# Patient Record
Sex: FEMALE | Race: WHITE | Employment: UNEMPLOYED | URBAN - METROPOLITAN AREA
[De-identification: names, ages, dates, MRNs, and addresses within clinical notes are randomized per-mention and may not be internally consistent; named-entity substitution may affect disease eponyms.]

---

## 2018-04-18 ENCOUNTER — HOSPITAL ENCOUNTER (EMERGENCY)
Age: 48
Discharge: HOME | End: 2018-04-18

## 2018-04-18 ENCOUNTER — APPOINTMENT (OUTPATIENT)
Dept: GENERAL RADIOLOGY | Age: 48
End: 2018-04-18

## 2018-04-18 VITALS
HEART RATE: 69 BPM | BODY MASS INDEX: 27.37 KG/M2 | SYSTOLIC BLOOD PRESSURE: 190 MMHG | TEMPERATURE: 98.6 F | OXYGEN SATURATION: 99 % | RESPIRATION RATE: 18 BRPM | WEIGHT: 180 LBS | DIASTOLIC BLOOD PRESSURE: 93 MMHG

## 2018-04-18 DIAGNOSIS — I10 HYPERTENSION, UNSPECIFIED TYPE: ICD-10-CM

## 2018-04-18 DIAGNOSIS — I10 ESSENTIAL HYPERTENSION: ICD-10-CM

## 2018-04-18 DIAGNOSIS — K59.00 CONSTIPATION, UNSPECIFIED CONSTIPATION TYPE: ICD-10-CM

## 2018-04-18 DIAGNOSIS — J40 BRONCHITIS: Primary | ICD-10-CM

## 2018-04-18 PROCEDURE — 6370000000 HC RX 637 (ALT 250 FOR IP): Performed by: NURSE PRACTITIONER

## 2018-04-18 PROCEDURE — 71046 X-RAY EXAM CHEST 2 VIEWS: CPT

## 2018-04-18 PROCEDURE — 99212 OFFICE O/P EST SF 10 MIN: CPT

## 2018-04-18 RX ORDER — LISINOPRIL AND HYDROCHLOROTHIAZIDE 12.5; 1 MG/1; MG/1
2 TABLET ORAL DAILY
Qty: 60 TABLET | Refills: 0 | Status: SHIPPED | OUTPATIENT
Start: 2018-04-18 | End: 2018-06-28

## 2018-04-18 RX ORDER — CLONIDINE HYDROCHLORIDE 0.1 MG/1
0.1 TABLET ORAL ONCE
Status: COMPLETED | OUTPATIENT
Start: 2018-04-18 | End: 2018-04-18

## 2018-04-18 RX ORDER — ALBUTEROL SULFATE 90 UG/1
2 AEROSOL, METERED RESPIRATORY (INHALATION) EVERY 6 HOURS PRN
Qty: 1 INHALER | Refills: 3 | Status: SHIPPED | OUTPATIENT
Start: 2018-04-18 | End: 2018-12-13

## 2018-04-18 RX ORDER — AZITHROMYCIN 250 MG/1
TABLET, FILM COATED ORAL
Qty: 6 TABLET | Refills: 0 | Status: SHIPPED | OUTPATIENT
Start: 2018-04-18 | End: 2018-04-28

## 2018-04-18 RX ADMIN — CLONIDINE HYDROCHLORIDE 0.1 MG: 0.1 TABLET ORAL at 19:18

## 2018-06-27 DIAGNOSIS — M79.671 RIGHT FOOT PAIN: Primary | ICD-10-CM

## 2018-06-28 ENCOUNTER — HOSPITAL ENCOUNTER (OUTPATIENT)
Age: 48
Discharge: HOME | End: 2018-06-30

## 2018-06-28 ENCOUNTER — OFFICE VISIT (OUTPATIENT)
Dept: ORTHOPEDIC SURGERY | Age: 48
End: 2018-06-28

## 2018-06-28 VITALS — WEIGHT: 180 LBS | HEIGHT: 68 IN | BODY MASS INDEX: 27.28 KG/M2

## 2018-06-28 DIAGNOSIS — E11.610 CHARCOT FOOT DUE TO DIABETES MELLITUS (HCC): ICD-10-CM

## 2018-06-28 DIAGNOSIS — L08.9 RIGHT FOOT INFECTION: Primary | ICD-10-CM

## 2018-06-28 DIAGNOSIS — S92.314A CLOSED NONDISPLACED FRACTURE OF FIRST METATARSAL BONE OF RIGHT FOOT, INITIAL ENCOUNTER: ICD-10-CM

## 2018-06-28 DIAGNOSIS — L08.9 RIGHT FOOT INFECTION: ICD-10-CM

## 2018-06-28 LAB
BASOPHILS ABSOLUTE: 0.13 E9/L (ref 0–0.2)
BASOPHILS RELATIVE PERCENT: 1.1 % (ref 0–2)
EOSINOPHILS ABSOLUTE: 0.62 E9/L (ref 0.05–0.5)
EOSINOPHILS RELATIVE PERCENT: 5 % (ref 0–6)
HCT VFR BLD CALC: 36.7 % (ref 34–48)
HEMOGLOBIN: 11.9 G/DL (ref 11.5–15.5)
IMMATURE GRANULOCYTES #: 0.04 E9/L
IMMATURE GRANULOCYTES %: 0.3 % (ref 0–5)
LYMPHOCYTES ABSOLUTE: 2.97 E9/L (ref 1.5–4)
LYMPHOCYTES RELATIVE PERCENT: 24.1 % (ref 20–42)
MCH RBC QN AUTO: 30.7 PG (ref 26–35)
MCHC RBC AUTO-ENTMCNC: 32.4 % (ref 32–34.5)
MCV RBC AUTO: 94.6 FL (ref 80–99.9)
MONOCYTES ABSOLUTE: 0.57 E9/L (ref 0.1–0.95)
MONOCYTES RELATIVE PERCENT: 4.6 % (ref 2–12)
NEUTROPHILS ABSOLUTE: 7.99 E9/L (ref 1.8–7.3)
NEUTROPHILS RELATIVE PERCENT: 64.9 % (ref 43–80)
PDW BLD-RTO: 14.7 FL (ref 11.5–15)
PLATELET # BLD: 364 E9/L (ref 130–450)
PMV BLD AUTO: 11.4 FL (ref 7–12)
RBC # BLD: 3.88 E12/L (ref 3.5–5.5)
WBC # BLD: 12.3 E9/L (ref 4.5–11.5)

## 2018-06-28 PROCEDURE — 85025 COMPLETE CBC W/AUTO DIFF WBC: CPT

## 2018-06-28 PROCEDURE — 85651 RBC SED RATE NONAUTOMATED: CPT

## 2018-06-28 PROCEDURE — 99204 OFFICE O/P NEW MOD 45 MIN: CPT | Performed by: ORTHOPAEDIC SURGERY

## 2018-06-28 PROCEDURE — 86140 C-REACTIVE PROTEIN: CPT

## 2018-06-28 RX ORDER — LISINOPRIL 20 MG/1
TABLET ORAL
COMMUNITY
Start: 2018-06-13 | End: 2018-12-13

## 2018-06-28 RX ORDER — LEVOTHYROXINE SODIUM 0.03 MG/1
TABLET ORAL
Status: ON HOLD | COMMUNITY
Start: 2018-05-24 | End: 2020-10-03 | Stop reason: HOSPADM

## 2018-06-28 RX ORDER — NAPROXEN 500 MG/1
500 TABLET ORAL 2 TIMES DAILY
Qty: 14 TABLET | Refills: 0 | Status: ON HOLD
Start: 2018-06-28 | End: 2020-09-15 | Stop reason: HOSPADM

## 2018-06-28 NOTE — PROGRESS NOTES
cavus claw deformity noted. ROM R: Limited Secondary to pain; Left ankle : DF20; PF 40;  INV 30, MANNIE 10. This exam was compared bilaterally. Right Ankle:   (-) Anterior Drawer ,  (-) Posterior Drawer ,  (-) Squeeze test,  (-) External Rotation, (-) Eversion test , (-) Jennings Test     Left ankle:   (-) Anterior Drawer ,(-)  Posterior Drawer ,(-) Squeeze test,(-) External Rotation (-) Eversion test, (-) Jennings Test.      Foot exam- visual inspection reveals warm, good capillary refill, there is pain to palpation over the metatarsals especially at the base of the 1st.   ROM inversion/eversion full range of motion, abduction/adduction full range of motion, ROM in MTP/PIP/DIP limited range of motion. Xrays:    Xr Foot Right (min 3 Views)    Result Date: 6/28/2018  Reading location: 200 INDICATION: Pain FINDINGS: 3 views right foot compared with 4/8/2017. Intact alignment. Images raise possibility of cortical resorption at the level the first tarsometatarsal joint which was not present previous. No fracture. Abnormal appearance the first tarsometatarsal joint raising possibility of underlying infection. Consider MRI or CT for further assessment. Radiographic findings reviewed with patient      Impression:  Zeyad Astudillo was seen today for foot pain. Diagnoses and all orders for this visit:    Right foot infection  -     CBC Auto Differential; Future  -     MRI FOOT RIGHT WO CONTRAST; Future  -     CT FOOT RIGHT WO CONTRAST; Future  -     C-Reactive Protein; Future  -     Sedimentation Rate; Future  -     naproxen (NAPROSYN) 500 MG tablet; Take 1 tablet by mouth 2 times daily for 7 days    Closed nondisplaced fracture of first metatarsal bone of right foot, initial encounter  -     MRI FOOT RIGHT WO CONTRAST; Future  -     CT FOOT RIGHT WO CONTRAST; Future  -     naproxen (NAPROSYN) 500 MG tablet;  Take 1 tablet by mouth 2 times daily for 7 days    Charcot foot due to diabetes mellitus ([facility] [address]  -

## 2018-06-29 ENCOUNTER — TELEPHONE (OUTPATIENT)
Dept: ORTHOPEDIC SURGERY | Age: 48
End: 2018-06-29

## 2018-06-29 LAB
C-REACTIVE PROTEIN: 1.5 MG/DL (ref 0–0.4)
SEDIMENTATION RATE, ERYTHROCYTE: 45 MM/HR (ref 0–20)

## 2018-06-29 NOTE — TELEPHONE ENCOUNTER
Patient was in office yesterday and states she was told to notify you when her CT & MRI were scheduled. She is coming to [facility] today at 4pm to complete both tests.

## 2018-07-05 ENCOUNTER — OFFICE VISIT (OUTPATIENT)
Dept: ORTHOPEDIC SURGERY | Age: 48
End: 2018-07-05

## 2018-07-05 VITALS — WEIGHT: 180 LBS | BODY MASS INDEX: 27.28 KG/M2 | HEIGHT: 68 IN

## 2018-07-05 DIAGNOSIS — M14.671 CHARCOT'S JOINT OF RIGHT FOOT: Primary | ICD-10-CM

## 2018-07-05 PROCEDURE — 99214 OFFICE O/P EST MOD 30 MIN: CPT | Performed by: ORTHOPAEDIC SURGERY

## 2018-07-05 NOTE — PROGRESS NOTES
Chief Complaint   Patient presents with    Foot Pain     Right foot CT and MRI follow up. Tejal Lambert is a 41 y.o. female who presents today with a Right foot initial injury was March 2017. Patient states she kicked a wall. No new injury. Patient works as a . States her foot swells up everyday. Patient has tried a bone stimulator in June 2017. She returns today for results of MRI and CT scan.      Past Medical History:   Diagnosis Date    Colitis     Constipation     Diabetes mellitus ([address]     Hyperlipidemia     Hypertension     Hypothyroidism     Thyroid disease      Past Surgical History:   Procedure Laterality Date    APPENDECTOMY      CHOLECYSTECTOMY      COLONOSCOPY      HERNIA REPAIR  12/1/15    laparoscopic incisional reapair with mesh    HYSTERECTOMY      KNEE SURGERY Right     VENTRAL HERNIA REPAIR         Current Outpatient Prescriptions:     levothyroxine (SYNTHROID) 25 MCG tablet, Take by mouth, Disp: , Rfl:     lisinopril (PRINIVIL;ZESTRIL) 20 MG tablet, Take by mouth, Disp: , Rfl:     naproxen (NAPROSYN) 500 MG tablet, Take 1 tablet by mouth 2 times daily for 7 days, Disp: 14 tablet, Rfl: 0    albuterol sulfate HFA (VENTOLIN HFA) 108 (90 Base) MCG/ACT inhaler, Inhale 2 puffs into the lungs every 6 hours as needed for Wheezing, Disp: 1 Inhaler, Rfl: 3    simvastatin (ZOCOR) 10 MG tablet, Take 10 mg by mouth nightly, Disp: , Rfl:     Insulin Pen Needle ([employer] PEN NEEDLES 31G) 31G X 8 MM MISC, 1 each by Does not apply route daily, Disp: 120 each, Rfl: 3    insulin glargine (LANTUS SOLOSTAR) 100 UNIT/ML injection pen, Inject 20 Units into the skin nightly, Disp: 5 Pen, Rfl: 3    glucose blood VI test strips (ASCENSIA AUTODISC VI;ONE TOUCH ULTRA TEST VI) strip, Inject 1 each into the skin 3 times daily, Disp: 100 strip, Rfl: 5    methadone (DOLOPHINE) 10 MG/ML solution, Take 30 mg by mouth daily, Disp: , Rfl:   No Known Allergies  Social History     Social History demonstrate normal morphology and CT attenuation. 1. Erosive changes at the 1st tarsometatarsal joint with a nondisplaced subchondral fracture within the distal aspect of the medial cuneiform. Findings related to remote trauma with subsequent degenerative changes, chronic changes of septic arthritis, or an inflammatory arthropathy. 2. Well-corticated fragment at the anterior process of the calcaneus with chronic appearing erosive or degenerative changes. 3. Diffuse dorsal soft tissue swelling. Mri Foot Right Wo Contrast    Result Date: 7/1/2018  Reading location:  200 Indication: Closed nondisplaced fracture 1st metatarsal, right foot infection. Comparison: Right foot radiographs from 6/28/2018; CT right foot from 6/29/2018. Technique: Multiplanar, multisequence MR imaging of the right midfoot was performed without administration of intravenous contrast. FINDINGS: Corresponding to erosive changes at the 1st tarsometatarsal joint on the concurrently performed CT, there are is mild patchy marrow edema with areas of hypointense T1 marrow signal. Redemonstrated is a nondisplaced subchondral fracture within the distal aspect of the medial cuneiform with associated hypointense T1 marrow signal. There is a small joint effusion. The anterior process of the calcaneus is not included within the field-of-view. The interosseous band of the Lisfranc ligament is grossly intact. The visualized tendons are grossly intact. The visualized muscles demonstrate normal morphology and signal. There is mild diffuse dorsal soft tissue swelling. 1. Erosive changes at the 1st tarsometatarsal joint with a nondisplaced subchondral fracture within the distal medial cuneiform with associated marrow edema. There are areas of confluent T1 marrow signal. Differential includes remote trauma with subsequent degenerative changes, chronic changes of septic arthritis, or inflammatory arthropathy.  2. Dorsal soft tissue swelling. Radiographic findings reviewed with patient      Impression:  Zeyad Astudillo was seen today for foot pain. Diagnoses and all orders for this visit:    Charcot's joint of right foot      Seen and examined. Xray and CT reviewed with patient. Plan:Natural history and expected course discussed. Questions answered. Rest, ice, compression, elevation (RICE) therapy. Educational materials distributed. OTC analgesics as needed. Referral to Dr. Aden Knott      25 minutes was spent with patient. 50% or greater was spent counseling the patient.

## 2018-12-13 ENCOUNTER — HOSPITAL ENCOUNTER (EMERGENCY)
Age: 48
Discharge: HOME | End: 2018-12-13
Attending: EMERGENCY MEDICINE

## 2018-12-13 VITALS
BODY MASS INDEX: 29.55 KG/M2 | HEIGHT: 68 IN | HEART RATE: 75 BPM | SYSTOLIC BLOOD PRESSURE: 159 MMHG | TEMPERATURE: 98.2 F | DIASTOLIC BLOOD PRESSURE: 84 MMHG | OXYGEN SATURATION: 100 % | WEIGHT: 195 LBS | RESPIRATION RATE: 16 BRPM

## 2018-12-13 DIAGNOSIS — B02.9 HERPES ZOSTER WITHOUT COMPLICATION: Primary | ICD-10-CM

## 2018-12-13 PROCEDURE — 6360000002 HC RX W HCPCS: Performed by: EMERGENCY MEDICINE

## 2018-12-13 PROCEDURE — 99283 EMERGENCY DEPT VISIT LOW MDM: CPT

## 2018-12-13 PROCEDURE — 96372 THER/PROPH/DIAG INJ SC/IM: CPT

## 2018-12-13 RX ORDER — INDOMETHACIN 50 MG/1
50 CAPSULE ORAL
Qty: 21 CAPSULE | Refills: 0 | Status: SHIPPED | OUTPATIENT
Start: 2018-12-13 | End: 2019-07-14 | Stop reason: ALTCHOICE

## 2018-12-13 RX ORDER — AMLODIPINE BESYLATE 10 MG/1
20 TABLET ORAL DAILY
Status: ON HOLD | COMMUNITY
End: 2020-10-03 | Stop reason: HOSPADM

## 2018-12-13 RX ORDER — DEXAMETHASONE SODIUM PHOSPHATE 10 MG/ML
10 INJECTION, SOLUTION INTRAMUSCULAR; INTRAVENOUS ONCE
Status: COMPLETED | OUTPATIENT
Start: 2018-12-13 | End: 2018-12-13

## 2018-12-13 RX ORDER — VALACYCLOVIR HYDROCHLORIDE 1 G/1
1000 TABLET, FILM COATED ORAL 3 TIMES DAILY
Qty: 21 TABLET | Refills: 0 | Status: SHIPPED | OUTPATIENT
Start: 2018-12-13 | End: 2018-12-20

## 2018-12-13 RX ORDER — KETOROLAC TROMETHAMINE 30 MG/ML
30 INJECTION, SOLUTION INTRAMUSCULAR; INTRAVENOUS ONCE
Status: COMPLETED | OUTPATIENT
Start: 2018-12-13 | End: 2018-12-13

## 2018-12-13 RX ORDER — LISINOPRIL AND HYDROCHLOROTHIAZIDE 12.5; 1 MG/1; MG/1
1 TABLET ORAL DAILY
Status: ON HOLD | COMMUNITY
End: 2020-10-03 | Stop reason: HOSPADM

## 2018-12-13 RX ADMIN — DEXAMETHASONE SODIUM PHOSPHATE 10 MG: 10 INJECTION, SOLUTION INTRAMUSCULAR; INTRAVENOUS at 16:33

## 2018-12-13 RX ADMIN — KETOROLAC TROMETHAMINE 30 MG: 30 INJECTION, SOLUTION INTRAMUSCULAR at 16:30

## 2018-12-13 ASSESSMENT — PAIN SCALES - GENERAL
PAINLEVEL_OUTOF10: 5
PAINLEVEL_OUTOF10: 6
PAINLEVEL_OUTOF10: 6

## 2018-12-13 ASSESSMENT — PAIN DESCRIPTION - PROGRESSION
CLINICAL_PROGRESSION: NOT CHANGED
CLINICAL_PROGRESSION: GRADUALLY IMPROVING

## 2018-12-13 ASSESSMENT — PAIN DESCRIPTION - PAIN TYPE: TYPE: ACUTE PAIN

## 2018-12-13 ASSESSMENT — PAIN DESCRIPTION - DESCRIPTORS: DESCRIPTORS: BURNING;TIGHTNESS

## 2018-12-13 ASSESSMENT — PAIN DESCRIPTION - LOCATION: LOCATION: NECK;SHOULDER

## 2018-12-13 ASSESSMENT — PAIN DESCRIPTION - ORIENTATION: ORIENTATION: RIGHT

## 2018-12-13 ASSESSMENT — PAIN DESCRIPTION - FREQUENCY: FREQUENCY: CONTINUOUS

## 2018-12-13 NOTE — ED PROVIDER NOTES
mouth 2 times daily for 7 days    SIMVASTATIN (ZOCOR) 10 MG TABLET    Take 10 mg by mouth nightly   Modified Medications    No medications on file   Discontinued Medications    ALBUTEROL SULFATE HFA (VENTOLIN HFA) 108 (90 BASE) MCG/ACT INHALER    Inhale 2 puffs into the lungs every 6 hours as needed for Wheezing    LISINOPRIL (PRINIVIL;ZESTRIL) 20 MG TABLET    Take by mouth    METHADONE (DOLOPHINE) 10 MG/ML SOLUTION    Take 30 mg by mouth daily         Counseling: The emergency provider has spoken with the patient and discussed todays results, in addition to providing specific details for the plan of care and counseling regarding the diagnosis and prognosis. Questions are answered at this time and they are agreeable with the plan.      --------------------------------- IMPRESSION AND DISPOSITION ---------------------------------    IMPRESSION  1.  Herpes zoster without complication        DISPOSITION  Disposition: Discharge to home  Patient condition is good                 Yajaira Louie MD  12/13/18 4372

## 2018-12-15 ENCOUNTER — HOSPITAL ENCOUNTER (EMERGENCY)
Age: 48
Discharge: HOME | End: 2018-12-15
Attending: EMERGENCY MEDICINE

## 2018-12-15 ENCOUNTER — APPOINTMENT (OUTPATIENT)
Dept: GENERAL RADIOLOGY | Age: 48
End: 2018-12-15

## 2018-12-15 VITALS
HEIGHT: 68 IN | HEART RATE: 81 BPM | OXYGEN SATURATION: 98 % | WEIGHT: 195 LBS | TEMPERATURE: 98.3 F | SYSTOLIC BLOOD PRESSURE: 143 MMHG | DIASTOLIC BLOOD PRESSURE: 86 MMHG | RESPIRATION RATE: 16 BRPM | BODY MASS INDEX: 29.55 KG/M2

## 2018-12-15 DIAGNOSIS — S43.401A SPRAIN OF RIGHT SHOULDER, UNSPECIFIED SHOULDER SPRAIN TYPE, INITIAL ENCOUNTER: Primary | ICD-10-CM

## 2018-12-15 PROCEDURE — 73030 X-RAY EXAM OF SHOULDER: CPT

## 2018-12-15 PROCEDURE — 99283 EMERGENCY DEPT VISIT LOW MDM: CPT

## 2018-12-15 RX ORDER — HYDROCODONE BITARTRATE AND ACETAMINOPHEN 5; 325 MG/1; MG/1
1 TABLET ORAL EVERY 8 HOURS PRN
Qty: 10 TABLET | Refills: 0 | Status: SHIPPED | OUTPATIENT
Start: 2018-12-15 | End: 2018-12-18

## 2018-12-15 RX ORDER — CYCLOBENZAPRINE HCL 10 MG
10 TABLET ORAL 3 TIMES DAILY PRN
Qty: 15 TABLET | Refills: 0 | Status: SHIPPED | OUTPATIENT
Start: 2018-12-15 | End: 2018-12-20

## 2018-12-15 ASSESSMENT — PAIN DESCRIPTION - DESCRIPTORS: DESCRIPTORS: ACHING;SHARP

## 2018-12-15 ASSESSMENT — PAIN DESCRIPTION - FREQUENCY: FREQUENCY: INTERMITTENT

## 2018-12-15 ASSESSMENT — PAIN SCALES - GENERAL: PAINLEVEL_OUTOF10: 5

## 2018-12-15 ASSESSMENT — PAIN DESCRIPTION - PAIN TYPE: TYPE: ACUTE PAIN

## 2018-12-15 ASSESSMENT — PAIN DESCRIPTION - ORIENTATION: ORIENTATION: RIGHT

## 2018-12-15 ASSESSMENT — PAIN DESCRIPTION - LOCATION: LOCATION: SHOULDER;NECK

## 2018-12-15 NOTE — ED PROVIDER NOTES
AUTODISC VI;ONE TOUCH ULTRA TEST VI) STRIP    Inject 1 each into the skin 3 times daily    INDOMETHACIN (INDOCIN) 50 MG CAPSULE    Take 1 capsule by mouth 3 times daily (with meals)    INSULIN GLARGINE (LANTUS SOLOSTAR) 100 UNIT/ML INJECTION PEN    Inject 20 Units into the skin nightly    INSULIN PEN NEEDLE ([employer] PEN NEEDLES 31G) 31G X 8 MM MISC    1 each by Does not apply route daily    LEVOTHYROXINE (SYNTHROID) 25 MCG TABLET    Take by mouth    LISINOPRIL-HYDROCHLOROTHIAZIDE (PRINZIDE;ZESTORETIC) 10-12.5 MG PER TABLET    Take 1 tablet by mouth daily    NAPROXEN (NAPROSYN) 500 MG TABLET    Take 1 tablet by mouth 2 times daily for 7 days    SIMVASTATIN (ZOCOR) 10 MG TABLET    Take 10 mg by mouth nightly    VALACYCLOVIR (VALTREX) 1 G TABLET    Take 1 tablet by mouth 3 times daily for 7 days   Modified Medications    No medications on file   Discontinued Medications    No medications on file         Counseling: The emergency provider has spoken with the patient and discussed todays results, in addition to providing specific details for the plan of care and counseling regarding the diagnosis and prognosis. Questions are answered at this time and they are agreeable with the plan.      --------------------------------- IMPRESSION AND DISPOSITION ---------------------------------    IMPRESSION  1.  Sprain of right shoulder, unspecified shoulder sprain type, initial encounter        DISPOSITION  Disposition: Discharge to home  Patient condition is good                  Mitch Grewal MD  12/15/18 2155

## 2018-12-19 ENCOUNTER — HOSPITAL ENCOUNTER (OUTPATIENT)
Dept: GENERAL RADIOLOGY | Age: 48
Discharge: HOME | End: 2018-12-21

## 2018-12-19 ENCOUNTER — HOSPITAL ENCOUNTER (OUTPATIENT)
Age: 48
Discharge: HOME | End: 2018-12-21

## 2018-12-19 DIAGNOSIS — M54.2 CERVICALGIA: ICD-10-CM

## 2018-12-19 PROCEDURE — 72040 X-RAY EXAM NECK SPINE 2-3 VW: CPT

## 2018-12-19 PROCEDURE — 70360 X-RAY EXAM OF NECK: CPT

## 2018-12-21 ENCOUNTER — HOSPITAL ENCOUNTER (OUTPATIENT)
Age: 48
Discharge: HOME | End: 2018-12-23

## 2018-12-21 ENCOUNTER — HOSPITAL ENCOUNTER (OUTPATIENT)
Dept: GENERAL RADIOLOGY | Age: 48
Discharge: HOME | End: 2018-12-23

## 2018-12-21 DIAGNOSIS — S43.101A SEPARATION OF RIGHT ACROMIOCLAVICULAR JOINT, INITIAL ENCOUNTER: ICD-10-CM

## 2018-12-21 PROCEDURE — 73050 X-RAY EXAM OF SHOULDERS: CPT

## 2018-12-22 ENCOUNTER — HOSPITAL ENCOUNTER (EMERGENCY)
Age: 48
Discharge: OTHER INSTITUTION | End: 2018-12-22

## 2018-12-22 VITALS
WEIGHT: 195 LBS | TEMPERATURE: 98.1 F | HEART RATE: 80 BPM | OXYGEN SATURATION: 97 % | SYSTOLIC BLOOD PRESSURE: 135 MMHG | DIASTOLIC BLOOD PRESSURE: 72 MMHG | RESPIRATION RATE: 16 BRPM | BODY MASS INDEX: 29.65 KG/M2

## 2018-12-22 DIAGNOSIS — M79.89 LEG SWELLING: Primary | ICD-10-CM

## 2018-12-22 PROCEDURE — 99212 OFFICE O/P EST SF 10 MIN: CPT

## 2019-01-04 ENCOUNTER — OFFICE VISIT (OUTPATIENT)
Dept: ORTHOPEDIC SURGERY | Age: 49
End: 2019-01-04

## 2019-01-04 VITALS — BODY MASS INDEX: 29.55 KG/M2 | HEIGHT: 68 IN | WEIGHT: 195 LBS

## 2019-01-04 DIAGNOSIS — F40.240 CLAUSTROPHOBIA: ICD-10-CM

## 2019-01-04 DIAGNOSIS — Z77.22 TOBACCO SMOKE EXPOSURE: ICD-10-CM

## 2019-01-04 DIAGNOSIS — M54.12 CERVICAL RADICULOPATHY: Primary | ICD-10-CM

## 2019-01-04 DIAGNOSIS — G56.01 CARPAL TUNNEL SYNDROME OF RIGHT WRIST: ICD-10-CM

## 2019-01-04 PROCEDURE — 99214 OFFICE O/P EST MOD 30 MIN: CPT | Performed by: ORTHOPAEDIC SURGERY

## 2019-01-04 PROCEDURE — 99406 BEHAV CHNG SMOKING 3-10 MIN: CPT | Performed by: ORTHOPAEDIC SURGERY

## 2019-01-04 PROCEDURE — 97760 ORTHOTIC MGMT&TRAING 1ST ENC: CPT | Performed by: ORTHOPAEDIC SURGERY

## 2019-01-04 RX ORDER — DIAZEPAM 5 MG/1
5 TABLET ORAL PRN
Qty: 1 TABLET | Refills: 0 | Status: SHIPPED | OUTPATIENT
Start: 2019-01-04 | End: 2019-01-11

## 2019-01-07 ENCOUNTER — HOSPITAL ENCOUNTER (OUTPATIENT)
Dept: MRI IMAGING | Age: 49
Discharge: HOME | End: 2019-01-09

## 2019-01-07 DIAGNOSIS — M54.12 RADICULOPATHY, CERVICAL: ICD-10-CM

## 2019-01-07 PROCEDURE — 72141 MRI NECK SPINE W/O DYE: CPT

## 2019-01-07 NOTE — ED PROVIDER NOTES
HPI:  1/6/19, Time: 7:07 PM         Raphael Longo is a 57 y.o. female presenting to the ED for swelling to her left leg. She said she woke up like that this morning. She said that there  was no injury. She's denying any pain in the leg. She's denying any numbness or tingling in the leg. States that the right leg is not bothering her. She denies any chest pain or shortness of breath. Does not of any fever. Review of Systems:   Pertinent positives and negatives are stated within HPI, all other systems reviewed and are negative.          --------------------------------------------- PAST HISTORY ---------------------------------------------  Past Medical History:  has a past medical history of Colitis; Constipation; Diabetes mellitus ([address] Hyperlipidemia; Hypertension; Hypothyroidism; and Thyroid disease. Past Surgical History:  has a past surgical history that includes Cholecystectomy; Appendectomy; knee surgery (Right); ventral hernia repair; Hysterectomy; hernia repair (12/1/15); and Colonoscopy. Social History:  reports that she has been smoking Cigarettes. She has a 37.50 pack-year smoking history. She has never used smokeless tobacco. She reports that she uses drugs. She reports that she does not drink alcohol. Family History: family history includes Mult Sclerosis in her maternal grandmother and mother. She was adopted. The patients home medications have been reviewed. Allergies: Patient has no known allergies. -------------------------------------------------- RESULTS -------------------------------------------------  All laboratory and radiology results have been personally reviewed by myself   LABS:  No results found for this visit on 12/22/18.     RADIOLOGY:  Interpreted by Radiologist.  US DUP LOWER EXTREMITY LEFT POP    (Results Pending)       ------------------------- NURSING NOTES AND VITALS REVIEWED ---------------------------   The nursing notes within the ED encounter and vital signs as below have been reviewed. /72   Pulse 80   Temp 98.1 °F (36.7 °C) (Oral)   Resp 16   Wt 195 lb (88.5 kg)   SpO2 97%   BMI 29.65 kg/m²   Oxygen Saturation Interpretation: Normal      ---------------------------------------------------PHYSICAL EXAM--------------------------------------      Constitutional/General: Alert and oriented x3, well appearing, non toxic in NAD  Head: Normocephalic and atraumatic  Eyes: clear  Mouth: Oropharynx clear, handling secretions, no trismus  Neck: Supple, full ROM,   Pulmonary: Lungs clear to auscultation bilaterally, no wheezes, rales, or rhonchi. Not in respiratory distress  Cardiovascular:  Regular rate and rhythm, no murmurs, gallops, or rubs. 2+ distal pulses  Abdomen: Soft, non tender, non distended,   Extremities: Moves all extremities x 4. Warm and well perfused, F leg just has trace edema around the ankle. She has a good strong pedal pulse normal color warmth and sensation present to her foot with good capillary refill. Skin: warm and dry without rash  Neurologic: GCS 15,  Psych: Normal Affect      ------------------------------ ED COURSE/MEDICAL DECISION MAKING----------------------  Medications - No data to display      ED COURSE:       Medical Decision Making:    Patient has swelling to her left lower extremity she is concerned that she could have a DVT. I didn't did send her for a venous duplex scan of the left lower extremity to rule out DVT. patient never went for her tests. --She eloped without completing treatment    Counseling: The emergency provider has spoken with the patient and discussed todays results, in addition to providing specific details for the plan of care and counseling regarding the diagnosis and prognosis. Questions are answered at this time and they are agreeable with the plan.      --------------------------------- IMPRESSION AND DISPOSITION ---------------------------------    IMPRESSION  1.  Leg swelling        DISPOSITION  Disposition: Other Disposition: Eloped      NOTE: This report was transcribed using voice recognition software.  Every effort was made to ensure accuracy; however, inadvertent computerized transcription errors may be present     WALLACE Mckeon CNP  01/06/19 1812

## 2019-01-22 ENCOUNTER — INITIAL CONSULT (OUTPATIENT)
Dept: NEUROSURGERY | Age: 49
End: 2019-01-22

## 2019-01-22 VITALS
SYSTOLIC BLOOD PRESSURE: 196 MMHG | HEIGHT: 68 IN | WEIGHT: 196 LBS | BODY MASS INDEX: 29.7 KG/M2 | DIASTOLIC BLOOD PRESSURE: 101 MMHG | HEART RATE: 89 BPM

## 2019-01-22 DIAGNOSIS — M50.20 CERVICAL DISC HERNIATION: ICD-10-CM

## 2019-01-22 PROCEDURE — 99203 OFFICE O/P NEW LOW 30 MIN: CPT | Performed by: PHYSICIAN ASSISTANT

## 2019-05-10 ENCOUNTER — HOSPITAL ENCOUNTER (EMERGENCY)
Age: 49
Discharge: HOME | End: 2019-05-10

## 2019-05-10 ENCOUNTER — APPOINTMENT (OUTPATIENT)
Dept: CT IMAGING | Age: 49
End: 2019-05-10

## 2019-05-10 VITALS
WEIGHT: 185 LBS | BODY MASS INDEX: 28.04 KG/M2 | TEMPERATURE: 98.5 F | DIASTOLIC BLOOD PRESSURE: 83 MMHG | SYSTOLIC BLOOD PRESSURE: 167 MMHG | HEIGHT: 68 IN | RESPIRATION RATE: 16 BRPM | HEART RATE: 75 BPM | OXYGEN SATURATION: 96 %

## 2019-05-10 DIAGNOSIS — K59.00 CONSTIPATION, UNSPECIFIED CONSTIPATION TYPE: Primary | ICD-10-CM

## 2019-05-10 DIAGNOSIS — B99.9 INFECTIOUS AND PARASITIC DISEASE: ICD-10-CM

## 2019-05-10 LAB
ALBUMIN SERPL-MCNC: 4.2 G/DL (ref 3.5–5.2)
ALP BLD-CCNC: 120 U/L (ref 35–104)
ALT SERPL-CCNC: 17 U/L (ref 0–32)
ANION GAP SERPL CALCULATED.3IONS-SCNC: 12 MMOL/L (ref 7–16)
AST SERPL-CCNC: 28 U/L (ref 0–31)
BASOPHILS ABSOLUTE: 0.12 E9/L (ref 0–0.2)
BASOPHILS RELATIVE PERCENT: 0.8 % (ref 0–2)
BILIRUB SERPL-MCNC: 0.2 MG/DL (ref 0–1.2)
BILIRUBIN URINE: NEGATIVE
BLOOD, URINE: NEGATIVE
BUN BLDV-MCNC: 7 MG/DL (ref 6–20)
CALCIUM SERPL-MCNC: 9.6 MG/DL (ref 8.6–10.2)
CHLORIDE BLD-SCNC: 95 MMOL/L (ref 98–107)
CLARITY: CLEAR
CO2: 27 MMOL/L (ref 22–29)
COLOR: YELLOW
CREAT SERPL-MCNC: 1.1 MG/DL (ref 0.5–1)
EOSINOPHILS ABSOLUTE: 0.66 E9/L (ref 0.05–0.5)
EOSINOPHILS RELATIVE PERCENT: 4.5 % (ref 0–6)
GFR AFRICAN AMERICAN: >60
GFR NON-AFRICAN AMERICAN: 53 ML/MIN/1.73
GLUCOSE BLD-MCNC: 152 MG/DL (ref 74–99)
GLUCOSE URINE: NEGATIVE MG/DL
HCT VFR BLD CALC: 38.2 % (ref 34–48)
HEMOGLOBIN: 12.5 G/DL (ref 11.5–15.5)
IMMATURE GRANULOCYTES #: 0.17 E9/L
IMMATURE GRANULOCYTES %: 1.2 % (ref 0–5)
KETONES, URINE: NEGATIVE MG/DL
LACTIC ACID: 1.6 MMOL/L (ref 0.5–2.2)
LEUKOCYTE ESTERASE, URINE: NEGATIVE
LIPASE: 17 U/L (ref 13–60)
LYMPHOCYTES ABSOLUTE: 2.7 E9/L (ref 1.5–4)
LYMPHOCYTES RELATIVE PERCENT: 18.6 % (ref 20–42)
MCH RBC QN AUTO: 29.4 PG (ref 26–35)
MCHC RBC AUTO-ENTMCNC: 32.7 % (ref 32–34.5)
MCV RBC AUTO: 89.9 FL (ref 80–99.9)
MONOCYTES ABSOLUTE: 0.73 E9/L (ref 0.1–0.95)
MONOCYTES RELATIVE PERCENT: 5 % (ref 2–12)
NEUTROPHILS ABSOLUTE: 10.17 E9/L (ref 1.8–7.3)
NEUTROPHILS RELATIVE PERCENT: 69.9 % (ref 43–80)
NITRITE, URINE: NEGATIVE
PDW BLD-RTO: 14.3 FL (ref 11.5–15)
PH UA: 6 (ref 5–9)
PLATELET # BLD: 439 E9/L (ref 130–450)
PMV BLD AUTO: 10.3 FL (ref 7–12)
POTASSIUM SERPL-SCNC: 3.5 MMOL/L (ref 3.5–5)
PROTEIN UA: NEGATIVE MG/DL
RBC # BLD: 4.25 E12/L (ref 3.5–5.5)
SODIUM BLD-SCNC: 134 MMOL/L (ref 132–146)
SPECIFIC GRAVITY UA: <=1.005 (ref 1–1.03)
TOTAL PROTEIN: 8.4 G/DL (ref 6.4–8.3)
UROBILINOGEN, URINE: 0.2 E.U./DL
WBC # BLD: 14.6 E9/L (ref 4.5–11.5)

## 2019-05-10 PROCEDURE — 81003 URINALYSIS AUTO W/O SCOPE: CPT

## 2019-05-10 PROCEDURE — 96374 THER/PROPH/DIAG INJ IV PUSH: CPT

## 2019-05-10 PROCEDURE — 96376 TX/PRO/DX INJ SAME DRUG ADON: CPT

## 2019-05-10 PROCEDURE — 96375 TX/PRO/DX INJ NEW DRUG ADDON: CPT

## 2019-05-10 PROCEDURE — 6360000002 HC RX W HCPCS

## 2019-05-10 PROCEDURE — 74177 CT ABD & PELVIS W/CONTRAST: CPT

## 2019-05-10 PROCEDURE — 80053 COMPREHEN METABOLIC PANEL: CPT

## 2019-05-10 PROCEDURE — 6360000002 HC RX W HCPCS: Performed by: PHYSICIAN ASSISTANT

## 2019-05-10 PROCEDURE — 99284 EMERGENCY DEPT VISIT MOD MDM: CPT

## 2019-05-10 PROCEDURE — 83605 ASSAY OF LACTIC ACID: CPT

## 2019-05-10 PROCEDURE — 85025 COMPLETE CBC W/AUTO DIFF WBC: CPT

## 2019-05-10 PROCEDURE — 36415 COLL VENOUS BLD VENIPUNCTURE: CPT

## 2019-05-10 PROCEDURE — 87329 GIARDIA AG IA: CPT

## 2019-05-10 PROCEDURE — 83690 ASSAY OF LIPASE: CPT

## 2019-05-10 PROCEDURE — 6360000004 HC RX CONTRAST MEDICATION: Performed by: RADIOLOGY

## 2019-05-10 RX ORDER — POLYETHYLENE GLYCOL 3350 17 G/17G
17 POWDER, FOR SOLUTION ORAL DAILY
Qty: 1530 G | Refills: 1 | Status: SHIPPED | OUTPATIENT
Start: 2019-05-10 | End: 2019-06-09

## 2019-05-10 RX ORDER — ONDANSETRON 2 MG/ML
4 INJECTION INTRAMUSCULAR; INTRAVENOUS ONCE
Status: COMPLETED | OUTPATIENT
Start: 2019-05-10 | End: 2019-05-10

## 2019-05-10 RX ORDER — HYDRALAZINE HYDROCHLORIDE 20 MG/ML
INJECTION INTRAMUSCULAR; INTRAVENOUS
Status: COMPLETED
Start: 2019-05-10 | End: 2019-05-10

## 2019-05-10 RX ORDER — HYDRALAZINE HYDROCHLORIDE 20 MG/ML
10 INJECTION INTRAMUSCULAR; INTRAVENOUS ONCE
Status: COMPLETED | OUTPATIENT
Start: 2019-05-10 | End: 2019-05-10

## 2019-05-10 RX ORDER — KETOROLAC TROMETHAMINE 30 MG/ML
30 INJECTION, SOLUTION INTRAMUSCULAR; INTRAVENOUS ONCE
Status: COMPLETED | OUTPATIENT
Start: 2019-05-10 | End: 2019-05-10

## 2019-05-10 RX ADMIN — HYDRALAZINE HYDROCHLORIDE 10 MG: 20 INJECTION INTRAMUSCULAR; INTRAVENOUS at 21:40

## 2019-05-10 RX ADMIN — ONDANSETRON 4 MG: 2 INJECTION INTRAMUSCULAR; INTRAVENOUS at 21:41

## 2019-05-10 RX ADMIN — ONDANSETRON 4 MG: 2 INJECTION INTRAMUSCULAR; INTRAVENOUS at 20:50

## 2019-05-10 RX ADMIN — HYDRALAZINE HYDROCHLORIDE 10 MG: 20 INJECTION, SOLUTION INTRAMUSCULAR; INTRAVENOUS at 21:40

## 2019-05-10 RX ADMIN — IOHEXOL 50 ML: 240 INJECTION, SOLUTION INTRATHECAL; INTRAVASCULAR; INTRAVENOUS; ORAL at 22:19

## 2019-05-10 RX ADMIN — KETOROLAC TROMETHAMINE 30 MG: 30 INJECTION, SOLUTION INTRAMUSCULAR at 20:50

## 2019-05-10 RX ADMIN — IOPAMIDOL 80 ML: 755 INJECTION, SOLUTION INTRAVENOUS at 22:16

## 2019-05-10 ASSESSMENT — PAIN DESCRIPTION - PAIN TYPE: TYPE: ACUTE PAIN

## 2019-05-10 ASSESSMENT — PAIN SCALES - GENERAL: PAINLEVEL_OUTOF10: 3

## 2019-05-10 ASSESSMENT — PAIN DESCRIPTION - DESCRIPTORS: DESCRIPTORS: ACHING

## 2019-05-10 ASSESSMENT — PAIN DESCRIPTION - LOCATION: LOCATION: ABDOMEN

## 2019-05-11 NOTE — ED PROVIDER NOTES
[facility]  HPI:  5/10/19, Time: 8:12 PM         Jaclyn Rubi is a 57 y.o. female presenting to the ED for  Abdominal pain , beginning 1 week  ago. The complaint has been persistent, moderate in severity, and worsened by nothing. Patient comes in with complaint of abdominal pain and distention. She states she has not had a bowel movement for one week. Today she took an enema and had a small bowel movement and felt that there was possible warm within the stool. She denies any fever or chills. She states she's had nausea but no vomiting denies any urinary frequency urgency or burning. She states she had a a hamburger approximately 3 weeks ago that was not fully cooked. Review of Systems:   Pertinent positives and negatives are stated within HPI, all other systems reviewed and are negative.          --------------------------------------------- PAST HISTORY ---------------------------------------------  Past Medical History:  has a past medical history of Colitis, Constipation, Diabetes mellitus ([address] Hyperlipidemia, Hypertension, Hypothyroidism, and Thyroid disease. Past Surgical History:  has a past surgical history that includes Cholecystectomy; Appendectomy; knee surgery (Right); ventral hernia repair; Hysterectomy; hernia repair (12/1/15); Colonoscopy; and  section. Social History:  reports that she has been smoking cigarettes. She has a 37.50 pack-year smoking history. She has never used smokeless tobacco. She reports that she has current or past drug history. She reports that she does not drink alcohol. Family History: family history includes Mult Sclerosis in her maternal grandmother and mother. She was adopted. The patients home medications have been reviewed. Allergies: Patient has no known allergies.     -------------------------------------------------- RESULTS -------------------------------------------------  All laboratory and radiology results have been personally reviewed by myself   LABS:  Results for orders placed or performed during the hospital encounter of 05/10/19   CBC Auto Differential   Result Value Ref Range    WBC 14.6 (H) 4.5 - 11.5 E9/L    RBC 4.25 3.50 - 5.50 E12/L    Hemoglobin 12.5 11.5 - 15.5 g/dL    Hematocrit 38.2 34.0 - 48.0 %    MCV 89.9 80.0 - 99.9 fL    MCH 29.4 26.0 - 35.0 pg    MCHC 32.7 32.0 - 34.5 %    RDW 14.3 11.5 - 15.0 fL    Platelets 077 742 - 469 E9/L    MPV 10.3 7.0 - 12.0 fL    Neutrophils % 69.9 43.0 - 80.0 %    Immature Granulocytes % 1.2 0.0 - 5.0 %    Lymphocytes % 18.6 (L) 20.0 - 42.0 %    Monocytes % 5.0 2.0 - 12.0 %    Eosinophils % 4.5 0.0 - 6.0 %    Basophils % 0.8 0.0 - 2.0 %    Neutrophils # 10.17 (H) 1.80 - 7.30 E9/L    Immature Granulocytes # 0.17 E9/L    Lymphocytes # 2.70 1.50 - 4.00 E9/L    Monocytes # 0.73 0.10 - 0.95 E9/L    Eosinophils # 0.66 (H) 0.05 - 0.50 E9/L    Basophils # 0.12 0.00 - 0.20 E9/L   Comprehensive Metabolic Panel   Result Value Ref Range    Sodium 134 132 - 146 mmol/L    Potassium 3.5 3.5 - 5.0 mmol/L    Chloride 95 (L) 98 - 107 mmol/L    CO2 27 22 - 29 mmol/L    Anion Gap 12 7 - 16 mmol/L    Glucose 152 (H) 74 - 99 mg/dL    BUN 7 6 - 20 mg/dL    CREATININE 1.1 (H) 0.5 - 1.0 mg/dL    GFR Non-African American 53 >=60 mL/min/1.73    GFR African American >60     Calcium 9.6 8.6 - 10.2 mg/dL    Total Protein 8.4 (H) 6.4 - 8.3 g/dL    Alb 4.2 3.5 - 5.2 g/dL    Total Bilirubin 0.2 0.0 - 1.2 mg/dL    Alkaline Phosphatase 120 (H) 35 - 104 U/L    ALT 17 0 - 32 U/L    AST 28 0 - 31 U/L   Lactic Acid, Plasma   Result Value Ref Range    Lactic Acid 1.6 0.5 - 2.2 mmol/L   Lipase   Result Value Ref Range    Lipase 17 13 - 60 U/L   Urinalysis   Result Value Ref Range    Color, UA Yellow Straw/Yellow    Clarity, UA Clear Clear    Glucose, Ur Negative Negative mg/dL    Bilirubin Urine Negative Negative    Ketones, Urine Negative Negative mg/dL    Specific Gravity, UA <=1.005 1.005 - 1.030    Blood, Urine Negative Negative    pH, UA 6.0 5.0 - 9.0    Protein, UA Negative Negative mg/dL    Urobilinogen, Urine 0.2 <2.0 E.U./dL    Nitrite, Urine Negative Negative    Leukocyte Esterase, Urine Negative Negative       RADIOLOGY:  Interpreted by Radiologist.  CT ABDOMEN PELVIS W IV CONTRAST Additional Contrast? Oral   Final Result   1. Negative CT scan of the abdomen and pelvis for etiology of   abdominal pain. 2. Left ventricular concentric hypertrophy. 3. Atherosclerosis, possible hemodynamically significant stenosis of   the common iliac arteries. ------------------------- NURSING NOTES AND VITALS REVIEWED ---------------------------   The nursing notes within the ED encounter and vital signs as below have been reviewed. BP (!) 167/83   Pulse 75   Temp 98.5 °F (36.9 °C) (Oral)   Resp 16   Ht 5' 8\" (1.727 m)   Wt 185 lb (83.9 kg)   SpO2 96%   BMI 28.13 kg/m²   Oxygen Saturation Interpretation: Normal      ---------------------------------------------------PHYSICAL EXAM--------------------------------------      Constitutional/General: Alert and oriented x3, well appearing, non toxic in NAD  Head: Normocephalic and atraumatic  Eyes: PERRL, EOMI  Mouth: Oropharynx clear, handling secretions, no trismus  Neck: Supple, full ROM,   Pulmonary: Lungs clear to auscultation bilaterally, no wheezes, rales, or rhonchi. Not in respiratory distress  Cardiovascular:  Regular rate and rhythm, no murmurs, gallops, or rubs. 2+ distal pulses  Abdomen: Soft, mild generalized tenderness of the abdomen with distention present there is no guarding or rebound. Extremities: Moves all extremities x 4.  Warm and well perfused  Skin: warm and dry without rash  Neurologic: GCS 15,  Psych: Normal Affect      ------------------------------ ED COURSE/MEDICAL DECISION MAKING----------------------  Medications   ondansetron (ZOFRAN) injection 4 mg (4 mg Intravenous Given 5/10/19 2050)   ketorolac (TORADOL) injection 30 mg (30 mg Intravenous Given 5/10/19 2050)   ondansetron (ZOFRAN) injection 4 mg (4 mg Intravenous Given 5/10/19 2141)   hydrALAZINE (APRESOLINE) injection 10 mg (10 mg Intravenous Given 5/10/19 2140)   iopamidol (ISOVUE-370) 76 % injection 80 mL (80 mLs Intravenous Given 5/10/19 2216)   iohexol (OMNIPAQUE 240) injection 50 mL (50 mLs Oral Given 5/10/19 2219)         ED COURSE:     2135 Nausea persist pain improved   2230 no complaints at this time  2310 awaiting CT results patient with no complaints at this time    Medical Decision Making:    Patient came in with complaint of no bowel movement for one week and states she took a enema at home with small bowel movement. She states she felt that there was a  worm within the stool. She did bring a stool in from home and sent to lab. She was treated prophylactically with an ax. CT abdomen showed no acute findings. She is placed on MiraLAX for constipation. Counseling: The emergency provider has spoken with the patient and discussed todays results, in addition to providing specific details for the plan of care and counseling regarding the diagnosis and prognosis. Questions are answered at this time and they are agreeable with the plan.      --------------------------------- IMPRESSION AND DISPOSITION ---------------------------------    IMPRESSION  1. Constipation, unspecified constipation type    2. Possible parasitic disease        DISPOSITION  Disposition: Discharge to home  Patient condition is good      NOTE: This report was transcribed using voice recognition software.  Every effort was made to ensure accuracy; however, inadvertent computerized transcription errors may be present     Ruiz Reedma  05/10/19 4399

## 2019-05-13 LAB — GIARDIA ANTIGEN STOOL: NORMAL

## 2019-07-14 ENCOUNTER — APPOINTMENT (OUTPATIENT)
Dept: CT IMAGING | Age: 49
End: 2019-07-14

## 2019-07-14 ENCOUNTER — HOSPITAL ENCOUNTER (EMERGENCY)
Age: 49
Discharge: HOME | End: 2019-07-14
Attending: FAMILY MEDICINE

## 2019-07-14 VITALS
HEART RATE: 75 BPM | SYSTOLIC BLOOD PRESSURE: 189 MMHG | DIASTOLIC BLOOD PRESSURE: 97 MMHG | OXYGEN SATURATION: 99 % | BODY MASS INDEX: 30.56 KG/M2 | TEMPERATURE: 98.8 F | RESPIRATION RATE: 20 BRPM | WEIGHT: 201 LBS

## 2019-07-14 DIAGNOSIS — H60.392 ACUTE INFECTIVE OTITIS EXTERNA OF LEFT EAR: ICD-10-CM

## 2019-07-14 DIAGNOSIS — H60.12 CELLULITIS OF LEFT EAR: Primary | ICD-10-CM

## 2019-07-14 LAB
CO2: 25 MMOL/L (ref 22–29)
GFR AFRICAN AMERICAN: >60
GFR NON-AFRICAN AMERICAN: >60 ML/MIN/1.73
GLUCOSE BLD-MCNC: 159 MG/DL (ref 74–99)
HCT VFR BLD CALC: 36.6 % (ref 34–48)
HEMOGLOBIN: 12.3 G/DL (ref 11.5–15.5)
MCH RBC QN AUTO: 30 PG (ref 26–35)
MCHC RBC AUTO-ENTMCNC: 33.6 % (ref 32–34.5)
MCV RBC AUTO: 89.3 FL (ref 80–99.9)
PDW BLD-RTO: 16.2 FL (ref 11.5–15)
PLATELET # BLD: 272 E9/L (ref 130–450)
PMV BLD AUTO: 11.1 FL (ref 7–12)
POC ANION GAP: 10 MMOL/L (ref 7–16)
POC BUN: 10 MG/DL (ref 8–23)
POC CHLORIDE: 103 MMOL/L (ref 100–108)
POC CREATININE: 0.8 MG/DL (ref 0.5–1)
POC POTASSIUM: 3.8 MMOL/L (ref 3.5–5)
POC SODIUM: 138 MMOL/L (ref 132–146)
RBC # BLD: 4.1 E12/L (ref 3.5–5.5)
WBC # BLD: 9.9 E9/L (ref 4.5–11.5)

## 2019-07-14 PROCEDURE — 82565 ASSAY OF CREATININE: CPT

## 2019-07-14 PROCEDURE — 36415 COLL VENOUS BLD VENIPUNCTURE: CPT

## 2019-07-14 PROCEDURE — 82947 ASSAY GLUCOSE BLOOD QUANT: CPT

## 2019-07-14 PROCEDURE — 84520 ASSAY OF UREA NITROGEN: CPT

## 2019-07-14 PROCEDURE — 6360000004 HC RX CONTRAST MEDICATION: Performed by: RADIOLOGY

## 2019-07-14 PROCEDURE — 96372 THER/PROPH/DIAG INJ SC/IM: CPT

## 2019-07-14 PROCEDURE — 99283 EMERGENCY DEPT VISIT LOW MDM: CPT

## 2019-07-14 PROCEDURE — 6360000002 HC RX W HCPCS: Performed by: FAMILY MEDICINE

## 2019-07-14 PROCEDURE — 85027 COMPLETE CBC AUTOMATED: CPT

## 2019-07-14 PROCEDURE — 80051 ELECTROLYTE PANEL: CPT

## 2019-07-14 PROCEDURE — 70487 CT MAXILLOFACIAL W/DYE: CPT

## 2019-07-14 RX ORDER — CEFTRIAXONE SODIUM 250 MG/1
250 INJECTION, POWDER, FOR SOLUTION INTRAMUSCULAR; INTRAVENOUS ONCE
Status: COMPLETED | OUTPATIENT
Start: 2019-07-14 | End: 2019-07-14

## 2019-07-14 RX ORDER — OFLOXACIN 3 MG/ML
10 SOLUTION AURICULAR (OTIC) DAILY
Qty: 1 BOTTLE | Refills: 0 | Status: SHIPPED | OUTPATIENT
Start: 2019-07-14 | End: 2019-07-14 | Stop reason: CLARIF

## 2019-07-14 RX ORDER — SULFAMETHOXAZOLE AND TRIMETHOPRIM 800; 160 MG/1; MG/1
1 TABLET ORAL 2 TIMES DAILY
Qty: 20 TABLET | Refills: 0 | Status: SHIPPED | OUTPATIENT
Start: 2019-07-14 | End: 2019-07-24

## 2019-07-14 RX ORDER — CEPHALEXIN 500 MG/1
500 CAPSULE ORAL 4 TIMES DAILY
Qty: 40 CAPSULE | Refills: 0 | Status: SHIPPED | OUTPATIENT
Start: 2019-07-14 | End: 2019-07-24

## 2019-07-14 RX ORDER — CIPROFLOXACIN 500 MG/1
750 TABLET, FILM COATED ORAL 2 TIMES DAILY
Qty: 30 TABLET | Refills: 0 | Status: SHIPPED | OUTPATIENT
Start: 2019-07-14 | End: 2019-07-14 | Stop reason: CLARIF

## 2019-07-14 RX ADMIN — IOPAMIDOL 80 ML: 755 INJECTION, SOLUTION INTRAVENOUS at 17:31

## 2019-07-14 RX ADMIN — CEFTRIAXONE SODIUM 250 MG: 250 INJECTION, POWDER, FOR SOLUTION INTRAMUSCULAR; INTRAVENOUS at 16:00

## 2019-07-14 NOTE — ED NOTES
Pt instructed to go directly to CT dept at [facility] for study and to await the result. Pt verbalized understanding.      Maddie Tapia RN  07/14/19 [id number]

## 2019-07-14 NOTE — ED NOTES
Dr speaking with ENT Dr Naomi Parr and also cat scan and pt  Concerning FU care     Lorrie Loo, RN  07/14/19 4251

## 2019-07-14 NOTE — ED PROVIDER NOTES
RESULTS -------------------------------------------------  All laboratory and radiology results have been personally reviewed by myself   LABS:  Results for orders placed or performed during the hospital encounter of 07/14/19   CBC   Result Value Ref Range    WBC 9.9 4.5 - 11.5 E9/L    RBC 4.10 3.50 - 5.50 E12/L    Hemoglobin 12.3 11.5 - 15.5 g/dL    Hematocrit 36.6 34.0 - 48.0 %    MCV 89.3 80.0 - 99.9 fL    MCH 30.0 26.0 - 35.0 pg    MCHC 33.6 32.0 - 34.5 %    RDW 16.2 (H) 11.5 - 15.0 fL    Platelets 509 961 - 279 E9/L    MPV 11.1 7.0 - 12.0 fL   POCT Venous   Result Value Ref Range    POC Sodium 138 132 - 146 mmol/L    POC Potassium 3.8 3.5 - 5.0 mmol/L    POC Chloride 103 100 - 108 mmol/L    CO2 25 22 - 29 mmol/L    POC Anion Gap 10 7 - 16 mmol/L    POC Glucose 159 (H) 74 - 99 mg/dl    POC BUN 10 8 - 23 mg/dL    POC Creatinine 0.8 0.5 - 1.0 mg/dL    GFR Non-African American >60 >=60 mL/min/1.73    GFR  >60        RADIOLOGY:  Interpreted by Radiologist.  CT MAXILLOFACIAL W CONTRAST   Final Result   Asymmetric mural thickening left external auditory canal   could be related to otitis externa. ------------------------- NURSING NOTES AND VITALS REVIEWED ---------------------------   The nursing notes within the ED encounter and vital signs as below have been reviewed. BP (!) 189/97   Pulse 75   Temp 98.8 °F (37.1 °C) (Oral)   Resp 20   Wt 201 lb (91.2 kg)   SpO2 99%   BMI 30.56 kg/m²   Oxygen Saturation Interpretation: Normal      ---------------------------------------------------PHYSICAL EXAM--------------------------------------    Constitutional/General: Alert and oriented x3, well appearing, non toxic in NAD  Head: Normocephalic and atraumatic  Eyes: PERRL, EOMI, conjunctiva normal, sclera non icteric  Ear: Tenderness, redness, and hot to touch over left auricle. tenderness, warmth, and redness to touch from helix down to lobe, and surrounding ear areas on the left side.  No hearing loss. No abnormal findings of the inner ear on the left. Right ear findings are normal.   Mouth: Oropharynx clear, handling secretions, no trismus, no asymmetry of the posterior oropharynx or uvular edema  Neck: Supple, full ROM, non tender to palpation in the midline, no stridor, no crepitus, no meningeal signs  Respiratory: Lungs clear to auscultation bilaterally, no wheezes, rales, or rhonchi. Not in respiratory distress  Cardiovascular:  Regular rate. Regular rhythm. No murmurs, gallops, or rubs. 2+ distal pulses  Chest: No chest wall tenderness  GI:  Abdomen Soft, Non tender, Non distended. +BS. No organomegaly, no palpable masses,  No rebound, guarding, or rigidity. Musculoskeletal: Moves all extremities x 4. Warm and well perfused, no clubbing, cyanosis, or edema. Capillary refill <3 seconds  Integument: skin warm and dry. No rashes. As above under ear. Lymphatic: no lymphadenopathy noted  Neurologic: GCS 15, no focal deficits, symmetric strength 5/5 in the upper and lower extremities bilaterally  Psychiatric: Normal Affect      ------------------------------ ED COURSE/MEDICAL DECISION MAKING----------------------  Medications   cefTRIAXone (ROCEPHIN) injection 250 mg (250 mg Intramuscular Given 7/14/19 1600)   iopamidol (ISOVUE-370) 76 % injection 80 mL (80 mLs Intravenous Given 7/14/19 1731)         Medical Decision Making:    Patient is stable. Informed the patient about the importance of bp control, she reports she understands. I had a concern for left sided malignant otitis media based on patient's sx. I sent the patient for a CT maxillofacial. I called Dr. Laurita Stoll who was on call for ENT in regards to the patient results. I informed him of the patient's results, and he reported this is most likely an outer ear cellulitis. He said very unlikely to be malignant otits media. He recommended following up with Dr. Sonya Hall after treatment is complete.  I informed the patient if any worsening sx

## 2019-07-29 ENCOUNTER — APPOINTMENT (OUTPATIENT)
Dept: CT IMAGING | Age: 49
End: 2019-07-29

## 2019-07-29 ENCOUNTER — HOSPITAL ENCOUNTER (EMERGENCY)
Age: 49
Discharge: HOME | End: 2019-07-30

## 2019-07-29 VITALS
BODY MASS INDEX: 30.31 KG/M2 | TEMPERATURE: 98.1 F | HEIGHT: 68 IN | OXYGEN SATURATION: 98 % | DIASTOLIC BLOOD PRESSURE: 94 MMHG | WEIGHT: 200 LBS | SYSTOLIC BLOOD PRESSURE: 179 MMHG | HEART RATE: 72 BPM | RESPIRATION RATE: 18 BRPM

## 2019-07-29 DIAGNOSIS — H60.502 ACUTE OTITIS EXTERNA OF LEFT EAR, UNSPECIFIED TYPE: Primary | ICD-10-CM

## 2019-07-29 DIAGNOSIS — R60.9 SWELLING: ICD-10-CM

## 2019-07-29 LAB
ALBUMIN SERPL-MCNC: 4.5 G/DL (ref 3.5–5.2)
ALP BLD-CCNC: 94 U/L (ref 35–104)
ALT SERPL-CCNC: 16 U/L (ref 0–32)
AMYLASE: 39 U/L (ref 20–100)
ANION GAP SERPL CALCULATED.3IONS-SCNC: 16 MMOL/L (ref 7–16)
AST SERPL-CCNC: 35 U/L (ref 0–31)
BASOPHILS ABSOLUTE: 0 E9/L (ref 0–0.2)
BASOPHILS RELATIVE PERCENT: 1.2 % (ref 0–2)
BILIRUB SERPL-MCNC: 0.3 MG/DL (ref 0–1.2)
BILIRUBIN URINE: NEGATIVE
BLOOD, URINE: NEGATIVE
BUN BLDV-MCNC: 12 MG/DL (ref 6–20)
BURR CELLS: ABNORMAL
CALCIUM SERPL-MCNC: 9.7 MG/DL (ref 8.6–10.2)
CHLORIDE BLD-SCNC: 97 MMOL/L (ref 98–107)
CLARITY: CLEAR
CO2: 22 MMOL/L (ref 22–29)
COLOR: YELLOW
CREAT SERPL-MCNC: 1.1 MG/DL (ref 0.5–1)
EOSINOPHILS ABSOLUTE: 0.36 E9/L (ref 0.05–0.5)
EOSINOPHILS RELATIVE PERCENT: 2.7 % (ref 0–6)
GFR AFRICAN AMERICAN: >60
GFR NON-AFRICAN AMERICAN: 53 ML/MIN/1.73
GLUCOSE BLD-MCNC: 154 MG/DL (ref 74–99)
GLUCOSE URINE: NEGATIVE MG/DL
HCT VFR BLD CALC: 39.2 % (ref 34–48)
HEMOGLOBIN: 12.7 G/DL (ref 11.5–15.5)
KETONES, URINE: NEGATIVE MG/DL
LACTIC ACID: 1.5 MMOL/L (ref 0.5–2.2)
LEUKOCYTE ESTERASE, URINE: NEGATIVE
LYMPHOCYTES ABSOLUTE: 3.22 E9/L (ref 1.5–4)
LYMPHOCYTES RELATIVE PERCENT: 24.1 % (ref 20–42)
MCH RBC QN AUTO: 30.2 PG (ref 26–35)
MCHC RBC AUTO-ENTMCNC: 32.4 % (ref 32–34.5)
MCV RBC AUTO: 93.3 FL (ref 80–99.9)
MONOCYTES ABSOLUTE: 0.67 E9/L (ref 0.1–0.95)
MONOCYTES RELATIVE PERCENT: 4.5 % (ref 2–12)
NEUTROPHILS ABSOLUTE: 9.25 E9/L (ref 1.8–7.3)
NEUTROPHILS RELATIVE PERCENT: 68.8 % (ref 43–80)
NITRITE, URINE: NEGATIVE
OVALOCYTES: ABNORMAL
PDW BLD-RTO: 15.9 FL (ref 11.5–15)
PH UA: 5.5 (ref 5–9)
PLATELET # BLD: 293 E9/L (ref 130–450)
PMV BLD AUTO: 11.1 FL (ref 7–12)
POIKILOCYTES: ABNORMAL
POLYCHROMASIA: ABNORMAL
POTASSIUM SERPL-SCNC: 3.9 MMOL/L (ref 3.5–5)
PROTEIN UA: NEGATIVE MG/DL
RBC # BLD: 4.2 E12/L (ref 3.5–5.5)
SODIUM BLD-SCNC: 135 MMOL/L (ref 132–146)
SPECIFIC GRAVITY UA: <=1.005 (ref 1–1.03)
TOTAL PROTEIN: 8.8 G/DL (ref 6.4–8.3)
UROBILINOGEN, URINE: 0.2 E.U./DL
WBC # BLD: 13.4 E9/L (ref 4.5–11.5)

## 2019-07-29 PROCEDURE — 6360000004 HC RX CONTRAST MEDICATION: Performed by: RADIOLOGY

## 2019-07-29 PROCEDURE — 87040 BLOOD CULTURE FOR BACTERIA: CPT

## 2019-07-29 PROCEDURE — 6370000000 HC RX 637 (ALT 250 FOR IP): Performed by: NURSE PRACTITIONER

## 2019-07-29 PROCEDURE — 85025 COMPLETE CBC W/AUTO DIFF WBC: CPT

## 2019-07-29 PROCEDURE — 99283 EMERGENCY DEPT VISIT LOW MDM: CPT

## 2019-07-29 PROCEDURE — 2580000003 HC RX 258: Performed by: NURSE PRACTITIONER

## 2019-07-29 PROCEDURE — 36415 COLL VENOUS BLD VENIPUNCTURE: CPT

## 2019-07-29 PROCEDURE — 82150 ASSAY OF AMYLASE: CPT

## 2019-07-29 PROCEDURE — 80053 COMPREHEN METABOLIC PANEL: CPT

## 2019-07-29 PROCEDURE — 70491 CT SOFT TISSUE NECK W/DYE: CPT

## 2019-07-29 PROCEDURE — 83605 ASSAY OF LACTIC ACID: CPT

## 2019-07-29 PROCEDURE — 81003 URINALYSIS AUTO W/O SCOPE: CPT

## 2019-07-29 PROCEDURE — 87088 URINE BACTERIA CULTURE: CPT

## 2019-07-29 RX ORDER — 0.9 % SODIUM CHLORIDE 0.9 %
1000 INTRAVENOUS SOLUTION INTRAVENOUS ONCE
Status: COMPLETED | OUTPATIENT
Start: 2019-07-29 | End: 2019-07-29

## 2019-07-29 RX ORDER — OFLOXACIN 3 MG/ML
5 SOLUTION AURICULAR (OTIC) 2 TIMES DAILY
Qty: 1 BOTTLE | Refills: 0 | Status: SHIPPED | OUTPATIENT
Start: 2019-07-29 | End: 2019-08-08

## 2019-07-29 RX ORDER — OFLOXACIN 3 MG/ML
5 SOLUTION AURICULAR (OTIC) ONCE
Status: COMPLETED | OUTPATIENT
Start: 2019-07-29 | End: 2019-07-29

## 2019-07-29 RX ADMIN — IOPAMIDOL 80 ML: 755 INJECTION, SOLUTION INTRAVENOUS at 22:39

## 2019-07-29 RX ADMIN — OFLOXACIN 5 DROP: 3 SOLUTION AURICULAR (OTIC) at 23:55

## 2019-07-29 RX ADMIN — SODIUM CHLORIDE 1000 ML: 9 INJECTION, SOLUTION INTRAVENOUS at 22:07

## 2019-07-29 ASSESSMENT — PAIN DESCRIPTION - DESCRIPTORS: DESCRIPTORS: ACHING

## 2019-07-29 ASSESSMENT — PAIN DESCRIPTION - FREQUENCY: FREQUENCY: CONTINUOUS

## 2019-07-29 ASSESSMENT — PAIN DESCRIPTION - PROGRESSION: CLINICAL_PROGRESSION: NOT CHANGED

## 2019-07-29 ASSESSMENT — PAIN SCALES - GENERAL: PAINLEVEL_OUTOF10: 3

## 2019-07-29 ASSESSMENT — PAIN DESCRIPTION - ORIENTATION: ORIENTATION: LEFT

## 2019-07-29 ASSESSMENT — PAIN DESCRIPTION - PAIN TYPE: TYPE: ACUTE PAIN

## 2019-07-29 ASSESSMENT — PAIN DESCRIPTION - LOCATION: LOCATION: EAR

## 2019-07-30 NOTE — ED PROVIDER NOTES
ED Triage Vitals [07/29/19 2059]   BP Temp Temp Source Pulse Resp SpO2 Height Weight   (!) 177/74 98.5 °F (36.9 °C) Oral 86 18 99 % 5' 8\" (1.727 m) 200 lb (90.7 kg)      Oxygen Saturation Interpretation: Normal.    Constitutional:  Alert, development consistent with age. Ears:  External Ears: Left edematous, mild tenderness, no erythema, no drainage, mild abrasion to the upper auricle without drainage. No mastoid tenderness. TM's & External Canals:  normal TM and external ear canal right ear and abnormal external canal left ear - Mild erythema of the canal and TM with mild bulging of the TM. No purulence visualized. .  Nose:   There is no abnormalities present  Throat:  Pharynx without injection, exudate, or tonsillar hypertrophy. Airway patient. Neck:  Normal ROM. Supple. Respiratory:  Clear to auscultation and breath sounds equal.    CV: Regular rate and rhythm, normal heart sounds, without pathological murmurs, ectopy, gallops, or rubs. Skin:  No rashes, erythema present, unless noted elsewhere. Mild left facial edema. Lymphatic: No lymphangitis or adenopathy noted. Neurological:  Oriented. Motor functions intact.     Lab / Imaging Results   (All laboratory and radiology results have been personally reviewed by myself)  Labs:  Results for orders placed or performed during the hospital encounter of 07/29/19   CBC Auto Differential   Result Value Ref Range    WBC 13.4 (H) 4.5 - 11.5 E9/L    RBC 4.20 3.50 - 5.50 E12/L    Hemoglobin 12.7 11.5 - 15.5 g/dL    Hematocrit 39.2 34.0 - 48.0 %    MCV 93.3 80.0 - 99.9 fL    MCH 30.2 26.0 - 35.0 pg    MCHC 32.4 32.0 - 34.5 %    RDW 15.9 (H) 11.5 - 15.0 fL    Platelets 028 469 - 187 E9/L    MPV 11.1 7.0 - 12.0 fL    Neutrophils % 68.8 43.0 - 80.0 %    Lymphocytes % 24.1 20.0 - 42.0 %    Monocytes % 4.5 2.0 - 12.0 %    Eosinophils % 2.7 0.0 - 6.0 %    Basophils % 1.2 0.0 - 2.0 %    Neutrophils # 9.25 (H) 1.80 - 7.30 E9/L    Lymphocytes # 3.22 Incidental note is made of high-grade stenosis of the left subclavian   artery which can predispose to the to subclavian steal syndrome   depending on patient's clinical presentation. May consider a follow-up   nonemergent carotid ultrasound examination              ED Course / Medical Decision Making     Medications   0.9 % sodium chloride bolus (0 mLs Intravenous Stopped 7/29/19 2330)   iopamidol (ISOVUE-370) 76 % injection 80 mL (80 mLs Intravenous Given 7/29/19 2239)   ofloxacin (FLOXIN) 0.3 % otic solution 5 drop (5 drops Left Ear Given 7/29/19 2355)        Re-examination:  7/29/19       Time: 2330   Resting in no distress. Consult(s):   None    Procedure(s):   none    MDM:   Patient presented with recurrence of left ear pain. Diagnostics were reviewed and discussed with her. Clinical exam is consistent with otitis externa of the left ear. Patient is deemed appropriate for discharge and outpatient follow-up. Patient appears well and nontoxic. She is initiated on ofloxacin drops in the emergency department and will be given a prescription for home. She is instructed to return to the emergency department immediately with any new or worsening symptoms. Counseling: The emergency provider has spoken with the patient and family and discussed todays results, in addition to providing specific details for the plan of care and counseling regarding the diagnosis and prognosis. Questions are answered at this time and they are agreeable with the plan. Assessment      1. Acute otitis externa of left ear, unspecified type    2. Swelling      Plan   Discharge to home  Patient condition is good    New Medications     New Prescriptions    OFLOXACIN (FLOXIN OTIC) 0.3 % OTIC SOLUTION    Place 5 drops into the left ear 2 times daily for 10 days TO AFFECTED EAR     Electronically signed by WALLACE Alvarez CNP   DD: 7/29/19  **This report was transcribed using voice recognition software.  Every effort was

## 2019-07-30 NOTE — ED NOTES
Blood Cultures obtained from the left forearm. 2nd set drawn and sent to lab.      Sylvia Galo RN  07/30/19 2692

## 2019-08-01 LAB — URINE CULTURE, ROUTINE: NORMAL

## 2019-08-04 LAB
BLOOD CULTURE, ROUTINE: NORMAL
CULTURE, BLOOD 2: NORMAL

## 2020-08-11 ENCOUNTER — HOSPITAL ENCOUNTER (EMERGENCY)
Age: 50
Discharge: HOME | End: 2020-08-11
Attending: EMERGENCY MEDICINE
Payer: OTHER GOVERNMENT

## 2020-08-11 VITALS
OXYGEN SATURATION: 96 % | WEIGHT: 197 LBS | RESPIRATION RATE: 20 BRPM | TEMPERATURE: 97.8 F | HEART RATE: 82 BPM | BODY MASS INDEX: 29.95 KG/M2 | DIASTOLIC BLOOD PRESSURE: 81 MMHG | SYSTOLIC BLOOD PRESSURE: 113 MMHG

## 2020-08-11 LAB — STREP GRP A PCR: NEGATIVE

## 2020-08-11 PROCEDURE — G0382 LEV 3 HOSP TYPE B ED VISIT: HCPCS

## 2020-08-11 PROCEDURE — 87880 STREP A ASSAY W/OPTIC: CPT

## 2020-08-11 PROCEDURE — U0003 INFECTIOUS AGENT DETECTION BY NUCLEIC ACID (DNA OR RNA); SEVERE ACUTE RESPIRATORY SYNDROME CORONAVIRUS 2 (SARS-COV-2) (CORONAVIRUS DISEASE [COVID-19]), AMPLIFIED PROBE TECHNIQUE, MAKING USE OF HIGH THROUGHPUT TECHNOLOGIES AS DESCRIBED BY CMS-2020-01-R: HCPCS

## 2020-08-11 RX ORDER — NITROGLYCERIN 0.4 MG/1
0.4 TABLET SUBLINGUAL EVERY 5 MIN PRN
Status: ON HOLD | COMMUNITY
End: 2020-10-03 | Stop reason: HOSPADM

## 2020-08-11 RX ORDER — ASPIRIN 81 MG/1
81 TABLET ORAL DAILY
COMMUNITY
End: 2021-01-12 | Stop reason: SDUPTHER

## 2020-08-11 RX ORDER — BROMPHENIRAMINE MALEATE, PSEUDOEPHEDRINE HYDROCHLORIDE, AND DEXTROMETHORPHAN HYDROBROMIDE 2; 30; 10 MG/5ML; MG/5ML; MG/5ML
5 SYRUP ORAL 4 TIMES DAILY PRN
Qty: 120 ML | Refills: 0 | Status: SHIPPED | OUTPATIENT
Start: 2020-08-11 | End: 2020-08-31

## 2020-08-11 ASSESSMENT — ENCOUNTER SYMPTOMS
EYE REDNESS: 0
DIARRHEA: 0
VOMITING: 0
BACK PAIN: 0
ABDOMINAL DISTENTION: 0
RHINORRHEA: 1
SORE THROAT: 1
SHORTNESS OF BREATH: 0
NAUSEA: 0
SINUS PRESSURE: 0
COUGH: 1
EYE PAIN: 0
WHEEZING: 0
EYE DISCHARGE: 0

## 2020-08-11 ASSESSMENT — PAIN DESCRIPTION - FREQUENCY: FREQUENCY: INTERMITTENT

## 2020-08-11 ASSESSMENT — PAIN DESCRIPTION - ORIENTATION: ORIENTATION: ANTERIOR

## 2020-08-11 ASSESSMENT — PAIN DESCRIPTION - LOCATION: LOCATION: FACE

## 2020-08-11 ASSESSMENT — PAIN DESCRIPTION - DESCRIPTORS: DESCRIPTORS: ACHING

## 2020-08-11 ASSESSMENT — PAIN DESCRIPTION - PROGRESSION
CLINICAL_PROGRESSION: GRADUALLY WORSENING
CLINICAL_PROGRESSION: NOT CHANGED

## 2020-08-11 ASSESSMENT — PAIN DESCRIPTION - ONSET: ONSET: GRADUAL

## 2020-08-11 ASSESSMENT — PAIN SCALES - GENERAL: PAINLEVEL_OUTOF10: 3

## 2020-08-11 ASSESSMENT — PAIN DESCRIPTION - PAIN TYPE: TYPE: ACUTE PAIN

## 2020-08-11 NOTE — LETTER
[address] [place] [address] Emergency Department  [facility] [address] [place] [address]  Phone: [phone]               August 11, 2020    Patient: Farheen Mane   YOB: 1970   Date of Visit: 8/11/2020       To Whom It May Concern:    Javier Crocker was seen and treated in our emergency department on 8/11/2020. She may not return to work until 8- or [facility].       Sincerely,       Lauri Sifuentes MD         Signature:__________________________________

## 2020-08-11 NOTE — ED PROVIDER NOTES
CONCERNED ABOUT POSSIBLE COVID EXPOSURE    The history is provided by the patient. URI   Presenting symptoms: congestion, cough, facial pain, fatigue, rhinorrhea and sore throat    Presenting symptoms: no ear pain and no fever    Severity:  Moderate  Onset quality:  Gradual  Duration:  5 days  Chronicity:  New  Associated symptoms: no arthralgias, no headaches and no wheezing         Review of Systems   Constitutional: Positive for fatigue. Negative for chills and fever. HENT: Positive for congestion, rhinorrhea and sore throat. Negative for ear pain and sinus pressure. Eyes: Negative for pain, discharge and redness. Respiratory: Positive for cough. Negative for shortness of breath and wheezing. Cardiovascular: Negative for chest pain. Gastrointestinal: Negative for abdominal distention, diarrhea, nausea and vomiting. Genitourinary: Negative for dysuria and frequency. Musculoskeletal: Negative for arthralgias and back pain. Skin: Negative for rash and wound. Neurological: Negative for weakness and headaches. Hematological: Negative for adenopathy. All other systems reviewed and are negative. Physical Exam  Vitals signs and nursing note reviewed. Constitutional:       Appearance: She is well-developed. HENT:      Head: Normocephalic and atraumatic. Right Ear: Hearing, tympanic membrane and external ear normal.      Left Ear: Hearing, tympanic membrane and external ear normal.      Nose: Mucosal edema and congestion present. Mouth/Throat:      Pharynx: Uvula midline. Posterior oropharyngeal erythema present. Eyes:      General: Lids are normal.      Conjunctiva/sclera: Conjunctivae normal.      Pupils: Pupils are equal, round, and reactive to light. Neck:      Musculoskeletal: Normal range of motion and neck supple. Cardiovascular:      Rate and Rhythm: Normal rate and regular rhythm. Heart sounds: Normal heart sounds. No murmur.    Pulmonary:      Effort: Pulmonary effort is normal. No respiratory distress. Breath sounds: Normal breath sounds. No wheezing or rales. Abdominal:      General: Bowel sounds are normal.      Palpations: Abdomen is soft. Abdomen is not rigid. Tenderness: There is no abdominal tenderness. There is no guarding or rebound. Skin:     General: Skin is warm and dry. Findings: No abrasion or rash. Neurological:      Mental Status: She is alert and oriented to person, place, and time. GCS: GCS eye subscore is 4. GCS verbal subscore is 5. GCS motor subscore is 6. Cranial Nerves: No cranial nerve deficit. Sensory: No sensory deficit. Coordination: Coordination normal.      Gait: Gait normal.          Procedures     MDM          --------------------------------------------- PAST HISTORY ---------------------------------------------  Past Medical History:  has a past medical history of Colitis, Constipation, Diabetes mellitus ([facility] [address] Heart problem, Hyperlipidemia, Hypertension, Hypothyroidism, and Thyroid disease. Past Surgical History:  has a past surgical history that includes Cholecystectomy; Appendectomy; knee surgery (Right); ventral hernia repair; Hysterectomy; hernia repair (12/1/15); Colonoscopy; and  section. Social History:  reports that she has been smoking cigarettes. She has a 37.50 pack-year smoking history. She has never used smokeless tobacco. She reports current drug use. She reports that she does not drink alcohol. Family History: family history includes Mult Sclerosis in her maternal grandmother and mother. She was adopted. The patients home medications have been reviewed. Allergies: Patient has no known allergies.     -------------------------------------------------- RESULTS -------------------------------------------------  Labs:  Results for orders placed or performed during the hospital encounter of 20   Strep Screen Group A Throat    Specimen: Throat   Result Value Ref Range    Strep Grp A PCR Negative Negative       Radiology:  No orders to display       ------------------------- NURSING NOTES AND VITALS REVIEWED ---------------------------  Date / Time Roomed:  8/11/2020 10:13 AM  ED Bed Assignment:  04/04    The nursing notes within the ED encounter and vital signs as below have been reviewed. /81   Pulse 82   Temp 97.8 °F (36.6 °C) (Oral)   Resp 20   Wt 197 lb (89.4 kg)   SpO2 96%   BMI 29.95 kg/m²   Oxygen Saturation Interpretation: Normal      ------------------------------------------ PROGRESS NOTES ------------------------------------------  I have spoken with the patient and discussed todays results, in addition to providing specific details for the plan of care and counseling regarding the diagnosis and prognosis. Their questions are answered at this time and they are agreeable with the plan. I discussed at length with them reasons for immediate return here for re evaluation. They will followup with primary care by calling their office tomorrow. COVID TEST DONE AS SENDOUT.      --------------------------------- ADDITIONAL PROVIDER NOTES ---------------------------------  At this time the patient is without objective evidence of an acute process requiring hospitalization or inpatient management. They have remained hemodynamically stable throughout their entire ED visit and are stable for discharge with outpatient follow-up. The plan has been discussed in detail and they are aware of the specific conditions for emergent return, as well as the importance of follow-up. New Prescriptions    BROMPHENIRAMINE-PSEUDOEPHEDRINE-DM 2-30-10 MG/5ML SYRUP    Take 5 mLs by mouth 4 times daily as needed for Congestion or Cough       Diagnosis:  1. Viral URI    2. Suspected COVID-19 virus infection        Disposition:  Patient's disposition: Discharge to home  Patient's condition is stable.                       Gm Connors MD  08/11/20 [address]

## 2020-08-12 ENCOUNTER — CARE COORDINATION (OUTPATIENT)
Dept: CASE MANAGEMENT | Age: 50
End: 2020-08-12

## 2020-08-12 LAB
SARS-COV-2: NOT DETECTED
SOURCE: NORMAL

## 2020-08-12 NOTE — CARE COORDINATION
Date/Time:  8/12/2020 1:04 PM  Attempted to reach patient by telephone. Left HIPPA compliant message requesting a return call. Will attempt to reach patient again.

## 2020-08-12 NOTE — CARE COORDINATION
Patient contacted regarding [employer] . Discussed COVID-19 related testing which was pending at this time. Test results were pending. Patient informed of results, if available? No    Care Transition Nurse/ Ambulatory Care Manager contacted the patient by telephone to perform post discharge assessment. Verified name and  with patient as identifiers. Provided introduction to self, and explanation of the CTN/ACM role, and reason for call due to risk factors for infection and/or exposure to COVID-19. Symptoms reviewed with patient who verbalized the following symptoms: fever, pain or aching joints, cough, chills or shaking, nausea, no new symptoms and no worsening symptoms. Due to no new or worsening symptoms encounter was not routed to provider for escalation. Discussed follow-up appointments. If no appointment was previously scheduled, appointment scheduling offered:   [facility] follow up appointment(s):   Future Appointments   Date Time Provider Derek Lewis   2020  8:20 AM MD Constantino Moody Count includes the [facility] follow up appointment(s):      Advance Care Planning:   Does patient have an Advance Directive:  none. Patient has following risk factors of: diabetes. CTN/ACM reviewed discharge instructions, medical action plan and red flags such as increased shortness of breath, increasing fever and signs of decompensation with patient who verbalized understanding. Discussed exposure protocols and quarantine with CDC Guidelines What to do if you are sick with coronavirus disease .  Patient was given an opportunity for questions and concerns. The patient agrees to contact the [facility] exposure line [phone], [address] of [facility]: ([phone]) and PCP office for questions related to their healthcare. CTN/ACM provided contact information for future needs.     Reviewed and educated patient on any new and changed medications related to discharge diagnosis     Patient/family/caregiver given information for GetWell Loop and agrees to enroll yes  Patient's preferred e-mail: [email or web address]   Patient's preferred phone number: [phone]    Based on Loop alert triggers, patient will be contacted by nurse care manager for worsening symptoms. Pt will be further monitored by COVID Loop Team based on severity of symptoms and risk factors.

## 2020-08-30 NOTE — PROGRESS NOTES
[facility] Cardiology consult  Dr. Mildred Nguyen      Reason for Consult: Uncontrolled HTN  Referring Physician: Atif Olivares MD     CHIEF COMPLAINT:   Chief Complaint   Patient presents with    New Patient     per [facility] [address] for hypertension and chest pain. Patient has been having jaw pain with sob on exertion. This has been going on for 2 months. Patient denies any recent cardiac testing done. HISTORY OF PRESENT ILLNESS:   54year old female with history of Diabetes Hyperlipidemia and Hypertension is here due to uncontrolled hypertension and chest pain. Patient has been having chest pain, retrosternal, radiation to the jaw and neck, last 20 to 30 minutes, mostly with exertion but it happens at rest, no associated symptoms, did not try any treatment for her symptoms, symptoms have been going on for the last couple of months, denies any palpitations, no pedal edema, no PND, no orthopnea, no syncope, no presyncopal episodes. Past Medical History:   Diagnosis Date    Claustrophobia     mild    Colitis     Constipation     Diabetes mellitus ([facility] [address]     Heart problem     Hyperlipidemia     Hypertension     Hypothyroidism     Sleep apnea     diagnosed 7-8 years ago does not use C-pap problems with cost    Thyroid disease          Past Surgical History:   Procedure Laterality Date    APPENDECTOMY       SECTION      x2    CHOLECYSTECTOMY      COLONOSCOPY      HERNIA REPAIR  12/1/15    laparoscopic incisional reapair with mesh    HYSTERECTOMY      KNEE SURGERY Right     VENTRAL HERNIA REPAIR           Current Outpatient Medications   Medication Sig Dispense Refill    aspirin 81 MG EC tablet Take 81 mg by mouth daily      nitroGLYCERIN (NITROSTAT) 0.4 MG SL tablet Place 0.4 mg under the tongue every 5 minutes as needed for Chest pain up to max of 3 total doses. If no relief after 1 dose, call 911.       insulin regular (HUMULIN R;NOVOLIN R) 100 UNIT/ML injection Inject into the skin See Admin Instructions      amLODIPine (NORVASC) 10 MG tablet Take 20 mg by mouth daily       lisinopril-hydrochlorothiazide (PRINZIDE;ZESTORETIC) 10-12.5 MG per tablet Take 1 tablet by mouth daily Taking 2 tablets once a day      levothyroxine (SYNTHROID) 25 MCG tablet Take by mouth      Insulin Pen Needle (Empathy CoOGER PEN NEEDLES 31G) 31G X 8 MM MISC 1 each by Does not apply route daily 120 each 3    insulin glargine (LANTUS SOLOSTAR) 100 UNIT/ML injection pen Inject 20 Units into the skin nightly 5 Pen 3    simvastatin (ZOCOR) 10 MG tablet Take 10 mg by mouth nightly Not been taking past 2 weeks bothering her stomach  PCP aware      METHADONE HCL PO Take by mouth Takes 37 mg daily ( taking for past 20 years) history of 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  Oxycodone      naproxen (NAPROSYN) 500 MG tablet Take 1 tablet by mouth 2 times daily for 7 days (Patient taking differently: Take 500 mg by mouth 2 times daily Not taking) 14 tablet 0     No current facility-administered medications for this visit.           Allergies as of 08/31/2020    (No Known Allergies)       Social History     Socioeconomic History    Marital status:      Spouse name: Not on file    Number of children: Not on file    Years of education: Not on file    Highest education level: Not on file   Occupational History    Occupation: [employer] Financial resource strain: Not on file    Food insecurity     Worry: Not on file     Inability: Not on file   [employer] needs     Medical: Not on file     Non-medical: Not on file   Tobacco Use    Smoking status: Current Every Day Smoker     Packs/day: 1.00     Years: 25.00     Pack years: 25.00     Types: Cigarettes    Smokeless tobacco: Never Used    Tobacco comment: trying to quit   Substance and Sexual Activity    Alcohol use: Not Currently     Comment: Pepsi daily     Drug use: Yes     Comment: methadone     Sexual activity: Yes     Partners: Male   Lifestyle  Physical activity     Days per week: Not on file     Minutes per session: Not on file    Stress: Not on file   Relationships    Social connections     Talks on phone: Not on file     Gets together: Not on file     Attends Hoahaoism service: Not on file     Active member of club or organization: Not on file     Attends meetings of clubs or organizations: Not on file     Relationship status: Not on file    Intimate partner violence     Fear of current or ex partner: Not on file     Emotionally abused: Not on file     Physically abused: Not on file     Forced sexual activity: Not on file   Other Topics Concern    Not on file   Social History Narrative    Not on file       Family History   Adopted: Yes   Problem Relation Age of Onset    Mult Sclerosis Mother     Mult Sclerosis Maternal Grandmother        REVIEW OF SYSTEMS:     CONSTITUTIONAL:  negative for  fevers, chills, sweats, + fatigue  EYES:  negative for  double vision, blurred vision and blind spots  HEENT:  negative for  tinnitus, earaches, nasal congestion and epistaxis  RESPIRATORY:  negative for  dry cough, cough with sputum, wheezing and hemoptysis  CARDIOVASCULAR: as per HPI  GASTROINTESTINAL:  negative for nausea, vomiting, diarrhea, constipation, pruritus and jaundice  GENITOURINARY:  negative for frequency, dysuria, nocturia, urinary incontinence and hesitancy  HEMATOLOGIC/LYMPHATIC:  negative for easy bruising, bleeding, lymphadenopathy and petechiae  ALLERGIC/IMMUNOLOGIC:  negative for urticaria, hay fever and angioedema  ENDOCRINE:  negative for heat intolerance, cold intolerance, tremor, hair loss and diabetic symptoms including neither polyuria nor polydipsia nor blurred vision  MUSCULOSKELETAL:  negative for  myalgias, arthralgias, joint swelling, stiff joints and decreased range of motion  NEUROLOGICAL:  negative for memory problems, speech problems, visual disturbance, dysphagia, weakness and numbness      PHYSICAL EXAM: CONSTITUTIONAL:  awake, alert, cooperative, no apparent distress, and appears stated age  EYES:  lids and lashes normal and pupils equal, round and reactive to light, anicteric sclerae  HEAD:  normocepalic, without obvious abnormality, atraumatic, pink, moist mucous membranes. NECK:  Supple, symmetrical, trachea midline, no adenopathy, thyroid symmetric, not enlarged and no tenderness, skin normal  HEMATOLOGIC/LYMPHATICS:  no cervical lymphadenopathy and no supraclavicular lymphadenopathy  LUNGS:  No increased work of breathing, good air exchange, clear to auscultation bilaterally, no crackles or wheezing  CARDIOVASCULAR:  Normal apical impulse, regular rate and rhythm, normal S1 and S2, no S3 or S4, 2/6 systolic murmur at the apex, no JVD, no carotid bruit, no pedal edema, good carotid upstroke bilaterally. ABDOMEN:  Soft, nontender, no masses, no hepatomegaly or splenomegaly, BS+  CHEST: nontender to palpation, expands symmetrically  MUSCULOSKELETAL:  No clubbing no cyanosis. there is no redness, warmth, or swelling of the joints  full range of motion noted  NEUROLOGIC:  Alert, awake,oriented x3  SKIN:  no bruising or bleeding, normal skin color, texture, turgor and no redness, warmth, or swelling    BP (!) 164/70 (Site: Right Upper Arm, Position: Sitting, Cuff Size: Large Adult)   Pulse 82   Ht 5' 8\" (1.727 m)   Wt 196 lb (88.9 kg)   BMI 29.80 kg/m²     DATA:   I personally reviewed the visit EKG with the following interpretation: Sinus rhythm with a nonspecific ST-T changes in the lateral leads  ECHO: 10/22/15     Summary   Left ventricular size is grossly normal.   Moderate-severe left ventricular concentric hypertrophy noted. Ejection fraction is measured at 51%. No evidence of left ventricular mass or thrombus noted. Mid-posterior wall appears akinetic. Mild mitral regurgitation is present. No evidence of mitral valve stenosis. Physiologic and/or trace tricuspid regurgitation.    RVSP is 18.59 mmHg. Regular rhythm. Cardiology Labs: BMP:    Lab Results   Component Value Date     09/11/2020    K 4.1 09/11/2020    CL 93 09/11/2020    CO2 26 09/11/2020    BUN 9 09/11/2020    CREATININE 0.7 09/11/2020     CMP:    Lab Results   Component Value Date     09/11/2020    K 4.1 09/11/2020    CL 93 09/11/2020    CO2 26 09/11/2020    BUN 9 09/11/2020    CREATININE 0.7 09/11/2020    PROT 8.8 07/29/2019     CBC:    Lab Results   Component Value Date    WBC 14.5 09/11/2020    RBC 4.22 09/11/2020    HGB 12.9 09/11/2020    HCT 38.9 09/11/2020    MCV 92.2 09/11/2020    RDW 13.4 09/11/2020     09/11/2020     PT/INR:  No results found for: PTINR  PT/INR Warfarin:  No components found for: PTPATWAR, PTINRWAR  PTT:  No results found for: APTT  PTT Heparin:  No components found for: APTTHEP  Magnesium:  No results found for: MG  TSH:  No results found for: TSH  TROPONIN:  No components found for: TROP  BNP:  No results found for: BNP  FASTING LIPID PANEL:    Lab Results   Component Value Date    CHOL 288 10/27/2015    HDL 27 10/27/2015    TRIG 253 10/27/2015     No orders to display     I have personally reviewed the laboratory, cardiac diagnostic and radiographic testing as outlined above:      IMPRESSION:  1. Chest pain: Somewhat typical, no previous functional ischemic evaluation, will schedule for Lexiscan stress test  2. Hypertension: We will adjust medications  3. Type 2 diabetes mellitus:  4. Tobacco abuse: Patient was counseled regarding smoking cessation    RECOMMENDATIONS:   1. Lexiscan stress test since she cannot walk on a treadmill due to back pain  2. Patient was strongly advised to call 911 if symptoms recur or get worse for any reason  3. Continue current treatment  4. Follow-up with the PCP as scheduled  5.   Follow-up with Dr. Dnog Ferguson after her test    I have reviewed my findings and recommendations with patient    Thank you for the consult    Electronically signed by Diana Quiroz Nusrat Blue MD on 9/13/2020 at 10:10 PM      NOTE: This report was transcribed using voice recognition software.  Every effort was made to ensure accuracy; however, inadvertent computerized transcription errors may be present

## 2020-08-31 ENCOUNTER — OFFICE VISIT (OUTPATIENT)
Dept: CARDIOLOGY CLINIC | Age: 50
End: 2020-08-31
Payer: MEDICAID

## 2020-08-31 VITALS
WEIGHT: 196 LBS | DIASTOLIC BLOOD PRESSURE: 70 MMHG | HEART RATE: 82 BPM | SYSTOLIC BLOOD PRESSURE: 164 MMHG | HEIGHT: 68 IN | BODY MASS INDEX: 29.7 KG/M2

## 2020-08-31 PROCEDURE — 99244 OFF/OP CNSLTJ NEW/EST MOD 40: CPT | Performed by: INTERNAL MEDICINE

## 2020-09-01 ENCOUNTER — HOSPITAL ENCOUNTER (OUTPATIENT)
Dept: CARDIOLOGY | Age: 50
Discharge: HOME | End: 2020-09-01

## 2020-09-01 VITALS — HEIGHT: 68 IN | TEMPERATURE: 96.9 F | HEART RATE: 59 BPM | WEIGHT: 196 LBS | BODY MASS INDEX: 29.7 KG/M2

## 2020-09-01 PROCEDURE — 2580000003 HC RX 258: Performed by: INTERNAL MEDICINE

## 2020-09-01 PROCEDURE — 3430000000 HC RX DIAGNOSTIC RADIOPHARMACEUTICAL: Performed by: INTERNAL MEDICINE

## 2020-09-01 PROCEDURE — 78452 HT MUSCLE IMAGE SPECT MULT: CPT

## 2020-09-01 PROCEDURE — 93017 CV STRESS TEST TRACING ONLY: CPT

## 2020-09-01 PROCEDURE — 6360000002 HC RX W HCPCS: Performed by: INTERNAL MEDICINE

## 2020-09-01 PROCEDURE — A9502 TC99M TETROFOSMIN: HCPCS | Performed by: INTERNAL MEDICINE

## 2020-09-01 RX ORDER — SIMVASTATIN 10 MG
10 TABLET ORAL NIGHTLY
Status: ON HOLD | COMMUNITY
End: 2020-09-15 | Stop reason: HOSPADM

## 2020-09-01 RX ORDER — SODIUM CHLORIDE 0.9 % (FLUSH) 0.9 %
10 SYRINGE (ML) INJECTION PRN
Status: DISCONTINUED | OUTPATIENT
Start: 2020-09-01 | End: 2020-09-02 | Stop reason: HOSPADM

## 2020-09-01 RX ADMIN — SODIUM CHLORIDE, PRESERVATIVE FREE 10 ML: 5 INJECTION INTRAVENOUS at 09:29

## 2020-09-01 RX ADMIN — TETROFOSMIN 8.1 MILLICURIE: 0.23 INJECTION, POWDER, LYOPHILIZED, FOR SOLUTION INTRAVENOUS at 09:29

## 2020-09-01 RX ADMIN — SODIUM CHLORIDE, PRESERVATIVE FREE 10 ML: 5 INJECTION INTRAVENOUS at 10:45

## 2020-09-01 RX ADMIN — TETROFOSMIN 25.8 MILLICURIE: 0.23 INJECTION, POWDER, LYOPHILIZED, FOR SOLUTION INTRAVENOUS at 10:44

## 2020-09-01 RX ADMIN — REGADENOSON 0.4 MG: 0.08 INJECTION, SOLUTION INTRAVENOUS at 10:44

## 2020-09-01 RX ADMIN — SODIUM CHLORIDE, PRESERVATIVE FREE 10 ML: 5 INJECTION INTRAVENOUS at 10:44

## 2020-09-01 NOTE — PROCEDURES
[id number] [facility] [address] and Vascular Lab - [address]. OMAY[place], [address] [place] [address]  [phone]               Pharmacologic Stress Nuclear Gated SPECT Study    Name: Nicci Rodriguez Account Number: [de-identified]    :  1970          Sex: female         Date of Study:  2020                    Ordering Provider: Sherri Browning MD          PCP: Susie Portillo MD      Cardiologist: Sherri Browning MD             Interpreting Physician: Kristen Guzman MD  _________________________________________________________________________________    Indication:   Detecting the presence and location of coronary artery disease    Clinical History:   Patient has no known history of coronary artery disease. Resting ECG:    Sinus rhythm    Procedure:   Pharmacologic stress testing was performed with regadenoson 0.4 mg for 15 seconds. The heart rate was 59 at baseline and liana to 101 beats during the infusion. The blood pressure at baseline was 154/84 and blood pressure at the end of infusion was 158/72. Blood pressure response was normal during the stress procedure. The patient experienced mild increased breathing felt dizzy and mild jaw pain post infuson. Symptoms lessened then resolved post sips of Reguar Pepsi. ECG during the infusion did not change. IMAGING: Myocardial perfusion imaging was performed at rest 30-35 minutes following the intravenous injection of 8.1 mCi of (Tc-tetrofosmin) followed by 10 ml of Normal Saline. As per infusion protocol, the patient was injected intravenously with 25.8 mCi of (Tc-tetrofosmin) followed by 10 ml of Normal Saline. Gated post-stress tomographic imaging was performed 45 minutes after stress. FINDINGS: The overall quality of the study was good. Left ventricular cavity size was noted to be normal.    Rotational analog analysis demonstrated no patient motion or abnormal extracardiac radioactivity.       A mild defect was present in the mid anterior wall(s) that was  small size on the post regadenoson images             The resting images are show no change. Gated SPECT left ventricular ejection fraction was calculated to be 45% with mild global hypokinesis. Impression:    1. Electrocardiographically normal regadenoson infusion with a clinicallynonischemic response. 2. Myocardial perfusion imaging showed small anterior wall fixed defect. 3. Overall left ventricular systolic function mildly reduced, EF 45% with mild global hypokinesis. 4. Low risk general pharmacologic stress test    No recent study to compare. Thank you for sending your patient to this [employer].      Electronically signed by Domenico Felder MD on 9/2/2020 at 8:14 AM

## 2020-09-02 ENCOUNTER — TELEPHONE (OUTPATIENT)
Dept: CARDIOLOGY | Age: 50
End: 2020-09-02

## 2020-09-02 ENCOUNTER — TELEPHONE (OUTPATIENT)
Dept: CARDIOLOGY CLINIC | Age: 50
End: 2020-09-02

## 2020-09-02 PROCEDURE — 93000 ELECTROCARDIOGRAM COMPLETE: CPT | Performed by: INTERNAL MEDICINE

## 2020-09-11 ENCOUNTER — HOSPITAL ENCOUNTER (OUTPATIENT)
Age: 50
Discharge: HOME | End: 2020-09-11

## 2020-09-11 LAB
ABO/RH: NORMAL
ANION GAP SERPL CALCULATED.3IONS-SCNC: 13 MMOL/L (ref 7–16)
ANTIBODY SCREEN: NORMAL
BILIRUBIN URINE: NEGATIVE
BLOOD, URINE: NEGATIVE
BUN BLDV-MCNC: 9 MG/DL (ref 6–20)
CALCIUM SERPL-MCNC: 9.5 MG/DL (ref 8.6–10.2)
CHLORIDE BLD-SCNC: 93 MMOL/L (ref 98–107)
CLARITY: CLEAR
CO2: 26 MMOL/L (ref 22–29)
COLOR: YELLOW
CREAT SERPL-MCNC: 0.7 MG/DL (ref 0.5–1)
GFR AFRICAN AMERICAN: >60
GFR NON-AFRICAN AMERICAN: >60 ML/MIN/1.73
GLUCOSE BLD-MCNC: 252 MG/DL (ref 74–99)
GLUCOSE URINE: NEGATIVE MG/DL
HCT VFR BLD CALC: 38.9 % (ref 34–48)
HEMOGLOBIN: 12.9 G/DL (ref 11.5–15.5)
KETONES, URINE: NEGATIVE MG/DL
LEUKOCYTE ESTERASE, URINE: NEGATIVE
MCH RBC QN AUTO: 30.6 PG (ref 26–35)
MCHC RBC AUTO-ENTMCNC: 33.2 % (ref 32–34.5)
MCV RBC AUTO: 92.2 FL (ref 80–99.9)
NITRITE, URINE: NEGATIVE
PDW BLD-RTO: 13.4 FL (ref 11.5–15)
PH UA: 6 (ref 5–9)
PLATELET # BLD: 356 E9/L (ref 130–450)
PMV BLD AUTO: 10.6 FL (ref 7–12)
POTASSIUM SERPL-SCNC: 4.1 MMOL/L (ref 3.5–5)
PROTEIN UA: NEGATIVE MG/DL
RBC # BLD: 4.22 E12/L (ref 3.5–5.5)
SODIUM BLD-SCNC: 132 MMOL/L (ref 132–146)
SPECIFIC GRAVITY UA: 1.01 (ref 1–1.03)
UROBILINOGEN, URINE: 0.2 E.U./DL
WBC # BLD: 14.5 E9/L (ref 4.5–11.5)

## 2020-09-11 PROCEDURE — 80048 BASIC METABOLIC PNL TOTAL CA: CPT

## 2020-09-11 PROCEDURE — 86901 BLOOD TYPING SEROLOGIC RH(D): CPT

## 2020-09-11 PROCEDURE — 85027 COMPLETE CBC AUTOMATED: CPT

## 2020-09-11 PROCEDURE — 81003 URINALYSIS AUTO W/O SCOPE: CPT

## 2020-09-11 PROCEDURE — 36415 COLL VENOUS BLD VENIPUNCTURE: CPT

## 2020-09-11 PROCEDURE — 86850 RBC ANTIBODY SCREEN: CPT

## 2020-09-11 PROCEDURE — 86900 BLOOD TYPING SEROLOGIC ABO: CPT

## 2020-09-14 ENCOUNTER — HOSPITAL ENCOUNTER (OUTPATIENT)
Dept: CARDIAC CATH/INVASIVE PROCEDURES | Age: 50
Setting detail: OBSERVATION
Discharge: HOME | End: 2020-09-15
Attending: INTERNAL MEDICINE | Admitting: INTERNAL MEDICINE
Payer: MEDICAID

## 2020-09-14 PROBLEM — I25.10 CAD IN NATIVE ARTERY: Status: ACTIVE | Noted: 2020-09-14

## 2020-09-14 LAB — METER GLUCOSE: 207 MG/DL (ref 74–99)

## 2020-09-14 PROCEDURE — 93458 L HRT ARTERY/VENTRICLE ANGIO: CPT | Performed by: INTERNAL MEDICINE

## 2020-09-14 PROCEDURE — 82962 GLUCOSE BLOOD TEST: CPT

## 2020-09-14 PROCEDURE — 75710 ARTERY X-RAYS ARM/LEG: CPT | Performed by: INTERNAL MEDICINE

## 2020-09-14 PROCEDURE — 2500000003 HC RX 250 WO HCPCS

## 2020-09-14 PROCEDURE — C1760 CLOSURE DEV, VASC: HCPCS

## 2020-09-14 PROCEDURE — C1725 CATH, TRANSLUMIN NON-LASER: HCPCS

## 2020-09-14 PROCEDURE — C1769 GUIDE WIRE: HCPCS

## 2020-09-14 PROCEDURE — 2709999900 HC NON-CHARGEABLE SUPPLY

## 2020-09-14 PROCEDURE — C1894 INTRO/SHEATH, NON-LASER: HCPCS

## 2020-09-14 PROCEDURE — 6370000000 HC RX 637 (ALT 250 FOR IP): Performed by: INTERNAL MEDICINE

## 2020-09-14 PROCEDURE — G0379 DIRECT REFER HOSPITAL OBSERV: HCPCS

## 2020-09-14 PROCEDURE — 2580000003 HC RX 258: Performed by: INTERNAL MEDICINE

## 2020-09-14 PROCEDURE — 6360000002 HC RX W HCPCS

## 2020-09-14 PROCEDURE — G0378 HOSPITAL OBSERVATION PER HR: HCPCS

## 2020-09-14 RX ORDER — SODIUM CHLORIDE 0.9 % (FLUSH) 0.9 %
10 SYRINGE (ML) INJECTION EVERY 12 HOURS SCHEDULED
Status: DISCONTINUED | OUTPATIENT
Start: 2020-09-14 | End: 2020-09-15 | Stop reason: HOSPADM

## 2020-09-14 RX ORDER — METHADONE HYDROCHLORIDE 10 MG/5ML
37 SOLUTION ORAL DAILY
Status: DISCONTINUED | OUTPATIENT
Start: 2020-09-14 | End: 2020-09-14 | Stop reason: SDUPTHER

## 2020-09-14 RX ORDER — ASPIRIN 81 MG/1
81 TABLET ORAL DAILY
Status: DISCONTINUED | OUTPATIENT
Start: 2020-09-14 | End: 2020-09-15 | Stop reason: HOSPADM

## 2020-09-14 RX ORDER — ASPIRIN 325 MG
325 TABLET ORAL ONCE
Status: COMPLETED | OUTPATIENT
Start: 2020-09-14 | End: 2020-09-14

## 2020-09-14 RX ORDER — LISINOPRIL AND HYDROCHLOROTHIAZIDE 12.5; 1 MG/1; MG/1
1 TABLET ORAL DAILY
Status: DISCONTINUED | OUTPATIENT
Start: 2020-09-14 | End: 2020-09-14 | Stop reason: SDUPTHER

## 2020-09-14 RX ORDER — LISINOPRIL 10 MG/1
10 TABLET ORAL DAILY
Status: DISCONTINUED | OUTPATIENT
Start: 2020-09-14 | End: 2020-09-15 | Stop reason: HOSPADM

## 2020-09-14 RX ORDER — NITROGLYCERIN 0.4 MG/1
0.4 TABLET SUBLINGUAL EVERY 5 MIN PRN
Status: DISCONTINUED | OUTPATIENT
Start: 2020-09-14 | End: 2020-09-15 | Stop reason: HOSPADM

## 2020-09-14 RX ORDER — ATORVASTATIN CALCIUM 40 MG/1
40 TABLET, FILM COATED ORAL NIGHTLY
Status: DISCONTINUED | OUTPATIENT
Start: 2020-09-14 | End: 2020-09-15 | Stop reason: HOSPADM

## 2020-09-14 RX ORDER — ACETAMINOPHEN 325 MG/1
650 TABLET ORAL EVERY 4 HOURS PRN
Status: DISCONTINUED | OUTPATIENT
Start: 2020-09-14 | End: 2020-09-15 | Stop reason: HOSPADM

## 2020-09-14 RX ORDER — DEXTROSE MONOHYDRATE 25 G/50ML
12.5 INJECTION, SOLUTION INTRAVENOUS PRN
Status: DISCONTINUED | OUTPATIENT
Start: 2020-09-14 | End: 2020-09-15 | Stop reason: HOSPADM

## 2020-09-14 RX ORDER — METHADONE HYDROCHLORIDE 10 MG/ML
37 CONCENTRATE ORAL DAILY
Status: DISCONTINUED | OUTPATIENT
Start: 2020-09-14 | End: 2020-09-15 | Stop reason: HOSPADM

## 2020-09-14 RX ORDER — SODIUM CHLORIDE 0.9 % (FLUSH) 0.9 %
10 SYRINGE (ML) INJECTION PRN
Status: DISCONTINUED | OUTPATIENT
Start: 2020-09-14 | End: 2020-09-15 | Stop reason: HOSPADM

## 2020-09-14 RX ORDER — AMLODIPINE BESYLATE 10 MG/1
20 TABLET ORAL DAILY
Status: DISCONTINUED | OUTPATIENT
Start: 2020-09-14 | End: 2020-09-15 | Stop reason: HOSPADM

## 2020-09-14 RX ORDER — NICOTINE POLACRILEX 4 MG
15 LOZENGE BUCCAL PRN
Status: DISCONTINUED | OUTPATIENT
Start: 2020-09-14 | End: 2020-09-15 | Stop reason: HOSPADM

## 2020-09-14 RX ORDER — INSULIN GLARGINE 100 [IU]/ML
20 INJECTION, SOLUTION SUBCUTANEOUS NIGHTLY
Status: DISCONTINUED | OUTPATIENT
Start: 2020-09-14 | End: 2020-09-15 | Stop reason: HOSPADM

## 2020-09-14 RX ORDER — ALPRAZOLAM 1 MG/1
1 TABLET ORAL 4 TIMES DAILY PRN
Status: DISCONTINUED | OUTPATIENT
Start: 2020-09-14 | End: 2020-09-15 | Stop reason: HOSPADM

## 2020-09-14 RX ORDER — HYDROCHLOROTHIAZIDE 12.5 MG/1
12.5 TABLET ORAL DAILY
Status: DISCONTINUED | OUTPATIENT
Start: 2020-09-14 | End: 2020-09-15 | Stop reason: HOSPADM

## 2020-09-14 RX ORDER — DEXTROSE MONOHYDRATE 50 MG/ML
100 INJECTION, SOLUTION INTRAVENOUS PRN
Status: DISCONTINUED | OUTPATIENT
Start: 2020-09-14 | End: 2020-09-15 | Stop reason: HOSPADM

## 2020-09-14 RX ADMIN — ASPIRIN 325 MG: 325 TABLET, FILM COATED ORAL at 09:09

## 2020-09-14 RX ADMIN — INSULIN GLARGINE 20 UNITS: 100 INJECTION, SOLUTION SUBCUTANEOUS at 20:42

## 2020-09-14 RX ADMIN — ATORVASTATIN CALCIUM 40 MG: 40 TABLET, FILM COATED ORAL at 20:38

## 2020-09-14 RX ADMIN — SODIUM CHLORIDE, PRESERVATIVE FREE 10 ML: 5 INJECTION INTRAVENOUS at 20:38

## 2020-09-14 RX ADMIN — METOPROLOL TARTRATE 25 MG: 25 TABLET, FILM COATED ORAL at 20:38

## 2020-09-14 ASSESSMENT — PAIN SCALES - GENERAL
PAINLEVEL_OUTOF10: 0
PAINLEVEL_OUTOF10: 0

## 2020-09-14 NOTE — H&P
Department of Medicine  Section of Cardiology  Attending Procedure History and Physical        Pre-Procedural Diagnosis: Chest pain, abnormal stress test    Indications: Chest pain, abnormal stress test    Procedure Planned:  Cardiac catheterization    History obtained from patient    History of Present Illness: The patient is a 54 y.o. female who presents for the above procedure. With hypertension, diabetes mellitus, smoking, has been having chest pain, was found to have abnormal stress test, patient is here for cardiac catheterization for further evaluation. Past Medical History:        Diagnosis Date    CAD in native artery 2020    Claustrophobia     mild    Colitis     Constipation     Diabetes mellitus ([address]     Heart problem     Hyperlipidemia     Hypertension     Hypothyroidism     Sleep apnea     diagnosed 7-8 years ago does not use C-pap problems with cost    Thyroid disease        Past Surgical History:        Procedure Laterality Date    APPENDECTOMY      CARDIAC CATHETERIZATION  2020    Dr. Oquendo Reading EF 55% cts consult ordered     SECTION      x2    CHOLECYSTECTOMY      COLONOSCOPY      HERNIA REPAIR  12/1/15    laparoscopic incisional reapair with mesh    HYSTERECTOMY      KNEE SURGERY Right     VENTRAL HERNIA REPAIR       Medications:         Allergies:  Oxycodone and Seasonal    History of allergic reaction to anesthesia:  no    Social History  TOBACCO: Current smoker    REVIEW OF SYSTEMS  CONSTITUTIONAL:  negative for  fevers, chills  HEENT:  negative for earaches, nasal congestion and epistaxis  RESPIRATORY:  negative for  dry cough, cough with sputum,wheezing and hemoptysis  GASTROINTESTINAL:  negative for nausea, vomiting  MUSCULOSKELETAL:  negative for  myalgias, arthralgias  NEUROLOGICAL:  negative for visual disturbance, dysphagia    PHYSICAL EXAM    BP (!) 180/80   Pulse 62   Temp 98.3 °F (36.8 °C) (Temporal)   Resp 16   Ht 5' 8\" (1.727

## 2020-09-15 ENCOUNTER — APPOINTMENT (OUTPATIENT)
Dept: CT IMAGING | Age: 50
End: 2020-09-15
Attending: INTERNAL MEDICINE

## 2020-09-15 ENCOUNTER — APPOINTMENT (OUTPATIENT)
Dept: INTERVENTIONAL RADIOLOGY/VASCULAR | Age: 50
End: 2020-09-15
Attending: INTERNAL MEDICINE

## 2020-09-15 ENCOUNTER — APPOINTMENT (OUTPATIENT)
Dept: ULTRASOUND IMAGING | Age: 50
End: 2020-09-15
Attending: INTERNAL MEDICINE

## 2020-09-15 VITALS
RESPIRATION RATE: 16 BRPM | SYSTOLIC BLOOD PRESSURE: 153 MMHG | DIASTOLIC BLOOD PRESSURE: 88 MMHG | BODY MASS INDEX: 28.79 KG/M2 | HEART RATE: 52 BPM | WEIGHT: 190 LBS | HEIGHT: 68 IN | TEMPERATURE: 97 F | OXYGEN SATURATION: 98 %

## 2020-09-15 LAB
ANION GAP SERPL CALCULATED.3IONS-SCNC: 12 MMOL/L (ref 7–16)
BUN BLDV-MCNC: 12 MG/DL (ref 6–20)
CALCIUM SERPL-MCNC: 9.3 MG/DL (ref 8.6–10.2)
CHLORIDE BLD-SCNC: 96 MMOL/L (ref 98–107)
CO2: 27 MMOL/L (ref 22–29)
CREAT SERPL-MCNC: 0.8 MG/DL (ref 0.5–1)
GFR AFRICAN AMERICAN: >60
GFR NON-AFRICAN AMERICAN: >60 ML/MIN/1.73
GLUCOSE BLD-MCNC: 172 MG/DL (ref 74–99)
HBA1C MFR BLD: 11.5 % (ref 4–5.6)
HCT VFR BLD CALC: 37.4 % (ref 34–48)
HEMOGLOBIN: 12.2 G/DL (ref 11.5–15.5)
LV EF: 65 %
LVEF MODALITY: NORMAL
MCH RBC QN AUTO: 30 PG (ref 26–35)
MCHC RBC AUTO-ENTMCNC: 32.6 % (ref 32–34.5)
MCV RBC AUTO: 91.9 FL (ref 80–99.9)
PDW BLD-RTO: 13.7 FL (ref 11.5–15)
PLATELET # BLD: 328 E9/L (ref 130–450)
PMV BLD AUTO: 10.4 FL (ref 7–12)
POTASSIUM SERPL-SCNC: 4 MMOL/L (ref 3.5–5)
RBC # BLD: 4.07 E12/L (ref 3.5–5.5)
SODIUM BLD-SCNC: 135 MMOL/L (ref 132–146)
WBC # BLD: 14.3 E9/L (ref 4.5–11.5)

## 2020-09-15 PROCEDURE — 80048 BASIC METABOLIC PNL TOTAL CA: CPT

## 2020-09-15 PROCEDURE — 85027 COMPLETE CBC AUTOMATED: CPT

## 2020-09-15 PROCEDURE — 93923 UPR/LXTR ART STDY 3+ LVLS: CPT

## 2020-09-15 PROCEDURE — 71250 CT THORAX DX C-: CPT

## 2020-09-15 PROCEDURE — 93922 UPR/L XTREMITY ART 2 LEVELS: CPT

## 2020-09-15 PROCEDURE — 2580000003 HC RX 258: Performed by: INTERNAL MEDICINE

## 2020-09-15 PROCEDURE — 83036 HEMOGLOBIN GLYCOSYLATED A1C: CPT

## 2020-09-15 PROCEDURE — 93306 TTE W/DOPPLER COMPLETE: CPT

## 2020-09-15 PROCEDURE — G0378 HOSPITAL OBSERVATION PER HR: HCPCS

## 2020-09-15 PROCEDURE — 36415 COLL VENOUS BLD VENIPUNCTURE: CPT

## 2020-09-15 PROCEDURE — 99244 OFF/OP CNSLTJ NEW/EST MOD 40: CPT | Performed by: THORACIC SURGERY (CARDIOTHORACIC VASCULAR SURGERY)

## 2020-09-15 PROCEDURE — 94729 DIFFUSING CAPACITY: CPT

## 2020-09-15 PROCEDURE — 93970 EXTREMITY STUDY: CPT

## 2020-09-15 PROCEDURE — 93880 EXTRACRANIAL BILAT STUDY: CPT

## 2020-09-15 PROCEDURE — 94375 RESPIRATORY FLOW VOLUME LOOP: CPT

## 2020-09-15 PROCEDURE — 99217 PR OBSERVATION CARE DISCHARGE MANAGEMENT: CPT | Performed by: INTERNAL MEDICINE

## 2020-09-15 PROCEDURE — 6370000000 HC RX 637 (ALT 250 FOR IP): Performed by: INTERNAL MEDICINE

## 2020-09-15 RX ORDER — ATORVASTATIN CALCIUM 40 MG/1
40 TABLET, FILM COATED ORAL DAILY
Qty: 30 TABLET | Refills: 3 | Status: SHIPPED | OUTPATIENT
Start: 2020-09-15 | End: 2021-01-12

## 2020-09-15 RX ADMIN — LISINOPRIL 10 MG: 10 TABLET ORAL at 08:53

## 2020-09-15 RX ADMIN — METHADONE HYDROCHLORIDE 37 MG: 10 CONCENTRATE ORAL at 07:28

## 2020-09-15 RX ADMIN — ACETAMINOPHEN 650 MG: 325 TABLET ORAL at 05:41

## 2020-09-15 RX ADMIN — ALPRAZOLAM 1 MG: 1 TABLET ORAL at 12:22

## 2020-09-15 RX ADMIN — SODIUM CHLORIDE, PRESERVATIVE FREE 10 ML: 5 INJECTION INTRAVENOUS at 08:54

## 2020-09-15 RX ADMIN — METOPROLOL TARTRATE 25 MG: 25 TABLET, FILM COATED ORAL at 08:57

## 2020-09-15 RX ADMIN — ASPIRIN 81 MG: 81 TABLET, COATED ORAL at 08:53

## 2020-09-15 RX ADMIN — HYDROCHLOROTHIAZIDE 12.5 MG: 12.5 TABLET ORAL at 08:53

## 2020-09-15 RX ADMIN — AMLODIPINE BESYLATE 20 MG: 10 TABLET ORAL at 08:53

## 2020-09-15 ASSESSMENT — PAIN SCALES - GENERAL
PAINLEVEL_OUTOF10: 3
PAINLEVEL_OUTOF10: 3
PAINLEVEL_OUTOF10: 0
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
## 2020-09-15 NOTE — CARE COORDINATION
9/15, SW went to meet with patient-not in room. Per Pt Access patient is a self pay and aware of financial aid program.  SW to follow for further discharge planning.     Camila Mckee, [facility]

## 2020-09-15 NOTE — DISCHARGE SUMMARY
Physician Discharge Summary     Patient ID:  Idalia Burkett  [id number]  89 y.o.  1970    Admit date: 9/14/2020    Discharge date and time: 9/15/2020    Admitting Physician: Tristin Sampson MD     Discharge Physician: Hudson Donato    Admission Diagnoses: Abnormal result of other cardiovascular function study [R94.39]  CAD in native artery [I25.10]    Discharge Diagnoses: multivessel CAD    Admission Condition: fair    Discharged Condition: good    Indication for Admission: CAD    Hospital Course: did well post cath,evaluated by CTS, will be brought back for CABG    Consults: CT surgery    Significant Diagnostic Studies: angiography: IMPRESSION:  1.  Multivessel disease involving ostial LAD, distal LAD, left  circumflex artery, OM branch, RCA and acute marginal branch. 2.  Moderate disease involving the ostium and the proximal segment of  the right common iliac artery. 3.  Preserved LV function with mild inferior wall hypokinesis. 4.  Successful deployment of 6-Slovak Angio-Seal in the right common  femoral artery and deployment of 6-Slovak Angio-Seal in the left common  femoral artery.     Outstanding Order Results     Date and Time Order Name Status Description    9/15/2020 1117 CT CHEST WO CONTRAST In process           Discharge Exam:  BP (!) 153/88   Pulse 52   Temp 97 °F (36.1 °C) (Temporal)   Resp 16   Ht 5' 8\" (1.727 m)   Wt 190 lb (86.2 kg)   SpO2 98%   BMI 28.89 kg/m²   CONSTITUTIONAL:  awake, alert, cooperative, no apparent distress, and appears stated age  HEAD:  normocepalic, without obvious abnormality, atraumatic  NECK:  Supple, symmetrical, trachea midline, no adenopathy, thyroid symmetric, not enlarged and no tenderness, skin normal  LUNGS:  No increased work of breathing, good air exchange, clear to auscultation bilaterally, no crackles or wheezing  CARDIOVASCULAR:  Normal apical impulse, regular rate and rhythm, normal S1 and S2, no S3 or S4, systolic murmur, no edema, no JVD, no carotid bruit. ABDOMEN:  Soft, nontender, no masses, no hepatomegaly, no splenomegaly, BS+  MUSCULOSKELETAL:  No clubbing no cyanosis. there is no redness, warmth, or swelling of the joints  full range of motion noted  NEUROLOGIC:  Alert, awake,oriented x3  SKIN:  no bruising or bleeding, normal skin color, texture, turgor and no redness, warmth, or swelling    Disposition: home    Patient Instructions:    Konstantin Aragon   Home Medication Instructions [facility]:[id number]    Printed on:10/04/20 1812   Medication Information                      aspirin 81 MG EC tablet  Take 81 mg by mouth daily             atorvastatin (LIPITOR) 40 MG tablet  Take 1 tablet by mouth daily             Insulin Pen Needle ([employer] PEN NEEDLES 31G) 31G X 8 MM MISC  1 each by Does not apply route daily             METHADONE HCL PO  Take by mouth Takes 37 mg daily ( taking for past 20 years) history of 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  Oxycodone                 Activity:avoid strenuous physical activities  Diet: cardiac diet  Wound Care: none needed    Follow-up with PCP in 1 to 2 weeks, Dr. Robert Martin in 1 to 2 weeks. Note that over 32 minutes was spent in preparing discharge papers, discussing discharge with patient and family, medication review.     Shyanne Knox  9/15/2020  11:52 AM

## 2020-09-15 NOTE — CARE COORDINATION
Transition of care: Pt having 2d echo done. Met with pt and pt's , Jose Madden, briefly in room. Pt lives with her  in a 2 story home. Her bedroom and bathroom are on the 1st floor. Independent with ADLs and drives. DME- glucometer. Pt stated no health insurance coverage. Public Benefits to speak with her. PCP is Dr. Melany Brown and pharmacy is [facility] on [address]  Sw/salvatore will follow

## 2020-09-16 ENCOUNTER — TELEPHONE (OUTPATIENT)
Dept: CARDIOTHORACIC SURGERY | Age: 50
End: 2020-09-16

## 2020-09-16 ENCOUNTER — PREP FOR PROCEDURE (OUTPATIENT)
Dept: CARDIOTHORACIC SURGERY | Age: 50
End: 2020-09-16

## 2020-09-16 DIAGNOSIS — I25.10 CAD IN NATIVE ARTERY: Primary | ICD-10-CM

## 2020-09-16 RX ORDER — SODIUM CHLORIDE 0.9 % (FLUSH) 0.9 %
10 SYRINGE (ML) INJECTION EVERY 12 HOURS SCHEDULED
Status: CANCELLED | OUTPATIENT
Start: 2020-09-16

## 2020-09-16 RX ORDER — CHLORHEXIDINE GLUCONATE ORAL RINSE 1.2 MG/ML
15 SOLUTION DENTAL ONCE
Status: CANCELLED | OUTPATIENT
Start: 2020-09-16 | End: 2020-09-16

## 2020-09-16 RX ORDER — SODIUM CHLORIDE 9 MG/ML
INJECTION, SOLUTION INTRAVENOUS CONTINUOUS
Status: CANCELLED | OUTPATIENT
Start: 2020-09-16

## 2020-09-16 RX ORDER — CHLORHEXIDINE GLUCONATE 4 G/100ML
SOLUTION TOPICAL ONCE
Status: CANCELLED | OUTPATIENT
Start: 2020-09-16 | End: 2020-09-16

## 2020-09-16 RX ORDER — SODIUM CHLORIDE 0.9 % (FLUSH) 0.9 %
10 SYRINGE (ML) INJECTION PRN
Status: CANCELLED | OUTPATIENT
Start: 2020-09-16

## 2020-09-16 NOTE — H&P
Chief Complaint:  Angina      HPI:  53 yo female smoker with hx of DM, HTN, HLD who underwent outpatient stress test which was suspicious for ischemia. She is now s/p elective LHC which revealed severe MVCAD. She complains of jaw pain with exertion. She denies chest or jaw pain at rest.       Allergies:          Allergies   Allergen Reactions    Oxycodone         Addiction happened 20 years ago on Methadone     Seasonal         Maple trees grass dust dust mites        Home medications:    Current Facility-Administered Medications             Current Facility-Administered Medications   Medication Dose Route Frequency Provider Last Rate Last Dose    sodium chloride flush 0.9 % injection 10 mL  10 mL Intravenous 2 times per day Zulema Foley MD   10 mL at 09/15/20 0854    sodium chloride flush 0.9 % injection 10 mL  10 mL Intravenous PRN Zulema Foley MD        acetaminophen (TYLENOL) tablet 650 mg  650 mg Oral Q4H PRN Zulema Foley MD   650 mg at 09/15/20 0541    amLODIPine (NORVASC) tablet 20 mg  20 mg Oral Daily Zulema Foley MD   20 mg at 09/15/20 0853    aspirin EC tablet 81 mg  81 mg Oral Daily Zulema Foley MD   81 mg at 09/15/20 0853    insulin glargine (LANTUS) injection vial 20 Units  20 Units Subcutaneous Nightly Zulema Foley MD   20 Units at 09/14/20 2042    ALPRAZolam (XANAX) tablet 1 mg  1 mg Oral 4x Daily PRN Zulema Foley MD        nitroGLYCERIN (NITROSTAT) SL tablet 0.4 mg  0.4 mg Sublingual Q5 Min PRN Zulema Foley MD        atorvastatin (LIPITOR) tablet 40 mg  40 mg Oral Nightly Zulema Foley MD   40 mg at 09/14/20 2038    metoprolol tartrate (LOPRESSOR) tablet 25 mg  25 mg Oral BID Zulema Foley MD   25 mg at 09/15/20 0857    glucose (GLUTOSE) 40 % oral gel 15 g  15 g Oral PRN Zulema Foley MD        dextrose 50 % IV solution  12.5 g Intravenous PRN Zulema Foley MD        glucagon (rDNA) injection 1 mg  1 mg Intramuscular PRN Zulema Foley MD        dextrose 5 % solution  100 mL/hr Intravenous PRN Magen Bolus, MD        perflutren lipid microspheres (DEFINITY) injection 1.65 mg  1.5 mL Intravenous ONCE PRN Magen Bolus, MD        lisinopril (PRINIVIL;ZESTRIL) tablet 10 mg  10 mg Oral Daily Magen Bolus, MD   10 mg at 09/15/20 0853     And    hydroCHLOROthiazide (HYDRODIURIL) tablet 12.5 mg  12.5 mg Oral Daily Magen Bolus, MD   12.5 mg at 09/15/20 0853    methadone (DOLOPHINE) 10 MG/ML solution 37 mg  37 mg Oral Daily Magen Bolus, MD   37 mg at 09/15/20 7048           Past Medical History:  Past Medical History        Past Medical History:   Diagnosis Date    CAD in native artery 2020    Claustrophobia       mild    Colitis      Constipation      Diabetes mellitus ([facility] [address]      Heart problem      Hyperlipidemia      Hypertension      Hypothyroidism      Sleep apnea       diagnosed 7-8 years ago does not use C-pap problems with cost    Thyroid disease             Past Surgical History:  Past Surgical History   Past Surgical History:   Procedure Laterality Date    APPENDECTOMY        CARDIAC CATHETERIZATION   2020     Dr. Lorna Alan EF 55% cts consult ordered     SECTION         x2    CHOLECYSTECTOMY        COLONOSCOPY        HERNIA REPAIR   12/1/15     laparoscopic incisional reapair with mesh    HYSTERECTOMY        KNEE SURGERY Right      VENTRAL HERNIA REPAIR               Social History:  Social History               Socioeconomic History    Marital status:        Spouse name: Not on file    Number of children: Not on file    Years of education: Not on file    Highest education level: Not on file   Occupational History    Occupation:  [employer] Financial resource strain: Not on file    Food insecurity       Worry: Not on file       Inability: Not on file    Transportation needs       Medical: Not on file       Non-medical: Not on file   Tobacco Use    Smoking status: Current Every Day Smoker       Packs/day: 1.00       Years: 25.00       Pack years: 25.00       Types: Cigarettes    Smokeless tobacco: Never Used    Tobacco comment: trying to quit   Substance and Sexual Activity    Alcohol use: Not Currently       Comment: Pepsi daily     Drug use: Yes       Comment: methadone     Sexual activity: Yes       Partners: Male   Lifestyle    Physical activity       Days per week: Not on file       Minutes per session: Not on file    Stress: Not on file   Relationships    Social connections       Talks on phone: Not on file       Gets together: Not on file       Attends Bahai service: Not on file       Active member of club or organization: Not on file       Attends meetings of clubs or organizations: Not on file       Relationship status: Not on file    Intimate partner violence       Fear of current or ex partner: Not on file       Emotionally abused: Not on file       Physically abused: Not on file       Forced sexual activity: Not on file   Other Topics Concern    Not on file   Social History Narrative    Not on file           Family History:  Family History         Family History   Adopted: Yes   Problem Relation Age of Onset    Mult Sclerosis Mother      Mult Sclerosis Maternal Grandmother             Review of Systems:  Constitutional: Denies fevers, chills, or weight loss. HEENT: Denies visual changes or hearing loss. Heart: As per HPI. Lungs: Denies shortness of breath, cough, or wheezing. Gastrointestinal: Denies nausea, vomiting, constipation, or diarrhea. Genitourinary: dysuria or hematuria. Psychiatric: Patient denies anxiety or depression. Neurologic: Patient denies weakness of the extremities, dizziness, or headaches.   All other ROS checked and found to be negative.     Labs:      Recent Labs     09/15/20  0317   WBC 14.3*   HGB 12.2   HCT 37.4             Recent Labs     09/15/20  0317   BUN 12   CREATININE 0.8        Objective:  Vitals BP (!) 155/79   Pulse 55   Temp 97.2 °F (36.2 °C)   Resp 16   Ht 5' 8\" (1.727 m)   Wt 190 lb (86.2 kg)   SpO2 98%   BMI 28.89 kg/m²   General Appearance: Pleasant 54y.o. year old female who appears stated age. Communicates well, no acute distress. HEENT: Head is normocephalic, atraumatic. EOMs intact, PERRL. Trachea midline. Lungs: Normal respiratory rate and normal effort. She is not in respiratory distress. Breath sounds clear to auscultation. No wheezes. Heart: Normal rate. Regular rhythm. S1 normal and S2 normal.   Chest: Symmetric chest wall expansion. Extremities: Normal range of motion. Neurological: Patient is alert and oriented to person, place and time. Patient has normal reflexes. Skin: Warm and dry. Abdomen: Abdomen is soft and non-distended. Bowel sounds are normal. There is no abdominal tenderness tenderness. There is no guarding. There is no mass. Pulses: Distal pulses are intact. Skin: Warm and dry without lesions.           LHC  IMPRESSION:  1.  Multivessel disease involving ostial LAD, distal LAD, left  circumflex artery, OM branch, RCA and acute marginal branch. 2.  Moderate disease involving the ostium and the proximal segment of  the right common iliac artery. 3.  Preserved LV function with mild inferior wall hypokinesis. 4.  Successful deployment of 6-Belarusian Angio-Seal in the right common  femoral artery and deployment of 6-[place]ian Angio-Seal in the left common  femoral artery.     RECOMMENDATIONS:  CT surgery evaluation for possible CABG.       Assessment/Plan:   55 yo female s/p elective LHC found to have severe MVCAD. Surgery was recommended.   All risks, benefits, alternatives and potential complications explained thoroughly including, but not limited to, bleeding, infection, lung injury, kidney injury, stroke, heart attack, prolonged ventilation, wound complication, need for re-operation, and death, as well as the importance of smoking cessation prior to and after surgery and the patient agrees to proceed. plan for elective CABG with possible radial harvest on 9/29/2020.

## 2020-09-16 NOTE — PROGRESS NOTES
Patient having covid 9/24/20 at [facility]. Patient asked to bring ID and self quarantine until day of procedure.

## 2020-09-16 NOTE — PROCEDURES
[address]                  [address] [facility],  [facility]                               PULMONARY FUNCTION    PATIENT NAME: Teddy Lord                   :        1970  MED REC NO:   [id number]                            ROOM:       6306  ACCOUNT NO:   [de-identified]                           ADMIT DATE: 2020  PROVIDER:     Mane Stringer DO    DATE OF PROCEDURE:  09/15/2020    INDICATIONS:  A 72-year-old female, 68 inches, 190 pounds; preoperative  assessment; a 30 pack-year smoker. FINDINGS:  Spirometry reveals forced vital capacity 2.36 liters, 58% of  predicted; an FEV1 of 1.98 liters, 61% of predicted; with an FEV1/FVC  ratio of 84%. Mid flow rates are reduced at 73% of predicted with a  maximum voluntary ventilation of 56 liters per minute, 52% of predicted. Static lung volumes with slow vital capacity 2.61 liters, 65% of  predicted. Inspiratory capacity 2.03 liters, 82% of predicted. Expiratory reserve volume 0.58 liters, 37% of predicted. The diffusing  capacity is reduced at 15.93 mL/min/ per mmHg. IMPRESSION:  Nonspecific ventilatory limitation with loss in gas  transfer. Clinical correlation needed.         Arnold Peralta DO    D: 09/15/2020 17:36:56       T: 09/15/2020 17:44:38     [email or web address]  Job#: [id number]     Doc#: [id number]
right sinus of  Valsalva. It is a mid-size vessel that has 80% proximal disease. Then,  it has 80% in the mid segment, that gives off a large acute marginal  branch that has 60% proximal segment disease. The left heart catheterization showed LVEDP of 10 and there was no  gradient across the aortic valve on pullback. The left ventriculography  showed estimated ejection fraction of 55% with mild distal and mid  inferior wall hypokinesis. There was no significant angiographic mitral  valve regurgitation noted. Limited right iliofemoral angiography confirmed the arteriotomies were  in the right and left common femoral arteries respectively, so a  6-Guamanian Angio-Seal was deployed successfully in the right and left  common femoral artery with effective hemostasis. COMPLICATIONS:  None. ESTIMATED TOTAL BLOOD LOSS:  30 to 40 mL. MEDICATIONS USED DURING THE PROCEDURE:  We used Versed and fentanyl for  conscious sedation. First dose was given at 13:02, procedure ended at  13:48. There were 46 minutes of direct face-to-face supervision during  conscious sedation administration. Total contrast used was 82 mL of Optiray. Total fluoroscopy time was 2.8 minutes. IMPRESSION:  1.  Multivessel disease involving ostial LAD, distal LAD, left  circumflex artery, OM branch, RCA and acute marginal branch. 2.  Moderate disease involving the ostium and the proximal segment of  the right common iliac artery. 3.  Preserved LV function with mild inferior wall hypokinesis. 4.  Successful deployment of 6-Guamanian Angio-Seal in the right common  femoral artery and deployment of 6-Guamanian Angio-Seal in the left common  femoral artery. RECOMMENDATIONS:  CT surgery evaluation for possible CABG.         Good Torres MD    D: 09/14/2020 14:13:14       T: 09/14/2020 15:10:29     [email or web address]  Job#: [id number]     Doc#: [id number]    CC:

## 2020-09-23 ENCOUNTER — TELEPHONE (OUTPATIENT)
Dept: CARDIOLOGY CLINIC | Age: 50
End: 2020-09-23

## 2020-09-23 NOTE — TELEPHONE ENCOUNTER
Pt complains the left groin where cath site(cath last week) is is still very sore and has since developed bruising and a lump. Pt stated it is very painful to walk. No puss or blood, not warm to touch, no fever. Please advise      Ultrasound scheduled for 9/24/20 2pm at [facility]. Pt notified.

## 2020-09-24 ENCOUNTER — HOSPITAL ENCOUNTER (OUTPATIENT)
Age: 50
Discharge: HOME | End: 2020-09-26

## 2020-09-24 ENCOUNTER — TELEPHONE (OUTPATIENT)
Dept: CARDIOLOGY CLINIC | Age: 50
End: 2020-09-24

## 2020-09-24 ENCOUNTER — HOSPITAL ENCOUNTER (OUTPATIENT)
Dept: INTERVENTIONAL RADIOLOGY/VASCULAR | Age: 50
Discharge: HOME | End: 2020-09-26

## 2020-09-24 PROCEDURE — U0003 INFECTIOUS AGENT DETECTION BY NUCLEIC ACID (DNA OR RNA); SEVERE ACUTE RESPIRATORY SYNDROME CORONAVIRUS 2 (SARS-COV-2) (CORONAVIRUS DISEASE [COVID-19]), AMPLIFIED PROBE TECHNIQUE, MAKING USE OF HIGH THROUGHPUT TECHNOLOGIES AS DESCRIBED BY CMS-2020-01-R: HCPCS

## 2020-09-24 PROCEDURE — 93926 LOWER EXTREMITY STUDY: CPT

## 2020-09-24 NOTE — PROGRESS NOTES
[facility] PRE-ADMISSION TESTING GENERAL INSTRUCTIONS- [facility]-phone number:[phone]    GENERAL INSTRUCTIONS    [x] Nothing by mouth after midnight, including gum, candy, mints, or water. [x] You may brush your teeth, gargle, but do NOT swallow water. [x]Hibiclens shower  the night before and the morning of surgery. Do not use             Hibiclens on your face or head. [x]No smoking, chewing tobacco, illegal drugs, or alcohol within 24 hours of your surgery. [x] Jewelry, valuables or body piercing's should not be brought to the hospital. All body and/or tongue piercing's must be removed prior to arriving to hospital.  ALL hair pins must be removed. [x] Do not wear makeup, lotions, powders, deodorant. Nail polish as directed by the nurse. [x] Arrange transportation with a responsible adult  to and from the hospital. If you do not have a responsible adult  to transport you, you will need to make arrangements with a medical transportation company (i.e. [employer] A Uber/taxi/bus is not appropriate unless you are accompanied by a responsible adult ). Arrange for someone to be with you for the remainder of the day and for 24 hours after your procedure due to having had anesthesia. Who will be your  for transportation?____husband, greta        [x] Bring insurance card and photo ID. [x] Transfusion Bracelet: Please bring with you to hospital, day of surgery         PARKING INSTRUCTIONS:   [x] Arrival Time:____PLEASE ARRIVE 9/29/2020 AT 0500 AM SURGERY WITH DR Hodgson 135. ARRIVE  [address] TO PRE OP._________            [x] To reach the [place] from [facility], upon entering the hospital, take elevator B to the 3rd floor. EDUCATION INSTRUCTIONS:      [] Knee or hip replacement booklet & exercise pamphlets given. [] Slim Cedillo placed in chart.    [x] Pre-admission Testing educational folder given  [x] Incentive Spirometry,coughing & deep breathing exercises reviewed. [x]Medication information sheet(s)   [x]Fluoroscopy-Xray used in surgery reviewed with patient. Educational pamphlet placed in chart. [x]Pain: Post-op pain is normal and to be expected. You will be asked to rate your pain from 0-10(a zero is not acceptable-education is needed). Your post-op pain goal is:  [x] Ask your nurse for your pain medication. MEDICATION INSTRUCTIONS:   [x]Bring a complete list of your medications, please write the last time you took the medicine, give this list to the nurse. [x] Take the following medications the morning of surgery with 1-2 ounces of water: SEE LIST  [x] Stop herbal supplements and vitamins 5 days before your surgery. [x] DO NOT take any diabetic medicine the morning of surgery. Follow instructions for insulin the day before surgery. [x] If you are diabetic and your blood sugar is low or you feel symptomatic, you may drink 1-2 ounces of apple juice or take a glucose tablet. The morning of your procedure, you may call the pre-op area if you have concerns about your blood sugar [phone]. [x] Use your inhalers the morning of surgery. Bring your emergency inhaler with you day of surgery. [x] Follow physician instructions regarding any blood thinners you may be taking. WHAT TO EXPECT:  [x] The day of surgery you will be greeted and checked in by the [employer].  In addition, you will be registered in the [place] by a Patient Access Representative. Please bring your photo ID and insurance card. A nurse will greet you in accordance to the time you are needed in the pre-op area to prepare you for surgery. Please do not be discouraged if you are not greeted in the order you arrive as there are many variables that are involved in patient preparation. Your patience is greatly appreciated as you wait for your nurse. Please bring in items such as: books, magazines, newspapers, electronics, or any other items  to occupy your time in the waiting area. [x]  Delays may occur with surgery and staff will make a sincere effort to keep you informed of delays. If any delays occur with your procedure, we apologize ahead of time for your inconvenience as we recognize the value of your time.

## 2020-09-24 NOTE — PROGRESS NOTES
PATIENT RECEIVES HER METHADONE FROM THE [facility] IN [place], [place]. PHONE NUMBER AS FOLLOWS [phone].

## 2020-09-24 NOTE — PROGRESS NOTES
PER DR MACARIO, PATIENT IS TO TAKE METHADONE THE MORNING OF SURGERY WITH A SIP OF WATER. PER RAMAN BAUTISTA FOR METHADONE. PATIENT TO TAKE 1/2 DOSE OF METOPROLOL THE MORNING OF SURGERY.

## 2020-09-25 ENCOUNTER — HOSPITAL ENCOUNTER (OUTPATIENT)
Dept: PREADMISSION TESTING | Age: 50
Discharge: HOME | End: 2020-09-25

## 2020-09-25 VITALS
TEMPERATURE: 98.3 F | BODY MASS INDEX: 29.55 KG/M2 | HEART RATE: 59 BPM | WEIGHT: 195 LBS | RESPIRATION RATE: 12 BRPM | SYSTOLIC BLOOD PRESSURE: 138 MMHG | DIASTOLIC BLOOD PRESSURE: 65 MMHG | HEIGHT: 68 IN | OXYGEN SATURATION: 97 %

## 2020-09-25 LAB
ABO/RH: NORMAL
ALBUMIN SERPL-MCNC: 4 G/DL (ref 3.5–5.2)
ALP BLD-CCNC: 107 U/L (ref 35–104)
ALT SERPL-CCNC: 18 U/L (ref 0–32)
ANION GAP SERPL CALCULATED.3IONS-SCNC: 11 MMOL/L (ref 7–16)
ANTIBODY SCREEN: NORMAL
APTT: 33.1 SEC (ref 24.5–35.1)
AST SERPL-CCNC: 19 U/L (ref 0–31)
BACTERIA: NORMAL /HPF
BILIRUB SERPL-MCNC: <0.2 MG/DL (ref 0–1.2)
BILIRUBIN URINE: NEGATIVE
BLOOD, URINE: NEGATIVE
BUN BLDV-MCNC: 6 MG/DL (ref 6–20)
CALCIUM SERPL-MCNC: 9.2 MG/DL (ref 8.6–10.2)
CHLORIDE BLD-SCNC: 100 MMOL/L (ref 98–107)
CLARITY: CLEAR
CO2: 25 MMOL/L (ref 22–29)
COLOR: YELLOW
CREAT SERPL-MCNC: 0.8 MG/DL (ref 0.5–1)
GFR AFRICAN AMERICAN: >60
GFR NON-AFRICAN AMERICAN: >60 ML/MIN/1.73
GLUCOSE BLD-MCNC: 238 MG/DL (ref 74–99)
GLUCOSE URINE: NEGATIVE MG/DL
HCT VFR BLD CALC: 35.8 % (ref 34–48)
HEMOGLOBIN: 11.9 G/DL (ref 11.5–15.5)
INR BLD: 1.1
KETONES, URINE: NEGATIVE MG/DL
LEUKOCYTE ESTERASE, URINE: ABNORMAL
MCH RBC QN AUTO: 30.7 PG (ref 26–35)
MCHC RBC AUTO-ENTMCNC: 33.2 % (ref 32–34.5)
MCV RBC AUTO: 92.3 FL (ref 80–99.9)
NITRITE, URINE: NEGATIVE
PDW BLD-RTO: 13.6 FL (ref 11.5–15)
PH UA: 6.5 (ref 5–9)
PLATELET # BLD: 359 E9/L (ref 130–450)
PMV BLD AUTO: 10.7 FL (ref 7–12)
POTASSIUM SERPL-SCNC: 3.6 MMOL/L (ref 3.5–5)
PROTEIN UA: NEGATIVE MG/DL
PROTHROMBIN TIME: 12.4 SEC (ref 9.3–12.4)
RBC # BLD: 3.88 E12/L (ref 3.5–5.5)
RBC UA: NORMAL /HPF (ref 0–2)
RENAL EPITHELIAL, UA: NORMAL /HPF
SODIUM BLD-SCNC: 136 MMOL/L (ref 132–146)
SPECIFIC GRAVITY UA: 1.01 (ref 1–1.03)
TOTAL PROTEIN: 8 G/DL (ref 6.4–8.3)
UROBILINOGEN, URINE: 0.2 E.U./DL
WBC # BLD: 12.7 E9/L (ref 4.5–11.5)
WBC UA: NORMAL /HPF (ref 0–5)

## 2020-09-25 PROCEDURE — 85027 COMPLETE CBC AUTOMATED: CPT

## 2020-09-25 PROCEDURE — 86920 COMPATIBILITY TEST SPIN: CPT

## 2020-09-25 PROCEDURE — 86901 BLOOD TYPING SEROLOGIC RH(D): CPT

## 2020-09-25 PROCEDURE — 36415 COLL VENOUS BLD VENIPUNCTURE: CPT

## 2020-09-25 PROCEDURE — 87088 URINE BACTERIA CULTURE: CPT

## 2020-09-25 PROCEDURE — 86850 RBC ANTIBODY SCREEN: CPT

## 2020-09-25 PROCEDURE — 86900 BLOOD TYPING SEROLOGIC ABO: CPT

## 2020-09-25 PROCEDURE — 85610 PROTHROMBIN TIME: CPT

## 2020-09-25 PROCEDURE — 80053 COMPREHEN METABOLIC PANEL: CPT

## 2020-09-25 PROCEDURE — 87081 CULTURE SCREEN ONLY: CPT

## 2020-09-25 PROCEDURE — 85730 THROMBOPLASTIN TIME PARTIAL: CPT

## 2020-09-25 PROCEDURE — 81001 URINALYSIS AUTO W/SCOPE: CPT

## 2020-09-26 LAB
MRSA CULTURE ONLY: NORMAL
SARS-COV-2: NOT DETECTED
SOURCE: NORMAL

## 2020-09-27 LAB — URINE CULTURE, ROUTINE: NORMAL

## 2020-09-28 ENCOUNTER — ANESTHESIA EVENT (OUTPATIENT)
Dept: OPERATING ROOM | Age: 50
DRG: 236 | End: 2020-09-28

## 2020-09-29 ENCOUNTER — ANESTHESIA (OUTPATIENT)
Dept: OPERATING ROOM | Age: 50
DRG: 236 | End: 2020-09-29

## 2020-09-29 ENCOUNTER — APPOINTMENT (OUTPATIENT)
Dept: GENERAL RADIOLOGY | Age: 50
DRG: 236 | End: 2020-09-29
Attending: THORACIC SURGERY (CARDIOTHORACIC VASCULAR SURGERY)

## 2020-09-29 ENCOUNTER — HOSPITAL ENCOUNTER (INPATIENT)
Age: 50
LOS: 4 days | Discharge: HOME | DRG: 236 | End: 2020-10-03
Attending: THORACIC SURGERY (CARDIOTHORACIC VASCULAR SURGERY) | Admitting: THORACIC SURGERY (CARDIOTHORACIC VASCULAR SURGERY)
Payer: MEDICAID

## 2020-09-29 VITALS — OXYGEN SATURATION: 98 %

## 2020-09-29 LAB
AADO2: 121.6 MMHG
AADO2: 179.2 MMHG
AADO2: 285.9 MMHG
ANION GAP SERPL CALCULATED.3IONS-SCNC: 11 MMOL/L (ref 7–16)
APTT: 30.2 SEC (ref 24.5–35.1)
B.E.: -1.3 MMOL/L (ref -3–0)
B.E.: -2.4 MMOL/L (ref -3–0)
B.E.: -3 MMOL/L (ref -3–3)
B.E.: -3.1 MMOL/L (ref -3–3)
B.E.: -3.7 MMOL/L (ref -3–3)
B.E.: -5.4 MMOL/L (ref -3–3)
B.E.: -5.9 MMOL/L (ref -3–3)
BUN BLDV-MCNC: 13 MG/DL (ref 6–20)
CALCIUM SERPL-MCNC: 7.9 MG/DL (ref 8.6–10.2)
CARDIOPULMONARY BYPASS: NO
CARDIOPULMONARY BYPASS: YES
CHLORIDE BLD-SCNC: 101 MMOL/L (ref 98–107)
CO2: 23 MMOL/L (ref 22–29)
COHB: 0.6 % (ref 0–1.5)
COHB: 0.8 % (ref 0–1.5)
COHB: 1.4 % (ref 0–1.5)
COHB: 1.5 % (ref 0–1.5)
COHB: 2.7 % (ref 0–1.5)
CREAT SERPL-MCNC: 0.9 MG/DL (ref 0.5–1)
CRITICAL: ABNORMAL
DATE ANALYZED: ABNORMAL
DATE OF COLLECTION: ABNORMAL
DEVICE: ABNORMAL
DEVICE: ABNORMAL
FIO2: 40 %
FIO2: 50 %
FIO2: 70 %
GFR AFRICAN AMERICAN: >60
GFR NON-AFRICAN AMERICAN: >60 ML/MIN/1.73
GLUCOSE BLD-MCNC: 237 MG/DL (ref 74–99)
HCO3 ARTERIAL: 23.6 MMOL/L (ref 22–26)
HCO3 ARTERIAL: 24.2 MMOL/L (ref 22–26)
HCO3: 19.1 MMOL/L (ref 22–26)
HCO3: 19.2 MMOL/L (ref 22–26)
HCO3: 21.3 MMOL/L (ref 22–26)
HCO3: 21.9 MMOL/L (ref 22–26)
HCO3: 22.5 MMOL/L (ref 22–26)
HCT (EST): 22 % (ref 34–48)
HCT (EST): 25 % (ref 34–48)
HCT VFR BLD CALC: 29.3 % (ref 34–48)
HEMOGLOBIN: 9.8 G/DL (ref 11.5–15.5)
HGB, (EST): 7.5 G/DL (ref 11.5–15.5)
HGB, (EST): 8.5 G/DL (ref 11.5–15.5)
HHB: 2 % (ref 0–5)
HHB: 2.8 % (ref 0–5)
HHB: 3.2 % (ref 0–5)
HHB: 5.3 % (ref 0–5)
HHB: 5.6 % (ref 0–5)
INR BLD: 1.4
LAB: ABNORMAL
MAGNESIUM: 2.4 MG/DL (ref 1.6–2.6)
MCH RBC QN AUTO: 30.3 PG (ref 26–35)
MCHC RBC AUTO-ENTMCNC: 33.4 % (ref 32–34.5)
MCV RBC AUTO: 90.7 FL (ref 80–99.9)
METER GLUCOSE: 118 MG/DL (ref 74–99)
METER GLUCOSE: 120 MG/DL (ref 74–99)
METER GLUCOSE: 147 MG/DL (ref 74–99)
METER GLUCOSE: 156 MG/DL (ref 74–99)
METER GLUCOSE: 170 MG/DL (ref 74–99)
METER GLUCOSE: 212 MG/DL (ref 74–99)
METER GLUCOSE: 214 MG/DL (ref 74–99)
METER GLUCOSE: 224 MG/DL (ref 74–99)
METER GLUCOSE: 253 MG/DL (ref 74–99)
METER GLUCOSE: 338 MG/DL (ref 74–99)
METER GLUCOSE: 359 MG/DL (ref 74–99)
METER GLUCOSE: 401 MG/DL (ref 74–99)
METHB: 0.1 % (ref 0–1.5)
METHB: 0.3 % (ref 0–1.5)
MODE: ABNORMAL
MODE: AC
MODE: AC
O2 SATURATION: 100 % (ref 92–98.5)
O2 SATURATION: 95.8 % (ref 92–98.5)
O2 SATURATION: 96.1 % (ref 92–98.5)
O2 SATURATION: 98 % (ref 92–98.5)
O2 SATURATION: 98 % (ref 92–98.5)
O2 SATURATION: 99 % (ref 92–98.5)
O2 SATURATION: 99.9 % (ref 92–98.5)
O2HB: 93.7 % (ref 94–97)
O2HB: 93.8 % (ref 94–97)
O2HB: 95.1 % (ref 94–97)
O2HB: 95.2 % (ref 94–97)
O2HB: 95.6 % (ref 94–97)
OPERATOR ID: 101
OPERATOR ID: ABNORMAL
OPERATOR ID: ABNORMAL
PATIENT TEMP: 37 C
PCO2 ARTERIAL: 38.9 MMHG (ref 35–45)
PCO2 ARTERIAL: 50.2 MMHG (ref 35–45)
PCO2: 33.9 MMHG (ref 35–45)
PCO2: 35.5 MMHG (ref 35–45)
PCO2: 35.6 MMHG (ref 35–45)
PCO2: 42.1 MMHG (ref 35–45)
PCO2: 42.9 MMHG (ref 35–45)
PDW BLD-RTO: 13.8 FL (ref 11.5–15)
PEEP/CPAP: 5 CMH2O
PEEP/CPAP: 5 CMH2O
PFO2: 2.28 MMHG/%
PFO2: 2.35 MMHG/%
PFO2: 2.82 MMHG/%
PH BLOOD GAS: 7.29 (ref 7.35–7.45)
PH BLOOD GAS: 7.33 (ref 7.35–7.45)
PH BLOOD GAS: 7.34 (ref 7.35–7.45)
PH BLOOD GAS: 7.35 (ref 7.35–7.45)
PH BLOOD GAS: 7.37 (ref 7.35–7.45)
PH BLOOD GAS: 7.39 (ref 7.35–7.45)
PH BLOOD GAS: 7.4 (ref 7.35–7.45)
PLATELET # BLD: 258 E9/L (ref 130–450)
PMV BLD AUTO: 10.3 FL (ref 7–12)
PO2 ARTERIAL: 365.2 MMHG (ref 80–100)
PO2 ARTERIAL: 417.2 MMHG (ref 80–100)
PO2: 112.7 MMHG (ref 75–100)
PO2: 117.4 MMHG (ref 75–100)
PO2: 159.3 MMHG (ref 75–100)
PO2: 81.5 MMHG (ref 75–100)
PO2: 84.5 MMHG (ref 75–100)
POTASSIUM SERPL-SCNC: 2.9 MMOL/L (ref 3.5–5.5)
POTASSIUM SERPL-SCNC: 3.3 MMOL/L (ref 3.5–5.5)
POTASSIUM SERPL-SCNC: 3.4 MMOL/L (ref 3.5–5)
POTASSIUM SERPL-SCNC: 3.7 MMOL/L (ref 3.5–5)
POTASSIUM SERPL-SCNC: 4.2 MMOL/L (ref 3.5–5)
POTASSIUM SERPL-SCNC: 4.31 MMOL/L (ref 3.5–5)
PROTHROMBIN TIME: 15.7 SEC (ref 9.3–12.4)
PS: 8 CMH20
RBC # BLD: 3.23 E12/L (ref 3.5–5.5)
RI(T): 1.08
RI(T): 1.53
RI(T): 1.79
RR MECHANICAL: 14 B/MIN
RR MECHANICAL: 16 B/MIN
SODIUM BLD-SCNC: 135 MMOL/L (ref 132–146)
SOURCE, BLOOD GAS: ABNORMAL
THB: 10.5 G/DL (ref 11.5–16.5)
THB: 10.8 G/DL (ref 11.5–16.5)
THB: 10.9 G/DL (ref 11.5–16.5)
THB: 9.7 G/DL (ref 11.5–16.5)
THB: 9.8 G/DL (ref 11.5–16.5)
TIME ANALYZED: 1219
TIME ANALYZED: 1328
TIME ANALYZED: 1530
TIME ANALYZED: 1722
TIME ANALYZED: 1858
VT MECHANICAL: 500 ML
VT MECHANICAL: 500 ML
WBC # BLD: 29.4 E9/L (ref 4.5–11.5)

## 2020-09-29 PROCEDURE — 94002 VENT MGMT INPAT INIT DAY: CPT

## 2020-09-29 PROCEDURE — 2000000000 HC ICU R&B

## 2020-09-29 PROCEDURE — C1713 ANCHOR/SCREW BN/BN,TIS/BN: HCPCS | Performed by: THORACIC SURGERY (CARDIOTHORACIC VASCULAR SURGERY)

## 2020-09-29 PROCEDURE — 3600000018 HC SURGERY OHS ADDTL 15MIN: Performed by: THORACIC SURGERY (CARDIOTHORACIC VASCULAR SURGERY)

## 2020-09-29 PROCEDURE — 99291 CRITICAL CARE FIRST HOUR: CPT | Performed by: NURSE PRACTITIONER

## 2020-09-29 PROCEDURE — P9045 ALBUMIN (HUMAN), 5%, 250 ML: HCPCS | Performed by: PHYSICIAN ASSISTANT

## 2020-09-29 PROCEDURE — 71045 X-RAY EXAM CHEST 1 VIEW: CPT

## 2020-09-29 PROCEDURE — 2700000000 HC OXYGEN THERAPY PER DAY

## 2020-09-29 PROCEDURE — 6360000002 HC RX W HCPCS: Performed by: PHYSICIAN ASSISTANT

## 2020-09-29 PROCEDURE — C9113 INJ PANTOPRAZOLE SODIUM, VIA: HCPCS | Performed by: PHYSICIAN ASSISTANT

## 2020-09-29 PROCEDURE — 6360000002 HC RX W HCPCS: Performed by: NURSE PRACTITIONER

## 2020-09-29 PROCEDURE — 06BQ4ZZ EXCISION OF LEFT SAPHENOUS VEIN, PERCUTANEOUS ENDOSCOPIC APPROACH: ICD-10-PCS | Performed by: THORACIC SURGERY (CARDIOTHORACIC VASCULAR SURGERY)

## 2020-09-29 PROCEDURE — 33534 CABG ARTERIAL TWO: CPT | Performed by: PHYSICIAN ASSISTANT

## 2020-09-29 PROCEDURE — 3700000000 HC ANESTHESIA ATTENDED CARE: Performed by: THORACIC SURGERY (CARDIOTHORACIC VASCULAR SURGERY)

## 2020-09-29 PROCEDURE — 6370000000 HC RX 637 (ALT 250 FOR IP): Performed by: PHYSICIAN ASSISTANT

## 2020-09-29 PROCEDURE — 36415 COLL VENOUS BLD VENIPUNCTURE: CPT

## 2020-09-29 PROCEDURE — 6360000002 HC RX W HCPCS: Performed by: ANESTHESIOLOGIST ASSISTANT

## 2020-09-29 PROCEDURE — 99253 IP/OBS CNSLTJ NEW/EST LOW 45: CPT | Performed by: INTERNAL MEDICINE

## 2020-09-29 PROCEDURE — 2580000003 HC RX 258: Performed by: THORACIC SURGERY (CARDIOTHORACIC VASCULAR SURGERY)

## 2020-09-29 PROCEDURE — 02100Z3 BYPASS CORONARY ARTERY, ONE ARTERY FROM CORONARY ARTERY, OPEN APPROACH: ICD-10-PCS | Performed by: THORACIC SURGERY (CARDIOTHORACIC VASCULAR SURGERY)

## 2020-09-29 PROCEDURE — 84132 ASSAY OF SERUM POTASSIUM: CPT

## 2020-09-29 PROCEDURE — 2580000003 HC RX 258: Performed by: ANESTHESIOLOGIST ASSISTANT

## 2020-09-29 PROCEDURE — 82803 BLOOD GASES ANY COMBINATION: CPT

## 2020-09-29 PROCEDURE — 02100Z9 BYPASS CORONARY ARTERY, ONE ARTERY FROM LEFT INTERNAL MAMMARY, OPEN APPROACH: ICD-10-PCS | Performed by: THORACIC SURGERY (CARDIOTHORACIC VASCULAR SURGERY)

## 2020-09-29 PROCEDURE — 82962 GLUCOSE BLOOD TEST: CPT

## 2020-09-29 PROCEDURE — 33508 ENDOSCOPIC VEIN HARVEST: CPT | Performed by: THORACIC SURGERY (CARDIOTHORACIC VASCULAR SURGERY)

## 2020-09-29 PROCEDURE — 37799 UNLISTED PX VASCULAR SURGERY: CPT

## 2020-09-29 PROCEDURE — 3600000008 HC SURGERY OHS BASE: Performed by: THORACIC SURGERY (CARDIOTHORACIC VASCULAR SURGERY)

## 2020-09-29 PROCEDURE — 85347 COAGULATION TIME ACTIVATED: CPT

## 2020-09-29 PROCEDURE — P9045 ALBUMIN (HUMAN), 5%, 250 ML: HCPCS | Performed by: NURSE PRACTITIONER

## 2020-09-29 PROCEDURE — 83735 ASSAY OF MAGNESIUM: CPT

## 2020-09-29 PROCEDURE — APPSS45 APP SPLIT SHARED TIME 31-45 MINUTES: Performed by: NURSE PRACTITIONER

## 2020-09-29 PROCEDURE — 2709999900 HC NON-CHARGEABLE SUPPLY: Performed by: THORACIC SURGERY (CARDIOTHORACIC VASCULAR SURGERY)

## 2020-09-29 PROCEDURE — 2500000003 HC RX 250 WO HCPCS: Performed by: ANESTHESIOLOGIST ASSISTANT

## 2020-09-29 PROCEDURE — 33517 CABG ARTERY-VEIN SINGLE: CPT | Performed by: PHYSICIAN ASSISTANT

## 2020-09-29 PROCEDURE — B24BZZ4 ULTRASONOGRAPHY OF HEART WITH AORTA, TRANSESOPHAGEAL: ICD-10-PCS | Performed by: THORACIC SURGERY (CARDIOTHORACIC VASCULAR SURGERY)

## 2020-09-29 PROCEDURE — 33534 CABG ARTERIAL TWO: CPT | Performed by: THORACIC SURGERY (CARDIOTHORACIC VASCULAR SURGERY)

## 2020-09-29 PROCEDURE — 021009W BYPASS CORONARY ARTERY, ONE ARTERY FROM AORTA WITH AUTOLOGOUS VENOUS TISSUE, OPEN APPROACH: ICD-10-PCS | Performed by: THORACIC SURGERY (CARDIOTHORACIC VASCULAR SURGERY)

## 2020-09-29 PROCEDURE — 33517 CABG ARTERY-VEIN SINGLE: CPT | Performed by: THORACIC SURGERY (CARDIOTHORACIC VASCULAR SURGERY)

## 2020-09-29 PROCEDURE — 94664 DEMO&/EVAL PT USE INHALER: CPT

## 2020-09-29 PROCEDURE — 33508 ENDOSCOPIC VEIN HARVEST: CPT | Performed by: PHYSICIAN ASSISTANT

## 2020-09-29 PROCEDURE — 85610 PROTHROMBIN TIME: CPT

## 2020-09-29 PROCEDURE — 80048 BASIC METABOLIC PNL TOTAL CA: CPT

## 2020-09-29 PROCEDURE — 2580000003 HC RX 258: Performed by: PHYSICIAN ASSISTANT

## 2020-09-29 PROCEDURE — 6360000002 HC RX W HCPCS: Performed by: THORACIC SURGERY (CARDIOTHORACIC VASCULAR SURGERY)

## 2020-09-29 PROCEDURE — 02HV33Z INSERTION OF INFUSION DEVICE INTO SUPERIOR VENA CAVA, PERCUTANEOUS APPROACH: ICD-10-PCS | Performed by: ANESTHESIOLOGY

## 2020-09-29 PROCEDURE — 85027 COMPLETE CBC AUTOMATED: CPT

## 2020-09-29 PROCEDURE — 85730 THROMBOPLASTIN TIME PARTIAL: CPT

## 2020-09-29 PROCEDURE — 5A1221Z PERFORMANCE OF CARDIAC OUTPUT, CONTINUOUS: ICD-10-PCS | Performed by: THORACIC SURGERY (CARDIOTHORACIC VASCULAR SURGERY)

## 2020-09-29 PROCEDURE — 2720000010 HC SURG SUPPLY STERILE: Performed by: THORACIC SURGERY (CARDIOTHORACIC VASCULAR SURGERY)

## 2020-09-29 PROCEDURE — 3700000001 HC ADD 15 MINUTES (ANESTHESIA): Performed by: THORACIC SURGERY (CARDIOTHORACIC VASCULAR SURGERY)

## 2020-09-29 PROCEDURE — 82805 BLOOD GASES W/O2 SATURATION: CPT

## 2020-09-29 PROCEDURE — 6370000000 HC RX 637 (ALT 250 FOR IP): Performed by: ANESTHESIOLOGIST ASSISTANT

## 2020-09-29 PROCEDURE — 94640 AIRWAY INHALATION TREATMENT: CPT

## 2020-09-29 DEVICE — PLATE LCK STRNL 8-H LAD T 1.8MM: Type: IMPLANTABLE DEVICE | Site: STERNUM | Status: FUNCTIONAL

## 2020-09-29 DEVICE — PLATE BONE 1.8MM THICKNESS STRNL LCK 6 H CP TI: Type: IMPLANTABLE DEVICE | Site: STERNUM | Status: FUNCTIONAL

## 2020-09-29 DEVICE — SCREW BNE L11MM DIA2.3MM THOR STRNL TI LOK DRL FREE LEV 1: Type: IMPLANTABLE DEVICE | Site: STERNUM | Status: FUNCTIONAL

## 2020-09-29 DEVICE — SCREW BNE L9MM DIA2.3MM THOR STRNL TI LOK DRL FREE LEV 1: Type: IMPLANTABLE DEVICE | Site: STERNUM | Status: FUNCTIONAL

## 2020-09-29 RX ORDER — OXYCODONE HYDROCHLORIDE 10 MG/1
10 TABLET ORAL EVERY 4 HOURS PRN
Status: DISCONTINUED | OUTPATIENT
Start: 2020-09-29 | End: 2020-10-03 | Stop reason: HOSPADM

## 2020-09-29 RX ORDER — POTASSIUM CHLORIDE 29.8 MG/ML
20 INJECTION INTRAVENOUS PRN
Status: DISCONTINUED | OUTPATIENT
Start: 2020-09-29 | End: 2020-09-30

## 2020-09-29 RX ORDER — DEXTROSE MONOHYDRATE 50 MG/ML
100 INJECTION, SOLUTION INTRAVENOUS PRN
Status: DISCONTINUED | OUTPATIENT
Start: 2020-09-29 | End: 2020-09-30

## 2020-09-29 RX ORDER — SODIUM CHLORIDE 0.9 % (FLUSH) 0.9 %
10 SYRINGE (ML) INJECTION EVERY 12 HOURS SCHEDULED
Status: DISCONTINUED | OUTPATIENT
Start: 2020-09-29 | End: 2020-09-29 | Stop reason: HOSPADM

## 2020-09-29 RX ORDER — PROPOFOL 10 MG/ML
10 INJECTION, EMULSION INTRAVENOUS CONTINUOUS PRN
Status: DISCONTINUED | OUTPATIENT
Start: 2020-09-29 | End: 2020-09-30

## 2020-09-29 RX ORDER — SODIUM CHLORIDE 0.9 % (FLUSH) 0.9 %
10 SYRINGE (ML) INJECTION EVERY 12 HOURS SCHEDULED
Status: DISCONTINUED | OUTPATIENT
Start: 2020-09-29 | End: 2020-10-03 | Stop reason: HOSPADM

## 2020-09-29 RX ORDER — EPHEDRINE SULFATE/0.9% NACL/PF 50 MG/5 ML
SYRINGE (ML) INTRAVENOUS PRN
Status: DISCONTINUED | OUTPATIENT
Start: 2020-09-29 | End: 2020-09-29 | Stop reason: SDUPTHER

## 2020-09-29 RX ORDER — CEFAZOLIN SODIUM 1 G/3ML
INJECTION, POWDER, FOR SOLUTION INTRAMUSCULAR; INTRAVENOUS PRN
Status: DISCONTINUED | OUTPATIENT
Start: 2020-09-29 | End: 2020-09-29 | Stop reason: SDUPTHER

## 2020-09-29 RX ORDER — PANTOPRAZOLE SODIUM 40 MG/1
40 TABLET, DELAYED RELEASE ORAL DAILY
Status: DISCONTINUED | OUTPATIENT
Start: 2020-09-30 | End: 2020-10-03 | Stop reason: HOSPADM

## 2020-09-29 RX ORDER — SODIUM CHLORIDE, SODIUM LACTATE, POTASSIUM CHLORIDE, CALCIUM CHLORIDE 600; 310; 30; 20 MG/100ML; MG/100ML; MG/100ML; MG/100ML
INJECTION, SOLUTION INTRAVENOUS CONTINUOUS PRN
Status: DISCONTINUED | OUTPATIENT
Start: 2020-09-29 | End: 2020-09-29 | Stop reason: SDUPTHER

## 2020-09-29 RX ORDER — POTASSIUM CHLORIDE 7.45 MG/ML
INJECTION INTRAVENOUS PRN
Status: DISCONTINUED | OUTPATIENT
Start: 2020-09-29 | End: 2020-09-29 | Stop reason: SDUPTHER

## 2020-09-29 RX ORDER — SODIUM CHLORIDE 9 MG/ML
INJECTION, SOLUTION INTRAVENOUS CONTINUOUS
Status: DISCONTINUED | OUTPATIENT
Start: 2020-09-29 | End: 2020-09-29

## 2020-09-29 RX ORDER — FENTANYL CITRATE 0.05 MG/ML
INJECTION, SOLUTION INTRAMUSCULAR; INTRAVENOUS PRN
Status: DISCONTINUED | OUTPATIENT
Start: 2020-09-29 | End: 2020-09-29 | Stop reason: SDUPTHER

## 2020-09-29 RX ORDER — CHLORHEXIDINE GLUCONATE 4 G/100ML
SOLUTION TOPICAL ONCE
Status: DISCONTINUED | OUTPATIENT
Start: 2020-09-29 | End: 2020-09-29 | Stop reason: HOSPADM

## 2020-09-29 RX ORDER — MAGNESIUM HYDROXIDE/ALUMINUM HYDROXICE/SIMETHICONE 120; 1200; 1200 MG/30ML; MG/30ML; MG/30ML
30 SUSPENSION ORAL EVERY 4 HOURS PRN
Status: DISCONTINUED | OUTPATIENT
Start: 2020-09-29 | End: 2020-09-30

## 2020-09-29 RX ORDER — MEPERIDINE HYDROCHLORIDE 25 MG/ML
25 INJECTION INTRAMUSCULAR; INTRAVENOUS; SUBCUTANEOUS
Status: ACTIVE | OUTPATIENT
Start: 2020-09-29 | End: 2020-09-29

## 2020-09-29 RX ORDER — VECURONIUM BROMIDE 1 MG/ML
INJECTION, POWDER, LYOPHILIZED, FOR SOLUTION INTRAVENOUS PRN
Status: DISCONTINUED | OUTPATIENT
Start: 2020-09-29 | End: 2020-09-29 | Stop reason: SDUPTHER

## 2020-09-29 RX ORDER — ACETAMINOPHEN 325 MG/1
650 TABLET ORAL EVERY 4 HOURS PRN
Status: DISCONTINUED | OUTPATIENT
Start: 2020-09-29 | End: 2020-09-30

## 2020-09-29 RX ORDER — SODIUM CHLORIDE 9 MG/ML
30 INJECTION, SOLUTION INTRAVENOUS CONTINUOUS
Status: DISCONTINUED | OUTPATIENT
Start: 2020-09-29 | End: 2020-09-30

## 2020-09-29 RX ORDER — SODIUM CHLORIDE 0.9 % (FLUSH) 0.9 %
10 SYRINGE (ML) INJECTION PRN
Status: DISCONTINUED | OUTPATIENT
Start: 2020-09-29 | End: 2020-09-29 | Stop reason: HOSPADM

## 2020-09-29 RX ORDER — ATORVASTATIN CALCIUM 40 MG/1
40 TABLET, FILM COATED ORAL DAILY
Status: DISCONTINUED | OUTPATIENT
Start: 2020-09-30 | End: 2020-10-03 | Stop reason: HOSPADM

## 2020-09-29 RX ORDER — PROPOFOL 10 MG/ML
INJECTION, EMULSION INTRAVENOUS PRN
Status: DISCONTINUED | OUTPATIENT
Start: 2020-09-29 | End: 2020-09-29 | Stop reason: SDUPTHER

## 2020-09-29 RX ORDER — SENNA PLUS 8.6 MG/1
1 TABLET ORAL NIGHTLY
Status: DISCONTINUED | OUTPATIENT
Start: 2020-09-30 | End: 2020-09-30

## 2020-09-29 RX ORDER — KETOROLAC TROMETHAMINE 30 MG/ML
30 INJECTION, SOLUTION INTRAMUSCULAR; INTRAVENOUS EVERY 6 HOURS PRN
Status: DISCONTINUED | OUTPATIENT
Start: 2020-09-29 | End: 2020-10-03 | Stop reason: HOSPADM

## 2020-09-29 RX ORDER — CHLORHEXIDINE GLUCONATE 0.12 MG/ML
15 RINSE ORAL 2 TIMES DAILY
Status: DISCONTINUED | OUTPATIENT
Start: 2020-09-29 | End: 2020-09-30

## 2020-09-29 RX ORDER — PANTOPRAZOLE SODIUM 40 MG/10ML
40 INJECTION, POWDER, LYOPHILIZED, FOR SOLUTION INTRAVENOUS DAILY
Status: COMPLETED | OUTPATIENT
Start: 2020-09-29 | End: 2020-09-29

## 2020-09-29 RX ORDER — DEXTROSE MONOHYDRATE 25 G/50ML
12.5 INJECTION, SOLUTION INTRAVENOUS PRN
Status: DISCONTINUED | OUTPATIENT
Start: 2020-09-29 | End: 2020-09-30

## 2020-09-29 RX ORDER — MAGNESIUM SULFATE IN WATER 40 MG/ML
2 INJECTION, SOLUTION INTRAVENOUS PRN
Status: DISCONTINUED | OUTPATIENT
Start: 2020-09-29 | End: 2020-09-30

## 2020-09-29 RX ORDER — IPRATROPIUM BROMIDE AND ALBUTEROL SULFATE 2.5; .5 MG/3ML; MG/3ML
1 SOLUTION RESPIRATORY (INHALATION)
Status: DISCONTINUED | OUTPATIENT
Start: 2020-09-29 | End: 2020-10-03 | Stop reason: HOSPADM

## 2020-09-29 RX ORDER — PROPOFOL 10 MG/ML
INJECTION, EMULSION INTRAVENOUS
Status: DISPENSED
Start: 2020-09-29 | End: 2020-09-29

## 2020-09-29 RX ORDER — SODIUM CHLORIDE 9 MG/ML
10 INJECTION INTRAVENOUS DAILY
Status: COMPLETED | OUTPATIENT
Start: 2020-09-29 | End: 2020-09-29

## 2020-09-29 RX ORDER — CHLORHEXIDINE GLUCONATE 0.12 MG/ML
15 RINSE ORAL ONCE
Status: COMPLETED | OUTPATIENT
Start: 2020-09-29 | End: 2020-09-29

## 2020-09-29 RX ORDER — AMINOCAPROIC ACID 250 MG/ML
INJECTION, SOLUTION INTRAVENOUS PRN
Status: DISCONTINUED | OUTPATIENT
Start: 2020-09-29 | End: 2020-09-29 | Stop reason: SDUPTHER

## 2020-09-29 RX ORDER — NICOTINE POLACRILEX 4 MG
15 LOZENGE BUCCAL PRN
Status: DISCONTINUED | OUTPATIENT
Start: 2020-09-29 | End: 2020-09-30

## 2020-09-29 RX ORDER — LEVOTHYROXINE SODIUM 0.03 MG/1
25 TABLET ORAL DAILY
Status: DISCONTINUED | OUTPATIENT
Start: 2020-09-29 | End: 2020-10-03

## 2020-09-29 RX ORDER — INSULIN GLARGINE 100 [IU]/ML
0.15 INJECTION, SOLUTION SUBCUTANEOUS NIGHTLY
Status: DISCONTINUED | OUTPATIENT
Start: 2020-09-30 | End: 2020-10-02

## 2020-09-29 RX ORDER — SENNA AND DOCUSATE SODIUM 50; 8.6 MG/1; MG/1
1 TABLET, FILM COATED ORAL 2 TIMES DAILY
Status: DISCONTINUED | OUTPATIENT
Start: 2020-09-29 | End: 2020-09-30

## 2020-09-29 RX ORDER — ACETAMINOPHEN 650 MG/1
650 SUPPOSITORY RECTAL EVERY 4 HOURS PRN
Status: DISCONTINUED | OUTPATIENT
Start: 2020-09-29 | End: 2020-09-30

## 2020-09-29 RX ORDER — ASPIRIN 300 MG/1
300 SUPPOSITORY RECTAL ONCE
Status: COMPLETED | OUTPATIENT
Start: 2020-09-29 | End: 2020-09-29

## 2020-09-29 RX ORDER — ASPIRIN 81 MG/1
81 TABLET ORAL DAILY
Status: DISCONTINUED | OUTPATIENT
Start: 2020-09-30 | End: 2020-09-30

## 2020-09-29 RX ORDER — PROTAMINE SULFATE 10 MG/ML
INJECTION, SOLUTION INTRAVENOUS PRN
Status: DISCONTINUED | OUTPATIENT
Start: 2020-09-29 | End: 2020-09-29 | Stop reason: SDUPTHER

## 2020-09-29 RX ORDER — PHENYLEPHRINE HYDROCHLORIDE 10 MG/ML
INJECTION INTRAVENOUS PRN
Status: DISCONTINUED | OUTPATIENT
Start: 2020-09-29 | End: 2020-09-29 | Stop reason: SDUPTHER

## 2020-09-29 RX ORDER — SODIUM CHLORIDE 0.9 % (FLUSH) 0.9 %
10 SYRINGE (ML) INJECTION PRN
Status: DISCONTINUED | OUTPATIENT
Start: 2020-09-29 | End: 2020-10-03 | Stop reason: HOSPADM

## 2020-09-29 RX ORDER — ONDANSETRON 2 MG/ML
4 INJECTION INTRAMUSCULAR; INTRAVENOUS EVERY 8 HOURS PRN
Status: DISCONTINUED | OUTPATIENT
Start: 2020-09-29 | End: 2020-10-03 | Stop reason: HOSPADM

## 2020-09-29 RX ORDER — HEPARIN SODIUM 10000 [USP'U]/ML
INJECTION, SOLUTION INTRAVENOUS; SUBCUTANEOUS PRN
Status: DISCONTINUED | OUTPATIENT
Start: 2020-09-29 | End: 2020-09-29 | Stop reason: SDUPTHER

## 2020-09-29 RX ORDER — ALBUMIN, HUMAN INJ 5% 5 %
25 SOLUTION INTRAVENOUS ONCE
Status: COMPLETED | OUTPATIENT
Start: 2020-09-29 | End: 2020-09-29

## 2020-09-29 RX ORDER — FENTANYL CITRATE 50 UG/ML
25 INJECTION, SOLUTION INTRAMUSCULAR; INTRAVENOUS
Status: DISCONTINUED | OUTPATIENT
Start: 2020-09-29 | End: 2020-09-30

## 2020-09-29 RX ORDER — OXYCODONE HYDROCHLORIDE 5 MG/1
5 TABLET ORAL EVERY 4 HOURS PRN
Status: DISCONTINUED | OUTPATIENT
Start: 2020-09-29 | End: 2020-10-03 | Stop reason: HOSPADM

## 2020-09-29 RX ORDER — 0.9 % SODIUM CHLORIDE 0.9 %
250 INTRAVENOUS SOLUTION INTRAVENOUS CONTINUOUS PRN
Status: DISCONTINUED | OUTPATIENT
Start: 2020-09-29 | End: 2020-09-30

## 2020-09-29 RX ORDER — BETHANECHOL CHLORIDE 25 MG/1
25 TABLET ORAL 3 TIMES DAILY
Status: DISCONTINUED | OUTPATIENT
Start: 2020-09-29 | End: 2020-10-03 | Stop reason: HOSPADM

## 2020-09-29 RX ORDER — ALBUMIN, HUMAN INJ 5% 5 %
SOLUTION INTRAVENOUS
Status: DISPENSED
Start: 2020-09-29 | End: 2020-09-29

## 2020-09-29 RX ORDER — MIDAZOLAM HYDROCHLORIDE 1 MG/ML
INJECTION INTRAMUSCULAR; INTRAVENOUS PRN
Status: DISCONTINUED | OUTPATIENT
Start: 2020-09-29 | End: 2020-09-29 | Stop reason: SDUPTHER

## 2020-09-29 RX ORDER — FENTANYL CITRATE 50 UG/ML
50 INJECTION, SOLUTION INTRAMUSCULAR; INTRAVENOUS
Status: DISCONTINUED | OUTPATIENT
Start: 2020-09-29 | End: 2020-09-30

## 2020-09-29 RX ORDER — CLOPIDOGREL BISULFATE 75 MG/1
75 TABLET ORAL DAILY
Status: DISCONTINUED | OUTPATIENT
Start: 2020-09-30 | End: 2020-10-03 | Stop reason: HOSPADM

## 2020-09-29 RX ORDER — ALBUMIN, HUMAN INJ 5% 5 %
25 SOLUTION INTRAVENOUS PRN
Status: DISCONTINUED | OUTPATIENT
Start: 2020-09-29 | End: 2020-09-30

## 2020-09-29 RX ADMIN — Medication 10 ML: at 21:07

## 2020-09-29 RX ADMIN — PHENYLEPHRINE HYDROCHLORIDE 100 MCG: 10 INJECTION INTRAVENOUS at 08:35

## 2020-09-29 RX ADMIN — POTASSIUM CHLORIDE 20 MEQ: 400 INJECTION, SOLUTION INTRAVENOUS at 17:00

## 2020-09-29 RX ADMIN — MIDAZOLAM 2 MG: 1 INJECTION INTRAMUSCULAR; INTRAVENOUS at 10:49

## 2020-09-29 RX ADMIN — IPRATROPIUM BROMIDE AND ALBUTEROL SULFATE 1 AMPULE: 2.5; .5 SOLUTION RESPIRATORY (INHALATION) at 17:13

## 2020-09-29 RX ADMIN — ALBUMIN (HUMAN) 25 G: 12.5 SOLUTION INTRAVENOUS at 16:04

## 2020-09-29 RX ADMIN — FENTANYL CITRATE 150 MCG: 50 INJECTION, SOLUTION INTRAMUSCULAR; INTRAVENOUS at 07:33

## 2020-09-29 RX ADMIN — ALBUMIN (HUMAN) 12.5 G: 12.5 INJECTION, SOLUTION INTRAVENOUS at 12:25

## 2020-09-29 RX ADMIN — SODIUM CHLORIDE, POTASSIUM CHLORIDE, SODIUM LACTATE AND CALCIUM CHLORIDE: 600; 310; 30; 20 INJECTION, SOLUTION INTRAVENOUS at 06:45

## 2020-09-29 RX ADMIN — PANTOPRAZOLE SODIUM 40 MG: 40 INJECTION, POWDER, FOR SOLUTION INTRAVENOUS at 12:30

## 2020-09-29 RX ADMIN — CHLORHEXIDINE GLUCONATE 0.12% ORAL RINSE 15 ML: 1.2 LIQUID ORAL at 21:07

## 2020-09-29 RX ADMIN — INSULIN HUMAN 10 UNITS: 100 INJECTION, SOLUTION PARENTERAL at 10:05

## 2020-09-29 RX ADMIN — PROPOFOL 15 MCG/KG/MIN: 10 INJECTION, EMULSION INTRAVENOUS at 11:53

## 2020-09-29 RX ADMIN — PHENYLEPHRINE HYDROCHLORIDE 100 MCG: 10 INJECTION INTRAVENOUS at 08:40

## 2020-09-29 RX ADMIN — PHENYLEPHRINE HYDROCHLORIDE 100 MCG: 10 INJECTION INTRAVENOUS at 11:08

## 2020-09-29 RX ADMIN — CEFAZOLIN 1000 MG: 1 INJECTION, POWDER, FOR SOLUTION INTRAMUSCULAR; INTRAVENOUS at 10:47

## 2020-09-29 RX ADMIN — IPRATROPIUM BROMIDE AND ALBUTEROL SULFATE 1 AMPULE: 2.5; .5 SOLUTION RESPIRATORY (INHALATION) at 12:25

## 2020-09-29 RX ADMIN — FENTANYL CITRATE 25 MCG: 50 INJECTION, SOLUTION INTRAMUSCULAR; INTRAVENOUS at 18:44

## 2020-09-29 RX ADMIN — Medication 5 MG: at 08:10

## 2020-09-29 RX ADMIN — ALBUMIN (HUMAN) 12.5 G: 12.5 INJECTION, SOLUTION INTRAVENOUS at 23:30

## 2020-09-29 RX ADMIN — SODIUM CHLORIDE 6 UNITS: 9 INJECTION, SOLUTION INTRAVENOUS at 09:17

## 2020-09-29 RX ADMIN — ASPIRIN 300 MG: 300 SUPPOSITORY RECTAL at 12:30

## 2020-09-29 RX ADMIN — INSULIN LISPRO 3 UNITS: 100 INJECTION, SOLUTION INTRAVENOUS; SUBCUTANEOUS at 18:55

## 2020-09-29 RX ADMIN — FENTANYL CITRATE 25 MCG: 50 INJECTION, SOLUTION INTRAMUSCULAR; INTRAVENOUS at 15:26

## 2020-09-29 RX ADMIN — INSULIN LISPRO 3 UNITS: 100 INJECTION, SOLUTION INTRAVENOUS; SUBCUTANEOUS at 23:52

## 2020-09-29 RX ADMIN — FENTANYL CITRATE 250 MCG: 50 INJECTION, SOLUTION INTRAMUSCULAR; INTRAVENOUS at 07:35

## 2020-09-29 RX ADMIN — VECURONIUM BROMIDE 10 MG: 10 INJECTION, POWDER, LYOPHILIZED, FOR SOLUTION INTRAVENOUS at 08:25

## 2020-09-29 RX ADMIN — PROTAMINE SULFATE 350 MG: 10 INJECTION, SOLUTION INTRAVENOUS at 10:30

## 2020-09-29 RX ADMIN — KETOROLAC TROMETHAMINE 30 MG: 30 INJECTION, SOLUTION INTRAMUSCULAR at 14:17

## 2020-09-29 RX ADMIN — MUPIROCIN: 20 OINTMENT TOPICAL at 21:06

## 2020-09-29 RX ADMIN — AMINOCAPROIC ACID 0.5 G/HR: 250 INJECTION, SOLUTION INTRAVENOUS at 07:15

## 2020-09-29 RX ADMIN — BETHANECHOL CHLORIDE 25 MG: 25 TABLET ORAL at 22:40

## 2020-09-29 RX ADMIN — PHENYLEPHRINE HYDROCHLORIDE 100 MCG: 10 INJECTION INTRAVENOUS at 11:47

## 2020-09-29 RX ADMIN — PHENYLEPHRINE HYDROCHLORIDE 100 MCG: 10 INJECTION INTRAVENOUS at 11:37

## 2020-09-29 RX ADMIN — INSULIN HUMAN 6 UNITS: 100 INJECTION, SOLUTION PARENTERAL at 09:15

## 2020-09-29 RX ADMIN — HEPARIN SODIUM 35000 UNITS: 10000 INJECTION, SOLUTION INTRAVENOUS; SUBCUTANEOUS at 08:12

## 2020-09-29 RX ADMIN — FENTANYL CITRATE 250 MCG: 50 INJECTION, SOLUTION INTRAMUSCULAR; INTRAVENOUS at 07:08

## 2020-09-29 RX ADMIN — PROPOFOL 100 MG: 10 INJECTION, EMULSION INTRAVENOUS at 06:58

## 2020-09-29 RX ADMIN — VECURONIUM BROMIDE 10 MG: 10 INJECTION, POWDER, LYOPHILIZED, FOR SOLUTION INTRAVENOUS at 06:59

## 2020-09-29 RX ADMIN — PROPOFOL 30 MG: 10 INJECTION, EMULSION INTRAVENOUS at 07:35

## 2020-09-29 RX ADMIN — KETOROLAC TROMETHAMINE 30 MG: 30 INJECTION, SOLUTION INTRAMUSCULAR at 21:04

## 2020-09-29 RX ADMIN — SODIUM CHLORIDE 5 UNITS/HR: 9 INJECTION, SOLUTION INTRAVENOUS at 15:28

## 2020-09-29 RX ADMIN — FENTANYL CITRATE 100 MCG: 50 INJECTION, SOLUTION INTRAMUSCULAR; INTRAVENOUS at 07:27

## 2020-09-29 RX ADMIN — CHLORHEXIDINE GLUCONATE 0.12% ORAL RINSE 15 ML: 1.2 LIQUID ORAL at 12:30

## 2020-09-29 RX ADMIN — FENTANYL CITRATE 500 MCG: 50 INJECTION, SOLUTION INTRAMUSCULAR; INTRAVENOUS at 07:37

## 2020-09-29 RX ADMIN — POTASSIUM CHLORIDE 10 MEQ: 10 INJECTION, SOLUTION INTRAVENOUS at 11:30

## 2020-09-29 RX ADMIN — IPRATROPIUM BROMIDE AND ALBUTEROL SULFATE 1 AMPULE: 2.5; .5 SOLUTION RESPIRATORY (INHALATION) at 20:32

## 2020-09-29 RX ADMIN — Medication 2 G: at 19:11

## 2020-09-29 RX ADMIN — DOCUSATE SODIUM 50 MG AND SENNOSIDES 8.6 MG 1 TABLET: 8.6; 5 TABLET, FILM COATED ORAL at 14:12

## 2020-09-29 RX ADMIN — PHENYLEPHRINE HYDROCHLORIDE 100 MCG: 10 INJECTION INTRAVENOUS at 11:18

## 2020-09-29 RX ADMIN — Medication 5 MG: at 08:43

## 2020-09-29 RX ADMIN — FENTANYL CITRATE 250 MCG: 50 INJECTION, SOLUTION INTRAMUSCULAR; INTRAVENOUS at 07:34

## 2020-09-29 RX ADMIN — ALBUMIN (HUMAN) 12.5 G: 12.5 INJECTION, SOLUTION INTRAVENOUS at 21:08

## 2020-09-29 RX ADMIN — Medication 2 G: at 07:07

## 2020-09-29 RX ADMIN — DOCUSATE SODIUM 50 MG AND SENNOSIDES 8.6 MG 1 TABLET: 8.6; 5 TABLET, FILM COATED ORAL at 21:06

## 2020-09-29 RX ADMIN — SODIUM CHLORIDE 30 ML/HR: 9 INJECTION, SOLUTION INTRAVENOUS at 12:28

## 2020-09-29 RX ADMIN — PHENYLEPHRINE HYDROCHLORIDE 100 MCG: 10 INJECTION INTRAVENOUS at 08:36

## 2020-09-29 RX ADMIN — FENTANYL CITRATE 250 MCG: 50 INJECTION, SOLUTION INTRAMUSCULAR; INTRAVENOUS at 07:36

## 2020-09-29 RX ADMIN — MIDAZOLAM 4 MG: 1 INJECTION INTRAMUSCULAR; INTRAVENOUS at 06:38

## 2020-09-29 RX ADMIN — Medication 10 ML: at 14:12

## 2020-09-29 RX ADMIN — Medication 5 MG: at 11:25

## 2020-09-29 RX ADMIN — SODIUM CHLORIDE: 9 INJECTION, SOLUTION INTRAVENOUS at 06:09

## 2020-09-29 RX ADMIN — POTASSIUM CHLORIDE 10 MEQ: 10 INJECTION, SOLUTION INTRAVENOUS at 11:10

## 2020-09-29 RX ADMIN — SODIUM CHLORIDE, PRESERVATIVE FREE 10 ML: 5 INJECTION INTRAVENOUS at 12:30

## 2020-09-29 RX ADMIN — FENTANYL CITRATE 100 MCG: 50 INJECTION, SOLUTION INTRAMUSCULAR; INTRAVENOUS at 10:49

## 2020-09-29 RX ADMIN — FENTANYL CITRATE 25 MCG: 50 INJECTION, SOLUTION INTRAMUSCULAR; INTRAVENOUS at 17:05

## 2020-09-29 RX ADMIN — MUPIROCIN: 20 OINTMENT TOPICAL at 14:11

## 2020-09-29 RX ADMIN — CALCIUM GLUCONATE 1 G: 98 INJECTION, SOLUTION INTRAVENOUS at 13:44

## 2020-09-29 RX ADMIN — CHLORHEXIDINE GLUCONATE 15 ML: 1.2 RINSE ORAL at 06:10

## 2020-09-29 RX ADMIN — FENTANYL CITRATE 25 MCG: 50 INJECTION, SOLUTION INTRAMUSCULAR; INTRAVENOUS at 21:03

## 2020-09-29 RX ADMIN — Medication 10 MG: at 10:45

## 2020-09-29 RX ADMIN — FENTANYL CITRATE 25 MCG: 50 INJECTION, SOLUTION INTRAMUSCULAR; INTRAVENOUS at 17:49

## 2020-09-29 RX ADMIN — SODIUM CHLORIDE, POTASSIUM CHLORIDE, SODIUM LACTATE AND CALCIUM CHLORIDE: 600; 310; 30; 20 INJECTION, SOLUTION INTRAVENOUS at 10:37

## 2020-09-29 RX ADMIN — POTASSIUM CHLORIDE 20 MEQ: 400 INJECTION, SOLUTION INTRAVENOUS at 15:56

## 2020-09-29 RX ADMIN — FENTANYL CITRATE 25 MCG: 50 INJECTION, SOLUTION INTRAMUSCULAR; INTRAVENOUS at 22:44

## 2020-09-29 RX ADMIN — AMINOCAPROIC ACID 5000 MG: 250 INJECTION, SOLUTION INTRAVENOUS at 07:07

## 2020-09-29 RX ADMIN — FENTANYL CITRATE 250 MCG: 50 INJECTION, SOLUTION INTRAMUSCULAR; INTRAVENOUS at 06:58

## 2020-09-29 ASSESSMENT — PULMONARY FUNCTION TESTS
PIF_VALUE: 32
PIF_VALUE: 28
PIF_VALUE: 1
PIF_VALUE: 28
PIF_VALUE: 30
PIF_VALUE: 24
PIF_VALUE: 8
PIF_VALUE: 1
PIF_VALUE: 0
PIF_VALUE: 5
PIF_VALUE: 1
PIF_VALUE: 27
PIF_VALUE: 0
PIF_VALUE: 31
PIF_VALUE: 32
PIF_VALUE: 34
PIF_VALUE: 1
PIF_VALUE: 1
PIF_VALUE: 30
PIF_VALUE: 28
PIF_VALUE: 32
PIF_VALUE: 23
PIF_VALUE: 1
PIF_VALUE: 30
PIF_VALUE: 27
PIF_VALUE: 29
PIF_VALUE: 27
PIF_VALUE: 0
PIF_VALUE: 29
PIF_VALUE: 0
PIF_VALUE: 26
PIF_VALUE: 31
PIF_VALUE: 1
PIF_VALUE: 27
PIF_VALUE: 23
PIF_VALUE: 0
PIF_VALUE: 29
PIF_VALUE: 26
PIF_VALUE: 21
PIF_VALUE: 28
PIF_VALUE: 28
PIF_VALUE: 6
PIF_VALUE: 27
PIF_VALUE: 28
PIF_VALUE: 29
PIF_VALUE: 1
PIF_VALUE: 1
PIF_VALUE: 27
PIF_VALUE: 31
PIF_VALUE: 28
PIF_VALUE: 1
PIF_VALUE: 26
PIF_VALUE: 1
PIF_VALUE: 0
PIF_VALUE: 27
PIF_VALUE: 30
PIF_VALUE: 1
PIF_VALUE: 1
PIF_VALUE: 32
PIF_VALUE: 27
PIF_VALUE: 6
PIF_VALUE: 27
PIF_VALUE: 32
PIF_VALUE: 29
PIF_VALUE: 24
PIF_VALUE: 1
PIF_VALUE: 27
PIF_VALUE: 30
PIF_VALUE: 32
PIF_VALUE: 28
PIF_VALUE: 27
PIF_VALUE: 1
PIF_VALUE: 31
PIF_VALUE: 1
PIF_VALUE: 1
PIF_VALUE: 27
PIF_VALUE: 1
PIF_VALUE: 24
PIF_VALUE: 28
PIF_VALUE: 30
PIF_VALUE: 1
PIF_VALUE: 1
PIF_VALUE: 27
PIF_VALUE: 32
PIF_VALUE: 27
PIF_VALUE: 26
PIF_VALUE: 29
PIF_VALUE: 29
PIF_VALUE: 1
PIF_VALUE: 29
PIF_VALUE: 29
PIF_VALUE: 33
PIF_VALUE: 1
PIF_VALUE: 32
PIF_VALUE: 32
PIF_VALUE: 29
PIF_VALUE: 32
PIF_VALUE: 30
PIF_VALUE: 34
PIF_VALUE: 30
PIF_VALUE: 32
PIF_VALUE: 1
PIF_VALUE: 1
PIF_VALUE: 23
PIF_VALUE: 0
PIF_VALUE: 1
PIF_VALUE: 1
PIF_VALUE: 25
PIF_VALUE: 27
PIF_VALUE: 32
PIF_VALUE: 25
PIF_VALUE: 13
PIF_VALUE: 21
PIF_VALUE: 32
PIF_VALUE: 29
PIF_VALUE: 1
PIF_VALUE: 32
PIF_VALUE: 1
PIF_VALUE: 1
PIF_VALUE: 30
PIF_VALUE: 22
PIF_VALUE: 31
PIF_VALUE: 1
PIF_VALUE: 14
PIF_VALUE: 1
PIF_VALUE: 30
PIF_VALUE: 27
PIF_VALUE: 2
PIF_VALUE: 26
PIF_VALUE: 30
PIF_VALUE: 30
PIF_VALUE: 27
PIF_VALUE: 1
PIF_VALUE: 27
PIF_VALUE: 28
PIF_VALUE: 27
PIF_VALUE: 32
PIF_VALUE: 27
PIF_VALUE: 29
PIF_VALUE: 24
PIF_VALUE: 0
PIF_VALUE: 1
PIF_VALUE: 28
PIF_VALUE: 27
PIF_VALUE: 1
PIF_VALUE: 1
PIF_VALUE: 22
PIF_VALUE: 22
PIF_VALUE: 1
PIF_VALUE: 30
PIF_VALUE: 30
PIF_VALUE: 1
PIF_VALUE: 1
PIF_VALUE: 27
PIF_VALUE: 0
PIF_VALUE: 25
PIF_VALUE: 29
PIF_VALUE: 9
PIF_VALUE: 13
PIF_VALUE: 1
PIF_VALUE: 27
PIF_VALUE: 29
PIF_VALUE: 1
PIF_VALUE: 33
PIF_VALUE: 26
PIF_VALUE: 31
PIF_VALUE: 32
PIF_VALUE: 1
PIF_VALUE: 22
PIF_VALUE: 33
PIF_VALUE: 5
PIF_VALUE: 0
PIF_VALUE: 28
PIF_VALUE: 29
PIF_VALUE: 1
PIF_VALUE: 1
PIF_VALUE: 25
PIF_VALUE: 1
PIF_VALUE: 24
PIF_VALUE: 1
PIF_VALUE: 22
PIF_VALUE: 0
PIF_VALUE: 1
PIF_VALUE: 1
PIF_VALUE: 33
PIF_VALUE: 29
PIF_VALUE: 35
PIF_VALUE: 25
PIF_VALUE: 28
PIF_VALUE: 27
PIF_VALUE: 1
PIF_VALUE: 1
PIF_VALUE: 28
PIF_VALUE: 1
PIF_VALUE: 7
PIF_VALUE: 27
PIF_VALUE: 1
PIF_VALUE: 30
PIF_VALUE: 0
PIF_VALUE: 28
PIF_VALUE: 5
PIF_VALUE: 1
PIF_VALUE: 29
PIF_VALUE: 1
PIF_VALUE: 32
PIF_VALUE: 1
PIF_VALUE: 29
PIF_VALUE: 1
PIF_VALUE: 28
PIF_VALUE: 27
PIF_VALUE: 0
PIF_VALUE: 26
PIF_VALUE: 26
PIF_VALUE: 1
PIF_VALUE: 9
PIF_VALUE: 26
PIF_VALUE: 32
PIF_VALUE: 28
PIF_VALUE: 24
PIF_VALUE: 4
PIF_VALUE: 32
PIF_VALUE: 24
PIF_VALUE: 1
PIF_VALUE: 0
PIF_VALUE: 1
PIF_VALUE: 1
PIF_VALUE: 27
PIF_VALUE: 1
PIF_VALUE: 26
PIF_VALUE: 1
PIF_VALUE: 0
PIF_VALUE: 1
PIF_VALUE: 1
PIF_VALUE: 31
PIF_VALUE: 31
PIF_VALUE: 29
PIF_VALUE: 27
PIF_VALUE: 27
PIF_VALUE: 1
PIF_VALUE: 1
PIF_VALUE: 6
PIF_VALUE: 1
PIF_VALUE: 29
PIF_VALUE: 27
PIF_VALUE: 7
PIF_VALUE: 29
PIF_VALUE: 7
PIF_VALUE: 30
PIF_VALUE: 1
PIF_VALUE: 1
PIF_VALUE: 32
PIF_VALUE: 30
PIF_VALUE: 26
PIF_VALUE: 27
PIF_VALUE: 1
PIF_VALUE: 26
PIF_VALUE: 30
PIF_VALUE: 27
PIF_VALUE: 1
PIF_VALUE: 28
PIF_VALUE: 27
PIF_VALUE: 1
PIF_VALUE: 27
PIF_VALUE: 32
PIF_VALUE: 28
PIF_VALUE: 27
PIF_VALUE: 31
PIF_VALUE: 13
PIF_VALUE: 33
PIF_VALUE: 0
PIF_VALUE: 23
PIF_VALUE: 1
PIF_VALUE: 29
PIF_VALUE: 1
PIF_VALUE: 26
PIF_VALUE: 30
PIF_VALUE: 29
PIF_VALUE: 0
PIF_VALUE: 25
PIF_VALUE: 1
PIF_VALUE: 27
PIF_VALUE: 23
PIF_VALUE: 29
PIF_VALUE: 27
PIF_VALUE: 13
PIF_VALUE: 27
PIF_VALUE: 23
PIF_VALUE: 32
PIF_VALUE: 27
PIF_VALUE: 27
PIF_VALUE: 23
PIF_VALUE: 1
PIF_VALUE: 28
PIF_VALUE: 0
PIF_VALUE: 7
PIF_VALUE: 12
PIF_VALUE: 29
PIF_VALUE: 26
PIF_VALUE: 29
PIF_VALUE: 1
PIF_VALUE: 28
PIF_VALUE: 29
PIF_VALUE: 27
PIF_VALUE: 30
PIF_VALUE: 31
PIF_VALUE: 29
PIF_VALUE: 28
PIF_VALUE: 22
PIF_VALUE: 31
PIF_VALUE: 1
PIF_VALUE: 1
PIF_VALUE: 26
PIF_VALUE: 27
PIF_VALUE: 0
PIF_VALUE: 1
PIF_VALUE: 1
PIF_VALUE: 35
PIF_VALUE: 7
PIF_VALUE: 0
PIF_VALUE: 28

## 2020-09-29 ASSESSMENT — PAIN DESCRIPTION - PROGRESSION
CLINICAL_PROGRESSION: GRADUALLY WORSENING
CLINICAL_PROGRESSION: NOT CHANGED
CLINICAL_PROGRESSION: NOT CHANGED
CLINICAL_PROGRESSION: GRADUALLY WORSENING
CLINICAL_PROGRESSION: GRADUALLY WORSENING

## 2020-09-29 ASSESSMENT — PAIN SCALES - GENERAL
PAINLEVEL_OUTOF10: 0
PAINLEVEL_OUTOF10: 7
PAINLEVEL_OUTOF10: 9
PAINLEVEL_OUTOF10: 1
PAINLEVEL_OUTOF10: 8
PAINLEVEL_OUTOF10: 0
PAINLEVEL_OUTOF10: 0
PAINLEVEL_OUTOF10: 7
PAINLEVEL_OUTOF10: 0
PAINLEVEL_OUTOF10: 1
PAINLEVEL_OUTOF10: 7
PAINLEVEL_OUTOF10: 0
PAINLEVEL_OUTOF10: 0
PAINLEVEL_OUTOF10: 6
PAINLEVEL_OUTOF10: 7
PAINLEVEL_OUTOF10: 1
PAINLEVEL_OUTOF10: 7
PAINLEVEL_OUTOF10: 2
PAINLEVEL_OUTOF10: 0
PAINLEVEL_OUTOF10: 0
PAINLEVEL_OUTOF10: 8

## 2020-09-29 ASSESSMENT — PAIN DESCRIPTION - PAIN TYPE
TYPE: ACUTE PAIN
TYPE: SURGICAL PAIN
TYPE: ACUTE PAIN
TYPE: SURGICAL PAIN
TYPE: ACUTE PAIN

## 2020-09-29 ASSESSMENT — PAIN DESCRIPTION - DESCRIPTORS
DESCRIPTORS: PENETRATING;SHARP
DESCRIPTORS: ACHING
DESCRIPTORS: ACHING;PENETRATING;PRESSURE
DESCRIPTORS: ACHING;PENETRATING;PRESSURE
DESCRIPTORS: ACHING

## 2020-09-29 ASSESSMENT — PAIN DESCRIPTION - LOCATION
LOCATION: CHEST

## 2020-09-29 ASSESSMENT — PAIN DESCRIPTION - ORIENTATION
ORIENTATION: ANTERIOR

## 2020-09-29 ASSESSMENT — PAIN DESCRIPTION - ONSET
ONSET: ON-GOING

## 2020-09-29 ASSESSMENT — PAIN - FUNCTIONAL ASSESSMENT: PAIN_FUNCTIONAL_ASSESSMENT: 0-10

## 2020-09-29 NOTE — ANESTHESIA PROCEDURE NOTES
Central Venous Line:    A central venous line was placed using ultrasound guidance and surface landmarks, in the pre-op for the following indication(s): central venous access and CVP monitoring. Sterility preparation included the following: hand hygiene performed prior to procedure, maximum sterile barriers used and sterile technique used to drape from head to toe. The patient was placed in Trendelenburg position. The right internal jugular vein was prepped. The site was prepped with Chloraprep. A 8.5 Fr (size), 10 (length), introducer slick was placed. During the procedure, the following specific steps were taken: target vein identified, needle advanced into vein and blood aspirated and guidewire advanced into vein. Intravenous verification was obtained by ultrasound and venous blood return. Post insertion care included: all ports aspirated, all ports flushed easily, guidewire removed intact, Biopatch applied, line sutured in place and dressing applied. During the procedure the patient experienced: patient tolerated procedure well with no complications.       Anesthesia type: local..No  Staffing  Anesthesiologist: Steve Alvarado DO  Performed: Anesthesiologist   Preanesthetic Checklist  Completed: patient identified, site marked, surgical consent, pre-op evaluation, timeout performed, IV checked, risks and benefits discussed, monitors and equipment checked, anesthesia consent given, oxygen available and patient being monitored

## 2020-09-29 NOTE — FLOWSHEET NOTE
PT arrived to the unit and admitted to [facility] post ACB x3, all hemodynamic lines leveled and calibrated, pt on vent, report given by anesthesia

## 2020-09-29 NOTE — ANESTHESIA PROCEDURE NOTES
Arterial Line:    An arterial line was placed using surface landmarks, in the OR for the following indication(s): continuous blood pressure monitoring and blood sampling needed. A 20 gauge (size), 5 cm (length), Arrow (type) catheter was placed, Seldinger technique not used, into thebrachial artery, secured by tape. Anesthesia type: Local    Events:  patient tolerated procedure well with no complications.   Anesthesiologist: Yaneth Jin MD  Performed: Resident/CRNA   Preanesthetic Checklist  Completed: patient identified, site marked, surgical consent, pre-op evaluation, timeout performed, IV checked, risks and benefits discussed, monitors and equipment checked, anesthesia consent given, oxygen available and patient being monitored

## 2020-09-29 NOTE — PROGRESS NOTES
Wean parameter done    VT= 612 mls  F= 22 B/M  V= 10.2 l/m  NIF=- 50 cmH2O  VC= 1057 mL    Pt awake and following commands.  Patient has a positive cuff leak

## 2020-09-29 NOTE — PLAN OF CARE
Problem: Cardiac Output - Decreased:  Goal: Cardiac output within specified parameters  Description: Cardiac output within specified parameters  Outcome: Met This Shift  Note: Monitor vital signs adm medications as ordered monitor fluid balance replace electrolytes as ordered     Problem: Gas Exchange - Impaired:  Goal: Levels of oxygenation will improve  Description: Levels of oxygenation will improve  Outcome: Met This Shift  Note: Wean and extubate pt as josefina encourage pt to cough and deep breathe instruct pt in the use of smi encourage pt to cough and deep breath

## 2020-09-29 NOTE — CONSULTS
Inpatient Cardiology Consultation      Reason for Consult: Post CABG     Consulting Physician: Dr. Luda Mendoza    Requesting Physician: Dr. Ulysees Kussmaul    Date of Consultation: 9/29/2020    HISTORY OF PRESENT ILLNESS:       This 81-year-old female is known to [address] [place] [address] and is followed by Dr. Luda Mendoza. She underwent cardiac catheterization on September 14 following an or abnormal stress test in the setting of chest pain. She underwent a CABG today and cardiology was consulted. Following information is taken from a review of electronic medical records this location is immediately postop. Past medical and surgical history  1.  overweight. 2. Tobacco abuse  3. Hypertension  4. Hyperlipidemia  5. Diabetes, type II ,insulin requiring  6. Obstructive sleep apnea  7. Chronic back pain  8. ?History of drug abuse and on methadone at home  9. Lexiscan stress test September 1, 2020  Good electrocardiographically normal regadenoson infusion with a   clinicallynonischemic response. 2. Myocardial perfusion imaging showed small anterior wall fixed   defect. 3. Overall left ventricular systolic function mildly reduced, EF   45% with mild global hypokinesis. 4.   Low risk general pharmacologic stress test    10. Coronary artery disease with a cardiac catheterization September 14, 2020   ANGIOGRAPHIC FINDINGS:  1.  Moderate disease involving the ostium and the proximal segment of  the right common iliac artery. 2.  The left main coronary artery is a mid size vessel without any  significant disease. It bifurcates into left anterior descending artery  and left circumflex artery. 3.  The left anterior descending artery is a long vessel, extends down  to the apex. It has 80% ostial stenosis. The rest of the vessel looks  okay. The distal segment of the vessel, there is MONY-1 to MONY-2 flow. The large diagonal branch has mild disease.   4.  The left circumflex artery is a large vessel that gives off a high  mid-sized OM branch that looks significantly diseased distally, the  proximal segment of the circumflex artery is moderately diseased. The  distal segment is significantly diseased with estimated stenosis at 80%  in between the take off of the fourth and fifth OM branches. Again, the  second OM branch is a small to mid size that has mild disease in the mid  segment. The fourth one is small, the fifth one has 80% disease in the  mid segment. The OM sixth one branch has no significant disease. 5.  The right coronary artery arises normally from the right sinus of  Valsalva. It is a mid-size vessel that has 80% proximal disease. Then,  it has 80% in the mid segment, that gives off a large acute marginal  branch that has 60% proximal segment disease.   The left heart catheterization showed LVEDP of 10 and there was no  gradient across the aortic valve on pullback. The left ventriculography  showed estimated ejection fraction of 55% with mild distal and mid  inferior wall hypokinesis. There was no significant angiographic mitral  valve regurgitation noted.     11. 2D echo 2020  EF 65%  Left ventricular internal dimensions were normal in diastole and systole. Severe left ventricular concentric hypertrophy noted. No regional wall motion abnormalities seen. Normal left ventricular ejection fraction. There is doppler evidence of stage II diastolic dysfunction. The left atrium is borderline dilated. Physiologic and/or trace mitral regurgitation is present. There is a trivial circumferential pericardial effusion noted    12. CABG 2020 LIMA to the LAD radial to the obtuse marginal saphenous vein graft to the d RCA  13. Hypothyroidism  14. History of colitis  15. Claustrophobia  16. Ventral hernia repair  16. Knee surgery on the right  18. Hysterectomy and appendectomy  19. Cholecystectomy  20.        Medications Prior to admit:  Prior to Admission medications Medication Sig Start Date End Date Taking? Authorizing Provider   atorvastatin (LIPITOR) 40 MG tablet Take 1 tablet by mouth daily 9/15/20  Yes Mirna Case MD   metoprolol tartrate (LOPRESSOR) 25 MG tablet Take 1 tablet by mouth 2 times daily 9/15/20  Yes Mirna Case MD   METHADONE HCL PO Take by mouth Takes 37 mg daily ( taking for past 20 years) history of 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  Oxycodone   Yes Historical Provider, MD   amLODIPine (NORVASC) 10 MG tablet Take 20 mg by mouth daily    Yes Historical Provider, MD   lisinopril-hydrochlorothiazide (PRINZIDE;ZESTORETIC) 10-12.5 MG per tablet Take 1 tablet by mouth daily Taking 2 tablets once a day   Yes Historical Provider, MD   levothyroxine (SYNTHROID) 25 MCG tablet Take by mouth 5/24/18  Yes Historical Provider, MD   insulin glargine (LANTUS SOLOSTAR) 100 UNIT/ML injection pen Inject 20 Units into the skin nightly 3/30/16  Yes Robinson Nobles, DO   aspirin 81 MG EC tablet Take 81 mg by mouth daily    Historical Provider, MD   nitroGLYCERIN (NITROSTAT) 0.4 MG SL tablet Place 0.4 mg under the tongue every 5 minutes as needed for Chest pain up to max of 3 total doses. If no relief after 1 dose, call 911.     Historical Provider, MD   insulin regular (HUMULIN R;NOVOLIN R) 100 UNIT/ML injection Inject into the skin See Admin Instructions    Historical Provider, MD   Insulin Pen Needle ([employer] PEN NEEDLES 31G) 31G X 8 MM MISC 1 each by Does not apply route daily 4/2/16   Robinson Nobles, DO       Current Medications:    Current Facility-Administered Medications: [START ON 9/30/2020] atorvastatin (LIPITOR) tablet 40 mg, 40 mg, Oral, Daily  levothyroxine (SYNTHROID) tablet 25 mcg, 25 mcg, Oral, Daily  0.9 % sodium chloride infusion, 30 mL/hr, Intravenous, Continuous  sodium chloride flush 0.9 % injection 10 mL, 10 mL, Intravenous, 2 times per day  sodium chloride flush 0.9 % injection 10 mL, 10 mL, Intravenous, PRN  potassium chloride 20 mEq/50 mL IVPB (Central Line), 20 mEq, Intravenous, PRN  magnesium sulfate 2 g in 50 mL IVPB premix, 2 g, Intravenous, PRN  ceFAZolin (ANCEF) 2 g in sterile water 20 mL IV syringe, 2 g, Intravenous, Q8H  acetaminophen (TYLENOL) tablet 650 mg, 650 mg, Oral, Q4H PRN  acetaminophen (TYLENOL) suppository 650 mg, 650 mg, Rectal, Q4H PRN  oxyCODONE (ROXICODONE) immediate release tablet 5 mg, 5 mg, Oral, Q4H PRN **OR** oxyCODONE (ROXICODONE) immediate release tablet 10 mg, 10 mg, Oral, Q4H PRN  fentaNYL (SUBLIMAZE) injection 25 mcg, 25 mcg, Intravenous, Q1H PRN **OR** fentaNYL (SUBLIMAZE) injection 50 mcg, 50 mcg, Intravenous, Q1H PRN  sennosides-docusate sodium (SENOKOT-S) 8.6-50 MG tablet 1 tablet, 1 tablet, Oral, BID  magnesium hydroxide (MILK OF MAGNESIA) 400 MG/5ML suspension 30 mL, 30 mL, Oral, Daily PRN  ondansetron (ZOFRAN) injection 4 mg, 4 mg, Intravenous, Q8H PRN  [START ON 9/30/2020] pantoprazole (PROTONIX) tablet 40 mg, 40 mg, Oral, Daily  pantoprazole (PROTONIX) injection 40 mg, 40 mg, Intravenous, Daily **AND** sodium chloride (PF) 0.9 % injection 10 mL, 10 mL, Intravenous, Daily  chlorhexidine (PERIDEX) 0.12 % solution 15 mL, 15 mL, Mouth/Throat, BID  [START ON 9/30/2020] magnesium oxide (MAG-OX) tablet 400 mg, 400 mg, Oral, Daily  mupirocin (BACTROBAN) 2 % ointment, , Nasal, BID  propofol injection, 10 mcg/kg/min, Intravenous, Continuous PRN  [START ON 9/30/2020] aspirin EC tablet 81 mg, 81 mg, Oral, Daily  aspirin suppository 300 mg, 300 mg, Rectal, Once  meperidine (DEMEROL) injection 25 mg, 25 mg, Intravenous, Once PRN  ipratropium-albuterol (DUONEB) nebulizer solution 1 ampule, 1 ampule, Inhalation, Q4H WA  albumin human 5 % IV solution 25 g, 25 g, Intravenous, PRN  0.9 % sodium chloride bolus, 250 mL, Intravenous, Continuous PRN  norepinephrine (LEVOPHED) 16 mg in dextrose 5% 250 mL infusion, 2 mcg/min, Intravenous, Continuous PRN  clevidipine (CLEVIPREX) infusion, 2 mg/hr, Intravenous, Continuous PRN  insulin regular (HUMULIN R;NOVOLIN R) 100 Units in sodium chloride 0.9 % 100 mL infusion, 1 Units/hr, Intravenous, Continuous PRN  [START ON 9/30/2020] insulin glargine (LANTUS) injection vial 13 Units, 0.15 Units/kg, Subcutaneous, Nightly  glucose (GLUTOSE) 40 % oral gel 15 g, 15 g, Oral, PRN  dextrose 50 % IV solution, 12.5 g, Intravenous, PRN  glucagon (rDNA) injection 1 mg, 1 mg, Intramuscular, PRN  dextrose 5 % solution, 100 mL/hr, Intravenous, PRN  aluminum & magnesium hydroxide-simethicone (MAALOX) 200-200-20 MG/5ML suspension 30 mL, 30 mL, Per NG tube, Q4H PRN  calcium gluconate 1 g in dextrose 5 % 100 mL IVPB, 1 g, Intravenous, PRN  [START ON 9/30/2020] clopidogrel (PLAVIX) tablet 75 mg, 75 mg, Oral, Daily  insulin lispro (HUMALOG) injection vial 0-18 Units, 0-18 Units, Subcutaneous, Q4H  [START ON 9/30/2020] insulin lispro (HUMALOG) injection vial 0-18 Units, 0-18 Units, Subcutaneous, Q4H  [START ON 9/30/2020] senna (SENOKOT) tablet 8.6 mg, 1 tablet, Oral, Nightly  bethanechol (URECHOLINE) tablet 25 mg, 25 mg, Oral, TID  propofol 1000 MG/100ML injection, , ,   albumin human 5 % IV solution, , ,   Facility-Administered Medications Ordered in Other Encounters: midazolam (VERSED) injection, , , PRN  fentaNYL (SUBLIMAZE) injection, , , PRN  propofol injection, , , PRN  vecuronium (NORCURON) injection, , , PRN  aminocaproic acid (AMICAR) injection, , , PRN  lactated ringers infusion, , , Continuous PRN  aminocaproic acid (AMICAR) 10 g in sodium chloride 0.9 % 500 mL infusion, , , Continuous PRN  heparin (porcine) injection, , , PRN  ePHEDrine injection, , , PRN  phenylephrine (VAZCULEP) injection, , , PRN  insulin regular (HUMULIN R;NOVOLIN R) injection, , , PRN  insulin regular (HUMULIN R;NOVOLIN R) 100 Units in sodium chloride 0.9 % 100 mL infusion, , , Continuous PRN  protamine injection, , , PRN  ceFAZolin (ANCEF) injection, , , PRN  potassium chloride 10 mEq/100 mL IVPB (Peripheral Line), , , PRN    Allergies:  Seasonal    Social History: Tobacco abuse, no alcohol hx drug abuse      Family History:   Family History   Adopted: Yes   Problem Relation Age of Onset    Mult Sclerosis Mother     Mult Sclerosis Maternal Grandmother        REVIEW OF SYSTEMS:   unable    PHYSICAL EXAM:   BP (!) 168/83   Pulse 69   Temp 97.4 °F (36.3 °C) (Temporal)   Resp 18   Ht 5' 8\" (1.727 m)   Wt 195 lb (88.5 kg)   SpO2 97%   BMI 29.65 kg/m²   CONST:  Well developed, obese who appears of stated age. Awake, alert and cooperative. Post anesthesia   HEENT:   Head- Normocephalic, atraumatic   Eyes- Conjunctivae pink, anicteric  Throat- Oral mucosa pink and moist. Oral ETT and NG tube  Neck-  No stridor, trachea midline, no jugular venous distention. No carotid bruit. Right neck IVC line  CHEST: Chest symmetrical and non-tender to palpation. No accessory muscle use or intercostal retractions, sternal dressing dry, chest tubes draining sanguinous  RESPIRATORY: Lung sounds - clear throughout fields   CARDIOVASCULAR:     Heart Inspection- shows no noted pulsations  Heart Palpation- no heaves or thrills; PMI is non-displaced   Heart Ausculation- Regular rate and rhythm, no murmur. No s3, s4 or rub   PV: No lower extremity edema on left. No varicosities. Pedal pulses palpable, , no clubbing or cyanosis ,right leg ACE  ABDOMEN: Soft, non-tender to light palpation. Bowel sounds present. No palpable masses no organomegaly; no abdominal bruit  MS: Good muscle strength and tone. No atrophy or abnormal movements. :  Camilo catheter draining clear rupa  SKIN: Warm and dry no statis dermatitis or ulcers   NEURO / PSYCH: Postanesthesia  DATA:    ECG / Tele strips: SR  Diagnostic:      Intake/Output Summary (Last 24 hours) at 9/29/2020 1146  Last data filed at 9/29/2020 1107  Gross per 24 hour   Intake 1475 ml   Output 475 ml   Net 1000 ml       Labs:   CBC: No results for input(s): WBC, HGB, HCT, PLT in the last 72 hours.   BMP:   Recent Labs     09/29/20  0906 09/29/20  1055 during my history, exam, and decision making. Please see my additional contributions to the history, physical exam, assessment, and recommendations below. HISTORY OF PRESENT ILLNESS:     Is intubated postop    Past medical history:  Reviewed, as above. Past surgical history:  Reviewed, as above. Current medications:  Reviewed, as above    Allergies:  Reviewed, as above    Social history:  Reviewed, as above    Family medical history:  Reviewed, as above. REVIEW OF SYSTEMS:   Reviewed, as above. PHYSICAL EXAM:   CONSTITUTIONAL: Intubated, no apparent distress, and appears stated age intubated,  EYES:  lids and lashes normal and pupils equal, round and reactive to light, anicteric sclerae  HEAD:  normocepalic, without obvious abnormality, atraumatic, pink, moist mucous membranes. NECK:  Supple, symmetrical, trachea midline, no adenopathy, thyroid symmetric, not enlarged and no tenderness, skin normal  HEMATOLOGIC/LYMPHATICS:  no cervical lymphadenopathy and no supraclavicular lymphadenopathy  LUNGS:  No increased work of breathing, good air exchange, scattered rhonchi CARDIOVASCULAR:  Normal apical impulse, regular rate and rhythm, normal S1 and S2, no S3 or S4, and no murmur noted and no JVD, no carotid bruit, no pedal edema, good carotid upstroke bilaterally. ABDOMEN:  Soft, nontender, no masses, no hepatomegaly or splenomegaly, BS+  CHEST: nontender to palpation, expands symmetrically  MUSCULOSKELETAL:  No clubbing no cyanosis. there is no redness, warmth, or swelling of the joints  NEUROLOGIC: Intubated, sedated  SKIN:  no bruising or bleeding, normal skin color, texture, turgor and no redness, warmth, or swelling    /60   Pulse 81   Temp 99.1 °F (37.3 °C) (Bladder)   Resp 17   Ht 5' 8\" (1.727 m)   Wt 195 lb (88.5 kg)   SpO2 96%   BMI 29.65 kg/m²     DATA:   I personally reviewed the admission EKG with the following interpretation: Sinus rhythm  ECHO:   Stress Test:   Angiography: Cardiology Labs:   BMP:    Lab Results   Component Value Date     09/29/2020    K 4.2 09/29/2020     09/29/2020    CO2 23 09/29/2020    BUN 13 09/29/2020     CMP:    Lab Results   Component Value Date     09/29/2020    K 4.2 09/29/2020     09/29/2020    CO2 23 09/29/2020    BUN 13 09/29/2020    PROT 8.0 09/25/2020     CBC:    Lab Results   Component Value Date    WBC 29.4 09/29/2020    RBC 3.23 09/29/2020    HGB 9.8 09/29/2020    HCT 29.3 09/29/2020    MCV 90.7 09/29/2020    RDW 13.8 09/29/2020     09/29/2020     PT/INR:  No results found for: PTINR  PT/INR Warfarin:  No components found for: PTPATWAR, PTINRWAR  PTT:    Lab Results   Component Value Date    APTT 30.2 09/29/2020     PTT Heparin:  No components found for: APTTHEP  Magnesium:    Lab Results   Component Value Date    MG 2.4 09/29/2020     TSH:  No results found for: TSH  TROPONIN:  No components found for: TROP  BNP:  No results found for: BNP  FASTING LIPID PANEL:    Lab Results   Component Value Date    CHOL 288 10/27/2015    HDL 27 10/27/2015    TRIG 253 10/27/2015     XR CHEST PORTABLE   Final Result   Moderate cardiomegaly with postoperative changes               XR CHEST PORTABLE    (Results Pending)     I have personally reviewed the laboratory, cardiac diagnostic and radiographic testing as outlined above:    IMPRESSION:  1.  CAD: Status post CABG with a LIMA to LAD, radial graft to OM, SVG to distal RCA,Still intubated  2. Hypertension: Controlled  3. Hyperlipidemia: On statin  4. Type 2 diabetes mellitus  5. Tobacco abuse    RECOMMENDATIONS:   1. Continue current treatment as per CT surgery    I have reviewed my findings and recommendations with patient    Thank you for the consult  Electronically signed by Zion Quijano MD on 9/29/2020 at 10:30 PM  NOTE: This report was transcribed using voice recognition software.  Every effort was made to ensure accuracy; however, inadvertent computerized transcription errors

## 2020-09-29 NOTE — PROGRESS NOTES
CVICU Admission Note    Name: Rupert Beach  MRN: [id number]    CC: Postoperative Critical Care Management     Indication for Surgery/Procedure: CAD  LVEF:  Normal    RVF:  Normal    Important/Relevant PMH/PSH: HTN, HPL, BURTON (not on CPAP), Hypothyroidism, DM, Colitis, current smoker, h/o drug abuse on methadone     Procedure/Surgeries: 9/29/2020 CABGx3 (LIMA-LAD, Radial-OM, SVG-dRCA), EVH, ERH, KLS plating     Pacing wires: Ventricular       Physical Exam:    /66   Pulse 71   Temp 97.4 °F (36.3 °C) (Temporal)   Resp 17   Ht 5' 8\" (1.727 m)   Wt 195 lb (88.5 kg)   SpO2 97%   BMI 29.65 kg/m²       Post operative CXR:  Atelectasis, No pneumothorax noted, Mildly increased vascular markings bilateral, No significant pleural effusion. ETT/lines/drains appear to be in proper position. Final Radiology report pending. General Appearance: Arrived to ICU intubated, sedated. Hemodynamically stable. On RHI gtt. Eyes: PERRL  Pulmonary: Diminished bibasilar. No wheezes, no accessory muscle use noted   Ventilator: Mode: AC/VC, 70% FiO2, 5 PEEP, 500 Vt   Cardiovascular: RRR, no heaves or thrills palpated   Telemetry: SR 70s  Abdomen: Soft, OG to LIWS  Extremities: s/p LUE radial harvest; LLE palpable DP/PT, RLE +PT signal, no edema   Neurologic/Psych: sedation  Skin: Warm and dry    Incision:MSI PEPE intact, LUE radial site with ace wrap- soft to palpation,  RLE SVG sites with ace wrap     Assessment/Plan: Day of Surgery     1. CAD S/p CABGx3 (LIMA-LAD, Radial-OM, SVG-dRCA)  - DAPT, statin  - Start Nitrate tomorrow for radial graft protection as BP allows   - Ancef   - Monitor chest tube output and hemodynamics closely    2. Acute Postoperative Respiratory Insufficiency  - 2/2 surgery  - Intubated on ventilator   - Wean vent settings and extubate patient once awake, following commands, HOOK, and no signs of bleeding  - ABGs per protocol and PRN     3.  Acute Post Operative Pain   - h/o drug abuse, on methadone at home   - PRN fentanyl for pain management until able to take PO     4. DM Type2   - HgbA1C 11.5%, Home Rx: lantus 20units nightly, SQ Humulin  - On RHI infusion for glycemic control per protocol    5. Postoperative Hypotension  - Target MAP >65  - IVF resuscitation PRN, start low dose levophed gtt if needed while sedated, wean off as able       This patient has a high probability of sudden deterioration, which requires preparedness to urgently intervene. I participated in the decision making process and managed the direct care of the patient that required frequent assessment and treatment. I personally spent 33 minutes of critical care time treating the patient.          Electronically signed by WALLACE Robertson CNP on 9/29/2020 at 11:56 AM

## 2020-09-29 NOTE — ANESTHESIA PRE PROCEDURE
Department of Anesthesiology  Preprocedure Note       Name:  Jaocb Em   Age:  54 y.o.  :  1970                                          MRN:  [id number]         Date:  2020      Surgeon: Mirtha Santos):  Kandis Peterson DO    Procedure: Procedure(s):  CORONARY ARTERY BYPASS, POSSIBLE LEFT RADIAL ARTERY HARVEST, MINDA    Medications prior to admission:   Prior to Admission medications    Medication Sig Start Date End Date Taking? Authorizing Provider   metoprolol tartrate (LOPRESSOR) 25 MG tablet Take 1 tablet by mouth 2 times daily 9/15/20  Yes Wendy Cabral MD   METHADONE HCL PO Take by mouth Takes 37 mg daily ( taking for past 20 years) history of 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  Oxycodone   Yes Historical Provider, MD   amLODIPine (NORVASC) 10 MG tablet Take 20 mg by mouth daily    Yes Historical Provider, MD   atorvastatin (LIPITOR) 40 MG tablet Take 1 tablet by mouth daily 9/15/20   Wendy Cabral MD   aspirin 81 MG EC tablet Take 81 mg by mouth daily    Historical Provider, MD   nitroGLYCERIN (NITROSTAT) 0.4 MG SL tablet Place 0.4 mg under the tongue every 5 minutes as needed for Chest pain up to max of 3 total doses. If no relief after 1 dose, call 911.     Historical Provider, MD   insulin regular (HUMULIN R;NOVOLIN R) 100 UNIT/ML injection Inject into the skin See Admin Instructions    Historical Provider, MD   lisinopril-hydrochlorothiazide (PRINZIDE;ZESTORETIC) 10-12.5 MG per tablet Take 1 tablet by mouth daily Taking 2 tablets once a day    Historical Provider, MD   levothyroxine (SYNTHROID) 25 MCG tablet Take by mouth 18   Historical Provider, MD   Insulin Pen Needle ([employer] PEN NEEDLES 31G) 31G X 8 MM MISC 1 each by Does not apply route daily 16   Robinson Nobles DO   insulin glargine (LANTUS SOLOSTAR) 100 UNIT/ML injection pen Inject 20 Units into the skin nightly 3/30/16   Robinson Nobles DO       Current medications:    Current Facility-Administered Medications   Medication Dose Route Progress Notes by Jen Bright RN at 05/14/18 10:52 AM     Author:  Jen Bright RN Service:  (none) Author Type:  Registered Nurse     Filed:  05/14/18 10:54 AM Encounter Date:  5/14/2018 Status:  Signed     :  Jen Bright RN (Registered Nurse)            Please see result note(s) from 5/9.  Patient notified.  She noted that she has not been compliant with taking the Mevacor.  Compliance stressed.  Refills sent to pharmacy.  Labs ordered.[CH1.1M]  Dora verbalized understanding of information given.[CH1.1T]        Revision History        User Key Date/Time User Provider Type Action    > CH1.1 05/14/18 10:54 AM Jen Bright RN Registered Nurse Sign    M - Manual, T - Template             Frequency Provider Last Rate Last Dose    0.9 % sodium chloride infusion   Intravenous Continuous KATY Brown        ceFAZolin (ANCEF) 2 g in sterile water 20 mL IV syringe  2 g Intravenous On Call to [address] KATY Bolivar        chlorhexidine (HIBICLENS) 4 % liquid   Topical Once Solange Brown        chlorhexidine (PERIDEX) 0.12 % solution 15 mL  15 mL Mouth/Throat Once YesyimaSolange Glover        sodium chloride flush 0.9 % injection 10 mL  10 mL Intravenous 2 times per day Yesyimahattie Gonsalves [place]        sodium chloride flush 0.9 % injection 10 mL  10 mL Intravenous PRN KATY Brown           Allergies:     Allergies   Allergen Reactions    Oxycodone      Addiction happened 20 years ago on Methadone     Seasonal      Maple trees grass dust dust mites       Problem List:    Patient Active Problem List   Diagnosis Code    Leukocytosis D72.829    Essential hypertension I10    History of drug abuse ([facility] [address] F19.11    Chronic nonintractable headache R51    Cervical disc herniation M50.20    CAD in native artery I25.10       Past Medical History:        Diagnosis Date    CAD in native artery 2020    Claustrophobia     mild    Colitis     Constipation     Diabetes mellitus ([facility] [address]     Heart problem     Hyperlipidemia     Hypertension     Hypothyroidism     Sleep apnea     diagnosed 7-8 years ago does not use C-pap problems with cost    Thyroid disease     Wears partial dentures     upper       Past Surgical History:        Procedure Laterality Date    APPENDECTOMY      CARDIAC CATHETERIZATION  2020    Dr. Jesus Castillo EF 55% cts consult ordered     SECTION      x2    CHOLECYSTECTOMY      COLONOSCOPY      HERNIA REPAIR  12/1/15    laparoscopic incisional reapair with mesh    HYSTERECTOMY      KNEE SURGERY Right     VENTRAL HERNIA REPAIR         Social History:    Social History     Tobacco Use    Smoking status: Current Every Day Smoker     Packs/day: 1.00     Years: 25.00     Pack years: 25.00     Types: Cigarettes    Smokeless tobacco: Never Used    Tobacco comment: trying to quit   Substance Use Topics    Alcohol use: Not Currently     Comment: [employer] daily                                 Ready to quit: Not Answered  Counseling given: Not Answered  Comment: trying to quit      Vital Signs (Current):   Vitals:    09/29/20 0543   BP: (!) 168/83   Pulse: 69   Resp: 18   Temp: 97.4 °F (36.3 °C)   TempSrc: Temporal   SpO2: 97%   Weight: 195 lb (88.5 kg)   Height: 5' 8\" (1.727 m)                                              BP Readings from Last 3 Encounters:   09/29/20 (!) 168/83   09/25/20 138/65   09/15/20 (!) 153/88       NPO Status:  > 8hrs                                                                               BMI:   Wt Readings from Last 3 Encounters:   09/29/20 195 lb (88.5 kg)   09/25/20 195 lb (88.5 kg)   09/14/20 190 lb (86.2 kg)     Body mass index is 29.65 kg/m². CBC:   Lab Results   Component Value Date    WBC 12.7 09/25/2020    RBC 3.88 09/25/2020    HGB 11.9 09/25/2020    HCT 35.8 09/25/2020    MCV 92.3 09/25/2020    RDW 13.6 09/25/2020     09/25/2020       CMP:   Lab Results   Component Value Date     09/25/2020    K 3.6 09/25/2020     09/25/2020    CO2 25 09/25/2020    BUN 6 09/25/2020    CREATININE 0.8 09/25/2020    GFRAA >60 09/25/2020    LABGLOM >60 09/25/2020    GLUCOSE 238 09/25/2020    PROT 8.0 09/25/2020    CALCIUM 9.2 09/25/2020    BILITOT <0.2 09/25/2020    ALKPHOS 107 09/25/2020    AST 19 09/25/2020    ALT 18 09/25/2020       POC Tests: No results for input(s): POCGLU, POCNA, POCK, POCCL, POCBUN, POCHEMO, POCHCT in the last 72 hours.     Coags:   Lab Results   Component Value Date    PROTIME 12.4 09/25/2020    INR 1.1 09/25/2020    APTT 33.1 09/25/2020       HCG (If Applicable): No results found for: PREGTESTUR, PREGSERUM, HCG, HCGQUANT     ABGs: No results found for: PHART, PO2ART, SIN0AGW, DAK1BKL, BEART, X9JARGZL     Type & Screen (If Applicable):  No results found for: LABABO, LABRH    Drug/Infectious Status (If Applicable):  No results found for: HIV, HEPCAB    COVID-19 Screening (If Applicable):   Lab Results   Component Value Date    COVID19 Not Detected 09/24/2020         Anesthesia Evaluation  Patient summary reviewed and Nursing notes reviewed no history of anesthetic complications:   Airway: Mallampati: III  TM distance: >3 FB   Neck ROM: full  Mouth opening: > = 3 FB Dental:      Comment: None loose    Pulmonary:   (+) sleep apnea: on noncompliant,  decreased breath sounds,                             Cardiovascular:  Exercise tolerance: poor (<4 METS),   (+) hypertension:, CAD:,         Rhythm: regular  Rate: normal           Beta Blocker:  Dose within 24 Hrs      ROS comment: Left ventricular internal dimensions were normal in diastole and systole. Severe left ventricular concentric hypertrophy noted. No regional wall motion abnormalities seen. Normal left ventricular ejection fraction. There is doppler evidence of stage II diastolic dysfunction. The left atrium is borderline dilated. Physiologic and/or trace mitral regurgitation is present. There is a trivial circumferential pericardial effusion noted. IMPRESSION:  1.  Multivessel disease involving ostial LAD, distal LAD, left  circumflex artery, OM branch, RCA and acute marginal branch. 2.  Moderate disease involving the ostium and the proximal segment of  the right common iliac artery. 3.  Preserved LV function with mild inferior wall hypokinesis. 4.  Successful deployment of 6-[place] Angio-Seal in the right common  femoral artery and deployment of 6-[place] Angio-Seal in the left common  femoral artery.      Neuro/Psych:   (+) headaches:,             GI/Hepatic/Renal: Neg GI/Hepatic/Renal ROS            Endo/Other:    (+) DiabetesType II DM, , hypothyroidism::., .                 Abdominal:           Vascular:           ROS comment: Atherosclerotic disease. No hemodynamically significant stenosis is   identified   Estimated stenosis by NASCET criteria in the proximal right carotid   artery is between 0% and 49%. Estimated stenosis by NASCET criteria in the proximal left carotid   artery is between 0% and 49%. .                               Anesthesia Plan      general     ASA 4       Induction: intravenous. arterial line, BIS, central line, MINDA and CVP  MIPS: Postoperative opioids intended, Prophylactic antiemetics administered and Postoperative ventilation. Anesthetic plan and risks discussed with patient. Plan discussed with CRNA.                   Kalen Diaz DO   9/29/2020

## 2020-09-29 NOTE — BRIEF OP NOTE
Brief Postoperative Note      Patient: Maria E Pritchett  YOB: 1970  MRN: [id number]    Date of Procedure: 9/29/2020    Pre-Op Diagnosis: CORONARY ARTERY DISEASE    Post-Op Diagnosis: Same       Procedure(s):  CORONARY ARTERY BYPASS x3 (LIMA-LAD, Radial-OM, SVG-dRCA)  EVH  ERH  KLS Plating     Surgeon(s):  Seamus Gallegos DO    Assistant:  First Assistant: Shantell Lopez  Physician Assistant: Linden Wilcox PA-C; KATY Rojas    Anesthesia: General    Estimated Blood Loss (mL): less than 50     Complications: None    Specimens:   * No specimens in log *    Implants:  Implant Name Type Inv. Item Serial No.  Lot No. LRB No. Used Action   PLATE STERNAL TI 6 HL 1.8MM Screw/Plate/Nail/Sai PLATE STERNAL TI 6 HL 1.8MM  [employer]  N/A 1 Implanted   PLATE STERNL LK 8 HL LADDER 1.8MM Screw/Plate/Nail/Sai PLATE STERNL LK 8 HL LADDER 1.8MM  [employer]  N/A 1 Implanted   SCREW STERNAL LOCK 2. [id number] Screw/Plate/Nail/Sai SCREW STERNAL LOCK 2. [id number]  [employer]  N/A 8 Implanted   SCREW LK STNAL 2.3X11MM Screw/Plate/Nail/Sai SCREW LK STNAL 2.3X11MM  [employer]  N/A 6 Implanted         Drains:   Chest Tube 1 Posterior Mediastinal 19 Malaysian (Active)   Dressing Status Clean;Dry; Intact 09/29/20 0917   Dressing Type Dry dressing 09/29/20 0917   Site Assessment Clean;Dry; Intact 09/29/20 0917       Chest Tube 2 Anterior Mediastinal 19 Malaysian (Active)   Dressing Status Clean;Dry; Intact 09/29/20 0917   Dressing Type Dry dressing 09/29/20 0917   Site Assessment Clean;Dry; Intact 09/29/20 0917       Chest Tube 3 Left Pleural 19 Malaysian (Active)   Dressing Status Clean;Dry; Intact 09/29/20 0917   Dressing Type Dry dressing 09/29/20 0917   Site Assessment Clean;Dry; Intact 09/29/20 0917       NG/OG/NJ/NE Tube Center mouth (Active)       Urethral Catheter Temperature probe;Non-latex 16 fr (Active)         Electronically signed by Seamus Gallegos DO on 9/29/2020 at 11:31 AM

## 2020-09-29 NOTE — PROGRESS NOTES
Admitted to Same Day Surgery Unit. Preop instructions given to patient & family. Covid Test done: Yes, 9/24/20    Results: NEGATIVE    Self-quarantine guidelines followed since tested? Yes    Any unusual S/S or concerns expressed or observed?  No

## 2020-09-30 ENCOUNTER — APPOINTMENT (OUTPATIENT)
Dept: GENERAL RADIOLOGY | Age: 50
DRG: 236 | End: 2020-09-30
Attending: THORACIC SURGERY (CARDIOTHORACIC VASCULAR SURGERY)

## 2020-09-30 LAB
ACTIVATED CLOTTING TIME: 107 SECONDS (ref 99–130)
ACTIVATED CLOTTING TIME: 109 SECONDS (ref 99–130)
ACTIVATED CLOTTING TIME: 311 SECONDS (ref 99–130)
ACTIVATED CLOTTING TIME: 423 SECONDS (ref 99–130)
ACTIVATED CLOTTING TIME: 443 SECONDS (ref 99–130)
ACTIVATED CLOTTING TIME: 503 SECONDS (ref 99–130)
ACTIVATED CLOTTING TIME: >1005 SECONDS (ref 99–130)
ANION GAP SERPL CALCULATED.3IONS-SCNC: 11 MMOL/L (ref 7–16)
BUN BLDV-MCNC: 12 MG/DL (ref 6–20)
CALCIUM SERPL-MCNC: 7.7 MG/DL (ref 8.6–10.2)
CHLORIDE BLD-SCNC: 103 MMOL/L (ref 98–107)
CO2: 22 MMOL/L (ref 22–29)
CREAT SERPL-MCNC: 0.7 MG/DL (ref 0.5–1)
GFR AFRICAN AMERICAN: >60
GFR NON-AFRICAN AMERICAN: >60 ML/MIN/1.73
GLUCOSE BLD-MCNC: 167 MG/DL (ref 74–99)
HCT VFR BLD CALC: 24.4 % (ref 34–48)
HEMOGLOBIN: 8 G/DL (ref 11.5–15.5)
MAGNESIUM: 2.1 MG/DL (ref 1.6–2.6)
MCH RBC QN AUTO: 30.5 PG (ref 26–35)
MCHC RBC AUTO-ENTMCNC: 32.8 % (ref 32–34.5)
MCV RBC AUTO: 93.1 FL (ref 80–99.9)
METER GLUCOSE: 164 MG/DL (ref 74–99)
METER GLUCOSE: 181 MG/DL (ref 74–99)
METER GLUCOSE: 188 MG/DL (ref 74–99)
METER GLUCOSE: 194 MG/DL (ref 74–99)
METER GLUCOSE: 221 MG/DL (ref 74–99)
PDW BLD-RTO: 14.2 FL (ref 11.5–15)
PLATELET # BLD: 206 E9/L (ref 130–450)
PMV BLD AUTO: 11.1 FL (ref 7–12)
POTASSIUM SERPL-SCNC: 4 MMOL/L (ref 3.5–5)
POTASSIUM SERPL-SCNC: 4.2 MMOL/L (ref 3.5–5)
RBC # BLD: 2.62 E12/L (ref 3.5–5.5)
SODIUM BLD-SCNC: 136 MMOL/L (ref 132–146)
WBC # BLD: 14.8 E9/L (ref 4.5–11.5)

## 2020-09-30 PROCEDURE — 6360000002 HC RX W HCPCS: Performed by: PHYSICIAN ASSISTANT

## 2020-09-30 PROCEDURE — 97530 THERAPEUTIC ACTIVITIES: CPT

## 2020-09-30 PROCEDURE — 2140000000 HC CCU INTERMEDIATE R&B

## 2020-09-30 PROCEDURE — 2700000000 HC OXYGEN THERAPY PER DAY

## 2020-09-30 PROCEDURE — 6370000000 HC RX 637 (ALT 250 FOR IP): Performed by: PHYSICIAN ASSISTANT

## 2020-09-30 PROCEDURE — 83735 ASSAY OF MAGNESIUM: CPT

## 2020-09-30 PROCEDURE — 97535 SELF CARE MNGMENT TRAINING: CPT

## 2020-09-30 PROCEDURE — 6360000002 HC RX W HCPCS: Performed by: NURSE PRACTITIONER

## 2020-09-30 PROCEDURE — 94640 AIRWAY INHALATION TREATMENT: CPT

## 2020-09-30 PROCEDURE — 85027 COMPLETE CBC AUTOMATED: CPT

## 2020-09-30 PROCEDURE — 2580000003 HC RX 258: Performed by: NURSE PRACTITIONER

## 2020-09-30 PROCEDURE — 99233 SBSQ HOSP IP/OBS HIGH 50: CPT | Performed by: NURSE PRACTITIONER

## 2020-09-30 PROCEDURE — 6370000000 HC RX 637 (ALT 250 FOR IP): Performed by: NURSE PRACTITIONER

## 2020-09-30 PROCEDURE — 37799 UNLISTED PX VASCULAR SURGERY: CPT

## 2020-09-30 PROCEDURE — 80048 BASIC METABOLIC PNL TOTAL CA: CPT

## 2020-09-30 PROCEDURE — P9045 ALBUMIN (HUMAN), 5%, 250 ML: HCPCS | Performed by: PHYSICIAN ASSISTANT

## 2020-09-30 PROCEDURE — 36415 COLL VENOUS BLD VENIPUNCTURE: CPT

## 2020-09-30 PROCEDURE — 71045 X-RAY EXAM CHEST 1 VIEW: CPT

## 2020-09-30 PROCEDURE — 82962 GLUCOSE BLOOD TEST: CPT

## 2020-09-30 PROCEDURE — 2580000003 HC RX 258: Performed by: PHYSICIAN ASSISTANT

## 2020-09-30 PROCEDURE — 97162 PT EVAL MOD COMPLEX 30 MIN: CPT

## 2020-09-30 PROCEDURE — 97166 OT EVAL MOD COMPLEX 45 MIN: CPT

## 2020-09-30 PROCEDURE — 84132 ASSAY OF SERUM POTASSIUM: CPT

## 2020-09-30 RX ORDER — DIPHENHYDRAMINE HCL 25 MG
25 TABLET ORAL EVERY 8 HOURS PRN
Status: DISCONTINUED | OUTPATIENT
Start: 2020-09-30 | End: 2020-10-03 | Stop reason: HOSPADM

## 2020-09-30 RX ORDER — DEXTROSE MONOHYDRATE 25 G/50ML
12.5 INJECTION, SOLUTION INTRAVENOUS PRN
Status: DISCONTINUED | OUTPATIENT
Start: 2020-09-30 | End: 2020-10-03 | Stop reason: HOSPADM

## 2020-09-30 RX ORDER — ACETAMINOPHEN 500 MG
1000 TABLET ORAL EVERY 8 HOURS SCHEDULED
Status: DISPENSED | OUTPATIENT
Start: 2020-09-30 | End: 2020-10-01

## 2020-09-30 RX ORDER — ASCORBIC ACID 500 MG
500 TABLET ORAL 2 TIMES DAILY
Status: DISCONTINUED | OUTPATIENT
Start: 2020-09-30 | End: 2020-10-03 | Stop reason: HOSPADM

## 2020-09-30 RX ORDER — ISOSORBIDE MONONITRATE 30 MG/1
30 TABLET, EXTENDED RELEASE ORAL DAILY
Status: DISCONTINUED | OUTPATIENT
Start: 2020-09-30 | End: 2020-10-03 | Stop reason: HOSPADM

## 2020-09-30 RX ORDER — POTASSIUM CHLORIDE 20 MEQ/1
20 TABLET, EXTENDED RELEASE ORAL PRN
Status: DISCONTINUED | OUTPATIENT
Start: 2020-09-30 | End: 2020-10-03 | Stop reason: HOSPADM

## 2020-09-30 RX ORDER — DEXTROSE MONOHYDRATE 50 MG/ML
100 INJECTION, SOLUTION INTRAVENOUS PRN
Status: DISCONTINUED | OUTPATIENT
Start: 2020-09-30 | End: 2020-10-03 | Stop reason: HOSPADM

## 2020-09-30 RX ORDER — BISACODYL 10 MG
10 SUPPOSITORY, RECTAL RECTAL DAILY PRN
Status: DISCONTINUED | OUTPATIENT
Start: 2020-09-30 | End: 2020-10-03 | Stop reason: HOSPADM

## 2020-09-30 RX ORDER — ACETAMINOPHEN 325 MG/1
650 TABLET ORAL EVERY 4 HOURS PRN
Status: DISCONTINUED | OUTPATIENT
Start: 2020-09-30 | End: 2020-10-03 | Stop reason: HOSPADM

## 2020-09-30 RX ORDER — SENNA PLUS 8.6 MG/1
1 TABLET ORAL 2 TIMES DAILY
Status: DISCONTINUED | OUTPATIENT
Start: 2020-09-30 | End: 2020-10-03 | Stop reason: HOSPADM

## 2020-09-30 RX ORDER — NICOTINE POLACRILEX 4 MG
15 LOZENGE BUCCAL PRN
Status: DISCONTINUED | OUTPATIENT
Start: 2020-09-30 | End: 2020-10-03 | Stop reason: HOSPADM

## 2020-09-30 RX ORDER — POLYETHYLENE GLYCOL 3350 17 G/17G
17 POWDER, FOR SOLUTION ORAL DAILY
Status: DISCONTINUED | OUTPATIENT
Start: 2020-09-30 | End: 2020-10-03 | Stop reason: HOSPADM

## 2020-09-30 RX ORDER — METHADONE HYDROCHLORIDE 10 MG/ML
37 CONCENTRATE ORAL DAILY
Status: DISCONTINUED | OUTPATIENT
Start: 2020-10-01 | End: 2020-10-03 | Stop reason: HOSPADM

## 2020-09-30 RX ORDER — ASPIRIN 81 MG/1
81 TABLET ORAL DAILY
Status: DISCONTINUED | OUTPATIENT
Start: 2020-10-01 | End: 2020-10-03 | Stop reason: HOSPADM

## 2020-09-30 RX ORDER — DOCUSATE SODIUM 100 MG/1
100 CAPSULE, LIQUID FILLED ORAL 2 TIMES DAILY
Status: DISCONTINUED | OUTPATIENT
Start: 2020-09-30 | End: 2020-10-03 | Stop reason: HOSPADM

## 2020-09-30 RX ORDER — FOLIC ACID 1 MG/1
1 TABLET ORAL DAILY
Status: DISCONTINUED | OUTPATIENT
Start: 2020-09-30 | End: 2020-10-03 | Stop reason: HOSPADM

## 2020-09-30 RX ORDER — FERROUS SULFATE 325(65) MG
325 TABLET ORAL 2 TIMES DAILY WITH MEALS
Status: DISCONTINUED | OUTPATIENT
Start: 2020-09-30 | End: 2020-10-03 | Stop reason: HOSPADM

## 2020-09-30 RX ADMIN — IPRATROPIUM BROMIDE AND ALBUTEROL SULFATE 1 AMPULE: 2.5; .5 SOLUTION RESPIRATORY (INHALATION) at 09:59

## 2020-09-30 RX ADMIN — ATORVASTATIN CALCIUM 40 MG: 40 TABLET, FILM COATED ORAL at 10:33

## 2020-09-30 RX ADMIN — DOCUSATE SODIUM 50 MG AND SENNOSIDES 8.6 MG 1 TABLET: 8.6; 5 TABLET, FILM COATED ORAL at 08:16

## 2020-09-30 RX ADMIN — Medication 500 MG: at 20:17

## 2020-09-30 RX ADMIN — POLYETHYLENE GLYCOL 3350 17 G: 17 POWDER, FOR SOLUTION ORAL at 14:14

## 2020-09-30 RX ADMIN — OXYCODONE HYDROCHLORIDE 10 MG: 10 TABLET ORAL at 20:17

## 2020-09-30 RX ADMIN — CLOPIDOGREL 75 MG: 75 TABLET, FILM COATED ORAL at 08:16

## 2020-09-30 RX ADMIN — ACETAMINOPHEN 1000 MG: 500 TABLET ORAL at 10:32

## 2020-09-30 RX ADMIN — OXYCODONE HYDROCHLORIDE 10 MG: 10 TABLET ORAL at 16:08

## 2020-09-30 RX ADMIN — Medication 10 ML: at 19:43

## 2020-09-30 RX ADMIN — PANTOPRAZOLE SODIUM 40 MG: 40 TABLET, DELAYED RELEASE ORAL at 08:16

## 2020-09-30 RX ADMIN — DOCUSATE SODIUM 100 MG: 100 CAPSULE ORAL at 10:32

## 2020-09-30 RX ADMIN — ASPIRIN 81 MG: 81 TABLET, COATED ORAL at 10:33

## 2020-09-30 RX ADMIN — ISOSORBIDE MONONITRATE 30 MG: 30 TABLET ORAL at 10:33

## 2020-09-30 RX ADMIN — BETHANECHOL CHLORIDE 25 MG: 25 TABLET ORAL at 16:09

## 2020-09-30 RX ADMIN — FENTANYL CITRATE 25 MCG: 50 INJECTION, SOLUTION INTRAMUSCULAR; INTRAVENOUS at 10:33

## 2020-09-30 RX ADMIN — OXYCODONE 5 MG: 5 TABLET ORAL at 08:16

## 2020-09-30 RX ADMIN — STANDARDIZED SENNA CONCENTRATE 8.6 MG: 8.6 TABLET ORAL at 20:17

## 2020-09-30 RX ADMIN — FENTANYL CITRATE 25 MCG: 50 INJECTION, SOLUTION INTRAMUSCULAR; INTRAVENOUS at 02:25

## 2020-09-30 RX ADMIN — Medication 2 G: at 19:43

## 2020-09-30 RX ADMIN — INSULIN LISPRO 3 UNITS: 100 INJECTION, SOLUTION INTRAVENOUS; SUBCUTANEOUS at 04:46

## 2020-09-30 RX ADMIN — IPRATROPIUM BROMIDE AND ALBUTEROL SULFATE 1 AMPULE: 2.5; .5 SOLUTION RESPIRATORY (INHALATION) at 19:51

## 2020-09-30 RX ADMIN — INSULIN LISPRO 3 UNITS: 100 INJECTION, SOLUTION INTRAVENOUS; SUBCUTANEOUS at 20:43

## 2020-09-30 RX ADMIN — Medication 2 G: at 12:36

## 2020-09-30 RX ADMIN — Medication 10 ML: at 10:34

## 2020-09-30 RX ADMIN — INSULIN GLARGINE 13 UNITS: 100 INJECTION, SOLUTION SUBCUTANEOUS at 20:43

## 2020-09-30 RX ADMIN — INSULIN LISPRO 6 UNITS: 100 INJECTION, SOLUTION INTRAVENOUS; SUBCUTANEOUS at 12:30

## 2020-09-30 RX ADMIN — LEVOTHYROXINE SODIUM 25 MCG: 0.03 TABLET ORAL at 06:26

## 2020-09-30 RX ADMIN — STANDARDIZED SENNA CONCENTRATE 8.6 MG: 8.6 TABLET ORAL at 10:32

## 2020-09-30 RX ADMIN — Medication 500 MG: at 15:22

## 2020-09-30 RX ADMIN — ALBUMIN (HUMAN) 12.5 G: 12.5 INJECTION, SOLUTION INTRAVENOUS at 02:37

## 2020-09-30 RX ADMIN — OXYCODONE HYDROCHLORIDE 10 MG: 10 TABLET ORAL at 11:48

## 2020-09-30 RX ADMIN — CALCIUM GLUCONATE 1 G: 98 INJECTION, SOLUTION INTRAVENOUS at 08:17

## 2020-09-30 RX ADMIN — FOLIC ACID 1 MG: 1 TABLET ORAL at 14:15

## 2020-09-30 RX ADMIN — SODIUM CHLORIDE 30 ML/HR: 9 INJECTION, SOLUTION INTRAVENOUS at 07:06

## 2020-09-30 RX ADMIN — KETOROLAC TROMETHAMINE 30 MG: 30 INJECTION, SOLUTION INTRAMUSCULAR at 18:56

## 2020-09-30 RX ADMIN — MUPIROCIN: 20 OINTMENT TOPICAL at 08:16

## 2020-09-30 RX ADMIN — Medication 2 G: at 03:08

## 2020-09-30 RX ADMIN — KETOROLAC TROMETHAMINE 30 MG: 30 INJECTION, SOLUTION INTRAMUSCULAR at 10:33

## 2020-09-30 RX ADMIN — MUPIROCIN: 20 OINTMENT TOPICAL at 20:44

## 2020-09-30 RX ADMIN — INSULIN LISPRO 3 UNITS: 100 INJECTION, SOLUTION INTRAVENOUS; SUBCUTANEOUS at 17:34

## 2020-09-30 RX ADMIN — DOCUSATE SODIUM 100 MG: 100 CAPSULE ORAL at 20:17

## 2020-09-30 RX ADMIN — SODIUM CHLORIDE 250 ML: 9 INJECTION, SOLUTION INTRAVENOUS at 05:11

## 2020-09-30 RX ADMIN — MAGNESIUM GLUCONATE 500 MG ORAL TABLET 400 MG: 500 TABLET ORAL at 10:33

## 2020-09-30 RX ADMIN — KETOROLAC TROMETHAMINE 30 MG: 30 INJECTION, SOLUTION INTRAMUSCULAR at 04:33

## 2020-09-30 RX ADMIN — FERROUS SULFATE TAB 325 MG (65 MG ELEMENTAL FE) 325 MG: 325 (65 FE) TAB at 17:35

## 2020-09-30 RX ADMIN — FENTANYL CITRATE 25 MCG: 50 INJECTION, SOLUTION INTRAMUSCULAR; INTRAVENOUS at 06:28

## 2020-09-30 RX ADMIN — INSULIN LISPRO 3 UNITS: 100 INJECTION, SOLUTION INTRAVENOUS; SUBCUTANEOUS at 08:43

## 2020-09-30 RX ADMIN — BETHANECHOL CHLORIDE 25 MG: 25 TABLET ORAL at 08:17

## 2020-09-30 RX ADMIN — Medication 10 ML: at 08:18

## 2020-09-30 ASSESSMENT — PAIN DESCRIPTION - PAIN TYPE
TYPE: SURGICAL PAIN

## 2020-09-30 ASSESSMENT — PAIN DESCRIPTION - FREQUENCY
FREQUENCY: CONTINUOUS

## 2020-09-30 ASSESSMENT — PAIN DESCRIPTION - ONSET
ONSET: ON-GOING

## 2020-09-30 ASSESSMENT — PAIN DESCRIPTION - DESCRIPTORS
DESCRIPTORS: ACHING
DESCRIPTORS: ACHING;DISCOMFORT;SORE
DESCRIPTORS: ACHING
DESCRIPTORS: ACHING;DISCOMFORT;SORE
DESCRIPTORS: ACHING;DISCOMFORT
DESCRIPTORS: SHARP
DESCRIPTORS: SHARP

## 2020-09-30 ASSESSMENT — PAIN DESCRIPTION - ORIENTATION
ORIENTATION: ANTERIOR
ORIENTATION: ANTERIOR

## 2020-09-30 ASSESSMENT — PAIN DESCRIPTION - LOCATION
LOCATION: CHEST
LOCATION: CHEST
LOCATION: STERNUM
LOCATION: CHEST
LOCATION: STERNUM;CHEST
LOCATION: CHEST
LOCATION: CHEST
LOCATION: STERNUM

## 2020-09-30 ASSESSMENT — PAIN SCALES - GENERAL
PAINLEVEL_OUTOF10: 8
PAINLEVEL_OUTOF10: 0
PAINLEVEL_OUTOF10: 0
PAINLEVEL_OUTOF10: 8
PAINLEVEL_OUTOF10: 6
PAINLEVEL_OUTOF10: 7
PAINLEVEL_OUTOF10: 5
PAINLEVEL_OUTOF10: 6
PAINLEVEL_OUTOF10: 6
PAINLEVEL_OUTOF10: 7
PAINLEVEL_OUTOF10: 5
PAINLEVEL_OUTOF10: 8
PAINLEVEL_OUTOF10: 5
PAINLEVEL_OUTOF10: 0
PAINLEVEL_OUTOF10: 6
PAINLEVEL_OUTOF10: 5
PAINLEVEL_OUTOF10: 7

## 2020-09-30 ASSESSMENT — PAIN DESCRIPTION - PROGRESSION
CLINICAL_PROGRESSION: GRADUALLY WORSENING

## 2020-09-30 ASSESSMENT — PAIN - FUNCTIONAL ASSESSMENT
PAIN_FUNCTIONAL_ASSESSMENT: PREVENTS OR INTERFERES SOME ACTIVE ACTIVITIES AND ADLS
PAIN_FUNCTIONAL_ASSESSMENT: PREVENTS OR INTERFERES SOME ACTIVE ACTIVITIES AND ADLS

## 2020-09-30 NOTE — OP NOTE
[address]                  [address] [facility]  [facility]                                OPERATIVE REPORT    PATIENT NAME: Elvina Gaucher                     :        1970  MED REC NO:   [id number]                            ROOM:       381  ACCOUNT NO:   [de-identified]                           ADMIT DATE: 2020  PROVIDER:     Yoselin Payton DO    DATE OF PROCEDURE:  2020    PREOPERATIVE DIAGNOSIS:  Severe multivessel coronary artery disease. POSTOPERATIVE DIAGNOSIS:  Severe multivessel coronary artery disease. PROCEDURES PERFORMED:  1. Coronary artery bypass grafting x3 using the left internal mammary  artery to the left anterior descending artery, radial artery to the  dominant obtuse marginal branch and circumflex and reverse saphenous  vein graft to the distal right coronary artery. 2.  Lower extremity endoscopic vein harvest.  3.  Left upper extremity endoscopic radial artery harvest.  4.  Rigid external fixation with KLS plating system. SURGEON:  Yoselin Payton DO    ASSISTANTS:  Kiarra Sow and [facility]. ANESTHESIA:  General.    ESTIMATED BLOOD LOSS:  Less than 50. COMPLICATIONS:  None. SPECIMENS:  None. DESCRIPTION OF THE PROCEDURE:  After informed and written consent had  been obtained, the patient was brought to the operating room, placed in  supine position on the operating table. Monitoring lines as well as  Camilo catheter and transesophageal echo probe were inserted after  induction of anesthesia. A preoperative echo demonstrated preserved  biventricular function. No valvular abnormalities. A surgical time-out  was held and the preoperative antibiotics were administered. A standard  midline sternotomy incision was then carried out. This subcutaneous  tissue was dissected and the sternum was divided with the sternal saw.    Simultaneously, the left upper extremity underwent endoscopic radial  harvest and the lower extremity underwent endoscopic saphenous vein  harvested as well. The left internal mammary artery was dissected free  from the undersurface of the left chest wall with the pedicle technique. All branches were clipped and divided. The patient was administered  systemic heparin for an adequate ACT for bypass and distal portion of  the left internal mammary artery was clipped and divided. Next, the  mammary retractor was removed. A standard sternal retractor was placed  and this pericardium was opened. The ascending aorta was palpated and  will be free of atherosclerotic disease, suitable areas were cannulated  with cross-clamping and proximal anastomoses were apparent. Next,  central cannulation was commenced in the usual fashion with the  ascending aorta and right atrial cannulas, this was followed by  insertion of antegrade and retrograde cardioplegia lines. Next, the ACT  was verified and the patient was placed in the cardiopulmonary bypass. The distal targets were inspected in order to be adequate. There was  one dominant obtuse marginal branch in the circumflex that was much  larger than the others. This was chosen for the lateral wall graft and  the posterior descending artery and PL branch of the right coronary  artery were much too small for bypass grafting, so a suitable portion of  the distal right coronary artery was chosen for bypass grafting through  the right coronary artery system. Next, cross-clamp was applied. The  heart was arrested with combination of antegrade and retrograde  cardioplegia. We were able to achieve an excellent arrest and  administered retrograde doses of cardioplegia every 15 minutes. First  our attention was turned to the obtuse marginal branch of the circumflex  after arteriotomy. End-to-side radial to OM anastomosis was created  here with running 7-0 Prolene suture.   This was tested and had excellent  flow and also was hemostatic. Next, our attention was turned to the  distal right coronary artery after arteriotomy. End-to-side vein graft  anastomosis was created here with a running 7-0 Prolene suture. This  was tested and also had excellent flow and that was also hemostatic. Next, our attention was turned to the midportion of the LAD. After  arteriotomy, an end-to-side LIMA to LAD anastomosis was created here  with running 7-0 Prolene suture. This was tested by removing the  bulldog clamp and noted excellent runoff to the distal vessel and also  it was noted to be hemostatic at that time. Next, our attention was  turned to the proximal anastomoses. A 4.8-mm punch was used to fashion  one aortotomy in the ascending aorta and the vein graft was then  measured to length. This proximal anastomoses was created with running  6-0 Prolene suture. Next, an additional aortotomy was fashioned in the  ascending aorta with a 4.0-mm punch and the radial artery was measured  to length and this proximal anastomoses was created with a running 6-0  Prolene suture. Once this was completed, a warm dose of blood was  administered via the retrograde cardioplegia cannula as a \"hot shot. \"   Once this was completed, the bulldog clamp was removed from the left  internal mammary artery. Cross-clamp was removed from the aorta and the  heart began to reanimate immediately. A temporary ventricular pacing  wire was placed; however, not used secondary to the patient's being in a  normal sinus rhythm. Once all evidence of intracardiac air was gone,  the aortic root vent was removed as well as the retrograde cardioplegia  line. The patient was then weaned from the cardiopulmonary bypass  without difficulty. The venous cannula was removed. Protamine was then  administered to normalize the ACT and after the volume from the bypass  circuit was returned to the patient, the aortic cannula was then  removed.   ACT was then verified and will be back down to its baseline  and inspection for hemostasis was carried out. Three chest tubes were  inserted, one in the left pleural space, two in the mediastinal space. The sponge and needle counts were noted to be correct and the sternum  was approximated with stainless steel wires. In addition, the sternum  underwent rigid fixation with the KLS plating system with one plate in  the manubrium, one in the body of the sternum. Next, the wound was  copiously irrigated and closed in multiple layers. The patient  tolerated the procedure well and was transferred to the intensive care  unit. The cardiopulmonary bypass time was 101 minutes. The cross-clamp  time was 80 minutes.         Brandon Brennan DO    D: 09/29/2020 15:56:47       T: 09/29/2020 17:00:31     [email or web address]  Job#: [id number]     Doc#: [id number]    CC:

## 2020-09-30 NOTE — FLOWSHEET NOTE
[facility]  Right jugular introducer dc'd site held pressure dressing applied right brachial art line dc'd pressure dressing applied chavez dc'd dtv 7-9pm 9/30 drained clear yellow urine transport ordered

## 2020-09-30 NOTE — PROGRESS NOTES
CVICU Progress Note    Name: Krishna Marcos  MRN: [id number]    CC: Postoperative Critical Care Management      Indication for Surgery/Procedure: CAD  LVEF:  Normal    RVF:  Normal     Important/Relevant PMH/PSH: HTN, HPL, BURTON (not on CPAP), Hypothyroidism, DM, Colitis, current smoker, h/o drug abuse on methadone      Procedure/Surgeries: 9/29/2020 CABGx3 (LIMA-LAD, Radial-OM, SVG-dRCA), EVH, ERH, KLS plating      Pacing wires: Ventricular       Intake/Output Summary (Last 24 hours) at 9/30/2020 0812  Last data filed at 9/30/2020 0700  Gross per 24 hour   Intake 4968.49 ml   Output 3885 ml   Net 1083.49 ml       Recent Labs     09/29/20  1207 09/30/20  0445   WBC 29.4* 14.8*   HGB 9.8* 8.0*   HCT 29.3* 24.4*    206      Lab Results   Component Value Date     09/30/2020    K 4.2 09/30/2020     09/30/2020    CO2 22 09/30/2020    BUN 12 09/30/2020    CREATININE 0.7 09/30/2020    GLUCOSE 167 09/30/2020    CALCIUM 7.7 09/30/2020      Recent Labs     09/29/20  1207 09/30/20  0445   BUN 13 12   CREATININE 0.9 0.7       Physical Exam:    /60   Pulse 72   Temp 99.7 °F (37.6 °C) (Bladder)   Resp 14   Ht 5' 8\" (1.727 m)   Wt 204 lb 6.4 oz (92.7 kg)   SpO2 94%   BMI 31.08 kg/m²       CXR Findings:     FINDINGS:    Stable postoperative changes. Stable multiple chest tubes and central    venous catheter. Endotracheal and nasogastric tubes have been removed. There is no visible pneumothorax. The cardiomediastinal silhouette is    stable. There is opacity at the left lower lobe with partial obscuration of    left my diaphragm which is likely related to atelectasis. This is    relatively stable. - CXR personally viewed and interpreted by ICU Nurse Practitioner, agree with above findings     General: Awake, alert. C/o incisional pain, tingling in left hand. Denies SOB, palpitations   Eyes: PERRL, anicteric   Pulmonary: Diminished bibasilar.  No wheezes, no accessory muscle use noted Cardiovascular:  RRR, no heaves or thrills on palpation  Tele: SR   Abdomen: Soft, nontender, hypoactive BS   Extremities: Palpable pulses all extremities, trace LE edema   Neurologic/Psych: A&Ox3, HOOK to command   Skin: Warm and dry  Incisions: MSI with PEPE intact, LUE radial harvest site well approximated- soft but tender to palpation; RLE SVG sites well approximated      Assessment/Plan: POD #1    1. CAD S/p CABGx3 (LIMA-LAD, Radial-OM, SVG-dRCA)  - DAPT, statin  - Imdur started for radial harvest protection   - Ancef   - Monitor chest tube output and hemodynamics closely     2. Acute Postoperative Respiratory Insufficiency  - 2/2 surgery, extubated DOS without difficulty  - On 3 L O2 NC, sats 95%  - Continue EZPAP q 4 hours, encourage IS, instructed to C&DB, OOBTC with PT/OT, increase activity as tolerated, wean O2 as able for sats >92%     3. Acute Post Operative Pain   - h/o drug abuse, on methadone at home   - Pain control suboptimal, PRN toradol, tylenol ATC, PRN PO narcotics, resume home methadone on transfer once off IV narcotics      4. DM Type2   - HgbA1C 11.5%, Home Rx: lantus 20units nightly, SQ Humulin  - SSI, nightly lantus for glycemic control      5. Postoperative Hypotension  - resolved    6. HTN  - Imdur started for radial graft protection, monitor BP and start BB when able     7.  Acute blood loss anemia  - expected blood loss 2/2 surgery  - H/H 8/24.4, continue to monitor       VTE Prophylaxis: Pharmacologic/Mechanical:  Yes, SCDs   Line infection prevention: Can CVC or arterial line be removed: yes, remove prior to tranfer  Continued need for urinary catheter: no, remove prior to transfer     Dispo: [facility]     Electronically signed by WALLACE Gold - CNP on 9/30/2020 at 7:56 AM

## 2020-09-30 NOTE — ANESTHESIA POSTPROCEDURE EVALUATION
Department of Anesthesiology  Postprocedure Note    Patient: Rae Faith  MRN: [id number]  YOB: 1970  Date of evaluation: 9/30/2020  Time:  11:21 AM     Procedure Summary     Date:  09/29/20 Room / Location:  [facility] OR 01 / [facility]    Anesthesia Start:  8393 Anesthesia Stop:  1209    Procedure:  CORONARY ARTERY BYPASS, POSSIBLE LEFT RADIAL ARTERY HARVEST, MINDA (N/A Chest) Diagnosis:  (CORONARY ARTERY DISEASE)    Surgeon:  Kasandra Stephens DO Responsible Provider:  Trina Nation MD    Anesthesia Type:  general ASA Status:  4          Anesthesia Type: general    Jose Phase I: Jose Score: 10    Jose Phase II:      Last vitals: Reviewed and per EMR flowsheets.        Anesthesia Post Evaluation    Patient location during evaluation: ICU  Patient participation: complete - patient participated  Level of consciousness: awake and alert  Airway patency: patent  Nausea & Vomiting: no nausea and no vomiting  Complications: no  Cardiovascular status: blood pressure returned to baseline and hemodynamically stable  Respiratory status: acceptable  Hydration status: euvolemic

## 2020-09-30 NOTE — PROGRESS NOTES
Physical Therapy  Physical Therapy Initial Assessment     Name: Romulo Howe  : 1970  MRN: [id number]    Referring Provider:  KATY Carson    Date of Service: 2020    Evaluating PT:  Kane Tineo PT, DPT [id number]    Room #:  2408/9804-W  Diagnosis:  CAD  Precautions: Falls, Sternal, Chest tube x 2, O2  Procedure/Surgery:   CABG x 3  PMHx/PSHx:  DM, HLD, HTN  Equipment Needs:  TBD    SUBJECTIVE:    Pt lives with , 2 children in a 2 story home, 1st floor setup with 3 stairs to enter and 1 rail. Pt ambulated with no device and was independent PTA. OBJECTIVE:   Initial Evaluation  Date: 20 Treatment Short Term/ Long Term   Goals   AM-PAC 6 Clicks      Was pt agreeable to Eval/treatment? Yes     Does pt have pain?  6/10 surgical site     Bed Mobility  Rolling: NT  Supine to sit: ModA x 2 with HOB elevated  Sit to supine: NT  Scooting: ModA  Anant   Transfers Sit to stand: Anant   Stand to sit: ModA  Stand pivot: Anant no device  Independent   Ambulation   20 feet with Anant no device  >400 feet Independently   Stair negotiation: ascended and descended NT  >4 steps with 1 rail Mod Independent   ROM BUE:  Defer to OT note  BLE:  WNL     Strength BUE:  Defer to OT note  BLE:  4/5  Increase by 1/3 MMT grade   Balance Sitting EOB:  Anant dynamic  Dynamic Standing:  Anant no device  Sitting EOB:  Independent  Dynamic Standing:  Independent     Pt is A & O x 4  CAM-ICU: NT  RASS: 0  Sensation:  WNL  Edema:  None    Vitals:  Heart Rate at rest 78 bpm Heart Rate post session 81 bpm   SpO2 at rest 95% SpO2 post session 95%   Blood Pressure at rest 120/55 mmHg Blood Pressure post session 139/67 mmHg     Functional Status Score-Intensive Care Unit (FSS-ICU)   Rolling -/   Supine to sit transfer 2   Unsupported sitting     Sit to stand transfers    Ambulation    Total         Therapeutic Exercises:  NA    Patient education  Pt educated on safety    Patient response to education:   Pt verbalized understanding Pt demonstrated skill Pt requires further education in this area   x x x     ASSESSMENT:    Comments:  RN reported pt was stable for session. Pt was in bed upon arrival, agreeable to initial evaluation. Pt was educated on sternal precautions and PLB prior to activity. Increased assistance required for all mobility. SOB noted with all activity and pt required frequent cues for PLB. Pt completed transfer to chair initially. Instructed pt on transfer technique from lower surface height. Pt ambulated with decreased gait speed and shuffled steps. Occasional brief standing rest breaks due to SOB. Pt was left in chair with all needs met and call light in reach. All lines remained intact. Pt's  present for session. Assistance of two required due to acuity of medical condition, complex medical lines management as well as overall deconditioning of patient. Treatment:  Patient practiced and was instructed in the following treatment:     Bed mobility training - pt given verbal and tactile cues to facilitate proper sequencing and safety during supine>sit as well as provided with physical assistance to complete task    Sitting EOB for >8 minutes for upright tolerance, postural awareness and BLE ROM   Transfer training - pt was given verbal and tactile cues to facilitate proper hand placement, technique and safety during sit to stand and stand to sit as well as provided with physical assistance to complete task.  Gait training- pt was given verbal and tactile cues to facilitate safety and balance during ambulation as well as provided with physical assistance to complete task. Pt's/ family goals   1. Return home    Patient and or family understand(s) diagnosis, prognosis, and plan of care.   Yes    PLAN OF CARE:    Current Treatment Recommendations     [x] Strengthening     [] ROM   [x] Balance Training   [x] Endurance Training   [x] Transfer Training   [x] Gait Training   [x] Stair Training   [] Positioning   [x] Safety and Education Training   [x] Patient/Caregiver Education   [] HEP  [] Other     Frequency of treatments: 2-5x/week x 1-2 weeks. Time in  0900  Time out  0930    Total Treatment Time  25 minutes     Evaluation Time includes thorough review of current medical information, gathering information on past medical history/social history and prior level of function, completion of standardized testing/informal observation of tasks, assessment of data and education on plan of care and goals.     CPT codes:  [] Low Complexity PT evaluation 25205  [x] Moderate Complexity PT evaluation 35705  [] High Complexity PT evaluation 49447  [] PT Re-evaluation 66353  [] Gait training 98937 - minutes  [] Manual therapy 85108 - minutes  [x] Therapeutic activities 41794 25 minutes  [] Therapeutic exercises 39344 - minutes  [] Neuromuscular reeducation 65899 - minutes     Serena Barbosa, PT, DPT  [id number]

## 2020-09-30 NOTE — CARE COORDINATION
Pod #1 s/p cabg x3. Met with pt and  in room. They live with their 2 children in a 2 story home with bed and bath on 1st floor, 3 steps to enter. Pt is independent in adl's. Explained need for 24/7 care for first 2 wks at home.  plans to take off work for 1 week and when he returns to work, son will be with her. She does not have health ins. and is aware that c will be self pay. Referral made to Dw[facility] at [facility] to follow.  will transport pt home at discharge.

## 2020-09-30 NOTE — PLAN OF CARE
Problem: Fluid Volume - Imbalance:  Goal: Chest tube drainage is within specified parameters  Description: Chest tube drainage is within specified parameters  Outcome: Met This Shift

## 2020-09-30 NOTE — PROGRESS NOTES
[address] [place] [address] ([phone])  for verification of Methadone HCL dose, pt takes 37mg liquid daily as reported.

## 2020-09-30 NOTE — PROGRESS NOTES
OCCUPATIONAL THERAPY INITIAL EVALUATION      Date:2020  Patient Name: Pipe Chapman  MRN: [id number]  : 1970  Room: [address] [place], [place] [address]    Referring Provider:   KATY Emery      Evaluating OT: Collette Squires, OTR/L [id number]    AM-PAC Daily Activity Raw Score:     Recommended Adaptive Equipment:  LB dressing AE, shower seat as needed for energy conservation      Diagnosis:  CAD  Surgery/Procedures:   CABG x 3     Pertinent Medical History:   DM, HLD, HTN    Precautions:  Falls, Sternal, Chest tube x 2, O2     Home Living: Pt lives with  and children in a 2 story with 3 step(s) to enter and 1 rail(s); bed/bath on first floor  Bathroom setup: tub/shower; lower commode seat  Equipment owned: no DME    Prior Level of Function: IND with ADLs;  IND with IADLs. No device for ambulation. Driving: yes  Occupation: no    Pain Level: pt c/o 6/10 chest pain  this session      Cognition: A&O: 4/4    Follows 1-2 step commands appropriately.    Memory: G   Comprehension G   Problem solving: G   Judgement/safety: G               Communication skills: WFL           Vision: WFL               Glasses:yes                                                   Hearing: WFL     RASS: 0  CAM-ICU: (NT) Delirium    UE Assessment:  Hand Dominance: Right []  Left [x]     ROM Strength STM goal: PRN   RUE  Grossly WFL within precautions Not formally tested; grossly WFL              WNL for ADLS     LUE Grossly WFL within precautions Not formally tested; grossly WFL              WNL for ADLS       Sensation: c/o numbness tingling L hand  Tone: WNL   Edema:WFL     Functional Assessment   Initial Eval Status  Date:  Treatment Status  Date: STG=LTG  5-7days   Feeding S; set up                       IND  while seated up in chair to increase activity tolerance        Grooming Min A                       Mod I   while standing sink level requiring no deivce for balance and demonstrating G tolerance      UB dressing/bathing [x]Functional transfer/mobility training for fall prevention & DME recommendations         [x]Patient/family education to increase safety and functional independence   [x]Environmental modifications for safe mobility and completion of ADLs                   []Cognitive retraining ex's to improve problem solving skills & safe participation in ADLs/IADLs     []Sensory re-education techniques to improve extremity awareness, maintain skin integrity and improve hand function                     []Visual/Perceptual retraining ex's to improve body awareness and safety during transfers and ADLs  []Splinting/postioning needs to maintain joint/skin integrity and prevent contractures  [x]Therapeutic activity to improve functional skills                                  [x]Therapeutic exercise to improve functional tolerance for ADLs  [x]Balance retraining ex's for postural control with dynamic challenges  []Neuromuscular re-education exercises                    []Delirium prevention/treatment    [x]Other:     Treatment: OT intervention provided to achieve goals:   Functional mobility: Instruction on sternal precautions to facilitate safe bed mobility & functional transfers. Pt required 2 person assist for safe mobility due to complexity of medical condition, medical lines and deconditioning; HOB elevated to assist. Pt required min cues to maintain sternal precautions. ADL retraining: Instruction on adapted dressing techniques/equipment to maintain sternal precautions during ADLs. Pt educated on use of reacher, sock aid to increase independence. Pt demo fair understanding; limited by SOB. Energy Conservation training: Education on postural awareness/positioning and breathing techniques to improve overall tolerance and participation in self care routine. Pt demonstrated fair- tolerance. Review of recommended DME for fall prevention, bathroom safety & energy conservation.     Pt/Family Education: Provided with handouts on energy conservation and sternal precautions during functional activities for safe return home. Pt/family demonstrates G understanding. Line management and environmental modifications made prior to and end of session to ensure patient safety and to increase efficiency of session. Skilled monitoring of HR, O2 saturation, blood pressure and patient's response to activity performed throughout session. Comments: OK from RN to see patient. Upon arrival, patient supine in bed; agreeable to session. At end of session, patient left seated in chair.  present. Call light within reach, all lines and tubes intact. Pt instructed on use of call light for assistance and fall prevention. Patient presents with decreased activity tolerance, dynamic balance, functional mobility limiting completion of ADLs and safety. Pt can benefit from continued skilled OT to increase safety and functional independence. Evaluation Complexity: Moderate    · History: Expanded chart review of consults, imaging, and psychosocial history related to current functional performance. · Exam: 5+ performance deficits identified limiting functional independence and safe return home   · Assistance/Modification: Min/mod assistance or modifications required to perform tasks. May have comorbidities that affect occupational performance. Rehab Potential: Good for established goals    Patient / Family Goal: to return to PLOF    Patient and/or family were instructed/educated on diagnosis, prognosis/goals and plan of care. Pt demonstrated G understanding. [] Malnutrition indicators have been identified and nursing has been notified to ensure a dietitian consult is ordered.       Time In:0910              Time Out: 0940       Total Treatment Time: 23          Min Units   OT Eval Low 21075     OT Eval Medium 01099 X    OT Eval High 12379     OT Re-Eval [id number]     Therapeutic Ex [id number]     Therapeutic Activities 09081 8 1   ADL/Self [facility] 29356 15 1   Orthotic Management 59616     Neuro Re-Ed 77244     Non-Billable Time     TOTAL TIMED TREATMENT 23 2     Evaluation time includes thorough review of current medical information, gathering information on past medical history/social history and prior level of function, completion of standardized testing/informal observation of tasks, assessment of data and development of POC/Goals.      Gabi Slater, OTR/L [id number]

## 2020-09-30 NOTE — PLAN OF CARE
Problem: Cardiac Output - Decreased:  Goal: Cardiac output within specified parameters  Description: Cardiac output within specified parameters  Outcome: Met This Shift  Note: Monitor vital signs adm medication as ordered monitor fluid balance replace electrolytes as ordered     Problem: Gas Exchange - Impaired:  Goal: Levels of oxygenation will improve  Description: Levels of oxygenation will improve  Outcome: Met This Shift  Note: Monitor sao2 abgs and cxr monitor breathe sounds encourage pt to cough and deep breathe instruct pt on smi

## 2020-10-01 ENCOUNTER — APPOINTMENT (OUTPATIENT)
Dept: GENERAL RADIOLOGY | Age: 50
DRG: 236 | End: 2020-10-01
Attending: THORACIC SURGERY (CARDIOTHORACIC VASCULAR SURGERY)

## 2020-10-01 LAB
ANION GAP SERPL CALCULATED.3IONS-SCNC: 12 MMOL/L (ref 7–16)
BUN BLDV-MCNC: 14 MG/DL (ref 6–20)
CALCIUM SERPL-MCNC: 8.5 MG/DL (ref 8.6–10.2)
CHLORIDE BLD-SCNC: 98 MMOL/L (ref 98–107)
CO2: 24 MMOL/L (ref 22–29)
CREAT SERPL-MCNC: 0.8 MG/DL (ref 0.5–1)
GFR AFRICAN AMERICAN: >60
GFR NON-AFRICAN AMERICAN: >60 ML/MIN/1.73
GLUCOSE BLD-MCNC: 181 MG/DL (ref 74–99)
HCT VFR BLD CALC: 27.2 % (ref 34–48)
HEMOGLOBIN: 8.8 G/DL (ref 11.5–15.5)
MCH RBC QN AUTO: 30.9 PG (ref 26–35)
MCHC RBC AUTO-ENTMCNC: 32.4 % (ref 32–34.5)
MCV RBC AUTO: 95.4 FL (ref 80–99.9)
METER GLUCOSE: 165 MG/DL (ref 74–99)
METER GLUCOSE: 172 MG/DL (ref 74–99)
METER GLUCOSE: 188 MG/DL (ref 74–99)
METER GLUCOSE: 200 MG/DL (ref 74–99)
PDW BLD-RTO: 14.3 FL (ref 11.5–15)
PLATELET # BLD: 214 E9/L (ref 130–450)
PMV BLD AUTO: 11.5 FL (ref 7–12)
POTASSIUM SERPL-SCNC: 4.3 MMOL/L (ref 3.5–5)
RBC # BLD: 2.85 E12/L (ref 3.5–5.5)
SODIUM BLD-SCNC: 134 MMOL/L (ref 132–146)
WBC # BLD: 15.9 E9/L (ref 4.5–11.5)

## 2020-10-01 PROCEDURE — 93798 PHYS/QHP OP CAR RHAB W/ECG: CPT

## 2020-10-01 PROCEDURE — 6370000000 HC RX 637 (ALT 250 FOR IP): Performed by: NURSE PRACTITIONER

## 2020-10-01 PROCEDURE — 71045 X-RAY EXAM CHEST 1 VIEW: CPT

## 2020-10-01 PROCEDURE — 6360000002 HC RX W HCPCS: Performed by: NURSE PRACTITIONER

## 2020-10-01 PROCEDURE — 6370000000 HC RX 637 (ALT 250 FOR IP): Performed by: PHYSICIAN ASSISTANT

## 2020-10-01 PROCEDURE — 2580000003 HC RX 258: Performed by: NURSE PRACTITIONER

## 2020-10-01 PROCEDURE — 80048 BASIC METABOLIC PNL TOTAL CA: CPT

## 2020-10-01 PROCEDURE — 82962 GLUCOSE BLOOD TEST: CPT

## 2020-10-01 PROCEDURE — 2140000000 HC CCU INTERMEDIATE R&B

## 2020-10-01 PROCEDURE — 99231 SBSQ HOSP IP/OBS SF/LOW 25: CPT | Performed by: INTERNAL MEDICINE

## 2020-10-01 PROCEDURE — 36415 COLL VENOUS BLD VENIPUNCTURE: CPT

## 2020-10-01 PROCEDURE — 85027 COMPLETE CBC AUTOMATED: CPT

## 2020-10-01 PROCEDURE — 2700000000 HC OXYGEN THERAPY PER DAY

## 2020-10-01 PROCEDURE — 94640 AIRWAY INHALATION TREATMENT: CPT

## 2020-10-01 RX ADMIN — Medication 500 MG: at 08:47

## 2020-10-01 RX ADMIN — Medication 10 ML: at 04:41

## 2020-10-01 RX ADMIN — ISOSORBIDE MONONITRATE 30 MG: 30 TABLET ORAL at 08:47

## 2020-10-01 RX ADMIN — MUPIROCIN: 20 OINTMENT TOPICAL at 20:22

## 2020-10-01 RX ADMIN — CLOPIDOGREL 75 MG: 75 TABLET, FILM COATED ORAL at 08:47

## 2020-10-01 RX ADMIN — STANDARDIZED SENNA CONCENTRATE 8.6 MG: 8.6 TABLET ORAL at 20:22

## 2020-10-01 RX ADMIN — Medication 10 ML: at 20:22

## 2020-10-01 RX ADMIN — METHADONE HYDROCHLORIDE 37 MG: 10 CONCENTRATE ORAL at 08:56

## 2020-10-01 RX ADMIN — OXYCODONE HYDROCHLORIDE 10 MG: 10 TABLET ORAL at 04:42

## 2020-10-01 RX ADMIN — ACETAMINOPHEN 1000 MG: 500 TABLET ORAL at 00:23

## 2020-10-01 RX ADMIN — IPRATROPIUM BROMIDE AND ALBUTEROL SULFATE 1 AMPULE: 2.5; .5 SOLUTION RESPIRATORY (INHALATION) at 07:39

## 2020-10-01 RX ADMIN — ASPIRIN 81 MG: 81 TABLET, COATED ORAL at 08:47

## 2020-10-01 RX ADMIN — OXYCODONE HYDROCHLORIDE 10 MG: 10 TABLET ORAL at 12:48

## 2020-10-01 RX ADMIN — METOPROLOL TARTRATE 12.5 MG: 25 TABLET, FILM COATED ORAL at 12:01

## 2020-10-01 RX ADMIN — MUPIROCIN: 20 OINTMENT TOPICAL at 08:51

## 2020-10-01 RX ADMIN — BETHANECHOL CHLORIDE 25 MG: 25 TABLET ORAL at 08:47

## 2020-10-01 RX ADMIN — INSULIN GLARGINE 13 UNITS: 100 INJECTION, SOLUTION SUBCUTANEOUS at 20:23

## 2020-10-01 RX ADMIN — FOLIC ACID 1 MG: 1 TABLET ORAL at 08:47

## 2020-10-01 RX ADMIN — BETHANECHOL CHLORIDE 25 MG: 25 TABLET ORAL at 14:27

## 2020-10-01 RX ADMIN — STANDARDIZED SENNA CONCENTRATE 8.6 MG: 8.6 TABLET ORAL at 08:47

## 2020-10-01 RX ADMIN — IPRATROPIUM BROMIDE AND ALBUTEROL SULFATE 1 AMPULE: 2.5; .5 SOLUTION RESPIRATORY (INHALATION) at 12:59

## 2020-10-01 RX ADMIN — ATORVASTATIN CALCIUM 40 MG: 40 TABLET, FILM COATED ORAL at 08:47

## 2020-10-01 RX ADMIN — POLYETHYLENE GLYCOL 3350 17 G: 17 POWDER, FOR SOLUTION ORAL at 08:47

## 2020-10-01 RX ADMIN — INSULIN LISPRO 3 UNITS: 100 INJECTION, SOLUTION INTRAVENOUS; SUBCUTANEOUS at 11:58

## 2020-10-01 RX ADMIN — INSULIN LISPRO 6 UNITS: 100 INJECTION, SOLUTION INTRAVENOUS; SUBCUTANEOUS at 06:59

## 2020-10-01 RX ADMIN — ACETAMINOPHEN 1000 MG: 500 TABLET ORAL at 06:59

## 2020-10-01 RX ADMIN — INSULIN LISPRO 3 UNITS: 100 INJECTION, SOLUTION INTRAVENOUS; SUBCUTANEOUS at 16:05

## 2020-10-01 RX ADMIN — PANTOPRAZOLE SODIUM 40 MG: 40 TABLET, DELAYED RELEASE ORAL at 08:46

## 2020-10-01 RX ADMIN — OXYCODONE HYDROCHLORIDE 10 MG: 10 TABLET ORAL at 00:27

## 2020-10-01 RX ADMIN — Medication 10 ML: at 04:52

## 2020-10-01 RX ADMIN — LEVOTHYROXINE SODIUM 25 MCG: 0.03 TABLET ORAL at 04:42

## 2020-10-01 RX ADMIN — FERROUS SULFATE TAB 325 MG (65 MG ELEMENTAL FE) 325 MG: 325 (65 FE) TAB at 08:47

## 2020-10-01 RX ADMIN — BETHANECHOL CHLORIDE 25 MG: 25 TABLET ORAL at 20:22

## 2020-10-01 RX ADMIN — OXYCODONE HYDROCHLORIDE 10 MG: 10 TABLET ORAL at 16:49

## 2020-10-01 RX ADMIN — DOCUSATE SODIUM 100 MG: 100 CAPSULE ORAL at 08:47

## 2020-10-01 RX ADMIN — Medication 500 MG: at 20:22

## 2020-10-01 RX ADMIN — OXYCODONE HYDROCHLORIDE 10 MG: 10 TABLET ORAL at 08:47

## 2020-10-01 RX ADMIN — IPRATROPIUM BROMIDE AND ALBUTEROL SULFATE 1 AMPULE: 2.5; .5 SOLUTION RESPIRATORY (INHALATION) at 18:24

## 2020-10-01 RX ADMIN — DOCUSATE SODIUM 100 MG: 100 CAPSULE ORAL at 20:22

## 2020-10-01 RX ADMIN — MAGNESIUM GLUCONATE 500 MG ORAL TABLET 400 MG: 500 TABLET ORAL at 08:47

## 2020-10-01 RX ADMIN — Medication 2 G: at 04:40

## 2020-10-01 RX ADMIN — Medication 10 ML: at 08:48

## 2020-10-01 RX ADMIN — FERROUS SULFATE TAB 325 MG (65 MG ELEMENTAL FE) 325 MG: 325 (65 FE) TAB at 16:05

## 2020-10-01 RX ADMIN — OXYCODONE HYDROCHLORIDE 10 MG: 10 TABLET ORAL at 21:20

## 2020-10-01 RX ADMIN — INSULIN LISPRO 3 UNITS: 100 INJECTION, SOLUTION INTRAVENOUS; SUBCUTANEOUS at 20:23

## 2020-10-01 RX ADMIN — BETHANECHOL CHLORIDE 25 MG: 25 TABLET ORAL at 00:24

## 2020-10-01 RX ADMIN — METOPROLOL TARTRATE 12.5 MG: 25 TABLET, FILM COATED ORAL at 20:21

## 2020-10-01 ASSESSMENT — PAIN SCALES - GENERAL
PAINLEVEL_OUTOF10: 0
PAINLEVEL_OUTOF10: 7
PAINLEVEL_OUTOF10: 7
PAINLEVEL_OUTOF10: 6
PAINLEVEL_OUTOF10: 7

## 2020-10-01 ASSESSMENT — PAIN DESCRIPTION - LOCATION
LOCATION: STERNUM
LOCATION: STERNUM
LOCATION: GENERALIZED
LOCATION: STERNUM

## 2020-10-01 ASSESSMENT — PAIN DESCRIPTION - FREQUENCY
FREQUENCY: CONTINUOUS

## 2020-10-01 ASSESSMENT — PAIN - FUNCTIONAL ASSESSMENT
PAIN_FUNCTIONAL_ASSESSMENT: PREVENTS OR INTERFERES WITH MANY ACTIVE NOT PASSIVE ACTIVITIES

## 2020-10-01 ASSESSMENT — PAIN DESCRIPTION - DESCRIPTORS
DESCRIPTORS: ACHING;CONSTANT;DISCOMFORT
DESCRIPTORS: ACHING;DISCOMFORT;CONSTANT
DESCRIPTORS: ACHING;CONSTANT;DISCOMFORT
DESCRIPTORS: ACHING;DISCOMFORT;PRESSURE

## 2020-10-01 ASSESSMENT — PAIN DESCRIPTION - ONSET
ONSET: GRADUAL
ONSET: GRADUAL
ONSET: ON-GOING
ONSET: GRADUAL

## 2020-10-01 ASSESSMENT — PAIN DESCRIPTION - ORIENTATION
ORIENTATION: INNER;LOWER;MID
ORIENTATION: MID;INNER
ORIENTATION: ANTERIOR

## 2020-10-01 ASSESSMENT — PAIN DESCRIPTION - PROGRESSION
CLINICAL_PROGRESSION: NOT CHANGED
CLINICAL_PROGRESSION: GRADUALLY WORSENING
CLINICAL_PROGRESSION: GRADUALLY IMPROVING
CLINICAL_PROGRESSION: NOT CHANGED

## 2020-10-01 ASSESSMENT — PAIN DESCRIPTION - PAIN TYPE
TYPE: SURGICAL PAIN
TYPE: ACUTE PAIN;SURGICAL PAIN
TYPE: SURGICAL PAIN

## 2020-10-01 NOTE — DISCHARGE INSTR - DIET
 Good nutrition is important when healing from an illness, injury, or surgery. Follow any nutrition recommendations given to you during your hospital stay.  If you were given an oral nutrition supplement while in the hospital, continue to take this supplement at home. You can take it with meals, in-between meals, and/or before bedtime. These supplements can be purchased at most local grocery stores, pharmacies, and chain super-stores.  If you have any questions about your diet or nutrition, call the hospital and ask for the dietitian. A heart-healthy and consistent carbohydrate diet is recommended to manage heart disease and diabetes. To follow a heart-healthy and consistent carbohydrate diet,  Eat a balanced diet with whole grains, fruits and vegetables, and lean protein sources. Choose heart-healthy unsaturated fats. Limit saturated fats, trans fats, and cholesterol intake. Eat more plant-based or vegetarian meals using beans and soy foods for protein. Eat whole, unprocessed foods to limit the amount of sodium (salt) you eat. Choose a consistent amount of carbohydrate at each meal and snack. Limit refined carbohydrates especially sugar, sweets and sugar-sweetened beverages. If you drink alcohol, do so in moderation: one serving per day (women) and two servings per day (men). o One serving is equivalent to 12 ounces beer, 5 ounces wine, or 1.5 ounces distilled spirits      Tips    Tips for Choosing Heart-Healthy Fats    Choose lean protein and low-fat dairy foods to reduce saturated fat intake. Saturated fat is usually found in animal-based protein and is associated with certain health risks. Saturated fat is the biggest contributor to raise low-density lipoprotein (LDL) cholesterol levels. Research shows that limiting saturated fat lowers unhealthy cholesterol levels. Eat no more than 7% of your total calories each day from saturated fat.  Ask your RDN to help you determine how much saturated fat is right for you. Evette Kelley are many foods that do not contain large amounts of saturated fats. Swapping these foods to replace foods high in saturated fats will help you limit the saturated fat you eat and improve your cholesterol levels. You can also try eating more plant-based or vegetarian meals. Instead of     Try:       Whole milk, cheese, yogurt, and ice cream     1% or skim milk, low-fat cheese, non-fat yogurt, and low-fat ice cream       Fatty, marbled beef and pork     Lean beef, pork, or venison       Poultry with skin     Poultry without skin       Butter, stick margarine     Reduced-fat, whipped, or liquid spreads       Coconut oil, palm oil     Liquid vegetable oils: corn, canola, olive, soybean and safflower oils         Avoid foods that contain trans fats. Trans fats increase levels of LDL-cholesterol. Hydrogenated fat in processed foods is the main source of trans fats in foods. Trans fats can be found in stick margarine, shortening, processed sweets, baked goods, some fried foods, and packaged foods made with hydrogenated oils. Avoid foods with partially hydrogenated oil on the ingredient list such as: cookies, pastries, baked goods, biscuits, crackers, microwave popcorn, and frozen dinners. Choose foods with heart healthy fats. Polyunsaturated and monounsaturated fat are unsaturated fats that may help lower your blood cholesterol level when used in place of saturated fat in your diet. Ask your RDN about taking a dietary supplement with plant sterols and stanols to help lower your cholesterol level. Yamel Mandujano shows that substituting saturated fats with unsaturated fats is beneficial to cholesterol levels. Try these easy swaps:              Instead of     Try:       Butter, stick margarine, or solid shortening     Reduced-fat, whipped, or liquid spreads       Beef, pork, or poultry with skin          Fish and seafood       Chips, crackers, snack foods     Raw Check your blood glucose level regularly. It can tell you if you need to adjust when you eat carbohydrates. Choose foods rich in viscous (soluble) fiber  Viscous, or soluble, is found in the walls of plant cells. Viscous fiber is found only in plant-based foods. Eating foods with fiber helps to lower your unhealthy cholesterol and keep your blood glucose in range  ? Rich sources of viscous fiber include vegetables (asparagus, [place] sprouts, sweet potatoes, turnips) fruit (apricots, mangoes, oranges), legumes, and whole grains (barley, oats, and oat bran). As you increase your fiber intake gradually, also increase the amount of water you drink. This will help prevent constipation. If you have difficulty achieving this goal, ask your RDN about fiber laxatives. Choose fiber supplements made with viscous fibers such as psyllium seed husks or methylcellulose to help lower unhealthy cholesterol. Limit refined carbohydrates   There are three types of carbohydrates: starches, sugar, and fiber. Some carbohydrates occur naturally in food, like the starches in rice or corn or the sugars in fruits and milk. Refined carbohydrates--foods with high amounts of simple sugars--can raise triglyceride levels. High triglyceride levels are associated with coronary heart disease. Some examples of refined carbohydrate foods are table sugar, sweets, and beverages sweetened with added sugar. Tips for Reducing Sodium (Salt)    Although sodium is important for your body to function, too much sodium can be harmful for people with high blood pressure. As sodium and fluid buildup in your tissues and bloodstream, your blood pressure increases. High blood pressure may cause damage to other organs and increase your risk for a stroke. Even if you take a pill for blood pressure or a water pill (diuretic) to remove fluid, it is still important to have less salt in your diet.  Ask your doctor and RDN what amount of sodium is right for you. Avoid processed foods. Eat more fresh foods. ?Fresh fruits and vegetables are naturally low in sodium, as well as frozen vegetables and fruits that have no added juices or sauces. ?Fresh meats are lower in sodium than processed meats, such as lim, sausage, and hotdogs. Read the nutrition label or ask your  to help you find a fresh meat that is low in sodium. Eat less salt--at the table and when cooking. ? A single teaspoon of table salt has 2,300 mg of sodium. ? Leave the salt out of recipes for pasta, casseroles, and soups. ? Ask your RDN how to cook your favorite recipes without sodium    Be a smart . ? Look for food packages that say salt-free or sodium-free.  These items contain less than 5 milligrams of sodium per serving. ?Very low-sodium products contain less than 35 milligrams of sodium per serving. ?Low-sodium products contain less than 140 milligrams of sodium per serving. ?Beware for Unsalted or No Added Salt products. These items may still be high in sodium. Check the nutrition label. Add flavors to your food without adding sodium. ?Try lemon juice, lime juice, fruit juice or vinegar. ?Dry or fresh herbs add flavor. Try basil, bay leaf, dill, rosemary, parsley, olive, dry mustard, nutmeg, thyme, and paprika. ? Pepper, red pepper flakes, and cayenne pepper can add spice t your meals without adding sodium. Hot sauce contains sodium, but if you use just a drop or two, it will not add up to much. ?Buy a sodium-free seasoning blend or make your own at home. Additional Lifestyle Tips    Achieve and maintain a healthy weight. Talk with your RDN or your doctor about what is a healthy weight for you. Set goals to reach and maintain that weight. To lose weight, reduce your calorie intake along with increasing your physical activity.  A weight loss of 10 to 15 pounds could reduce LDL-cholesterol by 5 milligrams per deciliter. Participate in physical activity. Talk with your health care team to find out what types of physical activity are best for you. Set a plan to get about 30 minutes of exercise on most days.     Foods Recommended      Food Group     Foods Recommended       Grains     Whole grain breads and cereals, including whole wheat, barley, rye, buckwheat, corn, teff, quinoa, millet, amaranth, brown or wild rice, sorghum, and oats  Pasta, especially whole wheat or other whole grain types   [place] [employer] [place], quinoa or wild rice  Whole grain crackers, bread, rolls, pitas   Home-made bread with reduced-sodium baking soda       Protein Foods     Lean cuts of beef and pork (loin, leg, round, extra lean hamburger)   Skinless poultry  Fish  Venison and other wild game  Dried beans and peas  Nuts and nut butters  Meat alternatives made with soy or textured vegetable protein   Egg whites or egg substitute  Cold cuts made with lean meat or soy protein       Dairy     Nonfat (skim), low-fat, or 1%-fat milk   Nonfat or low-fat yogurt or cottage cheese  Fat-free and low-fat cheese       Vegetables     Fresh, frozen, or canned vegetables without added fat or salt        Fruits     Fresh, frozen, canned, or dried fruit        Oils     Unsaturated oils (corn, olive, peanut, soy, sunflower, canola)   Soft or liquid margarines and vegetable oil spreads   Salad dressings  Seeds and nuts   Avocado       Foods Not Recommended      Food Group     Foods Not Recommended       Grains     Breads or crackers topped with salt   Cereals (hot or cold) with more than 300 mg sodium per serving   Biscuits, cornbread, and other quick breads prepared with baking soda   Bread crumbs or stuffing mix from a store   High-fat bakery products, such as doughnuts, biscuits, croissants, [place] pastries, pies, cookies   Instant cooking foods to which you add hot water and stir--potatoes, noodles, rice, etc.   Packaged starchy foods--seasoned noodle or rice dishes, stuffing mix, macaroni and cheese dinner   Snacks made with partially hydrogenated oils, including chips, cheese puffs, snack mixes, regular crackers, butter-flavored popcorn       Protein Foods     Higher-fat cuts of meats (ribs, t-bone steak, regular hamburger)   Ward, sausage, or hot dogs   Cold cuts, such as salami or bologna, deli meats, cured meats, corned beef   Organ meats (liver, brains, gizzards, sweetbreads)   Poultry with skin   Fried or smoked meat, poultry, and fish   Whole eggs and egg yolks (more than 2-4 per week)   Salted legumes, nuts, seeds, or nut/seed butters   Meat alternatives with high levels of sodium (>300 mg per serving) or saturated fat (>5 g per serving)       Dairy     Whole milk,?2% fat milk, buttermilk   Whole milk yogurt or ice cream   Cream   Half-&-half   Cream cheese   Sour cream   Cheese       Vegetables     Canned or frozen vegetables with salt, fresh vegetables prepared with salt, butter, cheese, or cream sauce  Fried vegetables  Pickled vegetables such as olives, pickles, or sauerkraut       Fruits     Fried fruits   Fruits served with butter or cream       Oils     Butter, stick margarine, shortening   Partially hydrogenated oils or trans fats   Tropical oils (coconut, palm, palm kernel oils)       Other     Candy, sugar sweetened soft drinks and desserts   Salt, sea salt, garlic salt, and seasoning mixes containing salt   Bouillon cubes   Ketchup, barbecue sauce, Worcestershire sauce, soy sauce, teriyaki sauce   Miso   Salsa   Pickles, olives, relish        Heart Healthy Consistent Carbohydrate Sample 1-Day Menu View Nutrient Info             Breakfast     1 cup cooked oatmeal (2 carbohydrate servings)    3/4 cup blueberries (1 carbohydrate serving)    1 ounce almonds    1 cup skim milk (1 carbohydrate serving)    1 cup coffee       Morning Snack     1 cup sugar-free nonfat yogurt (1 carbohydrate serving)       Lunch     2 slices whole-wheat bread (2 carbohydrate servings)    2 ounces lean turkey breast    1 ounce low-fat Swiss cheese    1 teaspoon mustard    1 slice tomato    1 lettuce leaf    1 small pear (1 carbohydrate serving)    1 cup skim milk (1 carbohydrate serving)       Afternoon Snack     1 ounce trail mix with unsalted nuts, seeds, and raisins (1 carbohydrate serving)       Evening Meal     3 ounces salmon    2/3 cup cooked brown rice (2 carbohydrate servings)    1 teaspoon soft margarine    1 cup cooked broccoli with 1/2 cup cooked carrots (1 carbohydrate serving    Carrots, cooked, boiled, drained, without salt    1 cup lettuce    1 teaspoon olive oil with vinegar for dressing    1 small whole grain roll (1 carbohydrate serving)    1 teaspoon soft margarine    1 cup unsweetened tea       Evening Snack     1 extra-small banana (1 carbohydrate serving)       Daily Sum            Nutrient Unit Value     Macronutrients   Energy kcal 1938   Energy kJ 8105   Protein g 110   Total lipid (fat) g 70   Carbohydrate, by difference g 232   Fiber, total dietary g 34   Sugars, total g 84   Minerals   Calcium, Ca mg 1359   Iron, Fe mg 12   Sodium, Na mg 1905   Vitamins   Vitamin C, total ascorbic acid mg 123   Vitamin A, IU IU 89028   Vitamin D    Lipids   Fatty acids, total saturated g 13   Fatty acids, total monounsaturated g 30   Fatty acids, total polyunsaturated g 21   Cholesterol mg 113         Heart Healthy Consistent Carbohydrate Vegetarian (Lacto-Ovo) Sample 1-Day Menu View Nutrient Info             Breakfast     1 cup oatmeal, cooked (2 carbohydrate servings)     ¾ cup blueberries (1 carbohydrate serving)     11 almonds, without salt     1 cup 1% milk (1 carbohydrate serving)    1 cup coffee       Morning Snack     1 cup fat-free plain yogurt (1 carbohydrate serving)       Lunch     1 whole wheat bun (1½ carbohydrate servings)    1 black bean burger (1 carbohydrate servings)    1 slice cheddar cheese, low sodium     2 slices tomatoes    2 leaves lettuce     1 teaspoon mustard    1 small pear (1½ carbohydrate servings)     1 cup green tea, unsweetened       Afternoon Snack     1/3 cup trail mix with nuts, seeds, and raisins, without salt (1½ carbohydrate servinga)       Evening Meal     ½ cup meatless chicken     2/3 cup brown rice, cooked (2 carbohydrate servings)    1 cup broccoli, cooked (2/3 carbohydrate serving)    ½ cup carrots, cooked (1/3 carbohydrate serving)    2 teaspoons olive oil    1 teaspoon balsamic vinegar    1 whole wheat dinner roll (1 carbohydrate serving)     1 teaspoon margarine, soft, tub    1 cup 1% milk (1 carbohydrate serving)       Evening Snack     1 extra small banana (1 carbohydrate serving)    1 tablespoon peanut butter       Daily Sum            Nutrient Unit Value     Macronutrients   Energy kcal 2093   Energy kJ 8755   Protein g 97   Total lipid (fat) g 81   Carbohydrate, by difference g 269   Fiber, total dietary g 39   Sugars, total g 96   Minerals   Calcium, Ca mg 1571   Iron, Fe mg 15   Sodium, Na mg 1950   Vitamins   Vitamin C, total ascorbic acid mg 139   Vitamin A, IU IU 01775   Vitamin D    Lipids   Fatty acids, total saturated g 20   Fatty acids, total monounsaturated g 33   Fatty acids, total polyunsaturated g 20   Cholesterol mg 57         Heart Healthy Consistent Carbohydrate Vegan Sample 1-Day Menu View Nutrient Info             Breakfast     1 cup oatmeal, cooked (2 carbohydrate servings)     ¾ cup blueberries (1 carbohydrate serving)     11 almonds, without salt     1 cup soymilk fortified with calcium, vitamin B12, and vitamin D    1 cup coffee       Morning Snack     6 ounces soy yogurt (1½ carbohydrate servings)       Lunch     1 whole wheat bun(1½ carbohydrate servings)    1 black bean burger (1 carbohydrate serving)    2 slices tomatoes    2 leaves lettuce     1 teaspoon mustard    1 small pear (1½ carbohydrate servings)     1 cup green tea, unsweetened       Afternoon Snack     1/3 cup trail mix with nuts, seeds, and raisins, without salt (1½ carbohydrate servinga)       Evening Meal     ½ cup meatless chicken     2/3 cup brown rice, cooked (2 carbohydrate servings)    1 cup broccoli, cooked (2/3 carbohydrate serving)    ½ cup carrots, cooked (1/3 carbohydrate serving)    2 teaspoons olive oil    1 teaspoon balsamic vinegar    1 whole wheat dinner roll (1 carbohydrate serving)     1 teaspoon margarine, soft, tub    1 cup soymilk fortified with calcium, vitamin B12, and vitamin D       Evening Snack     1 extra small banana (1 carbohydrate serving)    1 tablespoon peanut butter        Daily Sum            Nutrient Unit Value     Macronutrients   Energy kcal 1989   Energy kJ 8315   Protein g 85   Total lipid (fat) g 78   Carbohydrate, by difference g 258   Fiber, total dietary g 42   Sugars, total g 68   Minerals   Calcium, Ca mg 1343   Iron, Fe mg 16   Sodium, Na mg 1805   Vitamins   Vitamin C, total ascorbic acid mg 167   Vitamin A, IU IU 74467   Vitamin D    Lipids   Fatty acids, total saturated g 12   Fatty acids, total monounsaturated g 31   Fatty acids, total polyunsaturated g 25   Cholesterol mg 0

## 2020-10-01 NOTE — PROGRESS NOTES
Nutrition Education    Educational handouts and materials in discharge instructions. · Educated on heart healthy/cardiac diet  · Written educational materials provided. · Contact name and number provided. · Refer to Patient Education activity for more details.     Electronically signed by Corrine Gonzalez RD, LD on 10/1/20 at 10:33 AM EDT    Contact: [phone]

## 2020-10-01 NOTE — PROGRESS NOTES
POD# 2  Awake, alert. No complaints. Denies CP, palpitations, SOB at rest, dizziness/lightheadedness. Vitals:    10/01/20 0741 10/01/20 0814 10/01/20 1012 10/01/20 1026   BP:  124/68     Pulse:  80     Resp:  18     Temp:  98.5 °F (36.9 °C)     TempSrc:  Oral     SpO2: 93% 95% 94%    Weight:       Height:    5' 8\" (1.727 m)     O2: 3 L/NC      Intake/Output Summary (Last 24 hours) at 10/1/2020 1139  Last data filed at 10/1/2020 0540  Gross per 24 hour   Intake 400 ml   Output 1280 ml   Net -880 ml           Recent Labs     09/29/20  1207 09/30/20  0445 10/01/20  0719   WBC 29.4* 14.8* 15.9*   HGB 9.8* 8.0* 8.8*   HCT 29.3* 24.4* 27.2*    206 214      Recent Labs     09/29/20  1207 09/30/20  0445 10/01/20  0719   BUN 13 12 14   CREATININE 0.9 0.7 0.8         Telemetry: NSR      PE  Cardiac: RRR  Lungs: decreased bases  Chest incision with intact PEPE DSD. [employer] Bound Sternum stable. Chest tubes x 3 and Epicardial pacing wires present and secure. Abd: Soft, nontender, +BS  Ext: Incisions C/D/I, approximated, no erythema, + edema           A/P: POD# 2      Assessment/Plan: POD #1     1. CAD S/p CABGx3 (LIMA-LAD, Radial-OM, SVG-dRCA)  - DAPT, statin, will add low dose BB today   - Imdur started for radial harvest protection -WILL NEED TO REMAIN ON X 6 MONTHS   - will remove CTs today Chest tube(s) removed without difficulty. Patient tolerated well       2. Acute Postoperative Respiratory Insufficiency  - 2/2 surgery, extubated DOS without difficulty  - On 3 L O2 NC, sats 95%  - Continue EZPAP q 4 hours, encourage IS, instructed to C&DB, OOBTC with PT/OT, increase activity as tolerated, wean O2 as able for sats >92%     3. Acute Post Operative Pain   - h/o drug abuse, on methadone at home   - Pain control suboptimal, PRN toradol, tylenol ATC, PRN PO narcotics, resume home methadone      4. DM Type2   - HgbA1C 11.5%, Home Rx: lantus 20units nightly, SQ Humulin  - SSI, nightly lantus for glycemic control      5.

## 2020-10-01 NOTE — CARE COORDINATION
SOCIAL WORK/CASEMANAGEMENT TRANSITION OF CARE [id number] [facility], [address], [place], [phone]): I met with pt in the room this a.m. [facility] was referred by the cm in [facility]. Pt is aware she has to ambulate 400' , have 24/7 care from family and [facility]. Pt signed medicaid paperwork with gino this a.m. and I faxed them to public benefits. Pt is [facility] and they are going to file for medicaid. Provided pt with the pt assistance medication pamphlet. Gino/salvatore to follow.  Angela Reach  10/1/2020

## 2020-10-02 LAB
ANION GAP SERPL CALCULATED.3IONS-SCNC: 10 MMOL/L (ref 7–16)
BUN BLDV-MCNC: 15 MG/DL (ref 6–20)
CALCIUM SERPL-MCNC: 8.6 MG/DL (ref 8.6–10.2)
CHLORIDE BLD-SCNC: 102 MMOL/L (ref 98–107)
CO2: 24 MMOL/L (ref 22–29)
CREAT SERPL-MCNC: 0.7 MG/DL (ref 0.5–1)
GFR AFRICAN AMERICAN: >60
GFR NON-AFRICAN AMERICAN: >60 ML/MIN/1.73
GLUCOSE BLD-MCNC: 130 MG/DL (ref 74–99)
HCT VFR BLD CALC: 25.3 % (ref 34–48)
HEMOGLOBIN: 8.1 G/DL (ref 11.5–15.5)
MCH RBC QN AUTO: 30.2 PG (ref 26–35)
MCHC RBC AUTO-ENTMCNC: 32 % (ref 32–34.5)
MCV RBC AUTO: 94.4 FL (ref 80–99.9)
METER GLUCOSE: 128 MG/DL (ref 74–99)
METER GLUCOSE: 132 MG/DL (ref 74–99)
METER GLUCOSE: 134 MG/DL (ref 74–99)
METER GLUCOSE: 182 MG/DL (ref 74–99)
PDW BLD-RTO: 14.6 FL (ref 11.5–15)
PLATELET # BLD: 223 E9/L (ref 130–450)
PMV BLD AUTO: 11.6 FL (ref 7–12)
POTASSIUM SERPL-SCNC: 3.9 MMOL/L (ref 3.5–5)
RBC # BLD: 2.68 E12/L (ref 3.5–5.5)
SODIUM BLD-SCNC: 136 MMOL/L (ref 132–146)
WBC # BLD: 15.3 E9/L (ref 4.5–11.5)

## 2020-10-02 PROCEDURE — 82044 UR ALBUMIN SEMIQUANTITATIVE: CPT

## 2020-10-02 PROCEDURE — 6370000000 HC RX 637 (ALT 250 FOR IP): Performed by: INTERNAL MEDICINE

## 2020-10-02 PROCEDURE — 6360000002 HC RX W HCPCS: Performed by: NURSE PRACTITIONER

## 2020-10-02 PROCEDURE — 99254 IP/OBS CNSLTJ NEW/EST MOD 60: CPT | Performed by: INTERNAL MEDICINE

## 2020-10-02 PROCEDURE — 80048 BASIC METABOLIC PNL TOTAL CA: CPT

## 2020-10-02 PROCEDURE — 93798 PHYS/QHP OP CAR RHAB W/ECG: CPT

## 2020-10-02 PROCEDURE — 94640 AIRWAY INHALATION TREATMENT: CPT

## 2020-10-02 PROCEDURE — 85027 COMPLETE CBC AUTOMATED: CPT

## 2020-10-02 PROCEDURE — 6370000000 HC RX 637 (ALT 250 FOR IP): Performed by: NURSE PRACTITIONER

## 2020-10-02 PROCEDURE — 2580000003 HC RX 258: Performed by: NURSE PRACTITIONER

## 2020-10-02 PROCEDURE — 36415 COLL VENOUS BLD VENIPUNCTURE: CPT

## 2020-10-02 PROCEDURE — 2140000000 HC CCU INTERMEDIATE R&B

## 2020-10-02 PROCEDURE — 82962 GLUCOSE BLOOD TEST: CPT

## 2020-10-02 PROCEDURE — 6370000000 HC RX 637 (ALT 250 FOR IP): Performed by: PHYSICIAN ASSISTANT

## 2020-10-02 PROCEDURE — 97530 THERAPEUTIC ACTIVITIES: CPT

## 2020-10-02 PROCEDURE — 82570 ASSAY OF URINE CREATININE: CPT

## 2020-10-02 RX ORDER — INSULIN GLARGINE 100 [IU]/ML
14 INJECTION, SOLUTION SUBCUTANEOUS NIGHTLY
Status: DISCONTINUED | OUTPATIENT
Start: 2020-10-02 | End: 2020-10-03 | Stop reason: HOSPADM

## 2020-10-02 RX ORDER — DILTIAZEM HYDROCHLORIDE 120 MG/1
120 CAPSULE, COATED, EXTENDED RELEASE ORAL DAILY
Status: DISCONTINUED | OUTPATIENT
Start: 2020-10-02 | End: 2020-10-02

## 2020-10-02 RX ADMIN — FOLIC ACID 1 MG: 1 TABLET ORAL at 08:08

## 2020-10-02 RX ADMIN — Medication 10 ML: at 20:46

## 2020-10-02 RX ADMIN — METHADONE HYDROCHLORIDE 37 MG: 10 CONCENTRATE ORAL at 08:15

## 2020-10-02 RX ADMIN — OXYCODONE HYDROCHLORIDE 10 MG: 10 TABLET ORAL at 10:46

## 2020-10-02 RX ADMIN — DOCUSATE SODIUM 100 MG: 100 CAPSULE ORAL at 08:07

## 2020-10-02 RX ADMIN — MUPIROCIN: 20 OINTMENT TOPICAL at 20:41

## 2020-10-02 RX ADMIN — CLOPIDOGREL 75 MG: 75 TABLET, FILM COATED ORAL at 08:07

## 2020-10-02 RX ADMIN — POTASSIUM CHLORIDE 20 MEQ: 1500 TABLET, EXTENDED RELEASE ORAL at 10:41

## 2020-10-02 RX ADMIN — Medication 500 MG: at 08:07

## 2020-10-02 RX ADMIN — MUPIROCIN: 20 OINTMENT TOPICAL at 08:20

## 2020-10-02 RX ADMIN — BETHANECHOL CHLORIDE 25 MG: 25 TABLET ORAL at 20:41

## 2020-10-02 RX ADMIN — INSULIN LISPRO 3 UNITS: 100 INJECTION, SOLUTION INTRAVENOUS; SUBCUTANEOUS at 13:45

## 2020-10-02 RX ADMIN — PANTOPRAZOLE SODIUM 40 MG: 40 TABLET, DELAYED RELEASE ORAL at 08:07

## 2020-10-02 RX ADMIN — LEVOTHYROXINE SODIUM 25 MCG: 0.03 TABLET ORAL at 06:46

## 2020-10-02 RX ADMIN — ONDANSETRON 4 MG: 2 INJECTION INTRAMUSCULAR; INTRAVENOUS at 17:14

## 2020-10-02 RX ADMIN — ATORVASTATIN CALCIUM 40 MG: 40 TABLET, FILM COATED ORAL at 08:07

## 2020-10-02 RX ADMIN — Medication 10 ML: at 06:53

## 2020-10-02 RX ADMIN — OXYCODONE HYDROCHLORIDE 10 MG: 10 TABLET ORAL at 06:45

## 2020-10-02 RX ADMIN — BETHANECHOL CHLORIDE 25 MG: 25 TABLET ORAL at 15:16

## 2020-10-02 RX ADMIN — INSULIN GLARGINE 14 UNITS: 100 INJECTION, SOLUTION SUBCUTANEOUS at 20:43

## 2020-10-02 RX ADMIN — STANDARDIZED SENNA CONCENTRATE 8.6 MG: 8.6 TABLET ORAL at 20:41

## 2020-10-02 RX ADMIN — INSULIN LISPRO 1 UNITS: 100 INJECTION, SOLUTION INTRAVENOUS; SUBCUTANEOUS at 11:54

## 2020-10-02 RX ADMIN — ISOSORBIDE MONONITRATE 30 MG: 30 TABLET ORAL at 08:08

## 2020-10-02 RX ADMIN — FERROUS SULFATE TAB 325 MG (65 MG ELEMENTAL FE) 325 MG: 325 (65 FE) TAB at 08:07

## 2020-10-02 RX ADMIN — STANDARDIZED SENNA CONCENTRATE 8.6 MG: 8.6 TABLET ORAL at 08:07

## 2020-10-02 RX ADMIN — OXYCODONE HYDROCHLORIDE 10 MG: 10 TABLET ORAL at 15:16

## 2020-10-02 RX ADMIN — ONDANSETRON 4 MG: 2 INJECTION INTRAMUSCULAR; INTRAVENOUS at 06:53

## 2020-10-02 RX ADMIN — IPRATROPIUM BROMIDE AND ALBUTEROL SULFATE 1 AMPULE: 2.5; .5 SOLUTION RESPIRATORY (INHALATION) at 19:45

## 2020-10-02 RX ADMIN — POLYETHYLENE GLYCOL 3350 17 G: 17 POWDER, FOR SOLUTION ORAL at 08:08

## 2020-10-02 RX ADMIN — IPRATROPIUM BROMIDE AND ALBUTEROL SULFATE 1 AMPULE: 2.5; .5 SOLUTION RESPIRATORY (INHALATION) at 11:00

## 2020-10-02 RX ADMIN — Medication 10 ML: at 06:56

## 2020-10-02 RX ADMIN — Medication 10 ML: at 08:16

## 2020-10-02 RX ADMIN — INSULIN LISPRO 3 UNITS: 100 INJECTION, SOLUTION INTRAVENOUS; SUBCUTANEOUS at 17:56

## 2020-10-02 RX ADMIN — Medication 500 MG: at 20:41

## 2020-10-02 RX ADMIN — BETHANECHOL CHLORIDE 25 MG: 25 TABLET ORAL at 08:07

## 2020-10-02 RX ADMIN — METOPROLOL TARTRATE 12.5 MG: 25 TABLET, FILM COATED ORAL at 20:42

## 2020-10-02 RX ADMIN — OXYCODONE HYDROCHLORIDE 10 MG: 10 TABLET ORAL at 02:46

## 2020-10-02 RX ADMIN — DOCUSATE SODIUM 100 MG: 100 CAPSULE ORAL at 20:41

## 2020-10-02 RX ADMIN — ASPIRIN 81 MG: 81 TABLET, COATED ORAL at 08:07

## 2020-10-02 RX ADMIN — OXYCODONE HYDROCHLORIDE 10 MG: 10 TABLET ORAL at 19:35

## 2020-10-02 RX ADMIN — METOPROLOL TARTRATE 12.5 MG: 25 TABLET, FILM COATED ORAL at 08:08

## 2020-10-02 RX ADMIN — MAGNESIUM GLUCONATE 500 MG ORAL TABLET 400 MG: 500 TABLET ORAL at 08:11

## 2020-10-02 RX ADMIN — IPRATROPIUM BROMIDE AND ALBUTEROL SULFATE 1 AMPULE: 2.5; .5 SOLUTION RESPIRATORY (INHALATION) at 14:23

## 2020-10-02 ASSESSMENT — PAIN SCALES - GENERAL
PAINLEVEL_OUTOF10: 7
PAINLEVEL_OUTOF10: 0
PAINLEVEL_OUTOF10: 7
PAINLEVEL_OUTOF10: 7
PAINLEVEL_OUTOF10: 5

## 2020-10-02 ASSESSMENT — PAIN DESCRIPTION - LOCATION
LOCATION: STERNUM;NECK
LOCATION: STERNUM;NECK
LOCATION: SHOULDER;NECK

## 2020-10-02 ASSESSMENT — PAIN DESCRIPTION - FREQUENCY
FREQUENCY: INTERMITTENT
FREQUENCY: CONTINUOUS
FREQUENCY: INTERMITTENT

## 2020-10-02 ASSESSMENT — PAIN DESCRIPTION - ONSET
ONSET: GRADUAL

## 2020-10-02 ASSESSMENT — PAIN DESCRIPTION - PAIN TYPE
TYPE: ACUTE PAIN;SURGICAL PAIN
TYPE: SURGICAL PAIN
TYPE: ACUTE PAIN;SURGICAL PAIN

## 2020-10-02 ASSESSMENT — PAIN DESCRIPTION - ORIENTATION
ORIENTATION: LOWER;MID;INNER
ORIENTATION: LOWER;INNER;MID

## 2020-10-02 ASSESSMENT — PAIN DESCRIPTION - DESCRIPTORS
DESCRIPTORS: DISCOMFORT
DESCRIPTORS: DISCOMFORT
DESCRIPTORS: DISCOMFORT;CONSTANT

## 2020-10-02 ASSESSMENT — PAIN DESCRIPTION - PROGRESSION
CLINICAL_PROGRESSION: NOT CHANGED
CLINICAL_PROGRESSION: GRADUALLY IMPROVING
CLINICAL_PROGRESSION: NOT CHANGED

## 2020-10-02 NOTE — PROGRESS NOTES
POD#3  Awake, alert. No complaints other than some neck soreness    Vitals:    10/02/20 0036 10/02/20 0323 10/02/20 0656 10/02/20 0805   BP: 131/63 105/60  116/63   Pulse: 68 67  70   Resp: 18 18  18   Temp: 97.4 °F (36.3 °C) 96.8 °F (36 °C)  97.3 °F (36.3 °C)   TempSrc: Temporal Temporal     SpO2: 94% 93%  95%   Weight:   199 lb 12.8 oz (90.6 kg)    Height:         RA      Intake/Output Summary (Last 24 hours) at 10/2/2020 0858  Last data filed at 10/2/2020 0324  Gross per 24 hour   Intake 500 ml   Output 950 ml   Net -450 ml         Recent Labs     09/30/20  0445 10/01/20  0719 10/02/20  0504   WBC 14.8* 15.9* 15.3*   HGB 8.0* 8.8* 8.1*   HCT 24.4* 27.2* 25.3*    214 223      Recent Labs     09/30/20  0445 10/01/20  0719 10/02/20  0504   BUN 12 14 15   CREATININE 0.7 0.8 0.7         Telemetry: NSR       PE  Cardiac: RRR  Lungs: decreased bases  Chest incision with intact PEPE DSD. Sternum stable. Prior chest tube site incisions C/D/I, no erythema with intact sutures Epicardial pacing wires present and secure. Abd: Soft, nontender, +BS  Ext: Incisions C/D/I, approximated, no erythema, + edema           A/P: POD# 3    1. CAD S/p CABGx3 (LIMA-LAD, Radial-OM, SVG-dRCA)  - DAPT, statin, low dose BB added yesterday at 12.5- cont today  - Imdur started for radial harvest protection -WILL NEED TO REMAIN ON X 6 MONTHS , will add cardizem if pressure allows   - remove wires today. Epicardial pacing wires cut without difficulty. Patient tolerated well  - PT/OT         2. Acute blood loss anemia secondary to open heart surgery  --hgb 8.1 today will monitor closely     3.  Acute Postoperative Respiratory Insufficiency  - 2/2 surgery, extubated DOS without difficulty  - On RA  - Continue EZPAP q 4 hours, encourage IS, instructed to C&DB, OOBTC with PT/OT, increase activity as tolerated,      4.  DM Type2   - HgbA1C 11.5%, Home Rx: lantus 20units nightly, SQ Humulin  - SSI, nightly lantus for glycemic control   -

## 2020-10-02 NOTE — CONSULTS
ENDOCRINOLOGY INITIAL CONSULTATION NOTE      Date of admission: 9/29/2020  Date of service: 10/2/2020  Admitting physician: Davie Carreno DO   Primary Care Physician: Jacquelin Conteh MD  Consultant physician: Melina Hodgkins MD       Reason for the consultation:  Uncontrolled IDDM2    History of Present Illness: The history is provided by the patient. Accuracy of the patient data is excellent    Eitan Hollis is a very pleasant 54 y.o. old female with PMH listed below admitted to [facility] on 9/29/2020 because of CABG, endocrine team was consulted for diabetes management. Prior to admission  The patient was diagnosed with type 2 DM for many years. Prior to admission patient was on Lantus 20 Units nightly and medium dose sliding scale insulin with meals. Patient has had no hypoglycemic episodes. Patient has not been eating consistent carbohydrate meals. She does not know how to carb count. The patient has only been monitoring her blood glucose 2 times/day at the most (in the morning and prior to bed). Patient originally stated that she had a prior A1c of 6.5 but was surprised when she was told her A1c was 11.5 in September. She states that her blood glucoses can sometimes be in the 400-500s one week and 100s another week. She denies any changes in diet or lifestyle during the fluctuating blood glucose changes. She confirms that she normally gets her Lantus and meal time insulin from her PCP and normally gets approximately 5 pens per month. She states that she does not have insurance but has been taking her insulin. We discussed endocrinology followup with the patient and she is excited to have an office visit. She confirms peripheral neuropathy in her BL feet but denies other macrovascular or microvascular complications.  Has been up to date with yearly diabetic eye exam.   Lab Results   Component Value Date    LABA1C 11.5 09/15/2020       After admission   While hospitalized, anti-diabetic regiment includes Lantus 13 U nightly and low dose sliding scale insulin. Point of care glucose monitoring was reviewed and has been above goal. Appetite is good. Inpatient diet:   Carb Restricted diet     Point of care glucose monitoring (Independently reviewed)   Recent Labs     20  2042 10/01/20  0654 10/01/20  1149 10/01/20  1604 10/01/20  2020 10/02/20  0637 10/02/20  1033 10/02/20  1713   GLUMET 194* 200* 188* 165* 172* 134* 182* 128*       Past medical history:   Past Medical History:   Diagnosis Date    CAD in native artery 2020    Claustrophobia     mild    Colitis     Constipation     Diabetes mellitus ([facility] [address]     Heart problem     Hyperlipidemia     Hypertension     Hypothyroidism     Sleep apnea     diagnosed 7-8 years ago does not use C-pap problems with cost    Thyroid disease     Wears partial dentures     upper       Past surgical history:  Past Surgical History:   Procedure Laterality Date    APPENDECTOMY      CARDIAC CATHETERIZATION  2020    Dr. Christensen Files EF 55% cts consult ordered     SECTION      x2    CHOLECYSTECTOMY      COLONOSCOPY      CORONARY ARTERY BYPASS GRAFT N/A 2020    CORONARY ARTERY BYPASS, POSSIBLE LEFT RADIAL ARTERY HARVEST, MINDA performed by Jose Jauregui DO at [facility]  12/1/15    laparoscopic incisional reapair with mesh    HYSTERECTOMY      KNEE SURGERY Right     VENTRAL HERNIA REPAIR         Social history:   Tobacco:   reports that she has been smoking cigarettes. She has a 25.00 pack-year smoking history. She has never used smokeless tobacco.  Alcohol:   reports previous alcohol use. Drugs:   reports current drug use. Family history:    Family History   Adopted: Yes   Problem Relation Age of Onset    Mult Sclerosis Mother     Mult Sclerosis Maternal Grandmother        Allergy and drug reactions:    Allergies   Allergen Reactions    Seasonal      Maple trees grass dust dust mites       Scheduled Meds:   insulin lispro  0-6 Units Subcutaneous TID     insulin lispro  0-3 Units Subcutaneous Nightly    insulin lispro  3 Units Subcutaneous TID     insulin glargine  14 Units Subcutaneous Nightly    metoprolol tartrate  12.5 mg Oral BID    isosorbide mononitrate  30 mg Oral Daily    senna  1 tablet Oral BID    docusate sodium  100 mg Oral BID    methadone  37 mg Oral Daily    aspirin  81 mg Oral Daily    polyethylene glycol  17 g Oral Daily    vitamin C  500 mg Oral BID    ferrous sulfate  325 mg Oral BID WC    folic acid  1 mg Oral Daily    atorvastatin  40 mg Oral Daily    levothyroxine  25 mcg Oral Daily    sodium chloride flush  10 mL Intravenous 2 times per day    pantoprazole  40 mg Oral Daily    magnesium oxide  400 mg Oral Daily    mupirocin   Nasal BID    ipratropium-albuterol  1 ampule Inhalation Q4H WA    clopidogrel  75 mg Oral Daily    bethanechol  25 mg Oral TID     PRN Meds:   acetaminophen, 650 mg, Q4H PRN  potassium chloride, 20 mEq, PRN  bisacodyl, 10 mg, Daily PRN  diphenhydrAMINE, 25 mg, Q8H PRN  glucose, 15 g, PRN  dextrose, 12.5 g, PRN  glucagon (rDNA), 1 mg, PRN  dextrose, 100 mL/hr, PRN  sodium chloride flush, 10 mL, PRN  oxyCODONE, 5 mg, Q4H PRN    Or  oxyCODONE HCl, 10 mg, Q4H PRN  ondansetron, 4 mg, Q8H PRN  ketorolac, 30 mg, Q6H PRN      Continuous Infusions:   dextrose         Review of Systems  All systems reviewed. All negative except for symptoms mentioned in HPI     OBJECTIVE    /60   Pulse 67   Temp 98.5 °F (36.9 °C) (Oral)   Resp 20   Ht 5' 8\" (1.727 m)   Wt 199 lb 12.8 oz (90.6 kg)   SpO2 90%   BMI 30.38 kg/m²     Intake/Output Summary (Last 24 hours) at 10/2/2020 1721  Last data filed at 10/2/2020 1412  Gross per 24 hour   Intake 680 ml   Output 1250 ml   Net -570 ml       Physical examination:  General: awake alert, oriented x3, no abnormal position or movements.   HEENT: normocephalic non-traumatic, no exophthalmos   Neck: supple, no LN enlargement, no thyromegaly, no thyroid tenderness, no JVD. Pulm: Clear equal air entry no added sounds, no wheezing or rhonchi    CVS: S1 + S2, no murmur, no heave; surgical dressing and incision noted on patients chest   Abd: soft lax, no tenderness, no organomegaly, audible bowel sounds. Skin: warm, no lesions, no rash. Feet: sensory exam of the feet is present, No ulcers or open wounds. Decreased dorsalis pedis pulses BL but 2/4 posterior tibialis pulses BL. Compression stockings applied to BL LE;   Neuro: CN intact, muscle power normal  Psych: normal mood, and affect    Review of Laboratory Data:  I personally reviewed the following labs:   Recent Labs     09/30/20  0445 10/01/20  0719 10/02/20  0504   WBC 14.8* 15.9* 15.3*   RBC 2.62* 2.85* 2.68*   HGB 8.0* 8.8* 8.1*   HCT 24.4* 27.2* 25.3*   MCV 93.1 95.4 94.4   MCH 30.5 30.9 30.2   MCHC 32.8 32.4 32.0   RDW 14.2 14.3 14.6    214 223   MPV 11.1 11.5 11.6     Recent Labs     09/30/20  0445 09/30/20  1935 10/01/20  0719 10/02/20  0504     --  134 136   K 4.2 4.0 4.3 3.9     --  98 102   CO2 22  --  24 24   BUN 12  --  14 15   CREATININE 0.7  --  0.8 0.7   GLUCOSE 167*  --  181* 130*   CALCIUM 7.7*  --  8.5* 8.6     No results found for: BHYDRXBUT  Lab Results   Component Value Date    LABA1C 11.5 09/15/2020    LABA1C 9.5 10/27/2015    LABA1C 5.4 10/12/2015     No results found for: TSH, T4FREE, J8NCIDD, FT3, W6YDPJD, TSI, TPOABS, THGAB  Lab Results   Component Value Date    LABA1C 11.5 09/15/2020    GLUCOSE 130 10/02/2020    MALBCR - 10/27/2015    LABMICR <12.0 10/27/2015    LABCREA 108 10/27/2015     Lab Results   Component Value Date    TRIG 253 10/27/2015    HDL 27 10/27/2015    LDLCALC 210 10/27/2015    CHOL 288 10/27/2015       Blood culture   Lab Results   Component Value Date    BC 5 Days- no growth 07/29/2019       Radiology:  XR CHEST PORTABLE   Final Result   1. Interval removal of a right internal jugular central venous catheter.    2. Persistent bibasilar atelectasis, left greater than right. XR CHEST PORTABLE   Final Result   Postoperative changes with likely left lower lobe atelectasis which   appears stable. XR CHEST PORTABLE   Final Result   Moderate cardiomegaly with postoperative changes                   Medical Records/Labs/Images review:   I personally reviewed and summarized previous records   All labs and imaging were reviewed independently     [address] TIFFANIE Vanegas, a 54 y.o.-old female seen today for inpatient diabetes management     Diabetes Mellitus type 2  · Patient's diabetes is uncontrolled, A1c 11.5%   · For now, will change insulin regiment to:   · Lantus 14 units Nightly  · Humalog 3 units with meals   · Low dose sliding scale   · Continue glucose check with meals and at bedtime   · Will titrate insulin dose based on the blood glucose trend & insulin requirement  · We will arrange for the patient to be seen by our inpatient diabetes education team for further teaching on consistent Carb diet   · Pt will follow with us after discharge. Endocrine follow up visit, Thursday 10/15 at 10:00AM      Primary Hypothyroidism  · currently on synthroid 25 mcg daily  · will check Thyroid function panel while hospitalized to assess  · patient denies any intolerance to heat or cold, changes in nails, changes in hair, changes in weight, or changes in heart rate/syncopal episodes    CAD and LV Hypertrophy  · cardiology and CTS consulted for care s/p CABG with left internal mammary artery, radial artery, and saphenous vein grafting  · Care per their reccomendations; input appreciated    Hx of methadone use  · Care per primary     The above issues were reviewed with the patient who understood and agreed with the plan. Thank you for allowing us to participate in the care of this patient. Please do not hesitate to contact us with any additional questions.      Kristi Collado MD  Endocrinologist, [facility]   [address] [facility] [address]   Phone: [phone]  Fax: [phone]  --------------------------------------------  An electronic signature was used to authenticate this note.  Magda Curtis MD on 10/2/2020 at 5:21 PM

## 2020-10-02 NOTE — PLAN OF CARE
Problem:  Activity Intolerance:  Goal: Able to perform prescribed physical activity  Description: Able to perform prescribed physical activity  10/2/2020 0337 by Ozzy Watt RN  Outcome: Ongoing    Problem: Gas Exchange - Impaired:  Goal: Levels of oxygenation will improve  Description: Levels of oxygenation will improve  Outcome: Ongoing     Problem: Pain:  Goal: Control of acute pain  Description: Control of acute pain  Outcome: Met This Shift

## 2020-10-02 NOTE — PLAN OF CARE
Problem: Falls - Risk of:  Goal: Will remain free from falls  Description: Will remain free from falls  Outcome: Met This Shift     Problem: Discharge Planning:  Goal: Discharged to appropriate level of care  Description: Discharged to appropriate level of care  Outcome: Met This Shift     Problem:  Activity Intolerance:  Goal: Able to perform prescribed physical activity  Description: Able to perform prescribed physical activity  Outcome: Met This Shift

## 2020-10-02 NOTE — PROGRESS NOTES
Reason for consult: Uncontrolled type 2 DM, s/p CABG     A1C: 11.5%     [] Not available                 Patient states the following concerns/barriers to diabetes self-management:       [] None       [] Medication cost   [] Food cost/availability          [] Reading  [] Hearing   [] Vision                  [] Work    [] Transportation  [x] No insurance    [] Physical limitations    [] Other:              Diabetes survival packet provided to:   [x] Patient     [x] Other: daughter present and supportive     Information reviewed:   Definition of diabetes   Target glucose ranges/A1C   Self-monitoring of blood glucose   Prevention/symptoms/treatment of hypo-/hyperglycemia   Medication adherence   The plate method/meal planning guidelines   The benefits of exercise and recommendations   Reducing the risk of chronic complications         Comment: Patient pleasant and receptive to education. She states she has not been taking care of her diabetes as well as she should be. She skips her Lantus at bedtime sometimes because she forgets and does not count carbohydrates at meals or eat three meals per day. She normally only monitors her glucose levels twice daily, but states her glucose levels run in the low to mid 100's. She was very surprised her A1C was so high, and questioned if her glucose meter was working properly. It is only 1 months old. I encourage testing 4 times daily, which her doctor recommends, and she is agreeable to, so that she can get a better picture of her glucose patterns at different times of the day. After education, she has a good understanding of basic carbohydrate counting using the plate method for portion control. She will try to eat three carb-controlled meals daily, spaced out every 4.5 to 5 hours, take her Humalog before meals, and set an alarm to remember to take her bedtime Lantus--or she may also want to switch to every morning instead to increase compliance.  Patient is interested in attending Cardiac Rehab and outpatient diabetes education classes. She will also follow with endocrinology after discharge. Diabetes medications reviewed (use, purpose, action, side effects):  Lantus, Humalog     Evaluation/Plan/Recommendations:   Patient's understanding of diabetes:   []Poor   [x]Fair    [x]Good   [] Excellent     Outpatient diabetes education is recommended:   [x] Yes  [] No   [] As needed  Patient is interested in outpatient diabetes education:   [x] Yes    [] No    [] Unsure    Recommended:     [x] Consult to social work; patient has no insurance or financial hardship      [] Script for glucometer and supplies (per preference of patient's insurance)               [x] Script for outpatient diabetes education classes from PCP    [x] Carbohydrate-controlled diet    Thank you for this consult.     Kinsey Valverde, RN, BSN, Leann Sampson

## 2020-10-02 NOTE — PROGRESS NOTES
Physical Therapy  Treatment Note    Name: Abby Estrada  : 1970  MRN: [id number]    Referring Provider:  KATY Matos    Date of Service: 10/2/2020    Evaluating PT:  Mikhail Monet, PT, DPT [id number]    Room #:  4581/4693-H  Diagnosis:  CAD  Precautions: Falls, Sternal, Chest tube x 2, O2  Procedure/Surgery:   CABG x 3  PMHx/PSHx:  DM, HLD, HTN  Equipment Needs:  TBD    SUBJECTIVE:    Pt lives with , 2 children in a 2 story home, 1st floor setup with 3 stairs to enter and 1 rail. Pt ambulated with no device and was independent PTA. OBJECTIVE:   Initial Evaluation  Date: 20 Treatment  10/2/20 Short Term/ Long Term   Goals   AM-PAC 6 Clicks 67/41     Was pt agreeable to Eval/treatment? Yes Yes with encouragement    Does pt have pain? 6/10 surgical site -7/10 neck and sternal pain     Bed Mobility  Rolling: NT  Supine to sit: ModA x 2 with HOB elevated  Sit to supine: NT  Scooting: ModA NT- pt received sitting up in recliner Anant   Transfers Sit to stand: Anant   Stand to sit: ModA  Stand pivot: Anant no device Sit to stand: SBA  Stand to sit: SBA  Stand pivot: CGA using no AD  Independent   Ambulation   20 feet with Anant no device 300 feet using no AD with CGA  Pt took multiple short standing rest breaks >400 feet Independently   Stair negotiation: ascended and descended NT pt declined due to fatigue and pain >4 steps with 1 rail Mod Independent   ROM BUE:  Defer to OT note  BLE:  WNL     Strength BUE:  Defer to OT note  BLE:  4/5  Increase by 1/3 MMT grade   Balance Sitting EOB:  Anant dynamic  Dynamic Standing:  Anant no device Sitting: SBA  Standing: CGA Sitting EOB:  Independent  Dynamic Standing:  Independent     Pt is A & O x 4  Sensation:  WNL  Edema:  None    Vitals:  NA  Therapeutic Exercises:  NA    Patient education  Pt educated on safety, performing cervical ROM, transfer technique and benefits of mobility.     Patient response to education:   Pt verbalized understanding Pt demonstrated skill Pt requires further education in this area   x x x     ASSESSMENT:    Comments:  Pt received sitting up in recliner and required encouragement to participate. Pt amb with decreased gait speed and required rest breaks approximately every 25 feet due to neck pain and fatigue. Frequent VCs to open and keep eyes open during mobility. Pt declined stairs when attempted this PM.  Pt returned to recliner and was left with BLE elevated and call button within reach. Treatment:  Patient practiced and was instructed in the following treatment:    · Functional mobility as noted above with focus on increasing tolerance for mobility. Intermittent VCs for posture and to increase B step height. Assist for positioning in chair due to pt with decreased core strength. PLAN OF CARE:  Patient is making good progress towards PT goals. Will continue with current POC. Time in  1510  Time out  1535    Total Treatment Time  25 minutes     Evaluation Time includes thorough review of current medical information, gathering information on past medical history/social history and prior level of function, completion of standardized testing/informal observation of tasks, assessment of data and education on plan of care and goals.     CPT codes:  [] Low Complexity PT evaluation 39179  [] Moderate Complexity PT evaluation 50865  [] High Complexity PT evaluation 68136  [] PT Re-evaluation 21361  [] Gait training 88441 - minutes  [] Manual therapy 19958 - minutes  [x] Therapeutic activities 18754 25 minutes  [] Therapeutic exercises 56918 - minutes  [] Neuromuscular reeducation 37003 - minutes     Angie Pate PT, DPT  License [id number]

## 2020-10-03 VITALS
SYSTOLIC BLOOD PRESSURE: 142 MMHG | WEIGHT: 200.4 LBS | HEART RATE: 72 BPM | TEMPERATURE: 98.3 F | DIASTOLIC BLOOD PRESSURE: 73 MMHG | RESPIRATION RATE: 16 BRPM | HEIGHT: 68 IN | OXYGEN SATURATION: 94 % | BODY MASS INDEX: 30.37 KG/M2

## 2020-10-03 LAB
ANION GAP SERPL CALCULATED.3IONS-SCNC: 11 MMOL/L (ref 7–16)
BUN BLDV-MCNC: 17 MG/DL (ref 6–20)
CALCIUM SERPL-MCNC: 8.9 MG/DL (ref 8.6–10.2)
CHLORIDE BLD-SCNC: 99 MMOL/L (ref 98–107)
CO2: 24 MMOL/L (ref 22–29)
CREAT SERPL-MCNC: 0.7 MG/DL (ref 0.5–1)
CREATININE URINE: 58 MG/DL (ref 29–226)
GFR AFRICAN AMERICAN: >60
GFR NON-AFRICAN AMERICAN: >60 ML/MIN/1.73
GLUCOSE BLD-MCNC: 102 MG/DL (ref 74–99)
HCT VFR BLD CALC: 26.4 % (ref 34–48)
HEMOGLOBIN: 8.6 G/DL (ref 11.5–15.5)
MCH RBC QN AUTO: 31 PG (ref 26–35)
MCHC RBC AUTO-ENTMCNC: 32.6 % (ref 32–34.5)
MCV RBC AUTO: 95.3 FL (ref 80–99.9)
MICROALBUMIN UR-MCNC: <12 MG/L
MICROALBUMIN/CREAT UR-RTO: ABNORMAL (ref 0–30)
PDW BLD-RTO: 14.6 FL (ref 11.5–15)
PLATELET # BLD: 325 E9/L (ref 130–450)
PMV BLD AUTO: 11.1 FL (ref 7–12)
POTASSIUM SERPL-SCNC: 4.1 MMOL/L (ref 3.5–5)
RBC # BLD: 2.77 E12/L (ref 3.5–5.5)
SODIUM BLD-SCNC: 134 MMOL/L (ref 132–146)
T4 FREE: 0.32 NG/DL (ref 0.93–1.7)
TSH SERPL DL<=0.05 MIU/L-ACNC: 25.15 UIU/ML (ref 0.27–4.2)
WBC # BLD: 16.2 E9/L (ref 4.5–11.5)

## 2020-10-03 PROCEDURE — 93798 PHYS/QHP OP CAR RHAB W/ECG: CPT

## 2020-10-03 PROCEDURE — 80048 BASIC METABOLIC PNL TOTAL CA: CPT

## 2020-10-03 PROCEDURE — 2580000003 HC RX 258: Performed by: NURSE PRACTITIONER

## 2020-10-03 PROCEDURE — 6370000000 HC RX 637 (ALT 250 FOR IP): Performed by: INTERNAL MEDICINE

## 2020-10-03 PROCEDURE — 36415 COLL VENOUS BLD VENIPUNCTURE: CPT

## 2020-10-03 PROCEDURE — 6370000000 HC RX 637 (ALT 250 FOR IP): Performed by: PHYSICIAN ASSISTANT

## 2020-10-03 PROCEDURE — 6370000000 HC RX 637 (ALT 250 FOR IP): Performed by: NURSE PRACTITIONER

## 2020-10-03 PROCEDURE — 84439 ASSAY OF FREE THYROXINE: CPT

## 2020-10-03 PROCEDURE — 84443 ASSAY THYROID STIM HORMONE: CPT

## 2020-10-03 PROCEDURE — 85027 COMPLETE CBC AUTOMATED: CPT

## 2020-10-03 PROCEDURE — 99232 SBSQ HOSP IP/OBS MODERATE 35: CPT | Performed by: INTERNAL MEDICINE

## 2020-10-03 RX ORDER — CLOPIDOGREL BISULFATE 75 MG/1
75 TABLET ORAL DAILY
Qty: 45 TABLET | Refills: 0 | Status: SHIPPED | OUTPATIENT
Start: 2020-10-04 | End: 2021-01-06 | Stop reason: SDUPTHER

## 2020-10-03 RX ORDER — HYDROCODONE BITARTRATE AND ACETAMINOPHEN 5; 325 MG/1; MG/1
1 TABLET ORAL EVERY 6 HOURS PRN
Qty: 10 TABLET | Refills: 0 | Status: SHIPPED | OUTPATIENT
Start: 2020-10-03 | End: 2020-10-06

## 2020-10-03 RX ORDER — ACETAMINOPHEN 325 MG/1
650 TABLET ORAL EVERY 4 HOURS PRN
Qty: 120 TABLET | Refills: 3
Start: 2020-10-03

## 2020-10-03 RX ORDER — PSEUDOEPHEDRINE HCL 30 MG
100 TABLET ORAL 2 TIMES DAILY PRN
Qty: 20 CAPSULE | Refills: 0 | Status: SHIPPED | OUTPATIENT
Start: 2020-10-03

## 2020-10-03 RX ORDER — LEVOTHYROXINE SODIUM 0.07 MG/1
75 TABLET ORAL DAILY
Qty: 30 TABLET | Refills: 5 | Status: SHIPPED | OUTPATIENT
Start: 2020-10-04

## 2020-10-03 RX ORDER — INSULIN GLARGINE 100 [IU]/ML
14 INJECTION, SOLUTION SUBCUTANEOUS NIGHTLY
Qty: 1 VIAL | Refills: 3
Start: 2020-10-03 | End: 2021-01-12 | Stop reason: SDUPTHER

## 2020-10-03 RX ORDER — IBUPROFEN 600 MG/1
600 TABLET ORAL EVERY 8 HOURS PRN
Qty: 360 TABLET | Refills: 1 | Status: ON HOLD
Start: 2020-10-03 | End: 2022-09-27 | Stop reason: HOSPADM

## 2020-10-03 RX ORDER — LEVOTHYROXINE SODIUM 0.07 MG/1
75 TABLET ORAL DAILY
Status: DISCONTINUED | OUTPATIENT
Start: 2020-10-04 | End: 2020-10-03 | Stop reason: HOSPADM

## 2020-10-03 RX ORDER — PANTOPRAZOLE SODIUM 40 MG/1
40 TABLET, DELAYED RELEASE ORAL DAILY
Qty: 14 TABLET | Refills: 0 | Status: SHIPPED | OUTPATIENT
Start: 2020-10-04 | End: 2021-01-12 | Stop reason: SDUPTHER

## 2020-10-03 RX ORDER — ISOSORBIDE MONONITRATE 30 MG/1
30 TABLET, EXTENDED RELEASE ORAL DAILY
Qty: 90 TABLET | Refills: 1 | Status: SHIPPED | OUTPATIENT
Start: 2020-10-04 | End: 2021-01-06 | Stop reason: SDUPTHER

## 2020-10-03 RX ORDER — FOLIC ACID 1 MG/1
1 TABLET ORAL DAILY
Qty: 30 TABLET | Refills: 0 | Status: SHIPPED | OUTPATIENT
Start: 2020-10-04 | End: 2022-11-01

## 2020-10-03 RX ORDER — FERROUS SULFATE 325(65) MG
325 TABLET ORAL 2 TIMES DAILY WITH MEALS
Qty: 60 TABLET | Refills: 0 | Status: SHIPPED | OUTPATIENT
Start: 2020-10-03 | End: 2022-11-01

## 2020-10-03 RX ORDER — ASCORBIC ACID 500 MG
500 TABLET ORAL 2 TIMES DAILY
Qty: 60 TABLET | Refills: 0 | Status: SHIPPED | OUTPATIENT
Start: 2020-10-03 | End: 2021-01-12

## 2020-10-03 RX ADMIN — PANTOPRAZOLE SODIUM 40 MG: 40 TABLET, DELAYED RELEASE ORAL at 08:01

## 2020-10-03 RX ADMIN — MUPIROCIN: 20 OINTMENT TOPICAL at 08:02

## 2020-10-03 RX ADMIN — ASPIRIN 81 MG: 81 TABLET, COATED ORAL at 08:01

## 2020-10-03 RX ADMIN — METOPROLOL TARTRATE 12.5 MG: 25 TABLET, FILM COATED ORAL at 08:02

## 2020-10-03 RX ADMIN — OXYCODONE HYDROCHLORIDE 10 MG: 10 TABLET ORAL at 08:41

## 2020-10-03 RX ADMIN — OXYCODONE HYDROCHLORIDE 10 MG: 10 TABLET ORAL at 04:17

## 2020-10-03 RX ADMIN — METHADONE HYDROCHLORIDE 37 MG: 10 CONCENTRATE ORAL at 08:02

## 2020-10-03 RX ADMIN — FERROUS SULFATE TAB 325 MG (65 MG ELEMENTAL FE) 325 MG: 325 (65 FE) TAB at 08:01

## 2020-10-03 RX ADMIN — OXYCODONE HYDROCHLORIDE 10 MG: 10 TABLET ORAL at 00:03

## 2020-10-03 RX ADMIN — LEVOTHYROXINE SODIUM 25 MCG: 0.03 TABLET ORAL at 08:01

## 2020-10-03 RX ADMIN — Medication 500 MG: at 08:01

## 2020-10-03 RX ADMIN — INSULIN LISPRO 3 UNITS: 100 INJECTION, SOLUTION INTRAVENOUS; SUBCUTANEOUS at 08:10

## 2020-10-03 RX ADMIN — INSULIN LISPRO 2 UNITS: 100 INJECTION, SOLUTION INTRAVENOUS; SUBCUTANEOUS at 08:08

## 2020-10-03 RX ADMIN — OXYCODONE HYDROCHLORIDE 10 MG: 10 TABLET ORAL at 12:40

## 2020-10-03 RX ADMIN — FOLIC ACID 1 MG: 1 TABLET ORAL at 08:02

## 2020-10-03 RX ADMIN — ISOSORBIDE MONONITRATE 30 MG: 30 TABLET ORAL at 08:02

## 2020-10-03 RX ADMIN — CLOPIDOGREL 75 MG: 75 TABLET, FILM COATED ORAL at 08:02

## 2020-10-03 RX ADMIN — MAGNESIUM GLUCONATE 500 MG ORAL TABLET 400 MG: 500 TABLET ORAL at 08:02

## 2020-10-03 RX ADMIN — BETHANECHOL CHLORIDE 25 MG: 25 TABLET ORAL at 08:06

## 2020-10-03 RX ADMIN — ATORVASTATIN CALCIUM 40 MG: 40 TABLET, FILM COATED ORAL at 08:06

## 2020-10-03 RX ADMIN — Medication 10 ML: at 08:03

## 2020-10-03 ASSESSMENT — PAIN DESCRIPTION - PAIN TYPE
TYPE: ACUTE PAIN
TYPE: ACUTE PAIN
TYPE: ACUTE PAIN;SURGICAL PAIN
TYPE: ACUTE PAIN;SURGICAL PAIN

## 2020-10-03 ASSESSMENT — PAIN SCALES - GENERAL
PAINLEVEL_OUTOF10: 6
PAINLEVEL_OUTOF10: 2
PAINLEVEL_OUTOF10: 7
PAINLEVEL_OUTOF10: 0
PAINLEVEL_OUTOF10: 7
PAINLEVEL_OUTOF10: 0

## 2020-10-03 ASSESSMENT — PAIN DESCRIPTION - LOCATION
LOCATION: NECK;STERNUM
LOCATION: ARM;NECK;SHOULDER
LOCATION: BACK;NECK;STERNUM
LOCATION: NECK;SHOULDER

## 2020-10-03 ASSESSMENT — PAIN DESCRIPTION - ONSET
ONSET: GRADUAL

## 2020-10-03 ASSESSMENT — PAIN DESCRIPTION - FREQUENCY
FREQUENCY: INTERMITTENT

## 2020-10-03 ASSESSMENT — PAIN DESCRIPTION - ORIENTATION
ORIENTATION: MID
ORIENTATION: MID
ORIENTATION: RIGHT;LEFT
ORIENTATION: RIGHT;LEFT

## 2020-10-03 ASSESSMENT — PAIN DESCRIPTION - PROGRESSION
CLINICAL_PROGRESSION: GRADUALLY WORSENING

## 2020-10-03 ASSESSMENT — PAIN DESCRIPTION - DESCRIPTORS
DESCRIPTORS: ACHING;CRUSHING;NAGGING
DESCRIPTORS: ACHING;DISCOMFORT
DESCRIPTORS: ACHING;DISCOMFORT
DESCRIPTORS: ACHING;DISCOMFORT;NAGGING

## 2020-10-03 ASSESSMENT — PAIN - FUNCTIONAL ASSESSMENT
PAIN_FUNCTIONAL_ASSESSMENT: ACTIVITIES ARE NOT PREVENTED

## 2020-10-03 NOTE — PLAN OF CARE
Problem: Discharge Planning:  Goal: Discharged to appropriate level of care  Description: Discharged to appropriate level of care  Outcome: Ongoing     Problem:  Activity Intolerance:  Goal: Able to perform prescribed physical activity  Description: Able to perform prescribed physical activity  Outcome: Ongoing  Goal: Ability to tolerate increased activity will improve  Description: Ability to tolerate increased activity will improve  Outcome: Ongoing

## 2020-10-03 NOTE — PROGRESS NOTES
ENDOCRINOLOGY PROGRESS NOTE  VIA TELEHEALTH SERVICE      Date of admission: 9/29/2020  Date of service: 10/3/2020  Admitting physician: Lindsay Martin DO   Primary Care Physician: Giselle Kendall MD  Consultant physician: Rene Singletary MD       Reason for the consultation:  Uncontrolled IDDM2    History of Present Illness: The history is provided by the patient. Accuracy of the patient data is excellent    Krishna Marcos is a very pleasant 54 y.o. old female with PMH listed below admitted to [facility] on 9/29/2020 because of CABG, endocrine team was consulted for diabetes management. Subjective:  No acute issue, BS at goal, going home today     Inpatient diet:   Carb Restricted diet     Point of care glucose monitoring (Independently reviewed)   Recent Labs     10/01/20  0654 10/01/20  1149 10/01/20  1604 10/01/20  2020 10/02/20  0637 10/02/20  1033 10/02/20  1713 10/02/20  2038   GLUMET 200* 188* 165* 172* 134* 182* 128* 132*       Allergy and drug reactions:    Allergies   Allergen Reactions    Seasonal      Maple trees grass dust dust mites       Scheduled Meds:   insulin lispro  0-6 Units Subcutaneous TID WC    insulin lispro  0-3 Units Subcutaneous Nightly    insulin lispro  3 Units Subcutaneous TID WC    insulin glargine  14 Units Subcutaneous Nightly    metoprolol tartrate  12.5 mg Oral BID    isosorbide mononitrate  30 mg Oral Daily    senna  1 tablet Oral BID    docusate sodium  100 mg Oral BID    methadone  37 mg Oral Daily    aspirin  81 mg Oral Daily    polyethylene glycol  17 g Oral Daily    vitamin C  500 mg Oral BID    ferrous sulfate  325 mg Oral BID WC    folic acid  1 mg Oral Daily    atorvastatin  40 mg Oral Daily    levothyroxine  25 mcg Oral Daily    sodium chloride flush  10 mL Intravenous 2 times per day    pantoprazole  40 mg Oral Daily    magnesium oxide  400 mg Oral Daily    mupirocin   Nasal BID    ipratropium-albuterol  1 ampule Inhalation Q4H WA    clopidogrel  75 mg Oral Daily    bethanechol  25 mg Oral TID     PRN Meds:   acetaminophen, 650 mg, Q4H PRN  potassium chloride, 20 mEq, PRN  bisacodyl, 10 mg, Daily PRN  diphenhydrAMINE, 25 mg, Q8H PRN  glucose, 15 g, PRN  dextrose, 12.5 g, PRN  glucagon (rDNA), 1 mg, PRN  dextrose, 100 mL/hr, PRN  sodium chloride flush, 10 mL, PRN  oxyCODONE, 5 mg, Q4H PRN    Or  oxyCODONE HCl, 10 mg, Q4H PRN  ondansetron, 4 mg, Q8H PRN  ketorolac, 30 mg, Q6H PRN      Continuous Infusions:   dextrose         Review of Systems  All systems reviewed. All negative except for symptoms mentioned in HPI     OBJECTIVE    BP (!) 142/73   Pulse 72   Temp 98.3 °F (36.8 °C) (Oral)   Resp 16   Ht 5' 8\" (1.727 m)   Wt 200 lb 6.4 oz (90.9 kg)   SpO2 94% Comment: 94% lowest walking. Started at 96%, ended at 95%   BMI 30.47 kg/m²     Intake/Output Summary (Last 24 hours) at 10/3/2020 1231  Last data filed at 10/3/2020 0830  Gross per 24 hour   Intake 480 ml   Output 400 ml   Net 80 ml       Physical examination:  General: awake alert, oriented x3, no abnormal position or movements. HEENT: normocephalic non-traumatic, no exophthalmos   Neck: supple, no LN enlargement, no thyromegaly, no thyroid tenderness, no JVD. Pulm: Clear equal air entry no added sounds, no wheezing or rhonchi    CVS: S1 + S2,  surgical dressing and incision noted on patients chest   Abd: soft lax, no tenderness, no organomegaly, audible bowel sounds. Skin: warm, no lesions, no rash. Feet: No ulcers or open wounds. Decreased dorsalis pedis pulses BL but 2/4 posterior tibialis pulses BL.  Compression stockings applied to BL LE;   Neuro: CN intact, muscle power normal  Psych: normal mood, and affect    Review of Laboratory Data:  I personally reviewed the following labs:   Recent Labs     10/01/20  0719 10/02/20  0504 10/03/20  0613   WBC 15.9* 15.3* 16.2*   RBC 2.85* 2.68* 2.77*   HGB 8.8* 8.1* 8.6*   HCT 27.2* 25.3* 26.4*   MCV 95.4 94.4 95.3   MCH 30.9 30.2 31.0   MCHC 32.4 32.0 32.6   RDW 14.3 14.6 14.6    223 325   MPV 11.5 11.6 11.1     Recent Labs     10/01/20  0719 10/02/20  0504 10/03/20  0613    136 134   K 4.3 3.9 4.1   CL 98 102 99   CO2 24 24 24   BUN 14 15 17   CREATININE 0.8 0.7 0.7   GLUCOSE 181* 130* 102*   CALCIUM 8.5* 8.6 8.9     No results found for: BHYDRXBUT  Lab Results   Component Value Date    LABA1C 11.5 09/15/2020    LABA1C 9.5 10/27/2015    LABA1C 5.4 10/12/2015     Lab Results   Component Value Date/Time    TSH 25.150 (H) 10/03/2020 06:13 AM    T4FREE 0.32 (L) 10/03/2020 06:13 AM     Lab Results   Component Value Date    LABA1C 11.5 09/15/2020    GLUCOSE 102 10/03/2020    MALBCR - 10/02/2020    LABMICR <12.0 10/02/2020    LABCREA 58 10/02/2020     Lab Results   Component Value Date    TRIG 253 10/27/2015    HDL 27 10/27/2015    LDLCALC 210 10/27/2015    CHOL 288 10/27/2015       Blood culture   Lab Results   Component Value Date    BC 5 Days- no growth 07/29/2019       Radiology:  XR CHEST PORTABLE   Final Result   1. Interval removal of a right internal jugular central venous catheter. 2. Persistent bibasilar atelectasis, left greater than right. XR CHEST PORTABLE   Final Result   Postoperative changes with likely left lower lobe atelectasis which   appears stable.             XR CHEST PORTABLE   Final Result   Moderate cardiomegaly with postoperative changes                   Medical Records/Labs/Images review:   I personally reviewed and summarized previous records   All labs and imaging were reviewed independently     [address], a 54 y.o.-old female seen today for inpatient diabetes management     Diabetes Mellitus type 2  · Patient's diabetes is uncontrolled, A1c 11.5%   · Will continue current insulin regimen   · Lantus 14 units Nightly  · Humalog 3 units with meals   · Low dose sliding scale   · Continue glucose check with meals and at bedtime   · Will titrate insulin dose based on the blood glucose trend & insulin requirement  · On discharge, we recommend discharging pt on the above regimen   · Pt will follow with us after discharge. Endocrine follow up visit, Thursday 10/15 at 10:00AM      Postablative Hypothyroidism  · Increase dose to 75 mcg daily   · Will recheck level as an outpatient few weeks after discharge and adjust the dose if needed   · Patient was instructed to take levothyroxine in the morning at empty stomach, wait one hour before eating , avoid multivitamins containing calcium  or iron with it. CAD and LV Hypertrophy  · cardiology and CTS consulted for care s/p CABG with left internal mammary artery, radial artery, and saphenous vein grafting  · Care per their reccomendations; input appreciated    The above issues were reviewed with the patient who understood and agreed with the plan. Thank you for allowing us to participate in the care of this patient. Please do not hesitate to contact us with any additional questions. Shelly Hutchins MD  Endocrinologist, [facility]   [address] [place] [address]   Phone: [phone]  Fax: [phone]  --------------------------------------------  An electronic signature was used to authenticate this note.  Rosalinda Liang MD on 10/3/2020 at 12:34 PM  This visit was performed as a virtual video visit using a synchronous, two-way, audio-video telehealth technology platform

## 2020-10-03 NOTE — PROGRESS NOTES
Patient is seen in follow-up for CAD    Subjective:     Ms. Phoebe Garza is complaining of incisional chest discomfort  Sitting up in bed no apparent distress    ROS:  CONSTITUTIONAL:  negative for  fevers, chills  HEENT:  negative for earaches, nasal congestion and epistaxis  RESPIRATORY:  negative for  dry cough, cough with sputum,wheezing and hemoptysis  GASTROINTESTINAL:  negative for nausea, vomiting  MUSCULOSKELETAL:  negative for  myalgias, arthralgias  NEUROLOGICAL:  negative for visual disturbance, dysphagia    Medication side effects: none    Scheduled Meds:   insulin lispro  0-6 Units Subcutaneous TID WC    insulin lispro  0-3 Units Subcutaneous Nightly    insulin lispro  3 Units Subcutaneous TID WC    insulin glargine  14 Units Subcutaneous Nightly    metoprolol tartrate  12.5 mg Oral BID    isosorbide mononitrate  30 mg Oral Daily    senna  1 tablet Oral BID    docusate sodium  100 mg Oral BID    methadone  37 mg Oral Daily    aspirin  81 mg Oral Daily    polyethylene glycol  17 g Oral Daily    vitamin C  500 mg Oral BID    ferrous sulfate  325 mg Oral BID WC    folic acid  1 mg Oral Daily    atorvastatin  40 mg Oral Daily    levothyroxine  25 mcg Oral Daily    sodium chloride flush  10 mL Intravenous 2 times per day    pantoprazole  40 mg Oral Daily    magnesium oxide  400 mg Oral Daily    mupirocin   Nasal BID    ipratropium-albuterol  1 ampule Inhalation Q4H WA    clopidogrel  75 mg Oral Daily    bethanechol  25 mg Oral TID     Continuous Infusions:   dextrose       PRN Meds:acetaminophen, potassium chloride, bisacodyl, diphenhydrAMINE, glucose, dextrose, glucagon (rDNA), dextrose, sodium chloride flush, oxyCODONE **OR** oxyCODONE HCl, ondansetron, ketorolac      Objective:      Physical Exam:   /64   Pulse 67   Temp 98.4 °F (36.9 °C) (Oral)   Resp 16   Ht 5' 8\" (1.727 m)   Wt 200 lb 6.4 oz (90.9 kg)   SpO2 93%   BMI 30.47 kg/m²   CONSTITUTIONAL:  awake, alert, cooperative, no apparent distress, and appears stated age  HEAD:  normocepalic, without obvious abnormality, atraumatic  NECK:  Supple, symmetrical, trachea midline, no adenopathy, thyroid symmetric, not enlarged and no tenderness, skin normal  LUNGS:  No increased work of breathing, No accessory muscle use or intercostal retractions, good air exchange, clear to auscultation bilaterally, no crackles or wheezing  CARDIOVASCULAR:  Normal apical impulse, regular rate and rhythm, normal S1 and S2, no S3 or S4, and no murmur noted, no edema, no JVD, no carotid bruit. ABDOMEN:  Soft, nontender, no masses, no hepatomegaly, no splenomegaly, BS+  MUSCULOSKELETAL:  No clubbing no cyanosis. there is no redness, warmth, or swelling of the joints  full range of motion noted  NEUROLOGIC:  Alert, awake,oriented x3  SKIN:  no bruising or bleeding, normal skin color, texture, turgor and no redness, warmth, or swelling      Cardiographics  I personally reviewed the telemetry monitor strips with the following interpretation: Sinus rhythm    Echocardiogram:     Imaging  XR CHEST PORTABLE   Final Result   1. Interval removal of a right internal jugular central venous catheter. 2. Persistent bibasilar atelectasis, left greater than right. XR CHEST PORTABLE   Final Result   Postoperative changes with likely left lower lobe atelectasis which   appears stable.             XR CHEST PORTABLE   Final Result   Moderate cardiomegaly with postoperative changes                   Lab Review   Lab Results   Component Value Date     10/02/2020    K 3.9 10/02/2020     10/02/2020    CO2 24 10/02/2020    BUN 15 10/02/2020    CREATININE 0.7 10/02/2020    GLUCOSE 130 10/02/2020    CALCIUM 8.6 10/02/2020     Lab Results   Component Value Date    WBC 16.2 10/03/2020    HGB 8.6 10/03/2020    HCT 26.4 10/03/2020    MCV 95.3 10/03/2020     10/03/2020     I have personally reviewed the laboratory, cardiac diagnostic and radiographic testing as outlined above:    Assessment:     1.  CAD: Status post CABG with a LIMA to LAD, radial graft to OM, SVG to distal RCA, doing fine  2. Hypertension: Controlled  3. Hyperlipidemia: On statin  4. Type 2 diabetes mellitus  5. Tobacco abuse    Recommendations:     1. Continue current treatment  2.  will increase ambulation as tolerated    Discussed with patient  Electronically signed by Joyce Jones MD on 10/3/2020 at 7:26 AM  NOTE: This report was transcribed using voice recognition software.  Every effort was made to ensure accuracy; however, inadvertent computerized transcription errors may be present

## 2020-10-03 NOTE — PROGRESS NOTES
Attempted to walk patient at 0725. Readjusted her in the chair, but no walk at this time. Patient wanted to wait until she received her medication. Will reattempt walk around [phone].

## 2020-10-03 NOTE — PROGRESS NOTES
CLINICAL PHARMACY NOTE: MEDS TO [address] Select Patient?: No  Total # of Prescriptions Filled: 10   The following medications were delivered to the patient:  · Leader insulin syr  · Isosorbide mono er 30  · Metoprolol 25  · Stool softner  · Folic acid  · Ferrous sulfate 325  · Levothyroxine 75  · Pantoprazole 40  · Clopidogrel 75  · Insulin lispro  · Magnesium 400  Total # of Interventions Completed: 6  Time Spent (min): 45    Additional Documentation:

## 2020-10-03 NOTE — PROGRESS NOTES
Discharge instructions, appointments, cardiac precautions/instructions and medications reviewed with patient and . Patient and  verbalized understanding. Patient discharged home via private car with .

## 2020-10-03 NOTE — PROGRESS NOTES
POD#4 Awake, alert. No complaints. Denies CP, palpitations, SOB at rest, dizziness/lightheadedness. Vitals:    10/02/20 2015 10/03/20 0003 10/03/20 0318 10/03/20 0646   BP: (!) 111/59 122/65 115/64    Pulse: 68 67 67    Resp: 18 19 16    Temp: 97.8 °F (36.6 °C) 95.3 °F (35.2 °C) 98.4 °F (36.9 °C)    TempSrc: Temporal Temporal Oral    SpO2: 93% 92% 93%    Weight:    200 lb 6.4 oz (90.9 kg)   Height:         O2: RA      Intake/Output Summary (Last 24 hours) at 10/3/2020 0848  Last data filed at 10/3/2020 0320  Gross per 24 hour   Intake 480 ml   Output 200 ml   Net 280 ml           Recent Labs     10/01/20  0719 10/02/20  0504 10/03/20  0613   WBC 15.9* 15.3* 16.2*   HGB 8.8* 8.1* 8.6*   HCT 27.2* 25.3* 26.4*    223 325      Recent Labs     10/01/20  0719 10/02/20  0504 10/03/20  0613   BUN 14 15 17   CREATININE 0.8 0.7 0.7         PE  Cardiac: RRR  Lungs: decreased bases  Chest incision c/d/i; Sternum stable. Prior chest tube site incisions C/D/I, no erythema with intact sutures. Abd: Soft, nontender, +BS  Ext: UE and LE Incisions C/D/I, approximated, no erythema,            A/P: POD# 4     1. CAD S/p CABGx3 (LIMA-LAD, Radial-OM, SVG-dRCA)  - DAPT, statin, low dose BB added yesterday at 12.5- cont today  - Imdur started for radial harvest protection -WILL NEED TO REMAIN ON X 6 MONTHS , will add cardizem if pressure allows   -tubes/wires all out  - PT/OT         2. Acute blood loss anemia secondary to open heart surgery  --stable at 8.6     3.  Acute Postoperative Respiratory Insufficiency  - 2/2 surgery, extubated DOS without difficulty  - On RA  - Continue EZPAP q 4 hours, encourage IS, instructed to C&DB, OOBTC with PT/OT, increase activity as tolerated,      4.  DM Type2 / markedly elevated TSH  - HgbA1C 11.5%, Home Rx: lantus 20units nightly, SQ Humulin  - SSI, nightly lantus for glycemic control   -appreciate endocrine consult; DM meds adjusted  -markedly elevated TSH- on synthroid- will defer to endocrine for adjusting     5. HTN  - low dose lopressor started, cont imdur.     6. Constipation  -resolved     Dispo- home today    ATTEMPTED TO CALL [facility] WHERE SHE GETS HER METHADONE BUT THERE WAS NO ANSWER  DISCUSSED AT LENGTH WITH PATIENT WHO STATES SHE IS ALLOWED TO HAVE NARCOTIC IF P RESCRIBED BY A PHYSICIAN- plan will be to send her home with Rx but she is NOT TO FILL IT until she talks to her clinic MOnday.  In the meantime she is advised to take tylenol and advil around the clock to control pain     This patient's case and care plan was discussed with the attending surgeon

## 2020-10-05 NOTE — DISCHARGE SUMMARY
HYDROcodone-acetaminophen (NORCO) 5-325 MG per tablet  Take 1 tablet by mouth every 6 hours as needed for Pain for up to 3 days. Intended supply: 3 days.  Take lowest dose possible to manage pain             ibuprofen (ADVIL;MOTRIN) 600 MG tablet  Take 1 tablet by mouth every 8 hours as needed for Pain             insulin glargine (LANTUS) 100 UNIT/ML injection vial  Inject 14 Units into the skin nightly             insulin lispro (HUMALOG) 100 UNIT/ML injection vial  Inject 0-3 Units into the skin nightly             insulin lispro (HUMALOG) 100 UNIT/ML injection vial  Inject 0-6 Units into the skin 3 times daily (with meals)             insulin lispro (HUMALOG) 100 UNIT/ML injection vial  Inject 3 Units into the skin 3 times daily (with meals)             Insulin Pen Needle (VideoflotOGER PEN NEEDLES 31G) 31G X 8 MM MISC  1 each by Does not apply route daily             isosorbide mononitrate (IMDUR) 30 MG extended release tablet  Take 1 tablet by mouth daily PATIENT TO TAKE FOR 6 MONTHS TO PREVENT RADIAL GRAFT SPASM             levothyroxine (SYNTHROID) 75 MCG tablet  Take 1 tablet by mouth Daily             magnesium oxide (MAG-OX) 400 (241.3 Mg) MG TABS tablet  Take 1 tablet by mouth daily for 14 days             METHADONE HCL PO  Take by mouth Takes 37 mg daily ( taking for past 20 years) history of 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  Oxycodone             metoprolol tartrate (LOPRESSOR) 25 MG tablet  Take 0.5 tablets by mouth 2 times daily             pantoprazole (PROTONIX) 40 MG tablet  Take 1 tablet by mouth daily for 14 days             vitamin C (VITAMIN C) 500 MG tablet  Take 1 tablet by mouth 2 times daily               Activity: sternal precautions  Diet: cardiac diet and diabetic diet  Wound Care: keep wound clean and dry    Future Appointments   Date Time Provider Derek Lewis   10[facility]15/2020 10:00  [facility]   10/27/2020 12:45 PM Tyler Redd DO CARDIO SURG [facility]         Smoking cessation education provided prior to discharge    Discussed with patient the benefits of participation in cardiac rehab after discharge once approved safe by the surgeon and that a referral will be made at the follow up appointment. Pt verbalized comprehension.     Signed:  Minerva Gan PA-C

## 2020-10-10 LAB
BLOOD BANK DISPENSE STATUS: NORMAL
BLOOD BANK PRODUCT CODE: NORMAL
BPU ID: NORMAL
DESCRIPTION BLOOD BANK: NORMAL

## 2020-10-13 ENCOUNTER — HOSPITAL ENCOUNTER (OUTPATIENT)
Dept: GENERAL RADIOLOGY | Age: 50
Discharge: HOME | End: 2020-10-15
Payer: MEDICAID

## 2020-10-13 ENCOUNTER — TELEPHONE (OUTPATIENT)
Dept: CARDIOTHORACIC SURGERY | Age: 50
End: 2020-10-13

## 2020-10-13 ENCOUNTER — HOSPITAL ENCOUNTER (OUTPATIENT)
Age: 50
Discharge: HOME | End: 2020-10-15
Payer: MEDICAID

## 2020-10-13 PROCEDURE — 71046 X-RAY EXAM CHEST 2 VIEWS: CPT

## 2020-10-15 ENCOUNTER — VIRTUAL VISIT (OUTPATIENT)
Dept: ENDOCRINOLOGY | Age: 50
End: 2020-10-15
Payer: MEDICAID

## 2020-10-15 PROCEDURE — 99214 OFFICE O/P EST MOD 30 MIN: CPT | Performed by: INTERNAL MEDICINE

## 2020-10-15 RX ORDER — INSULIN GLARGINE 100 [IU]/ML
16 INJECTION, SOLUTION SUBCUTANEOUS NIGHTLY
COMMUNITY
End: 2021-02-05

## 2020-10-15 NOTE — PROGRESS NOTES
partial dentures     upper       PAST SURGICAL HISTORY   Past Surgical History:   Procedure Laterality Date    APPENDECTOMY      CARDIAC CATHETERIZATION  2020    Dr. Michelle Carrillo EF 55% cts consult ordered     SECTION      x2    CHOLECYSTECTOMY      COLONOSCOPY      CORONARY ARTERY BYPASS GRAFT N/A 2020    CORONARY ARTERY BYPASS, POSSIBLE LEFT RADIAL ARTERY HARVEST, MINDA performed by Tyler Redd DO at [facility]  12/1/15    laparoscopic incisional reapair with mesh    HYSTERECTOMY      KNEE SURGERY Right     VENTRAL HERNIA REPAIR         SOCIAL HISTORY   Tobacco:   reports that she has been smoking cigarettes. She has a 25.00 pack-year smoking history. She has never used smokeless tobacco.  Alcohol:   reports previous alcohol use. Drugs:   reports current drug use.     FAMILY HISTORY   Family History   Adopted: Yes   Problem Relation Age of Onset    Mult Sclerosis Mother     Mult Sclerosis Maternal Grandmother        ALLERGIES AND DRUG REACTIONS   Allergies   Allergen Reactions    Seasonal      Maple trees grass dust dust mites       CURRENT MEDICATIONS   Current Outpatient Medications   Medication Sig Dispense Refill    insulin glargine (LANTUS SOLOSTAR) 100 UNIT/ML injection pen Inject 14 Units into the skin nightly      acetaminophen (TYLENOL) 325 MG tablet Take 2 tablets by mouth every 4 hours as needed for Pain 120 tablet 3    isosorbide mononitrate (IMDUR) 30 MG extended release tablet Take 1 tablet by mouth daily PATIENT TO TAKE FOR 6 MONTHS TO PREVENT RADIAL GRAFT SPASM 90 tablet 1    insulin lispro (HUMALOG) 100 UNIT/ML injection vial Inject 3 Units into the skin 3 times daily (with meals) (Patient taking differently: Inject 3 Units into the skin 3 times daily (with meals) Plus ss) 1 vial 3    metoprolol tartrate (LOPRESSOR) 25 MG tablet Take 0.5 tablets by mouth 2 times daily 60 tablet 1    ferrous sulfate (IRON 325) 325 (65 Fe) MG tablet Take 1 tablet by 10/03/2020 06:13 AM      Lab Results   Component Value Date/Time     10/03/2020 06:13 AM    K 4.1 10/03/2020 06:13 AM    CO2 24 10/03/2020 06:13 AM    BUN 17 10/03/2020 06:13 AM    CREATININE 0.7 10/03/2020 06:13 AM    CALCIUM 8.9 10/03/2020 06:13 AM    LABGLOM >60 10/03/2020 06:13 AM    GFRAA >60 10/03/2020 06:13 AM      Lab Results   Component Value Date/Time    TSH 25.150 (H) 10/03/2020 06:13 AM    T4FREE 0.32 (L) 10/03/2020 06:13 AM     Lab Results   Component Value Date    LABA1C 11.5 09/15/2020    GLUCOSE 102 10/03/2020    MALBCR - 10/02/2020    LABMICR <12.0 10/02/2020    LABCREA 58 10/02/2020     Lab Results   Component Value Date    LABA1C 11.5 09/15/2020    LABA1C 9.5 10/27/2015    LABA1C 5.4 10/12/2015     Lab Results   Component Value Date    TRIG 253 10/27/2015    HDL 27 10/27/2015    LDLCALC 210 10/27/2015    CHOL 288 10/27/2015     No results found for: VITD25    Medical Records/Labs/Images review:   I personally reviewed and summarized previous records   All labs and imaging studies were independently reviewed     ASSESSMENT & RECOMMENDATIONS   Mir Trent, a 54 y.o.-old female seen in for the following issues     Diabetes Mellitus Type 2     · Improving control but still above goal   · Will change DM regimen to Lantus 16 units daily, Humalog 3/3/5 + ss 1:50>150   · The patient was advised to check blood sugars 4 times a day before meals and at bedtime and send BS readings to our office in a week. · Discussed with patient A1c and blood sugar goals   · Patient will need routine diabetes maintenance and prevention  · The patient was referred to diabetic educator for further teaching   · A1c at next OV      Dietary noncompliance   Improving    Discussed with patient the importance of eating consistent carbohydrate meals, avoiding high glycemic index food.  Also, discussed with patient the risk and negative consequences of dietary noncompliance on blood glucose control, blood pressure and weight    HLD  · Continue Lipitor 40 mg daily     Return in about 3 months (around 1/15/2021) for DM type 2 on insulin . The above issues were reviewed with the patient who understood and agreed with the plan. Thank you for allowing us to participate in the care of this patient. Please do not hesitate to contact us with any additional questions. Diagnosis Orders   1. Type 2 diabetes mellitus with other specified complication, with long-term current use of insulin ([address]     2. Vitamin D deficiency     3. Dietary noncompliance     4. Hyperlipidemia, unspecified hyperlipidemia type       Shelly Hutchins MD  Endocrinologist, [place] [address] [facility]   [address] [place] [id number]   Phone: [phone]  Fax: [phone]  --------------------------------------------  An electronic signature was used to authenticate this note.  Rosalinda Liang MD on 10/15/2020 at 9:36 AM

## 2020-10-15 NOTE — PROGRESS NOTES
Deann Rosado was read the following message We want to confirm that, for purposes of billing, this is a virtual visit with your provider for which we will submit a claim for reimbursement with your insurance company. You will be responsible for any copays, coinsurance amounts or other amounts not covered by your insurance company. If you do not accept this, unfortunately we will not be able to schedule a virtual visit with the provider. Do you accept?  Lynnette Russo responded YES

## 2020-10-16 ENCOUNTER — TELEPHONE (OUTPATIENT)
Dept: CARDIOTHORACIC SURGERY | Age: 50
End: 2020-10-16

## 2020-10-16 NOTE — TELEPHONE ENCOUNTER
Please bring her to office on Tuesday. Call if any swelling, redness, purulent drainage, or fever/chills. Cleanse the incision twice daily with betadine and keep covered.

## 2020-10-16 NOTE — TELEPHONE ENCOUNTER
Informed pt to continue wiping with Betadine twice a a day and to keep covered. Watch for signs of infection.  Office appointment scheduled Tuesday 10/20

## 2020-10-16 NOTE — TELEPHONE ENCOUNTER
Spoke to [facility] home della in regard to small opening in sternal incision. Earlier in the week area was just irritated. Pt was instructed to leave off bra, swab with betadine. [place] states area has now opened with yellow slough no signs of fever, however pt is diabetic.  Please advise

## 2020-10-19 ENCOUNTER — TELEPHONE (OUTPATIENT)
Dept: CARDIOTHORACIC SURGERY | Age: 50
End: 2020-10-19

## 2020-10-19 ENCOUNTER — HOSPITAL ENCOUNTER (OUTPATIENT)
Dept: CARDIAC REHAB | Age: 50
Setting detail: THERAPIES SERIES
Discharge: HOME | End: 2020-10-19

## 2020-10-19 NOTE — TELEPHONE ENCOUNTER
Pt stated she had a chest xray 10/13 and never received her results. Seen them in my chart. States she has a pleural effusion.  Please advise

## 2020-10-19 NOTE — TELEPHONE ENCOUNTER
D/w dr Reba Alicea. Please notify patient she has some fluid on the left side which is expected.  Please refer to Dr Jay Jay King of [facility] for evaluation for possible thoracentesis

## 2020-10-20 ENCOUNTER — OFFICE VISIT (OUTPATIENT)
Dept: CARDIOTHORACIC SURGERY | Age: 50
End: 2020-10-20

## 2020-10-20 VITALS
WEIGHT: 200 LBS | RESPIRATION RATE: 16 BRPM | BODY MASS INDEX: 30.31 KG/M2 | HEART RATE: 62 BPM | DIASTOLIC BLOOD PRESSURE: 90 MMHG | HEIGHT: 68 IN | SYSTOLIC BLOOD PRESSURE: 177 MMHG

## 2020-10-20 PROCEDURE — 99024 POSTOP FOLLOW-UP VISIT: CPT | Performed by: NURSE PRACTITIONER

## 2020-10-20 RX ORDER — CEPHALEXIN 500 MG/1
500 CAPSULE ORAL 2 TIMES DAILY
Qty: 20 CAPSULE | Refills: 0 | Status: SHIPPED | OUTPATIENT
Start: 2020-10-20 | End: 2020-10-27 | Stop reason: SDUPTHER

## 2020-10-20 NOTE — PROGRESS NOTES
Chief Complaint   Patient presents with    Post-Op Check     sternal incision open         HPI:  Ms. Kailey Lewis is a 28-year-old female whom presents to the office today for incision check. She is status post: CABG x 3 on 9/29/2020. Currently, there are no complaints of any CP, SOB, or major concerns. She reports small opening to distal sternal incision with some clear drainage. She denies any fever/chills, redness, or purulent drainage. She reports her blood sugars have been around 160. Review of Systems:   Constitutional: Negative for fever and chills. Respiratory: Negative for cough, chest tightness and shortness of breath. Cardiovascular: Negative for chest pain, palpitations and leg swelling. Gastrointestinal: Negative for nausea, diarrhea and constipation. Musculoskeletal: Negative for back pain. Skin: Negative for color change. Neurological: Negative for dizziness, syncope and light-headedness. Objective:   Physical Exam   Constitutional: oriented to person, place, and time. No distress. Cardiovascular: Normal rate. Pulmonary/Chest: Effort normal. No respiratory distress. midsternal and chest tube incisions well healing without evidence of infection. Sternum stable. Distal pole of sternal incision with superficial dehiscense, small amount of serous drainage, yellow fibrinous tissue. No erythema, no swelling, no purulent drainage. Abdominal: Soft. Bowel sounds are normal.   Musculoskeletal: Normal range of motion. Exhibits no edema. Neurological: alert and oriented to person, place, and time. Skin: Skin is warm and dry. No erythema. Endoscopic vein/radial harvest incisions intact without evidence of infection   Psychiatric: normal mood and affect. Assessment:      S/P CABG      Surgical incision dehiscence/irritation      Plan:     Cleanse distal sternal incision twice daily with betadine.  Keep area covered with DSD  Call if any swelling, redness, purulent drainage, or fever/chills  Keflex    RTO in 1 week for incision check as well as routine post operative follow up appointment     Continue follow up with PCP, Cardiology as scheduled. Encouraged to call office with any questions, concerns.

## 2020-10-22 ENCOUNTER — HOSPITAL ENCOUNTER (OUTPATIENT)
Dept: CARDIAC REHAB | Age: 50
Setting detail: THERAPIES SERIES
Discharge: HOME | End: 2020-10-22

## 2020-10-22 ENCOUNTER — HOSPITAL ENCOUNTER (EMERGENCY)
Age: 50
Discharge: HOME | End: 2020-10-23
Attending: EMERGENCY MEDICINE
Payer: MEDICAID

## 2020-10-22 PROCEDURE — 6370000000 HC RX 637 (ALT 250 FOR IP): Performed by: EMERGENCY MEDICINE

## 2020-10-22 PROCEDURE — 99283 EMERGENCY DEPT VISIT LOW MDM: CPT

## 2020-10-22 RX ORDER — CLONIDINE HYDROCHLORIDE 0.1 MG/1
0.1 TABLET ORAL ONCE
Status: COMPLETED | OUTPATIENT
Start: 2020-10-22 | End: 2020-10-22

## 2020-10-22 RX ADMIN — CLONIDINE HYDROCHLORIDE 0.1 MG: 0.1 TABLET ORAL at 23:42

## 2020-10-22 ASSESSMENT — ENCOUNTER SYMPTOMS
SORE THROAT: 0
COUGH: 0
SHORTNESS OF BREATH: 0
DIARRHEA: 0
VOMITING: 0
NAUSEA: 0
EYE PAIN: 0
ABDOMINAL DISTENTION: 0
BACK PAIN: 0
EYE DISCHARGE: 0
SINUS PRESSURE: 0
EYE REDNESS: 0
WHEEZING: 0

## 2020-10-23 ENCOUNTER — TELEPHONE (OUTPATIENT)
Dept: CARDIOTHORACIC SURGERY | Age: 50
End: 2020-10-23

## 2020-10-23 VITALS
DIASTOLIC BLOOD PRESSURE: 103 MMHG | WEIGHT: 205 LBS | RESPIRATION RATE: 18 BRPM | OXYGEN SATURATION: 95 % | SYSTOLIC BLOOD PRESSURE: 185 MMHG | TEMPERATURE: 98.2 F | HEART RATE: 74 BPM | BODY MASS INDEX: 31.17 KG/M2

## 2020-10-23 NOTE — TELEPHONE ENCOUNTER
Pt stated her arm incision was bleeding yesterday so she went to the ER. States it is bleeding more today. She is concerned its not healing, even though ER told her it wasn't infected.  Please advise She does have ov 10/27

## 2020-10-23 NOTE — TELEPHONE ENCOUNTER
Informed pt to wipe 2x daily with betadine, keep dry dressing on. Watch for signs of infection or fever.  Keep appt 10/27

## 2020-10-23 NOTE — ED PROVIDER NOTES
Patient is a 53 y/o female who presents to the ED. Patient states that she had open heart surgery 3 weeks ago. She has since had opening of her surgical incision and tonight noted blood on the dressing when she changed it. She denies any purulent drainage or fever. Review of Systems   Constitutional: Negative for chills and fever. HENT: Negative for ear pain, sinus pressure and sore throat. Eyes: Negative for pain, discharge and redness. Respiratory: Negative for cough, shortness of breath and wheezing. Cardiovascular: Negative for chest pain. Gastrointestinal: Negative for abdominal distention, diarrhea, nausea and vomiting. Genitourinary: Negative for dysuria and frequency. Musculoskeletal: Negative for arthralgias and back pain. Skin: Positive for wound. Negative for rash. Neurological: Negative for weakness and headaches. Hematological: Negative for adenopathy. All other systems reviewed and are negative. Physical Exam  Vitals signs and nursing note reviewed. Constitutional:       General: She is not in acute distress. HENT:      Head: Normocephalic and atraumatic. Right Ear: External ear normal.      Left Ear: External ear normal.      Nose: Nose normal.      Mouth/Throat:      Mouth: Mucous membranes are moist.   Eyes:      Conjunctiva/sclera: Conjunctivae normal.      Pupils: Pupils are equal, round, and reactive to light. Neck:      Musculoskeletal: Normal range of motion and neck supple. Cardiovascular:      Rate and Rhythm: Normal rate and regular rhythm. Heart sounds: No murmur. Pulmonary:      Effort: Pulmonary effort is normal. No respiratory distress. Breath sounds: Normal breath sounds. No stridor. No wheezing, rhonchi or rales. Abdominal:      General: Bowel sounds are normal. There is no distension. Palpations: Abdomen is soft. Tenderness: There is no abdominal tenderness. There is no guarding.    Musculoskeletal: Normal range of motion. General: No swelling. Skin:     General: Skin is warm. Comments: 2 cm area of dehiscence of distal surgical incision. No active bleeding or drainage. No erythema. Neurological:      Mental Status: She is alert and oriented to person, place, and time. Procedures     [facility]                --------------------------------------------- PAST HISTORY ---------------------------------------------  Past Medical History:  has a past medical history of CAD in native artery, Claustrophobia, Colitis, Constipation, Diabetes mellitus ([facility] [address] Heart problem, Hyperlipidemia, Hypertension, Hypothyroidism, Sleep apnea, Thyroid disease, and Wears partial dentures. Past Surgical History:  has a past surgical history that includes Cholecystectomy; Appendectomy; knee surgery (Right); ventral hernia repair; Hysterectomy; hernia repair (12/1/15); Colonoscopy;  section; Cardiac catheterization (2020); and Coronary artery bypass graft (N/A, 2020). Social History:  reports that she has been smoking cigarettes. She has a 25.00 pack-year smoking history. She has never used smokeless tobacco. She reports previous alcohol use. She reports current drug use. Family History: family history includes Mult Sclerosis in her maternal grandmother and mother. She was adopted. The patients home medications have been reviewed. Allergies: Seasonal    -------------------------------------------------- RESULTS -------------------------------------------------  Labs:  No results found for this visit on 10/22/20. Radiology:  No orders to display       ------------------------- NURSING NOTES AND VITALS REVIEWED ---------------------------  Date / Time Roomed:  10/22/2020 11:09 PM  ED Bed Assignment:      The nursing notes within the ED encounter and vital signs as below have been reviewed.    BP (!) 224/103   Pulse 79   Temp 98.2 °F (36.8 °C)   Resp 18   Wt 205 lb (93 kg)   SpO2 98%   BMI 31.17 kg/m²   Oxygen Saturation Interpretation: Normal      ------------------------------------------ PROGRESS NOTES ------------------------------------------  I have spoken with the patient and discussed todays results, in addition to providing specific details for the plan of care and counseling regarding the diagnosis and prognosis. Their questions are answered at this time and they are agreeable with the plan. I discussed at length with them reasons for immediate return here for re evaluation. They will followup with primary care by calling their office tomorrow. --------------------------------- ADDITIONAL PROVIDER NOTES ---------------------------------  At this time the patient is without objective evidence of an acute process requiring hospitalization or inpatient management. They have remained hemodynamically stable throughout their entire ED visit and are stable for discharge with outpatient follow-up. The plan has been discussed in detail and they are aware of the specific conditions for emergent return, as well as the importance of follow-up. New Prescriptions    No medications on file       Diagnosis:  1. Postoperative wound dehiscence, initial encounter        Disposition:  Patient's disposition: Discharge to home  Patient's condition is stable.            [address] [facility],   10/22/20 2008

## 2020-10-23 NOTE — TELEPHONE ENCOUNTER
Cleanse arm incisions twice daily with betadine. Keep covered with DSD with any drainage. Call if any increase in swelling, redness, purulent drainage, or fever/chills.  Otherwise, continue apt as scheduled on Tuesday

## 2020-10-27 ENCOUNTER — OFFICE VISIT (OUTPATIENT)
Dept: CARDIOTHORACIC SURGERY | Age: 50
End: 2020-10-27

## 2020-10-27 VITALS
RESPIRATION RATE: 16 BRPM | BODY MASS INDEX: 30.31 KG/M2 | HEIGHT: 68 IN | DIASTOLIC BLOOD PRESSURE: 79 MMHG | HEART RATE: 65 BPM | WEIGHT: 200 LBS | SYSTOLIC BLOOD PRESSURE: 149 MMHG

## 2020-10-27 PROCEDURE — 99024 POSTOP FOLLOW-UP VISIT: CPT | Performed by: NURSE PRACTITIONER

## 2020-10-27 RX ORDER — CEPHALEXIN 500 MG/1
500 CAPSULE ORAL 2 TIMES DAILY
Qty: 20 CAPSULE | Refills: 0 | Status: SHIPPED | OUTPATIENT
Start: 2020-10-27 | End: 2020-11-06

## 2020-10-28 NOTE — PROGRESS NOTES
Chief Complaint   Patient presents with    Post-Op Check     cabg x 3         HPI:  Ms. Adalid Munoz is a 68-year-old female whom presents to the office today for routine post-operative follow-up status post: CABG x 3 on 9/29/2020. Currently, there are no complaints of any CP, SOB, or major concerns. She reports increasing separation of distal sternal incision as well as her left wrist incision having some bloody drainage over the last week. She denies any fever/chills, purulent drainage, redness or swelling to any incision. Patient reports that she has not been using any betadine to cleanse her sternal incision as instructed to do so last week. She states her blood glucose levels have been ~160-180's. She reports she is smoking cigarettes again. Review of Systems:   Constitutional: Negative for fever and chills. Respiratory: Negative for cough, chest tightness and shortness of breath. Cardiovascular: Negative for chest pain, palpitations and leg swelling. Gastrointestinal: Negative for nausea, diarrhea and constipation. Musculoskeletal: Negative for back pain. Skin: Negative for color change. Neurological: Negative for dizziness, syncope and light-headedness. Objective:   Physical Exam   Constitutional: oriented to person, place, and time. No distress. Cardiovascular: Normal rate. Pulmonary/Chest: Effort normal. No respiratory distress. Proximal midsternal and chest tube incisions well healed without evidence of infection. Sternum stable. Distal pole of sternal incision with dehiscence ~3cm with yellow fibrinous tissue, minimal serosang drainage, no swelling and no erythema. Abdominal: Soft. Bowel sounds are normal.   Musculoskeletal: Normal range of motion. Exhibits no edema. Neurological: alert and oriented to person, place, and time. Skin: Skin is warm and dry. No erythema. Endoscopic vein harvest incisions intact without evidence of infection.  Left wrist incision C/D, scabbed over, no drainage, no erythema, no swelling. Left proximal LUE incision C/D/I and healing well. Psychiatric: normal mood and affect. Assessment:      S/P CABG      Sternal incision dehiscence  Sternal incision irritation      Plan:      Continue keflex with an additional week  BETADINE sternal incision twice daily. Keep covered with DSD and change twice daily and prn  Remove sternal precautions in 2 weeks  Cardiac rehab referral  Continue follow up with PCP, Cardiology as scheduled. Tobacco cessation encouraged  RTO in 1 week for incision check  Call if any swelling, redness, purulent drainage, or fever/chills  Encouraged to call office with any questions, concerns.

## 2020-11-03 ENCOUNTER — OFFICE VISIT (OUTPATIENT)
Dept: CARDIOTHORACIC SURGERY | Age: 50
End: 2020-11-03

## 2020-11-03 VITALS
RESPIRATION RATE: 18 BRPM | HEART RATE: 56 BPM | DIASTOLIC BLOOD PRESSURE: 78 MMHG | HEIGHT: 68 IN | BODY MASS INDEX: 30.31 KG/M2 | SYSTOLIC BLOOD PRESSURE: 128 MMHG | WEIGHT: 200 LBS

## 2020-11-03 PROCEDURE — 99024 POSTOP FOLLOW-UP VISIT: CPT | Performed by: PHYSICIAN ASSISTANT

## 2020-11-03 ASSESSMENT — ENCOUNTER SYMPTOMS
CHEST TIGHTNESS: 0
SHORTNESS OF BREATH: 0

## 2020-11-03 NOTE — PROGRESS NOTES
Subjective:      Patient ID: Vamshi Spear is a 52 y.o. female. Chief Complaint   Patient presents with   Jacinto Hawkins     wound check       HPI   This is a 52year old female who presents to the office today s/p CABG on 09/29/2020 for sternal wound check. She states the wound has been healing well, and she has noticed significantly less drainage. She is keeping it covered with a clean dressing and cleaning the wound with betadine twice daily. She has taken two round of Keflex and has 5 days left of the current prescription. She denies pain, fever, chills, swelling or other incisional issues. Review of Systems   Constitutional: Negative for chills, diaphoresis, fatigue and fever. Respiratory: Negative for chest tightness and shortness of breath. Cardiovascular: Negative for chest pain. Neurological: Negative for dizziness and light-headedness. Objective:   Physical Exam  Constitutional:       Appearance: Normal appearance. Cardiovascular:      Rate and Rhythm: Normal rate and regular rhythm. Comments: midsternal and chest tube incisions well healed without evidence of infection. Sternum stable. Distal portion of sternal incision with dehiscence ~3cm with yellow fibrinous tissue, minimal serosang drainage, no swelling and no erythema. Pulmonary:      Effort: Pulmonary effort is normal.      Breath sounds: Normal breath sounds. Skin:     General: Skin is warm and dry. Comments: Left radial harvest incision well healed without signs of infection. Neurological:      Mental Status: She is alert. Psychiatric:         Mood and Affect: Mood normal.         Behavior: Behavior normal.         Assessment:      Wound check s/p cabg      Plan:      Continue betadine of sternal incision twice daily. Keep covered with clean and dry dressing and change twice daily and as needed for drainage.  Continue to wear bra while active, however try to keep band from causing too much friction on wound.   Remove sternal precautions in 1 week. Finish Keflex prescription. Continue follow up with PCP, Cardiology as scheduled. Tobacco cessation encouraged  Call office if wound healing slows or worsens, fever/chills, purulent drainage or swelling. Encouraged to call office with any questions or concerns, however if wound heals nicely no further follow-up from [facility] is needed.          KATY Diego

## 2020-11-05 ENCOUNTER — HOSPITAL ENCOUNTER (OUTPATIENT)
Dept: CARDIAC REHAB | Age: 50
Setting detail: THERAPIES SERIES
Discharge: HOME | End: 2020-11-05

## 2020-11-06 ENCOUNTER — HOSPITAL ENCOUNTER (OUTPATIENT)
Dept: CARDIAC REHAB | Age: 50
Setting detail: THERAPIES SERIES
Discharge: HOME | End: 2020-11-06

## 2020-11-09 ENCOUNTER — HOSPITAL ENCOUNTER (OUTPATIENT)
Dept: CARDIAC REHAB | Age: 50
Setting detail: THERAPIES SERIES
Discharge: HOME | End: 2020-11-09

## 2020-11-11 NOTE — PLAN OF CARE
I reviewed the H&P, I examined the patient, and there are no changes in the patient's condition.     Problem: Pain:  Goal: Pain level will decrease  Description: Pain level will decrease  Outcome: Met This Shift

## 2020-11-12 ENCOUNTER — TELEPHONE (OUTPATIENT)
Dept: CARDIOLOGY CLINIC | Age: 50
End: 2020-11-12

## 2020-11-12 ENCOUNTER — HOSPITAL ENCOUNTER (EMERGENCY)
Age: 50
Discharge: LEFT BEFORE DISCHARGE | End: 2020-11-12

## 2020-11-12 ENCOUNTER — TELEPHONE (OUTPATIENT)
Dept: CARDIOTHORACIC SURGERY | Age: 50
End: 2020-11-12

## 2020-11-12 ENCOUNTER — HOSPITAL ENCOUNTER (OUTPATIENT)
Dept: CARDIAC REHAB | Age: 50
Setting detail: THERAPIES SERIES
Discharge: HOME | End: 2020-11-12

## 2020-11-12 ENCOUNTER — APPOINTMENT (OUTPATIENT)
Dept: GENERAL RADIOLOGY | Age: 50
End: 2020-11-12

## 2020-11-12 VITALS
SYSTOLIC BLOOD PRESSURE: 192 MMHG | HEIGHT: 68 IN | HEART RATE: 72 BPM | WEIGHT: 201.44 LBS | OXYGEN SATURATION: 93 % | TEMPERATURE: 98.5 F | BODY MASS INDEX: 30.53 KG/M2 | DIASTOLIC BLOOD PRESSURE: 84 MMHG | RESPIRATION RATE: 16 BRPM

## 2020-11-12 LAB
ALBUMIN SERPL-MCNC: 4.2 G/DL (ref 3.5–5.2)
ALP BLD-CCNC: 121 U/L (ref 35–104)
ALT SERPL-CCNC: 11 U/L (ref 0–32)
ANION GAP SERPL CALCULATED.3IONS-SCNC: 10 MMOL/L (ref 7–16)
AST SERPL-CCNC: 16 U/L (ref 0–31)
BASOPHILS ABSOLUTE: 0.08 E9/L (ref 0–0.2)
BASOPHILS RELATIVE PERCENT: 0.6 % (ref 0–2)
BILIRUB SERPL-MCNC: 0.3 MG/DL (ref 0–1.2)
BILIRUBIN URINE: NEGATIVE
BLOOD, URINE: NEGATIVE
BUN BLDV-MCNC: 16 MG/DL (ref 6–20)
CALCIUM SERPL-MCNC: 9.2 MG/DL (ref 8.6–10.2)
CHLORIDE BLD-SCNC: 97 MMOL/L (ref 98–107)
CLARITY: CLEAR
CO2: 29 MMOL/L (ref 22–29)
COLOR: YELLOW
CREAT SERPL-MCNC: 0.9 MG/DL (ref 0.5–1)
EOSINOPHILS ABSOLUTE: 0.84 E9/L (ref 0.05–0.5)
EOSINOPHILS RELATIVE PERCENT: 6.7 % (ref 0–6)
GFR AFRICAN AMERICAN: >60
GFR NON-AFRICAN AMERICAN: >60 ML/MIN/1.73
GLUCOSE BLD-MCNC: 188 MG/DL (ref 74–99)
GLUCOSE URINE: NEGATIVE MG/DL
HCT VFR BLD CALC: 35.6 % (ref 34–48)
HEMOGLOBIN: 10.9 G/DL (ref 11.5–15.5)
IMMATURE GRANULOCYTES #: 0.1 E9/L
IMMATURE GRANULOCYTES %: 0.8 % (ref 0–5)
KETONES, URINE: NEGATIVE MG/DL
LEUKOCYTE ESTERASE, URINE: NEGATIVE
LYMPHOCYTES ABSOLUTE: 2.21 E9/L (ref 1.5–4)
LYMPHOCYTES RELATIVE PERCENT: 17.6 % (ref 20–42)
MCH RBC QN AUTO: 30.3 PG (ref 26–35)
MCHC RBC AUTO-ENTMCNC: 30.6 % (ref 32–34.5)
MCV RBC AUTO: 98.9 FL (ref 80–99.9)
MONOCYTES ABSOLUTE: 0.78 E9/L (ref 0.1–0.95)
MONOCYTES RELATIVE PERCENT: 6.2 % (ref 2–12)
NEUTROPHILS ABSOLUTE: 8.57 E9/L (ref 1.8–7.3)
NEUTROPHILS RELATIVE PERCENT: 68.1 % (ref 43–80)
NITRITE, URINE: NEGATIVE
PDW BLD-RTO: 14.8 FL (ref 11.5–15)
PH UA: 6 (ref 5–9)
PLATELET # BLD: 501 E9/L (ref 130–450)
PMV BLD AUTO: 10.3 FL (ref 7–12)
POTASSIUM SERPL-SCNC: 3.7 MMOL/L (ref 3.5–5)
PROTEIN UA: NEGATIVE MG/DL
RBC # BLD: 3.6 E12/L (ref 3.5–5.5)
SODIUM BLD-SCNC: 136 MMOL/L (ref 132–146)
SPECIFIC GRAVITY UA: <=1.005 (ref 1–1.03)
TOTAL PROTEIN: 9.3 G/DL (ref 6.4–8.3)
TROPONIN: 0.04 NG/ML (ref 0–0.03)
UROBILINOGEN, URINE: 0.2 E.U./DL
WBC # BLD: 12.6 E9/L (ref 4.5–11.5)

## 2020-11-12 PROCEDURE — 71046 X-RAY EXAM CHEST 2 VIEWS: CPT

## 2020-11-12 PROCEDURE — 85025 COMPLETE CBC W/AUTO DIFF WBC: CPT

## 2020-11-12 PROCEDURE — 87040 BLOOD CULTURE FOR BACTERIA: CPT

## 2020-11-12 PROCEDURE — 81003 URINALYSIS AUTO W/O SCOPE: CPT

## 2020-11-12 PROCEDURE — 80053 COMPREHEN METABOLIC PANEL: CPT

## 2020-11-12 PROCEDURE — 84484 ASSAY OF TROPONIN QUANT: CPT

## 2020-11-12 PROCEDURE — 93005 ELECTROCARDIOGRAM TRACING: CPT | Performed by: EMERGENCY MEDICINE

## 2020-11-12 PROCEDURE — 99283 EMERGENCY DEPT VISIT LOW MDM: CPT

## 2020-11-12 NOTE — TELEPHONE ENCOUNTER
Home health called in stating Faiza's incision is opening up. She also has shortness of breath and a fever.   I advised them to call Dr Karen Kahn office due to the incision, but most likely due to other symptoms they would most likely refer her to the emergency room

## 2020-11-12 NOTE — TELEPHONE ENCOUNTER
[facility] states pt sob, fever and cough. Spoke with pt states low grade fever yesterday, dry cough, sob at rest. Pulse ox 80. Advised pt to go to ER to be evaluated.

## 2020-11-13 LAB
EKG ATRIAL RATE: 71 BPM
EKG P AXIS: 67 DEGREES
EKG P-R INTERVAL: 156 MS
EKG Q-T INTERVAL: 400 MS
EKG QRS DURATION: 94 MS
EKG QTC CALCULATION (BAZETT): 434 MS
EKG R AXIS: 56 DEGREES
EKG T AXIS: 117 DEGREES
EKG VENTRICULAR RATE: 71 BPM

## 2020-11-16 RX ORDER — BLOOD SUGAR DIAGNOSTIC
1 STRIP MISCELLANEOUS 3 TIMES DAILY
Qty: 100 EACH | Refills: 5 | Status: SHIPPED
Start: 2020-11-16 | End: 2022-01-18

## 2020-11-17 LAB — BLOOD CULTURE, ROUTINE: NORMAL

## 2020-11-19 ENCOUNTER — TELEPHONE (OUTPATIENT)
Dept: CARDIOLOGY CLINIC | Age: 50
End: 2020-11-19

## 2020-11-19 NOTE — TELEPHONE ENCOUNTER
Patient was sent to emergency room recently due to shortness of breath. She was never in a room in the back - they did however perform some testing on her that she is reviewing on My Chart and is concerned with her EKG reading. She ended up signing out AMA after being there for 9 hours.   She is asking if you can review and advise

## 2020-12-07 ENCOUNTER — TELEPHONE (OUTPATIENT)
Dept: CARDIOLOGY CLINIC | Age: 50
End: 2020-12-07

## 2020-12-07 NOTE — TELEPHONE ENCOUNTER
On 11/23/20 [facility] [id number] requested that the patient needs to be seen as soon as possible. I left a message on November 23rd and the 30th with no response.   Letter mailed

## 2020-12-08 ENCOUNTER — HOSPITAL ENCOUNTER (OUTPATIENT)
Age: 50
Discharge: HOME | End: 2020-12-10

## 2020-12-08 ENCOUNTER — HOSPITAL ENCOUNTER (OUTPATIENT)
Dept: GENERAL RADIOLOGY | Age: 50
Discharge: HOME | End: 2020-12-10

## 2020-12-08 PROCEDURE — 71046 X-RAY EXAM CHEST 2 VIEWS: CPT

## 2021-01-06 RX ORDER — EZETIMIBE 10 MG/1
10 TABLET ORAL DAILY
Qty: 30 TABLET | Refills: 3 | Status: SHIPPED | OUTPATIENT
Start: 2021-01-06 | End: 2021-01-12 | Stop reason: SDUPTHER

## 2021-01-06 RX ORDER — ISOSORBIDE MONONITRATE 30 MG/1
30 TABLET, EXTENDED RELEASE ORAL DAILY
Qty: 30 TABLET | Refills: 3 | Status: SHIPPED | OUTPATIENT
Start: 2021-01-06 | End: 2021-01-12 | Stop reason: SDUPTHER

## 2021-01-06 RX ORDER — EZETIMIBE 10 MG/1
10 TABLET ORAL DAILY
COMMUNITY
End: 2021-01-06 | Stop reason: SDUPTHER

## 2021-01-06 RX ORDER — CLOPIDOGREL BISULFATE 75 MG/1
75 TABLET ORAL DAILY
Qty: 30 TABLET | Refills: 3 | Status: SHIPPED | OUTPATIENT
Start: 2021-01-06 | End: 2021-01-12 | Stop reason: SDUPTHER

## 2021-01-07 ENCOUNTER — TELEPHONE (OUTPATIENT)
Dept: CARDIOTHORACIC SURGERY | Age: 51
End: 2021-01-07

## 2021-01-07 NOTE — TELEPHONE ENCOUNTER
Sternal incision has developed 2 bumps one at the bottom and one at the top of the area which was previously dehisced. Noticed them within last week, hard to touch puffy around area. No fever. Please advise.

## 2021-01-07 NOTE — PROGRESS NOTES
[facility] Cardiology consult  Dr. Hank Robbins      Reason for Consult: Uncontrolled HTN  Referring Physician: Valerie Burton MD     CHIEF COMPLAINT:   Chief Complaint   Patient presents with    Hypertension     post cabg. Pt still having center CP with radiating jaw pain and SOB, dizziness since surgery. No other cardiac complaints today       HISTORY OF PRESENT ILLNESS:   52year old female with history of Diabetes Hyperlipidemia and Hypertension is here due to uncontrolled hypertension and chest pain. Shortness of breath, chest discomfort radiating to the right side of the jaw, occasional dizziness, exertional, did not try any treatment for her symptoms, symptoms have been going on for the last couple of months, denies any palpitations, no pedal edema, no PND, no orthopnea, no syncope, no presyncopal episodes.       Past Medical History:   Diagnosis Date    CAD in native artery 2020    Claustrophobia     mild    Colitis     Constipation     Diabetes mellitus ([address]     Heart problem     Hyperlipidemia     Hypertension     Hypothyroidism     Sleep apnea     diagnosed 7-8 years ago does not use C-pap problems with cost    Thyroid disease     Wears partial dentures     upper         Past Surgical History:   Procedure Laterality Date    APPENDECTOMY      CARDIAC CATHETERIZATION  2020    Dr. Verdin Model EF 55% cts consult ordered     SECTION      x2    CHOLECYSTECTOMY      COLONOSCOPY      CORONARY ARTERY BYPASS GRAFT N/A 2020    CORONARY ARTERY BYPASS, POSSIBLE LEFT RADIAL ARTERY HARVEST, MINDA performed by Gris Joseph DO at [address] [place] [address]  12/1/15    laparoscopic incisional reapair with mesh    HYSTERECTOMY      KNEE SURGERY Right     VENTRAL HERNIA REPAIR           Current Outpatient Medications   Medication Sig Dispense Refill    clopidogrel (PLAVIX) 75 MG tablet Take 1 tablet by mouth daily 90 tablet 3    ezetimibe (ZETIA) 10 MG tablet Take 1 tablet by mouth daily 90 tablet 3    isosorbide mononitrate (IMDUR) 30 MG extended release tablet Take 1 tablet by mouth daily PATIENT TO TAKE FOR 6 MONTHS TO PREVENT RADIAL GRAFT SPASM 90 tablet 3    metoprolol tartrate (LOPRESSOR) 25 MG tablet Take 0.5 tablets by mouth 2 times daily 90 tablet 3    pantoprazole (PROTONIX) 40 MG tablet Take 1 tablet by mouth daily for 14 days 90 tablet 2    aspirin 81 MG EC tablet Take 1 tablet by mouth daily 180 tablet 3    Insulin Syringe-Needle U-100 ([employer] INSULIN SYR 0.5CC/30G) 30G X 5/16\" 0.5 ML MISC 1 each by Does not apply route 3 times daily 100 each 5    acetaminophen (TYLENOL) 325 MG tablet Take 2 tablets by mouth every 4 hours as needed for Pain 120 tablet 3    ferrous sulfate (IRON 325) 325 (65 Fe) MG tablet Take 1 tablet by mouth 2 times daily (with meals) 60 tablet 0    folic acid (FOLVITE) 1 MG tablet Take 1 tablet by mouth daily 30 tablet 0    docusate sodium (COLACE, DULCOLAX) 100 MG CAPS Take 100 mg by mouth 2 times daily as needed for Constipation (CONSTIPATION) 20 capsule 0    ibuprofen (ADVIL;MOTRIN) 600 MG tablet Take 1 tablet by mouth every 8 hours as needed for Pain 360 tablet 1    levothyroxine (SYNTHROID) 75 MCG tablet Take 1 tablet by mouth Daily 30 tablet 5    METHADONE HCL PO Take by mouth Takes 37 mg daily ( taking for past 20 years) history of 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  Oxycodone      Insulin Pen Needle ([employer] PEN NEEDLES 31G) 31G X 8 MM MISC 1 each by Does not apply route daily 120 each 3    insulin detemir (LEVEMIR) 100 UNIT/ML injection vial Inject 25 Units into the skin nightly      insulin aspart (NOVOLOG FLEXPEN) 100 UNIT/ML injection pen Inject into the skin 3 times daily (before meals)      furosemide (LASIX) 40 MG tablet Take 1 tablet by mouth daily Take 1 by mouth daily, take an extra 1 for extra swelling 90 tablet 3    potassium chloride (KLOR-CON M) 10 MEQ extended release tablet Take 1 tablet by mouth daily Take with Lasix 90 tablet 3 No current facility-administered medications for this visit. Allergies as of 01/12/2021 - Review Complete 01/12/2021   Allergen Reaction Noted    Seasonal  09/01/2020       Social History     Socioeconomic History    Marital status:      Spouse name: Not on file    Number of children: Not on file    Years of education: Not on file    Highest education level: Not on file   Occupational History    Occupation: [employer] resource strain: Not on file    Food insecurity     Worry: Not on file     Inability: Not on file   [employer] needs     Medical: Not on file     Non-medical: Not on file   Tobacco Use    Smoking status: Current Every Day Smoker     Packs/day: 0.25     Years: 25.00     Pack years: 6.25     Types: Cigarettes    Smokeless tobacco: Never Used    Tobacco comment: trying to quit.  5-6 per day   Substance and Sexual Activity    Alcohol use: Not Currently     Comment: Pepsi daily     Drug use: Not Currently     Comment: methadone     Sexual activity: Yes     Partners: Male   Lifestyle    Physical activity     Days per week: Not on file     Minutes per session: Not on file    Stress: Not on file   Relationships    Social connections     Talks on phone: Not on file     Gets together: Not on file     Attends Jain service: Not on file     Active member of club or organization: Not on file     Attends meetings of clubs or organizations: Not on file     Relationship status: Not on file    Intimate partner violence     Fear of current or ex partner: Not on file     Emotionally abused: Not on file     Physically abused: Not on file     Forced sexual activity: Not on file   Other Topics Concern    Not on file   Social History Narrative    Not on file       Family History   Adopted: Yes   Problem Relation Age of Onset    Mult Sclerosis Mother     Mult Sclerosis Maternal Grandmother        REVIEW OF SYSTEMS:     CONSTITUTIONAL:  negative for fevers, chills, sweats, + fatigue  HEENT:  negative for  tinnitus, earaches, nasal congestion and epistaxis  RESPIRATORY:  negative for  dry cough, cough with sputum, wheezing and hemoptysis  GASTROINTESTINAL:  negative for nausea, vomiting, diarrhea, constipation, pruritus and jaundice  HEMATOLOGIC/LYMPHATIC:  negative for easy bruising, bleeding, lymphadenopathy and petechiae  ENDOCRINE:  negative for heat intolerance, cold intolerance, tremor, hair loss and diabetic symptoms including neither polyuria nor polydipsia nor blurred vision  MUSCULOSKELETAL:  negative for  myalgias, arthralgias, joint swelling, stiff joints and decreased range of motion  NEUROLOGICAL:  negative for memory problems, speech problems, visual disturbance, dysphagia, weakness and numbness      PHYSICAL EXAM:   CONSTITUTIONAL:  awake, alert, cooperative, no apparent distress, and appears stated age  HEAD:  normocepalic, without obvious abnormality, atraumatic, pink, moist mucous membranes. NECK:  Supple, symmetrical, trachea midline, no adenopathy, thyroid symmetric, not enlarged and no tenderness, skin normal  LUNGS:  No increased work of breathing, good air exchange, clear to auscultation bilaterally, no crackles or wheezing  CARDIOVASCULAR:  Normal apical impulse, regular rate and rhythm, normal S1 and S2, no S3 or S4, 2/6 systolic murmur at the apex, no JVD, no carotid bruit, no pedal edema, good carotid upstroke bilaterally. ABDOMEN:  Soft, nontender, no masses, no hepatomegaly or splenomegaly, BS+  CHEST: nontender to palpation, expands symmetrically  MUSCULOSKELETAL:  No clubbing no cyanosis. there is no redness, warmth, or swelling of the joints  full range of motion noted  NEUROLOGIC:  Alert, awake,oriented x3  SKIN:  no bruising or bleeding, normal skin color, texture, turgor and no redness, warmth, or swelling    /82 (Site: Right Upper Arm, Position: Sitting, Cuff Size: Large Adult)   Ht 5' 8\" (1.727 m)   Wt 203 lb (92.1 kg)   BMI 30.87 kg/m²     DATA:   I personally reviewed the visit EKG with the following interpretation: Sinus rhythm, LVH by voltage criteria, nonspecific T wave changes possible old inferior lateral wall MI age undetermined    EKG 11/12/20 Baseline artifacts  Normal sinus rhythm  Possible old inferior wall MI age undetermined  ST & T wave abnormality, consider lateral ischemia  Abnormal ECG  No previous ECGs available    ECHO: 9/12/20 Summary   Left ventricular internal dimensions were normal in diastole and systole. Severe left ventricular concentric hypertrophy noted. No regional wall motion abnormalities seen. Normal left ventricular ejection fraction. There is doppler evidence of stage II diastolic dysfunction. The left atrium is borderline dilated. Physiologic and/or trace mitral regurgitation is present. There is a trivial circumferential pericardial effusion noted     1. Stress: 9/1/20 Electrocardiographically normal regadenoson infusion with a clinicallynonischemic response. 2. Myocardial perfusion imaging showed small anterior wall fixed defect. 3. Overall left ventricular systolic function mildly reduced, EF 45% with mild global hypokinesis. 4. Low risk general pharmacologic stress test    Angiography 9/14/20 IMPRESSION:  1.  Multivessel disease involving ostial LAD, distal LAD, left  circumflex artery, OM branch, RCA and acute marginal branch. 2.  Moderate disease involving the ostium and the proximal segment of  the right common iliac artery. 3.  Preserved LV function with mild inferior wall hypokinesis. 4.  Successful deployment of 6-[place] Angio-Seal in the right common  femoral artery and deployment of 6-[place] Angio-Seal in the left common  femoral artery.     CABG 9/29/20 Coronary artery bypass grafting x3 using the left internal mammary  artery to the left anterior descending artery, radial artery to the  dominant obtuse marginal branch and circumflex and reverse No symptomatic for any reason    I have reviewed my findings and recommendations with patient      Electronically signed by Greer Musa MD on 2/22/2021 at 6:46 PM      NOTE: This report was transcribed using voice recognition software.  Every effort was made to ensure accuracy; however, inadvertent computerized transcription errors may be present

## 2021-01-12 ENCOUNTER — TELEPHONE (OUTPATIENT)
Dept: CARDIOTHORACIC SURGERY | Age: 51
End: 2021-01-12

## 2021-01-12 ENCOUNTER — OFFICE VISIT (OUTPATIENT)
Dept: CARDIOLOGY CLINIC | Age: 51
End: 2021-01-12

## 2021-01-12 VITALS
SYSTOLIC BLOOD PRESSURE: 130 MMHG | BODY MASS INDEX: 30.77 KG/M2 | WEIGHT: 203 LBS | DIASTOLIC BLOOD PRESSURE: 82 MMHG | HEIGHT: 68 IN

## 2021-01-12 DIAGNOSIS — R06.02 SHORTNESS OF BREATH: Primary | ICD-10-CM

## 2021-01-12 PROCEDURE — 99214 OFFICE O/P EST MOD 30 MIN: CPT | Performed by: INTERNAL MEDICINE

## 2021-01-12 PROCEDURE — 93000 ELECTROCARDIOGRAM COMPLETE: CPT | Performed by: INTERNAL MEDICINE

## 2021-01-12 RX ORDER — EZETIMIBE 10 MG/1
10 TABLET ORAL DAILY
Qty: 90 TABLET | Refills: 3 | Status: SHIPPED | OUTPATIENT
Start: 2021-01-12

## 2021-01-12 RX ORDER — ISOSORBIDE MONONITRATE 30 MG/1
30 TABLET, EXTENDED RELEASE ORAL DAILY
Qty: 90 TABLET | Refills: 3 | Status: SHIPPED
Start: 2021-01-12 | End: 2021-05-04 | Stop reason: SDUPTHER

## 2021-01-12 RX ORDER — CLOPIDOGREL BISULFATE 75 MG/1
75 TABLET ORAL DAILY
Qty: 90 TABLET | Refills: 3 | Status: SHIPPED
Start: 2021-01-12 | End: 2021-05-04 | Stop reason: SDUPTHER

## 2021-01-12 RX ORDER — ASPIRIN 81 MG/1
81 TABLET ORAL DAILY
Qty: 180 TABLET | Refills: 3 | Status: SHIPPED | OUTPATIENT
Start: 2021-01-12

## 2021-01-12 RX ORDER — PANTOPRAZOLE SODIUM 40 MG/1
40 TABLET, DELAYED RELEASE ORAL DAILY
Qty: 90 TABLET | Refills: 2 | Status: SHIPPED
Start: 2021-01-12 | End: 2021-05-04 | Stop reason: SDUPTHER

## 2021-01-20 ENCOUNTER — APPOINTMENT (OUTPATIENT)
Dept: GENERAL RADIOLOGY | Age: 51
End: 2021-01-20

## 2021-01-20 ENCOUNTER — HOSPITAL ENCOUNTER (EMERGENCY)
Age: 51
Discharge: HOME | End: 2021-01-20
Attending: EMERGENCY MEDICINE
Payer: MEDICAID

## 2021-01-20 VITALS
DIASTOLIC BLOOD PRESSURE: 114 MMHG | RESPIRATION RATE: 16 BRPM | SYSTOLIC BLOOD PRESSURE: 183 MMHG | HEART RATE: 72 BPM | TEMPERATURE: 98.3 F | OXYGEN SATURATION: 97 %

## 2021-01-20 DIAGNOSIS — R06.02 SHORTNESS OF BREATH: Primary | ICD-10-CM

## 2021-01-20 LAB
ALBUMIN SERPL-MCNC: 4 G/DL (ref 3.5–5.2)
ALP BLD-CCNC: 126 U/L (ref 35–104)
ALT SERPL-CCNC: 13 U/L (ref 0–32)
ANION GAP SERPL CALCULATED.3IONS-SCNC: 13 MMOL/L (ref 7–16)
AST SERPL-CCNC: 16 U/L (ref 0–31)
BASOPHILS ABSOLUTE: 0.15 E9/L (ref 0–0.2)
BASOPHILS RELATIVE PERCENT: 1.2 % (ref 0–2)
BILIRUB SERPL-MCNC: 0.2 MG/DL (ref 0–1.2)
BUN BLDV-MCNC: 13 MG/DL (ref 6–20)
CALCIUM SERPL-MCNC: 9.3 MG/DL (ref 8.6–10.2)
CHLORIDE BLD-SCNC: 90 MMOL/L (ref 98–107)
CO2: 25 MMOL/L (ref 22–29)
CREAT SERPL-MCNC: 0.7 MG/DL (ref 0.5–1)
EKG ATRIAL RATE: 71 BPM
EKG P AXIS: 59 DEGREES
EKG P-R INTERVAL: 164 MS
EKG Q-T INTERVAL: 408 MS
EKG QRS DURATION: 90 MS
EKG QTC CALCULATION (BAZETT): 443 MS
EKG R AXIS: 50 DEGREES
EKG T AXIS: 110 DEGREES
EKG VENTRICULAR RATE: 71 BPM
EOSINOPHILS ABSOLUTE: 0.83 E9/L (ref 0.05–0.5)
EOSINOPHILS RELATIVE PERCENT: 6.5 % (ref 0–6)
GFR AFRICAN AMERICAN: >60
GFR NON-AFRICAN AMERICAN: >60 ML/MIN/1.73
GLUCOSE BLD-MCNC: 396 MG/DL (ref 74–99)
HCT VFR BLD CALC: 43.7 % (ref 34–48)
HEMOGLOBIN: 14 G/DL (ref 11.5–15.5)
IMMATURE GRANULOCYTES #: 0.13 E9/L
IMMATURE GRANULOCYTES %: 1 % (ref 0–5)
LYMPHOCYTES ABSOLUTE: 3.84 E9/L (ref 1.5–4)
LYMPHOCYTES RELATIVE PERCENT: 30.2 % (ref 20–42)
MCH RBC QN AUTO: 29.4 PG (ref 26–35)
MCHC RBC AUTO-ENTMCNC: 32 % (ref 32–34.5)
MCV RBC AUTO: 91.6 FL (ref 80–99.9)
MONOCYTES ABSOLUTE: 0.51 E9/L (ref 0.1–0.95)
MONOCYTES RELATIVE PERCENT: 4 % (ref 2–12)
NEUTROPHILS ABSOLUTE: 7.26 E9/L (ref 1.8–7.3)
NEUTROPHILS RELATIVE PERCENT: 57.1 % (ref 43–80)
PDW BLD-RTO: 13.6 FL (ref 11.5–15)
PLATELET # BLD: 398 E9/L (ref 130–450)
PMV BLD AUTO: 11.2 FL (ref 7–12)
POTASSIUM REFLEX MAGNESIUM: 4.1 MMOL/L (ref 3.5–5)
PRO-BNP: 1152 PG/ML (ref 0–125)
RBC # BLD: 4.77 E12/L (ref 3.5–5.5)
SARS-COV-2, NAAT: NOT DETECTED
SODIUM BLD-SCNC: 128 MMOL/L (ref 132–146)
TOTAL PROTEIN: 8.2 G/DL (ref 6.4–8.3)
TROPONIN: 0.04 NG/ML (ref 0–0.03)
WBC # BLD: 12.7 E9/L (ref 4.5–11.5)

## 2021-01-20 PROCEDURE — 93010 ELECTROCARDIOGRAM REPORT: CPT | Performed by: INTERNAL MEDICINE

## 2021-01-20 PROCEDURE — 99285 EMERGENCY DEPT VISIT HI MDM: CPT

## 2021-01-20 PROCEDURE — 84484 ASSAY OF TROPONIN QUANT: CPT

## 2021-01-20 PROCEDURE — 93005 ELECTROCARDIOGRAM TRACING: CPT | Performed by: STUDENT IN AN ORGANIZED HEALTH CARE EDUCATION/TRAINING PROGRAM

## 2021-01-20 PROCEDURE — 83880 ASSAY OF NATRIURETIC PEPTIDE: CPT

## 2021-01-20 PROCEDURE — 36415 COLL VENOUS BLD VENIPUNCTURE: CPT

## 2021-01-20 PROCEDURE — U0002 COVID-19 LAB TEST NON-CDC: HCPCS

## 2021-01-20 PROCEDURE — 85025 COMPLETE CBC W/AUTO DIFF WBC: CPT

## 2021-01-20 PROCEDURE — 71046 X-RAY EXAM CHEST 2 VIEWS: CPT

## 2021-01-20 PROCEDURE — 80053 COMPREHEN METABOLIC PANEL: CPT

## 2021-01-20 ASSESSMENT — ENCOUNTER SYMPTOMS
ABDOMINAL PAIN: 0
SORE THROAT: 0
SHORTNESS OF BREATH: 1
DIARRHEA: 0
SINUS PRESSURE: 0
CONSTIPATION: 0
VOMITING: 0
BACK PAIN: 0
WHEEZING: 0
NAUSEA: 0
COUGH: 0
ABDOMINAL DISTENTION: 0

## 2021-01-20 NOTE — ED PROVIDER NOTES
[address] [facility]  Department of Emergency Medicine     Written by: Bayron Shane DO  Patient Name: Chasity Saez  Attending Provider: Tia Rowell DO  Admit Date: 2021  1:04 PM  MRN: [id number]                   : 1970        Chief Complaint   Patient presents with    Shortness of Breath     started last night, triple bypass 2020    - Chief complaint    Patient is a 72-year-old female past medical history of hypertension, diabetes and CAD. Patient presents with chief complaint of shortness of breath. Patient states that she developed gradual onset shortness of breath last evening. She notes that the shortness of breath was worse with exertion and relieved with rest.  States that her symptoms worsened today and became difficult to ambulate, she notes that that she checked her oxygen saturations and it was in the upper 80s. In addition patient notes that she has mild chest discomfort, she states that this pain is located at the tip of her sternum. Nonradiating in nature. She states is been compliant with all her medications. Patient denies any fevers, chills, nausea, vomiting, cough, abdominal pain, constipation or diarrhea. The history is provided by the patient. No  was used. Review of Systems   Constitutional: Negative for chills and fever. HENT: Negative for sinus pressure and sore throat. Respiratory: Positive for shortness of breath. Negative for cough and wheezing. Cardiovascular: Positive for chest pain. Negative for leg swelling. Gastrointestinal: Negative for abdominal distention, abdominal pain, constipation, diarrhea, nausea and vomiting. Genitourinary: Negative for dysuria and frequency. Musculoskeletal: Negative for arthralgias and back pain. Skin: Negative for rash and wound. Neurological: Negative for weakness and headaches. Hematological: Negative for adenopathy.    All other systems reviewed and are negative. Physical Exam  Vitals signs and nursing note reviewed. Constitutional:       General: She is not in acute distress. Appearance: Normal appearance. HENT:      Head: Normocephalic and atraumatic. Nose: No congestion or rhinorrhea. Mouth/Throat:      Mouth: Mucous membranes are moist.      Pharynx: Oropharynx is clear. Eyes:      Extraocular Movements: Extraocular movements intact. Pupils: Pupils are equal, round, and reactive to light. Neck:      Musculoskeletal: Normal range of motion. No neck rigidity or muscular tenderness. Cardiovascular:      Rate and Rhythm: Normal rate and regular rhythm. Heart sounds: No murmur. No gallop. Pulmonary:      Effort: Pulmonary effort is normal. No respiratory distress. Breath sounds: No wheezing, rhonchi or rales. Abdominal:      General: Abdomen is flat. Palpations: Abdomen is soft. There is no mass. Tenderness: There is no abdominal tenderness. There is no guarding. Hernia: No hernia is present. Musculoskeletal: Normal range of motion. General: No swelling, tenderness or signs of injury. Skin:     General: Skin is warm and dry. Capillary Refill: Capillary refill takes less than 2 seconds. Neurological:      General: No focal deficit present. Mental Status: She is alert and oriented to person, place, and time. Mental status is at baseline. Psychiatric:         Mood and Affect: Mood normal.          Procedures   EKG #1:  Interpreted by emergency department physician unless otherwise noted. Time:  1316    Rate: 71  Rhythm: Sinus. Interpretation: EKG reviewed, normal sinus rhythm, rate 71, normal axis,  no acute ST segment changes. .  Comparison: stable as compared to patient's most recent EKG. MDM  Number of Diagnoses or Management Options  Shortness of breath  Diagnosis management comments: Patient is a 82-year-old female past medical history of CAD status post CABG. Patient presents with chief complaint of shortness of breath. Vital signs stable on presentation besides mildly elevated blood pressure. On physical exam, heart regular rate and rhythm, lungs clear to auscultation bilaterally, abdomen soft nontender, no lower extremity edema. CBC obtained unremarkable, CMP obtained demonstrate mild hyponatremia 128, elevated glucose of 396 and elevated alk phos of 126. Troponin 0 0.04 which is near patient's baseline, proBNP mildly elevated 1152 although patient does not appear to be fluid overloaded, COVID-19 testing was obtained was negative. Chest x-ray was obtained which demonstrated cardiomegaly with resolution of pleural effusions. Patient was ambulated pulse ox never dropped below 95%. Findings consistent with shortness of breath in setting of history of CAD, recent cardiology note reviewed there was a recommendation for outpatient stress test. Call was placed to cardiology they recommended discharge with outpatient follow-up. Decision made to discharge patient, plan of care discussed with patient including discharge, patient was instructed to follow-up with her primary care doctor soon as possible. In addition patient is instructed to contact her cardiologist tomorrow for follow-up stress test. If patient noticed any new or worrisome symptoms she was instructed to return to the emergency department for evaluation. Patient voiced understanding of all discharge instructions, patient was in agreement with plan of care and was discharged home in stable condition.        Amount and/or Complexity of Data Reviewed  Clinical lab tests: ordered and reviewed  Tests in the radiology section of CPT®: ordered and reviewed    Risk of Complications, Morbidity, and/or Mortality  Presenting problems: moderate  Diagnostic procedures: moderate  Management options: moderate    Patient Progress  Patient progress: stable       ED Course as of Jan 20 1600 Wed Jan 20, 2021   1540 Spoke with cardiology, case reviewed, recommend outpatient discharge with close follow-up for outpatient stress test.    [BP]   9189 ATTENDING PROVIDER ATTESTATION:     Jeremy Cochran presented to the emergency department for evaluation of [unfilled]  I have reviewed and discussed the case, including pertinent history (medical, surgical, family and social) and exam findings with the Midlevel and the Nurse assigned to Jeremy Cochran. I have personally performed and/or participated in the history, exam, medical decision making, and procedures and agree with all pertinent clinical information. I have reviewed my findings and recommendations with Jeremy Cochran and members of family present at the time of disposition. My findings/plan: Patient is a 45-year-old female who presents the chief complaint of shortness of breath. Patient had a CABG done in September 2020. She states that she did get pneumonia after the procedure but then has been doing well for the past month. She notes for the past couple of days she has had increasing shortness of breath especially with exertion. She did take her pulse ox yesterday and went down to 84%. The patient did recently see her cardiologist about a week ago and they recommended her getting a stress test in the near future but she has not yet done that. Patient denies any fevers, chills, chest pain, abdominal pain, nausea, vomiting, leg swelling. On examination the patient is resting comfortably in bed. Clear breath sounds in all lung fields. No conversational dyspnea present. Pulse ox 97% on room air. Regular rate and rhythm of the heart. No conversational dyspnea present. No lower extremity edema. Patient is awake, alert, oriented x3. No focal neurological deficit.   Jareth Robertson DO      [MS]      ED Course User Index  [BP] Angelina Iraheta DO  [MS] Joseph Palacio DO      --------------------------------------------- PAST HISTORY Eosinophils Absolute 0.83 (H) 0.05 - 0.50 E9/L    Basophils Absolute 0.15 0.00 - 0.20 E9/L   Comprehensive Metabolic Panel w/ Reflex to MG   Result Value Ref Range    Sodium 128 (L) 132 - 146 mmol/L    Potassium reflex Magnesium 4.1 3.5 - 5.0 mmol/L    Chloride 90 (L) 98 - 107 mmol/L    CO2 25 22 - 29 mmol/L    Anion Gap 13 7 - 16 mmol/L    Glucose 396 (H) 74 - 99 mg/dL    BUN 13 6 - 20 mg/dL    CREATININE 0.7 0.5 - 1.0 mg/dL    GFR Non-African American >60 >=60 mL/min/1.73    GFR African American >60     Calcium 9.3 8.6 - 10.2 mg/dL    Total Protein 8.2 6.4 - 8.3 g/dL    Alb 4.0 3.5 - 5.2 g/dL    Total Bilirubin 0.2 0.0 - 1.2 mg/dL    Alkaline Phosphatase 126 (H) 35 - 104 U/L    ALT 13 0 - 32 U/L    AST 16 0 - 31 U/L   Troponin   Result Value Ref Range    Troponin 0.04 (H) 0.00 - 0.03 ng/mL   Brain Natriuretic Peptide   Result Value Ref Range    Pro-BNP 1,152 (H) 0 - 125 pg/mL   COVID-19   Result Value Ref Range    SARS-CoV-2, NAAT Not Detected Not Detected   EKG 12 Lead   Result Value Ref Range    Ventricular Rate 71 BPM    Atrial Rate 71 BPM    P-R Interval 164 ms    QRS Duration 90 ms    Q-T Interval 408 ms    QTc Calculation (Bazett) 443 ms    P Axis 59 degrees    R Axis 50 degrees    T Axis 110 degrees       Radiology:  XR CHEST (2 VW)   Final Result   Cardiomegaly. Interval resolution of left pleural effusion. Postoperative   changes. ------------------------- NURSING NOTES AND VITALS REVIEWED ---------------------------  Date / Time Roomed:  1/20/2021  1:04 PM  ED Bed Assignment:  13/13    The nursing notes within the ED encounter and vital signs as below have been reviewed.    BP (!) 183/114   Pulse 72   Temp 98.3 °F (36.8 °C) (Oral)   Resp 16   SpO2 97%   Oxygen Saturation Interpretation: Normal      ------------------------------------------ PROGRESS NOTES ------------------------------------------  3:53 PM EST  I have spoken with the patient and discussed todays results, in addition to providing specific details for the plan of care and counseling regarding the diagnosis and prognosis. Their questions are answered at this time and they are agreeable with the plan. I discussed at length with them reasons for immediate return here for re evaluation. They will followup with their Cardiologist and primary care physician by calling their office tomorrow. --------------------------------- ADDITIONAL PROVIDER NOTES ---------------------------------  At this time the patient is without objective evidence of an acute process requiring hospitalization or inpatient management. They have remained hemodynamically stable throughout their entire ED visit and are stable for discharge with outpatient follow-up. The plan has been discussed in detail and they are aware of the specific conditions for emergent return, as well as the importance of follow-up. New Prescriptions    No medications on file       Diagnosis:  1. Shortness of breath        Disposition:  Patient's disposition: Discharge to home  Patient's condition is stable. Patient was seen and evaluated by myself and my attending Mango Kc DO. Assessment and Plan discussed with attending provider, please see attestation for final plan of care.      DO Faizan Cline DO  Resident  01/20/21 JOANNA Aceves,   Resident  01/20/21 1600

## 2021-01-20 NOTE — ED NOTES
IV access attempted in RAC, unsuccessful. Patient stated when this nurse pierced the skin, \"Ouch, that hurts! That hurts really bad. I can feel it going all the way down my arm into my wrist. You must of hit a nerve or something. \" IV catheter removed. IV access obtain in right inner forearm near the [facility]. All labs obtained and sent.      Katya El RN  01/20/21 [id number]

## 2021-01-22 ENCOUNTER — TELEPHONE (OUTPATIENT)
Dept: CARDIOLOGY CLINIC | Age: 51
End: 2021-01-22

## 2021-01-22 NOTE — TELEPHONE ENCOUNTER
Dr. Gunner Rose,    Patient called stating she was in ER 01/20/21 and was seen by NP at Dr. Kelly Schaffer office today. NP told her her labs show that she is in heart failure and wants her to start taking Lasix 40 mg twice daily. Patient wants to know if this is OK and if you need to see her since she was in ER. She was seen by you 01/12/21. Please advise. Thank you.   Elmer

## 2021-01-25 NOTE — PROGRESS NOTES
[facility] Cardiology consult  Dr. Jose Schwartz      Reason for Consult: Uncontrolled HTN  Referring Physician: Rachael Navarrete MD     CHIEF COMPLAINT:   Chief Complaint   Patient presents with    Coronary Artery Disease     s/p ER 21 for CHF. Patient is still having sob        HISTORY OF PRESENT ILLNESS:   52year old female with history of CABG, type 2 diabetes mellitus, hyperlipidemia and Hypertension is here for follow-up appointment  Still feeling short of breath, complaining of incisional chest pain, no palpitations, occasional pedal edema, no PND, no orthopnea, no syncope, no presyncopal episodes.       Past Medical History:   Diagnosis Date    CAD in native artery 2020    Claustrophobia     mild    Colitis     Constipation     Diabetes mellitus ([facility] [address]     Heart problem     Hyperlipidemia     Hypertension     Hypothyroidism     Sleep apnea     diagnosed 7-8 years ago does not use C-pap problems with cost    Thyroid disease     Wears partial dentures     upper         Past Surgical History:   Procedure Laterality Date    APPENDECTOMY      CARDIAC CATHETERIZATION  2020    Dr. Kyle Chamorro EF 55% cts consult ordered     SECTION      x2    CHOLECYSTECTOMY      COLONOSCOPY      CORONARY ARTERY BYPASS GRAFT N/A 2020    CORONARY ARTERY BYPASS, POSSIBLE LEFT RADIAL ARTERY HARVEST, MINDA performed by Tito Naranjo DO at [address] [place] [address]  12/1/15    laparoscopic incisional reapair with mesh    HYSTERECTOMY      KNEE SURGERY Right     VENTRAL HERNIA REPAIR           Current Outpatient Medications   Medication Sig Dispense Refill    insulin detemir (LEVEMIR) 100 UNIT/ML injection vial Inject 25 Units into the skin nightly      insulin aspart (NOVOLOG FLEXPEN) 100 UNIT/ML injection pen Inject into the skin 3 times daily (before meals)      furosemide (LASIX) 40 MG tablet Take 1 tablet by mouth daily Take 1 by mouth daily, take an extra 1 for extra swelling 90 tablet 3    potassium chloride (KLOR-CON M) 10 MEQ extended release tablet Take 1 tablet by mouth daily Take with Lasix 90 tablet 3    clopidogrel (PLAVIX) 75 MG tablet Take 1 tablet by mouth daily 90 tablet 3    ezetimibe (ZETIA) 10 MG tablet Take 1 tablet by mouth daily 90 tablet 3    isosorbide mononitrate (IMDUR) 30 MG extended release tablet Take 1 tablet by mouth daily PATIENT TO TAKE FOR 6 MONTHS TO PREVENT RADIAL GRAFT SPASM 90 tablet 3    metoprolol tartrate (LOPRESSOR) 25 MG tablet Take 0.5 tablets by mouth 2 times daily 90 tablet 3    pantoprazole (PROTONIX) 40 MG tablet Take 1 tablet by mouth daily for 14 days 90 tablet 2    aspirin 81 MG EC tablet Take 1 tablet by mouth daily 180 tablet 3    Insulin Syringe-Needle U-100 ([employer] INSULIN SYR 0.5CC/30G) 30G X 5/16\" 0.5 ML MISC 1 each by Does not apply route 3 times daily 100 each 5    acetaminophen (TYLENOL) 325 MG tablet Take 2 tablets by mouth every 4 hours as needed for Pain 120 tablet 3    ferrous sulfate (IRON 325) 325 (65 Fe) MG tablet Take 1 tablet by mouth 2 times daily (with meals) 60 tablet 0    folic acid (FOLVITE) 1 MG tablet Take 1 tablet by mouth daily 30 tablet 0    docusate sodium (COLACE, DULCOLAX) 100 MG CAPS Take 100 mg by mouth 2 times daily as needed for Constipation (CONSTIPATION) 20 capsule 0    ibuprofen (ADVIL;MOTRIN) 600 MG tablet Take 1 tablet by mouth every 8 hours as needed for Pain 360 tablet 1    levothyroxine (SYNTHROID) 75 MCG tablet Take 1 tablet by mouth Daily 30 tablet 5    METHADONE HCL PO Take by mouth Takes 37 mg daily ( taking for past 20 years) history of 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  Oxycodone      Insulin Pen Needle ([employer] PEN NEEDLES 31G) 31G X 8 MM MISC 1 each by Does not apply route daily 120 each 3     No current facility-administered medications for this visit.           Allergies as of 02/05/2021 - Review Complete 01/20/2021   Allergen Reaction Noted    Seasonal  09/01/2020       Social History Socioeconomic History    Marital status:      Spouse name: Not on file    Number of children: Not on file    Years of education: Not on file    Highest education level: Not on file   Occupational History    Occupation: [employer] Financial resource strain: Not on file    Food insecurity     Worry: Not on file     Inability: Not on file   [employer] needs     Medical: Not on file     Non-medical: Not on file   Tobacco Use    Smoking status: Current Every Day Smoker     Packs/day: 0.25     Years: 25.00     Pack years: 6.25     Types: Cigarettes    Smokeless tobacco: Never Used    Tobacco comment: trying to quit.  5-6 per day   Substance and Sexual Activity    Alcohol use: Not Currently     Comment: Pepsi daily     Drug use: Not Currently     Comment: methadone     Sexual activity: Yes     Partners: Male   Lifestyle    Physical activity     Days per week: Not on file     Minutes per session: Not on file    Stress: Not on file   Relationships    Social connections     Talks on phone: Not on file     Gets together: Not on file     Attends Druze service: Not on file     Active member of club or organization: Not on file     Attends meetings of clubs or organizations: Not on file     Relationship status: Not on file    Intimate partner violence     Fear of current or ex partner: Not on file     Emotionally abused: Not on file     Physically abused: Not on file     Forced sexual activity: Not on file   Other Topics Concern    Not on file   Social History Narrative    Not on file       Family History   Adopted: Yes   Problem Relation Age of Onset    Mult Sclerosis Mother     Mult Sclerosis Maternal Grandmother        REVIEW OF SYSTEMS:     CONSTITUTIONAL:  negative for  fevers, chills, sweats, + fatigue  EYES:  negative for  double vision, blurred vision and blind spots  HEENT:  negative for  tinnitus, earaches, nasal congestion and epistaxis  RESPIRATORY:  negative for dry cough, cough with sputum, wheezing and hemoptysis  CARDIOVASCULAR: as per HPI  GASTROINTESTINAL:  negative for nausea, vomiting, diarrhea, constipation, pruritus and jaundice  GENITOURINARY:  negative for frequency, dysuria, nocturia, urinary incontinence and hesitancy  HEMATOLOGIC/LYMPHATIC:  negative for easy bruising, bleeding, lymphadenopathy and petechiae  ALLERGIC/IMMUNOLOGIC:  negative for urticaria, hay fever and angioedema  ENDOCRINE:  negative for heat intolerance, cold intolerance, tremor, hair loss and diabetic symptoms including neither polyuria nor polydipsia nor blurred vision  MUSCULOSKELETAL:  negative for  myalgias, arthralgias, joint swelling, stiff joints and decreased range of motion  NEUROLOGICAL:  negative for memory problems, speech problems, visual disturbance, dysphagia, weakness and numbness      PHYSICAL EXAM:   CONSTITUTIONAL:  awake, alert, cooperative, no apparent distress, and appears stated age  EYES:  lids and lashes normal and pupils equal, round and reactive to light, anicteric sclerae  HEAD:  normocepalic, without obvious abnormality, atraumatic, pink, moist mucous membranes. NECK:  Supple, symmetrical, trachea midline, no adenopathy, thyroid symmetric, not enlarged and no tenderness, skin normal  HEMATOLOGIC/LYMPHATICS:  no cervical lymphadenopathy and no supraclavicular lymphadenopathy  LUNGS:  No increased work of breathing, good air exchange, clear to auscultation bilaterally, no crackles or wheezing  CARDIOVASCULAR:  Normal apical impulse, regular rate and rhythm, normal S1 and S2, no S3 or S4, 2/6 systolic murmur at the apex, no JVD, no carotid bruit, no pedal edema, good carotid upstroke bilaterally. ABDOMEN:  Soft, nontender, no masses, no hepatomegaly or splenomegaly, BS+  CHEST: nontender to palpation, expands symmetrically  MUSCULOSKELETAL:  No clubbing no cyanosis. there is no redness, warmth, or swelling of the joints  full range of motion noted  NEUROLOGIC: Alert, awake,oriented x3  SKIN:  no bruising or bleeding, normal skin color, texture, turgor and no redness, warmth, or swelling    /86 (Site: Right Upper Arm, Position: Sitting, Cuff Size: Large Adult)   Pulse 79   Ht 5' 8\" (1.727 m)   Wt 201 lb (91.2 kg)   BMI 30.56 kg/m²     DATA:   I personally reviewed the visit EKG with the following interpretation: Sinus rhythm, possible left atrial enlargement, nonspecific ST-T changes in the lateral leads    EKG 1/12/21 Sinus  Rhythm   Possible right atrial enlargement  LVH by voltage criteria  Nonspecific ST depression       ECHO: 9/12/20 Summary   Left ventricular internal dimensions were normal in diastole and systole. Severe left ventricular concentric hypertrophy noted. No regional wall motion abnormalities seen. Normal left ventricular ejection fraction. There is doppler evidence of stage II diastolic dysfunction. The left atrium is borderline dilated. Physiologic and/or trace mitral regurgitation is present. There is a trivial circumferential pericardial effusion noted     1. Stress: 9/1/20 Electrocardiographically normal regadenoson infusion with a clinicallynonischemic response. 2. Myocardial perfusion imaging showed small anterior wall fixed defect. 3. Overall left ventricular systolic function mildly reduced, EF 45% with mild global hypokinesis. 4. Low risk general pharmacologic stress test    Angiography 9/14/20 IMPRESSION:  1.  Multivessel disease involving ostial LAD, distal LAD, left  circumflex artery, OM branch, RCA and acute marginal branch. 2.  Moderate disease involving the ostium and the proximal segment of  the right common iliac artery. 3.  Preserved LV function with mild inferior wall hypokinesis. 4.  Successful deployment of 6-[place] Angio-Seal in the right common  femoral artery and deployment of 6-[place] Angio-Seal in the left common  femoral artery.     CABG 9/29/20 Coronary artery bypass grafting x3 using the left internal mammary  artery to the left anterior descending artery, radial artery to the  dominant obtuse marginal branch and circumflex and reverse saphenous  vein graft to the distal right coronary artery. Cardiology Labs: BMP:    Lab Results   Component Value Date     01/20/2021    K 4.1 01/20/2021    CL 90 01/20/2021    CO2 25 01/20/2021    BUN 13 01/20/2021    CREATININE 0.7 01/20/2021     CMP:    Lab Results   Component Value Date     01/20/2021    K 4.1 01/20/2021    CL 90 01/20/2021    CO2 25 01/20/2021    BUN 13 01/20/2021    CREATININE 0.7 01/20/2021    PROT 8.2 01/20/2021     CBC:    Lab Results   Component Value Date    WBC 12.7 01/20/2021    RBC 4.77 01/20/2021    HGB 14.0 01/20/2021    HCT 43.7 01/20/2021    MCV 91.6 01/20/2021    RDW 13.6 01/20/2021     01/20/2021     PT/INR:  No results found for: PTINR  PT/INR Warfarin:  No components found for: PTPATWAR, PTINRWAR  PTT:    Lab Results   Component Value Date    APTT 30.2 09/29/2020     PTT Heparin:  No components found for: APTTHEP  Magnesium:    Lab Results   Component Value Date    MG 2.1 09/30/2020     TSH:    Lab Results   Component Value Date    TSH 25.150 10/03/2020     TROPONIN:  No components found for: TROP  BNP:  No results found for: BNP  FASTING LIPID PANEL:    Lab Results   Component Value Date    CHOL 288 10/27/2015    HDL 27 10/27/2015    TRIG 253 10/27/2015     No orders to display     I have personally reviewed the laboratory, cardiac diagnostic and radiographic testing as outlined above:      IMPRESSION:   1.  CAD: Status post CABG with a LIMA to LAD, radial graft to OM, SVG to distal RCA, doing fine    2. Hypertension: Controlled  3. Hyperlipidemia: On statin  4. Type 2 diabetes mellitus  5. Tobacco abuse    RECOMMENDATIONS:   1. Continue current treatment  2. Cardiac rehab  3.   Preventive cardiology: Low-salt, low-cholesterol diet, daily exercise, total cholesterol of less than 200, LDL of less than 70, adherence to diabetic diet and diabetic medications, smoking cessation were all advised. 4.  Follow-up with the PCP as scheduled  5. Follow-up with Dr. Patricia Pringle in 3 months, sooner if symptomatic for any reason    I have reviewed my findings and recommendations with patient      Electronically signed by Syed Ansari MD on 2/5/2021 at 9:24 AM      NOTE: This report was transcribed using voice recognition software.  Every effort was made to ensure accuracy; however, inadvertent computerized transcription errors may be presen

## 2021-02-02 ENCOUNTER — HOSPITAL ENCOUNTER (OUTPATIENT)
Dept: NON INVASIVE DIAGNOSTICS | Age: 51
Discharge: HOME | End: 2021-02-02

## 2021-02-02 LAB
LV EF: 48 %
LVEF MODALITY: NORMAL

## 2021-02-02 PROCEDURE — 93306 TTE W/DOPPLER COMPLETE: CPT

## 2021-02-03 RX ORDER — POTASSIUM CHLORIDE 750 MG/1
10 TABLET, EXTENDED RELEASE ORAL DAILY
Qty: 90 TABLET | Refills: 3 | Status: SHIPPED
Start: 2021-02-03 | End: 2021-04-22

## 2021-02-03 RX ORDER — FUROSEMIDE 40 MG/1
40 TABLET ORAL DAILY
Qty: 90 TABLET | Refills: 3 | Status: SHIPPED
Start: 2021-02-03 | End: 2021-04-22 | Stop reason: DRUGHIGH

## 2021-02-05 ENCOUNTER — OFFICE VISIT (OUTPATIENT)
Dept: CARDIOLOGY CLINIC | Age: 51
End: 2021-02-05
Payer: MEDICAID

## 2021-02-05 VITALS
SYSTOLIC BLOOD PRESSURE: 132 MMHG | BODY MASS INDEX: 30.46 KG/M2 | HEART RATE: 79 BPM | WEIGHT: 201 LBS | HEIGHT: 68 IN | DIASTOLIC BLOOD PRESSURE: 86 MMHG

## 2021-02-05 DIAGNOSIS — I25.10 CAD IN NATIVE ARTERY: Primary | ICD-10-CM

## 2021-02-05 PROCEDURE — 93000 ELECTROCARDIOGRAM COMPLETE: CPT | Performed by: INTERNAL MEDICINE

## 2021-02-05 PROCEDURE — 99214 OFFICE O/P EST MOD 30 MIN: CPT | Performed by: INTERNAL MEDICINE

## 2021-02-05 RX ORDER — INSULIN DETEMIR 100 [IU]/ML
16 INJECTION, SOLUTION SUBCUTANEOUS 2 TIMES DAILY
COMMUNITY

## 2021-02-05 RX ORDER — INSULIN ASPART 100 [IU]/ML
INJECTION, SOLUTION INTRAVENOUS; SUBCUTANEOUS
COMMUNITY

## 2021-02-19 ENCOUNTER — HOSPITAL ENCOUNTER (OUTPATIENT)
Dept: CT IMAGING | Age: 51
Discharge: HOME | End: 2021-02-19

## 2021-02-19 DIAGNOSIS — R06.02 BREATH SHORTNESS: ICD-10-CM

## 2021-02-19 PROCEDURE — 71250 CT THORAX DX C-: CPT

## 2021-02-25 ENCOUNTER — TELEPHONE (OUTPATIENT)
Dept: CARDIAC REHAB | Age: 51
End: 2021-02-25

## 2021-02-25 NOTE — TELEPHONE ENCOUNTER
Contacted pt due to cardiac rehab eval no show. Pt stated that she would like to hold off on rehab for now due personal circumstances. Advised pt to contact facility if she would like to join the program in the future. Referring physician, Dr. Lambert Asencio, to be notified.

## 2021-03-04 ENCOUNTER — HOSPITAL ENCOUNTER (OUTPATIENT)
Dept: ULTRASOUND IMAGING | Age: 51
Discharge: HOME | End: 2021-03-04

## 2021-03-04 DIAGNOSIS — R16.0 HEPATOMEGALY: ICD-10-CM

## 2021-03-04 PROCEDURE — 76705 ECHO EXAM OF ABDOMEN: CPT

## 2021-03-30 ENCOUNTER — TELEPHONE (OUTPATIENT)
Dept: CARDIOLOGY CLINIC | Age: 51
End: 2021-03-30

## 2021-03-30 NOTE — TELEPHONE ENCOUNTER
Patient called in stating she completely passed out yesterday. She was with family and they said she was out of it for about 5 minutes after. She doesn't remember anything. When asked - she has never had this happen to her before sometimes gets dizzy when standing up. She did not go to the emergency room.

## 2021-04-19 ENCOUNTER — TELEPHONE (OUTPATIENT)
Dept: CARDIOLOGY CLINIC | Age: 51
End: 2021-04-19

## 2021-04-19 ENCOUNTER — HOSPITAL ENCOUNTER (OUTPATIENT)
Age: 51
Discharge: HOME | End: 2021-04-19

## 2021-04-19 DIAGNOSIS — R25.2 MUSCLE CRAMPS: Primary | ICD-10-CM

## 2021-04-19 DIAGNOSIS — Z95.1 STATUS POST CORONARY ARTERY BYPASS GRAFT: Primary | ICD-10-CM

## 2021-04-19 LAB
ANION GAP SERPL CALCULATED.3IONS-SCNC: 15 MMOL/L (ref 7–16)
BUN BLDV-MCNC: 37 MG/DL (ref 6–20)
CALCIUM SERPL-MCNC: 9.6 MG/DL (ref 8.6–10.2)
CHLORIDE BLD-SCNC: 84 MMOL/L (ref 98–107)
CO2: 28 MMOL/L (ref 22–29)
CREAT SERPL-MCNC: 2.1 MG/DL (ref 0.5–1)
GFR AFRICAN AMERICAN: 30
GFR NON-AFRICAN AMERICAN: 25 ML/MIN/1.73
GLUCOSE BLD-MCNC: 330 MG/DL (ref 74–99)
POTASSIUM SERPL-SCNC: 2.9 MMOL/L (ref 3.5–5)
SODIUM BLD-SCNC: 127 MMOL/L (ref 132–146)

## 2021-04-19 PROCEDURE — 36415 COLL VENOUS BLD VENIPUNCTURE: CPT

## 2021-04-19 PROCEDURE — 80048 BASIC METABOLIC PNL TOTAL CA: CPT

## 2021-04-19 NOTE — TELEPHONE ENCOUNTER
Patient called in stating that she has been on Lasix 40mg bid and potassium 20 daily since she was seen here in February.     She is having a lot of muscle cramps and burning asking if she has discontinue    She is not having any other problems I have personally seen and examined this patient.  I have fully participated in the care of this patient. I have reviewed all pertinent clinical information, including history, physical exam, plan and the Resident’s note and agree except as noted.

## 2021-04-22 DIAGNOSIS — R06.02 SHORTNESS OF BREATH: Primary | ICD-10-CM

## 2021-04-22 DIAGNOSIS — N17.9 ACUTE KIDNEY INJURY (HCC): Primary | ICD-10-CM

## 2021-04-22 RX ORDER — FUROSEMIDE 40 MG/1
40 TABLET ORAL DAILY PRN
Qty: 90 TABLET | Refills: 3 | Status: SHIPPED
Start: 2021-04-22 | End: 2022-06-21 | Stop reason: SDUPTHER

## 2021-04-22 NOTE — TELEPHONE ENCOUNTER
She can use Lasix just as needed not daily, should stop potassium take it just when she takes her Lasix, she needs basic metabolic panel in a week

## 2021-04-29 ENCOUNTER — HOSPITAL ENCOUNTER (OUTPATIENT)
Age: 51
Discharge: HOME | End: 2021-04-29

## 2021-04-29 DIAGNOSIS — N17.9 ACUTE KIDNEY INJURY (HCC): ICD-10-CM

## 2021-04-29 LAB
ANION GAP SERPL CALCULATED.3IONS-SCNC: 12 MMOL/L (ref 7–16)
BUN BLDV-MCNC: 11 MG/DL (ref 6–20)
CALCIUM SERPL-MCNC: 9 MG/DL (ref 8.6–10.2)
CHLORIDE BLD-SCNC: 100 MMOL/L (ref 98–107)
CO2: 24 MMOL/L (ref 22–29)
CREAT SERPL-MCNC: 0.8 MG/DL (ref 0.5–1)
GFR AFRICAN AMERICAN: >60
GFR NON-AFRICAN AMERICAN: >60 ML/MIN/1.73
GLUCOSE BLD-MCNC: 199 MG/DL (ref 74–99)
POTASSIUM SERPL-SCNC: 3.9 MMOL/L (ref 3.5–5)
SODIUM BLD-SCNC: 136 MMOL/L (ref 132–146)

## 2021-04-29 PROCEDURE — 36415 COLL VENOUS BLD VENIPUNCTURE: CPT

## 2021-04-29 PROCEDURE — 80048 BASIC METABOLIC PNL TOTAL CA: CPT

## 2021-05-03 NOTE — PROGRESS NOTES
[id number] [facility] Cardiology consult  Dr. Rogelio Naidu      Reason for Consult: Uncontrolled HTN  Referring Physician: Sondra Hernandez MD     CHIEF COMPLAINT:   Chief Complaint   Patient presents with    Coronary Artery Disease     3 month. Patient stated she passed out for seconds. Patient did not go to ER. HISTORY OF PRESENT ILLNESS:   52year old female with history of Diabetes Hyperlipidemia and Hypertension is here due to uncontrolled hypertension and chest pain. Had syncopal episode 2 weeks ago, was dizzy all day long that day, at coincided with her abnormal basic metabolic panel at that revealed significant prerenal azotemia, no recurrence since diuretics were adjusted, patient denies any chest pain, no shortness of breath, no palpitations, no pedal edema, no PND, no orthopnea.   Functional capacity is improving  Father is sick in the hospital    Past Medical History:   Diagnosis Date    CAD in native artery 2020    Claustrophobia     mild    Colitis     Constipation     Diabetes mellitus ([address]     Heart problem     Hyperlipidemia     Hypertension     Hypothyroidism     Sleep apnea     diagnosed 7-8 years ago does not use C-pap problems with cost    Thyroid disease     Wears partial dentures     upper         Past Surgical History:   Procedure Laterality Date    APPENDECTOMY      CARDIAC CATHETERIZATION  2020    Dr. Fabrizio Paz EF 55% cts consult ordered     SECTION      x2    CHOLECYSTECTOMY      COLONOSCOPY      CORONARY ARTERY BYPASS GRAFT N/A 2020    CORONARY ARTERY BYPASS, POSSIBLE LEFT RADIAL ARTERY HARVEST, MINDA performed by Devan Fernández DO at [facility]  12/1/15    laparoscopic incisional reapair with mesh    HYSTERECTOMY      KNEE SURGERY Right     VENTRAL HERNIA REPAIR           Current Outpatient Medications   Medication Sig Dispense Refill    clopidogrel (PLAVIX) 75 MG tablet Take 1 tablet by mouth daily 90 tablet 2    isosorbide mononitrate (IMDUR) 30 MG extended release tablet Take 1 tablet by mouth daily PATIENT TO TAKE FOR 6 MONTHS TO PREVENT RADIAL GRAFT SPASM 90 tablet 3    metoprolol tartrate (LOPRESSOR) 25 MG tablet Take 0.5 tablets by mouth 2 times daily 90 tablet 3    pantoprazole (PROTONIX) 40 MG tablet Take 1 tablet by mouth daily for 14 days 90 tablet 2    furosemide (LASIX) 40 MG tablet Take 1 tablet by mouth daily as needed Take 1 by mouth daily, take an extra 1 for extra swelling 90 tablet 3    insulin detemir (LEVEMIR) 100 UNIT/ML injection vial Inject 25 Units into the skin nightly      insulin aspart (NOVOLOG FLEXPEN) 100 UNIT/ML injection pen Inject into the skin 3 times daily (before meals)      ezetimibe (ZETIA) 10 MG tablet Take 1 tablet by mouth daily 90 tablet 3    aspirin 81 MG EC tablet Take 1 tablet by mouth daily 180 tablet 3    Insulin Syringe-Needle U-100 ([employer] INSULIN SYR 0.5CC/30G) 30G X 5/16\" 0.5 ML MISC 1 each by Does not apply route 3 times daily 100 each 5    acetaminophen (TYLENOL) 325 MG tablet Take 2 tablets by mouth every 4 hours as needed for Pain 120 tablet 3    ferrous sulfate (IRON 325) 325 (65 Fe) MG tablet Take 1 tablet by mouth 2 times daily (with meals) 60 tablet 0    folic acid (FOLVITE) 1 MG tablet Take 1 tablet by mouth daily 30 tablet 0    docusate sodium (COLACE, DULCOLAX) 100 MG CAPS Take 100 mg by mouth 2 times daily as needed for Constipation (CONSTIPATION) 20 capsule 0    ibuprofen (ADVIL;MOTRIN) 600 MG tablet Take 1 tablet by mouth every 8 hours as needed for Pain 360 tablet 1    levothyroxine (SYNTHROID) 75 MCG tablet Take 1 tablet by mouth Daily 30 tablet 5    METHADONE HCL PO Take by mouth Takes 37 mg daily ( taking for past 20 years) history of 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  Oxycodone      Insulin Pen Needle ([employer] PEN NEEDLES 31G) 31G X 8 MM MISC 1 each by Does not apply route daily 120 each 3     No current facility-administered medications for this visit. Allergies as of 05/04/2021 - Review Complete 02/22/2021   Allergen Reaction Noted    Seasonal  09/01/2020       Social History     Socioeconomic History    Marital status:      Spouse name: Not on file    Number of children: Not on file    Years of education: Not on file    Highest education level: Not on file   Occupational History    Occupation: [employer] resource strain: Not on file    Food insecurity     Worry: Not on file     Inability: Not on file   [employer] needs     Medical: Not on file     Non-medical: Not on file   Tobacco Use    Smoking status: Current Every Day Smoker     Packs/day: 0.50     Years: 25.00     Pack years: 12.50     Types: Cigarettes    Smokeless tobacco: Never Used    Tobacco comment: trying to quit.  5-6 per day   Substance and Sexual Activity    Alcohol use: Not Currently     Comment: Peps[employer] daily     Drug use: Not Currently     Comment: methadone     Sexual activity: Yes     Partners: Male   Lifestyle    Physical activity     Days per week: Not on file     Minutes per session: Not on file    Stress: Not on file   Relationships    Social connections     Talks on phone: Not on file     Gets together: Not on file     Attends [employer] service: Not on file     Active member of club or organization: Not on file     Attends meetings of clubs or organizations: Not on file     Relationship status: Not on file    Intimate partner violence     Fear of current or ex partner: Not on file     Emotionally abused: Not on file     Physically abused: Not on file     Forced sexual activity: Not on file   Other Topics Concern    Not on file   Social History Narrative    Not on file       Family History   Adopted: Yes   Problem Relation Age of Onset    Mult Sclerosis Mother     Mult Sclerosis Maternal Grandmother        REVIEW OF SYSTEMS:     CONSTITUTIONAL:  negative for  fevers, chills, sweats, + fatigue  HEENT:  negative for  tinnitus, earaches, nasal congestion and epistaxis  RESPIRATORY:  negative for  dry cough, cough with sputum, wheezing and hemoptysis  GASTROINTESTINAL:  negative for nausea, vomiting, diarrhea, constipation, pruritus and jaundice  HEMATOLOGIC/LYMPHATIC:  negative for easy bruising, bleeding, lymphadenopathy and petechiae  ENDOCRINE:  negative for heat intolerance, cold intolerance, tremor, hair loss and diabetic symptoms including neither polyuria nor polydipsia nor blurred vision  MUSCULOSKELETAL:  negative for  myalgias, arthralgias, joint swelling, stiff joints and decreased range of motion  NEUROLOGICAL:  negative for memory problems, speech problems, visual disturbance, dysphagia, weakness and numbness      PHYSICAL EXAM:   CONSTITUTIONAL:  awake, alert, cooperative, no apparent distress, and appears stated age  HEAD:  normocepalic, without obvious abnormality, atraumatic, pink, moist mucous membranes. NECK:  Supple, symmetrical, trachea midline, no adenopathy, thyroid symmetric, not enlarged and no tenderness, skin normal  LUNGS:  No increased work of breathing, good air exchange, clear to auscultation bilaterally, no crackles or wheezing  CARDIOVASCULAR:  Normal apical impulse, regular rate and rhythm, normal S1 and S2, no S3 or S4, 2/6 systolic murmur at the apex, no JVD, no carotid bruit, no pedal edema, good carotid upstroke bilaterally. ABDOMEN:  Soft, nontender, no masses, no hepatomegaly or splenomegaly, BS+  CHEST: nontender to palpation, expands symmetrically  MUSCULOSKELETAL:  No clubbing no cyanosis. there is no redness, warmth, or swelling of the joints  full range of motion noted  NEUROLOGIC:  Alert, awake,oriented x3  SKIN:  no bruising or bleeding, normal skin color, texture, turgor and no redness, warmth, or swelling    BP (!) 168/94 (Site: Left Upper Arm, Position: Sitting, Cuff Size: Large Adult)   Pulse 71   Ht 5' 8\" (1.727 m)   Wt 202 lb (91.6 kg)   BMI 30.71 kg/m²     DATA:   I personally reviewed the visit EKG with the following interpretation: Sinus rhythm, possible left atrial enlargement, nonspecific T wave changes, LVH by voltage criteria    EKG 1/12/21 Sinus  Rhythm   Possible right atrial enlargement  LVH by voltage criteria  Nonspecific ST depression       ECHO: 2/2/21 Summary   No previous echo for comparison. Technically adequate study. Left ventricle is mildly enlarged . Mild asymmetric septal hypertrophy. Ejection fraction is visually estimated at 45-50%. No regional wall motion abnormalities seen. There is doppler evidence of stage II diastolic dysfunction. Physiologic and/or trace mitral regurgitation is present. Physiologic and/or trace tricuspid regurgitation. RVSP is normal.    1. Stress: 9/1/20 Electrocardiographically normal regadenoson infusion with a clinicallynonischemic response. 2. Myocardial perfusion imaging showed small anterior wall fixed defect. 3. Overall left ventricular systolic function mildly reduced, EF 45% with mild global hypokinesis. 4. Low risk general pharmacologic stress test    Angiography 9/14/20 IMPRESSION:  1.  Multivessel disease involving ostial LAD, distal LAD, left  circumflex artery, OM branch, RCA and acute marginal branch. 2.  Moderate disease involving the ostium and the proximal segment of  the right common iliac artery. 3.  Preserved LV function with mild inferior wall hypokinesis. 4.  Successful deployment of 6-Brazilian Angio-Seal in the right common  femoral artery and deployment of 6-Brazilian Angio-Seal in the left common  femoral artery. CABG 9/29/20 Coronary artery bypass grafting x3 using the left internal mammary  artery to the left anterior descending artery, radial artery to the  dominant obtuse marginal branch and circumflex and reverse saphenous  vein graft to the distal right coronary artery.   Cardiology Labs: BMP:    Lab Results   Component Value Date     04/29/2021    K 3.9 04/29/2021    K 4.1 01/20/2021  04/29/2021    CO2 24 04/29/2021    BUN 11 04/29/2021    CREATININE 0.8 04/29/2021     CMP:    Lab Results   Component Value Date     04/29/2021    K 3.9 04/29/2021    K 4.1 01/20/2021     04/29/2021    CO2 24 04/29/2021    BUN 11 04/29/2021    CREATININE 0.8 04/29/2021    PROT 8.2 01/20/2021     CBC:    Lab Results   Component Value Date    WBC 12.7 01/20/2021    RBC 4.77 01/20/2021    HGB 14.0 01/20/2021    HCT 43.7 01/20/2021    MCV 91.6 01/20/2021    RDW 13.6 01/20/2021     01/20/2021     PT/INR:  No results found for: PTINR  PT/INR Warfarin:  No components found for: PTPATWAR, PTINRWAR  PTT:    Lab Results   Component Value Date    APTT 30.2 09/29/2020     PTT Heparin:  No components found for: APTTHEP  Magnesium:    Lab Results   Component Value Date    MG 2.1 09/30/2020     TSH:    Lab Results   Component Value Date    TSH 25.150 10/03/2020     TROPONIN:  No components found for: TROP  BNP:  No results found for: BNP  FASTING LIPID PANEL:    Lab Results   Component Value Date    CHOL 288 10/27/2015    HDL 27 10/27/2015    TRIG 253 10/27/2015     No orders to display     I have personally reviewed the laboratory, cardiac diagnostic and radiographic testing as outlined above:      IMPRESSION:   1.  CAD: Status post CABG with a LIMA to LAD, radial graft to OM, SVG to distal RCA, doing okay    2. Hypertension: Controlled  3. Hyperlipidemia: On statin  4. Type 2 diabetes mellitus  5. Tobacco abuse: Was counseled regarding smoking cessation    RECOMMENDATIONS:   1. Continue current treatment  2. Preventive cardiology: Low-salt, low-cholesterol diet, daily exercise, total cholesterol of less than 200, LDL of less than 70, adherence to diabetic diet and diabetic medications, smoking cessation were all advised. 3.  Continue current treatment  4. Follow-up with the PCP as scheduled  5.   Follow-up with Dr. Patricia Pringle in 6 months, sooner if symptomatic for any reason    I have reviewed my findings and recommendations with patient      Electronically signed by Lily Post MD on 5/4/2021 at 5:50 PM      NOTE: This report was transcribed using voice recognition software.  Every effort was made to ensure accuracy; however, inadvertent computerized transcription errors may be presen

## 2021-05-04 ENCOUNTER — OFFICE VISIT (OUTPATIENT)
Dept: CARDIOLOGY CLINIC | Age: 51
End: 2021-05-04
Payer: MEDICAID

## 2021-05-04 VITALS
HEIGHT: 68 IN | WEIGHT: 202 LBS | SYSTOLIC BLOOD PRESSURE: 168 MMHG | DIASTOLIC BLOOD PRESSURE: 94 MMHG | BODY MASS INDEX: 30.62 KG/M2 | HEART RATE: 71 BPM

## 2021-05-04 DIAGNOSIS — R06.02 SHORTNESS OF BREATH: Primary | ICD-10-CM

## 2021-05-04 PROCEDURE — 93000 ELECTROCARDIOGRAM COMPLETE: CPT | Performed by: INTERNAL MEDICINE

## 2021-05-04 PROCEDURE — 99214 OFFICE O/P EST MOD 30 MIN: CPT | Performed by: INTERNAL MEDICINE

## 2021-05-04 RX ORDER — CLOPIDOGREL BISULFATE 75 MG/1
75 TABLET ORAL DAILY
Qty: 90 TABLET | Refills: 2 | Status: SHIPPED | OUTPATIENT
Start: 2021-05-04 | End: 2022-11-01

## 2021-05-04 RX ORDER — PANTOPRAZOLE SODIUM 40 MG/1
40 TABLET, DELAYED RELEASE ORAL DAILY
Qty: 90 TABLET | Refills: 2 | Status: SHIPPED | OUTPATIENT
Start: 2021-05-04 | End: 2022-11-01

## 2021-05-04 RX ORDER — ISOSORBIDE MONONITRATE 30 MG/1
30 TABLET, EXTENDED RELEASE ORAL DAILY
Qty: 90 TABLET | Refills: 3 | Status: SHIPPED
Start: 2021-05-04 | End: 2022-08-03 | Stop reason: SDUPTHER

## 2021-05-17 RX ORDER — LISINOPRIL AND HYDROCHLOROTHIAZIDE 12.5; 1 MG/1; MG/1
1 TABLET ORAL DAILY
Qty: 90 TABLET | Refills: 1 | Status: SHIPPED
Start: 2021-05-17 | End: 2021-11-14

## 2021-05-19 ENCOUNTER — TELEPHONE (OUTPATIENT)
Dept: CARDIOTHORACIC SURGERY | Age: 51
End: 2021-05-19

## 2021-05-20 DIAGNOSIS — Z95.1 S/P CABG (CORONARY ARTERY BYPASS GRAFT): Primary | ICD-10-CM

## 2021-06-07 ENCOUNTER — HOSPITAL ENCOUNTER (OUTPATIENT)
Age: 51
Discharge: HOME | End: 2021-06-09

## 2021-06-07 ENCOUNTER — HOSPITAL ENCOUNTER (OUTPATIENT)
Dept: GENERAL RADIOLOGY | Age: 51
Discharge: HOME | End: 2021-06-09

## 2021-06-07 DIAGNOSIS — Z95.1 S/P CABG (CORONARY ARTERY BYPASS GRAFT): ICD-10-CM

## 2021-06-07 PROCEDURE — 71046 X-RAY EXAM CHEST 2 VIEWS: CPT

## 2021-06-21 ENCOUNTER — TELEPHONE (OUTPATIENT)
Dept: CARDIOLOGY CLINIC | Age: 51
End: 2021-06-21

## 2021-06-21 NOTE — TELEPHONE ENCOUNTER
Patient called in stating for the past few weeks she has the feeling of \"loosing air\"  It also feels like there's a knot over her incision on the outside    Her pulse ox will run in the 70's

## 2021-06-28 NOTE — PROGRESS NOTES
[facility] consult  Dr. Matthew Hull      Reason for Consult: Uncontrolled HTN  Referring Physician: Gladys Elliott MD     CHIEF COMPLAINT:   Chief Complaint   Patient presents with    Coronary Artery Disease     Check per patient due to CP, SOBOE, occ dizziness (no syncope) & occ LE edema.  Chest Pain    Shortness of Breath    Dizziness    Edema       HISTORY OF PRESENT ILLNESS:   52year old female with history of Diabetes Hyperlipidemia and Hypertension is here due to uncontrolled hypertension and chest pain. Patient is still complaining of shortness of breath, at rest and with exertion, chest pain left and right-sided, at rest and with exertion, occasional pedal edema, however now is under control with Lasix, occasional dizziness but that is chronic, no PND, no orthopnea, no syncope, no presyncopal episodes.     Functional capacity is improving      Past Medical History:   Diagnosis Date    CAD in native artery 2020    Claustrophobia     mild    Colitis     Constipation     Diabetes mellitus ([facility] [address]     Heart problem     Hyperlipidemia     Hypertension     Hypothyroidism     Sleep apnea     diagnosed 7-8 years ago does not use C-pap problems with cost    Thyroid disease     Wears partial dentures     upper         Past Surgical History:   Procedure Laterality Date    APPENDECTOMY      CARDIAC CATHETERIZATION  2020    Dr. Alejandrina Estevez EF 55% cts consult ordered     SECTION      x2    CHOLECYSTECTOMY      COLONOSCOPY      CORONARY ARTERY BYPASS GRAFT N/A 2020    CORONARY ARTERY BYPASS, POSSIBLE LEFT RADIAL ARTERY HARVEST, MINDA performed by Tra Oneill DO at [address] [facility]  12/1/15    laparoscopic incisional reapair with mesh    HYSTERECTOMY      KNEE SURGERY Right     VENTRAL HERNIA REPAIR           Current Outpatient Medications   Medication Sig Dispense Refill    senna (SENOKOT) 8.6 MG tablet Take 1 tablet by mouth 2 times daily      methylPREDNISolone (MEDROL, DALJIT,) 4 MG tablet Take by mouth.  1 kit 0    clopidogrel (PLAVIX) 75 MG tablet Take 1 tablet by mouth daily 90 tablet 2    isosorbide mononitrate (IMDUR) 30 MG extended release tablet Take 1 tablet by mouth daily PATIENT TO TAKE FOR 6 MONTHS TO PREVENT RADIAL GRAFT SPASM 90 tablet 3    metoprolol tartrate (LOPRESSOR) 25 MG tablet Take 0.5 tablets by mouth 2 times daily 90 tablet 3    pantoprazole (PROTONIX) 40 MG tablet Take 1 tablet by mouth daily for 14 days 90 tablet 2    furosemide (LASIX) 40 MG tablet Take 1 tablet by mouth daily as needed Take 1 by mouth daily, take an extra 1 for extra swelling 90 tablet 3    insulin detemir (LEVEMIR) 100 UNIT/ML injection vial Inject 60 Units into the skin nightly       insulin aspart (NOVOLOG FLEXPEN) 100 UNIT/ML injection pen Inject into the skin 3 times daily (before meals) Sliding scale      ezetimibe (ZETIA) 10 MG tablet Take 1 tablet by mouth daily 90 tablet 3    aspirin 81 MG EC tablet Take 1 tablet by mouth daily 180 tablet 3    acetaminophen (TYLENOL) 325 MG tablet Take 2 tablets by mouth every 4 hours as needed for Pain 120 tablet 3    ferrous sulfate (IRON 325) 325 (65 Fe) MG tablet Take 1 tablet by mouth 2 times daily (with meals) 60 tablet 0    folic acid (FOLVITE) 1 MG tablet Take 1 tablet by mouth daily 30 tablet 0    docusate sodium (COLACE, DULCOLAX) 100 MG CAPS Take 100 mg by mouth 2 times daily as needed for Constipation (CONSTIPATION) (Patient taking differently: Take 100 mg by mouth 2 times daily ) 20 capsule 0    ibuprofen (ADVIL;MOTRIN) 600 MG tablet Take 1 tablet by mouth every 8 hours as needed for Pain 360 tablet 1    levothyroxine (SYNTHROID) 75 MCG tablet Take 1 tablet by mouth Daily 30 tablet 5    METHADONE HCL PO Take by mouth Takes 37 mg daily ( taking for past 20 years) history of 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  Oxycodone      lisinopril-hydroCHLOROthiazide (PRINZIDE;ZESTORETIC) 10-12.5 MG per tablet Take 1 tablet by mouth daily (Patient not taking: Reported on 6/29/2021) 90 tablet 1    Insulin Syringe-Needle U-100 ([employer] INSULIN SYR 0.5CC/30G) 30G X 5/16\" 0.5 ML MISC 1 each by Does not apply route 3 times daily 100 each 5    Insulin Pen Needle ([employer] PEN NEEDLES 31G) 31G X 8 MM MISC 1 each by Does not apply route daily 120 each 3     No current facility-administered medications for this visit. Allergies as of 06/29/2021 - Fully Reviewed 06/29/2021   Allergen Reaction Noted    Seasonal  09/01/2020       Social History     Socioeconomic History    Marital status:      Spouse name: Not on file    Number of children: Not on file    Years of education: Not on file    Highest education level: Not on file   Occupational History    Occupation: [employer]   Tobacco Use    Smoking status: Current Every Day Smoker     Packs/day: 1.50     Years: 25.00     Pack years: 37.50     Types: Cigarettes    Smokeless tobacco: Never Used    Tobacco comment: trying to quit. currently 7-8/day   Vaping Use    Vaping Use: Some days    Substances: Nicotine   Substance and Sexual Activity    Alcohol use: Not Currently    Drug use: Not Currently     Comment: methadone     Sexual activity: Yes     Partners: Male   Other Topics Concern    Not on file   Social History Narrative    Not on file     Social Determinants of Health     Financial Resource Strain:     Difficulty of Paying Living Expenses:    Food Insecurity:     Worried About Running Out of Food in the Last Year:     [address] in the Last Year:    Transportation Needs:     Lack of Transportation (Medical):      Lack of Transportation (Non-Medical):    Physical Activity:     Days of Exercise per Week:     Minutes of Exercise per Session:    Stress:     Feeling of Stress :    Social Connections:     Frequency of Communication with Friends and Family:     Frequency of Social Gatherings with Friends and Family:     Attends Oriental orthodox Services:     Active Member of Clubs or Organizations:     Attends Club or Organization Meetings:     Marital Status:    Intimate Partner Violence:     Fear of Current or Ex-Partner:     Emotionally Abused:     Physically Abused:     Sexually Abused:        Family History   Adopted: Yes   Problem Relation Age of Onset    Mult Sclerosis Mother     Mult Sclerosis Maternal Grandmother        REVIEW OF SYSTEMS:     CONSTITUTIONAL:  negative for  fevers, chills, sweats, + fatigue  HEENT:  negative for  tinnitus, earaches, nasal congestion and epistaxis  RESPIRATORY:  negative for  dry cough, cough with sputum, wheezing and hemoptysis  GASTROINTESTINAL:  negative for nausea, vomiting, diarrhea, constipation, pruritus and jaundice  HEMATOLOGIC/LYMPHATIC:  negative for easy bruising, bleeding, lymphadenopathy and petechiae  ENDOCRINE:  negative for heat intolerance, cold intolerance, tremor, hair loss and diabetic symptoms including neither polyuria nor polydipsia nor blurred vision  MUSCULOSKELETAL:  negative for  myalgias, arthralgias, joint swelling, stiff joints and decreased range of motion  NEUROLOGICAL:  negative for memory problems, speech problems, visual disturbance, dysphagia, weakness and numbness      PHYSICAL EXAM:   CONSTITUTIONAL:  awake, alert, cooperative, no apparent distress, and appears stated age  HEAD:  normocepalic, without obvious abnormality, atraumatic, pink, moist mucous membranes. NECK:  Supple, symmetrical, trachea midline, no adenopathy, thyroid symmetric, not enlarged and no tenderness, skin normal  LUNGS:  No increased work of breathing, decreased air exchange, scattered wheezing  CARDIOVASCULAR:  Normal apical impulse, regular rate and rhythm, normal S1 and S2, no S3 or S4, 2/6 systolic murmur at the apex, no JVD, no carotid bruit, no pedal edema, good carotid upstroke bilaterally.   ABDOMEN:  Soft, nontender, no masses, no hepatomegaly or splenomegaly, BS+  CHEST: nontender to palpation, expands symmetrically  MUSCULOSKELETAL:  No clubbing no cyanosis. there is no redness, warmth, or swelling of the joints  full range of motion noted  NEUROLOGIC:  Alert, awake,oriented x3  SKIN:  no bruising or bleeding, normal skin color, texture, turgor and no redness, warmth, or swelling    BP (!) 144/80 (Site: Right Upper Arm, Position: Sitting, Cuff Size: Large Adult)   Pulse 76   Resp 20   Ht 5' 8\" (1.727 m)   Wt 203 lb (92.1 kg)   SpO2 96%   BMI 30.87 kg/m²     DATA:   I personally reviewed the visit EKG with the following interpretation: Sinus rhythm, possible biatrial enlargement, LVH by voltage cardiogram nonspecific ST-T changes    EKG 5/4/21 Sinus rhythm, possible left atrial enlargement, nonspecific T wave changes, LVH by voltage criteria    ECHO: 2/2/21 Summary   No previous echo for comparison. Technically adequate study. Left ventricle is mildly enlarged . Mild asymmetric septal hypertrophy. Ejection fraction is visually estimated at 45-50%. No regional wall motion abnormalities seen. There is doppler evidence of stage II diastolic dysfunction. Physiologic and/or trace mitral regurgitation is present. Physiologic and/or trace tricuspid regurgitation. RVSP is normal.    1. Stress: 9/1/20 Electrocardiographically normal regadenoson infusion with a clinicallynonischemic response. 2. Myocardial perfusion imaging showed small anterior wall fixed defect. 3. Overall left ventricular systolic function mildly reduced, EF 45% with mild global hypokinesis. 4. Low risk general pharmacologic stress test    Angiography 9/14/20 IMPRESSION:  1.  Multivessel disease involving ostial LAD, distal LAD, left  circumflex artery, OM branch, RCA and acute marginal branch. 2.  Moderate disease involving the ostium and the proximal segment of  the right common iliac artery. 3.  Preserved LV function with mild inferior wall hypokinesis.   4.  Successful deployment of 6-[place] Angio-Seal in the right common  femoral artery and deployment of 6-[place] Angio-Seal in the left common  femoral artery. CABG 9/29/20 Coronary artery bypass grafting x3 using the left internal mammary  artery to the left anterior descending artery, radial artery to the  dominant obtuse marginal branch and circumflex and reverse saphenous  vein graft to the distal right coronary artery. Cardiology Labs: BMP:    Lab Results   Component Value Date     04/29/2021    K 3.9 04/29/2021    K 4.1 01/20/2021     04/29/2021    CO2 24 04/29/2021    BUN 11 04/29/2021    CREATININE 0.8 04/29/2021     CMP:    Lab Results   Component Value Date     04/29/2021    K 3.9 04/29/2021    K 4.1 01/20/2021     04/29/2021    CO2 24 04/29/2021    BUN 11 04/29/2021    CREATININE 0.8 04/29/2021    PROT 8.2 01/20/2021     CBC:    Lab Results   Component Value Date    WBC 12.7 01/20/2021    RBC 4.77 01/20/2021    HGB 14.0 01/20/2021    HCT 43.7 01/20/2021    MCV 91.6 01/20/2021    RDW 13.6 01/20/2021     01/20/2021     PT/INR:  No results found for: PTINR  PT/INR Warfarin:  No components found for: PTPATWAR, PTINRWAR  PTT:    Lab Results   Component Value Date    APTT 30.2 09/29/2020     PTT Heparin:  No components found for: APTTHEP  Magnesium:    Lab Results   Component Value Date    MG 2.1 09/30/2020     TSH:    Lab Results   Component Value Date    TSH 25.150 10/03/2020     TROPONIN:  No components found for: TROP  BNP:  No results found for: BNP  FASTING LIPID PANEL:    Lab Results   Component Value Date    CHOL 288 10/27/2015    HDL 27 10/27/2015    TRIG 253 10/27/2015     No orders to display     I have personally reviewed the laboratory, cardiac diagnostic and radiographic testing as outlined above:      IMPRESSION:   1.  Shortness of breath: Doubt cardiac, suspect pulmonary, will refer to pulmonary for further evaluation  2. Chronic diastolic congestive heart failure: Compensated, continue current treatment  3.   CAD: Status post CABG with a LIMA to LAD, radial graft to OM, SVG to distal RCA, doing okay  4. Hypertension: Controlled  5. Hyperlipidemia: On statin  6. Type 2 diabetes mellitus  7. Tobacco abuse: Was counseled regarding smoking cessation    RECOMMENDATIONS:   1. Pulmonary referral  2. Continue current treatment  3. Preventive cardiology: Low-salt, low-cholesterol diet, daily exercise, total cholesterol of less than 200, LDL of less than 70, adherence to diabetic diet and diabetic medications, smoking cessation were all advised. 4.  Follow-up with the PCP as scheduled  5. Follow-up with Dr. Kevin Adames in 6 months, sooner if symptomatic for any reason    I have reviewed my findings and recommendations with patient      Electronically signed by Bascom Cushing, MD on 6/29/2021 at 11:06 AM      NOTE: This report was transcribed using voice recognition software.  Every effort was made to ensure accuracy; however, inadvertent computerized transcription errors may be presen

## 2021-06-29 ENCOUNTER — OFFICE VISIT (OUTPATIENT)
Dept: CARDIOLOGY CLINIC | Age: 51
End: 2021-06-29

## 2021-06-29 VITALS
BODY MASS INDEX: 30.77 KG/M2 | DIASTOLIC BLOOD PRESSURE: 80 MMHG | RESPIRATION RATE: 20 BRPM | HEART RATE: 76 BPM | SYSTOLIC BLOOD PRESSURE: 144 MMHG | HEIGHT: 68 IN | WEIGHT: 203 LBS | OXYGEN SATURATION: 96 %

## 2021-06-29 DIAGNOSIS — R06.02 SHORTNESS OF BREATH: Primary | ICD-10-CM

## 2021-06-29 PROCEDURE — 93000 ELECTROCARDIOGRAM COMPLETE: CPT | Performed by: INTERNAL MEDICINE

## 2021-06-29 PROCEDURE — 99214 OFFICE O/P EST MOD 30 MIN: CPT | Performed by: INTERNAL MEDICINE

## 2021-06-29 RX ORDER — METHYLPREDNISOLONE 4 MG/1
TABLET ORAL
Qty: 1 KIT | Refills: 0 | Status: SHIPPED | OUTPATIENT
Start: 2021-06-29 | End: 2021-07-05

## 2021-06-29 RX ORDER — LISINOPRIL 10 MG/1
10 TABLET ORAL DAILY
Qty: 90 TABLET | Refills: 1 | Status: SHIPPED
Start: 2021-06-29 | End: 2022-09-26

## 2021-06-29 RX ORDER — SENNA PLUS 8.6 MG/1
1 TABLET ORAL 2 TIMES DAILY
COMMUNITY

## 2021-06-30 DIAGNOSIS — R06.02 SHORTNESS OF BREATH: Primary | ICD-10-CM

## 2021-09-07 ENCOUNTER — TELEPHONE (OUTPATIENT)
Dept: CARDIOLOGY CLINIC | Age: 51
End: 2021-09-07

## 2021-09-07 NOTE — TELEPHONE ENCOUNTER
A week ago this past Sunday - patient was slammed by her sons dog in between a chair that actually knocked the wind out of her.   She is a post CABG patient about 1 year ago and is c/o a weight sensation down on her chest.  She waited to see if it would improve, but hasn't

## 2021-11-14 ENCOUNTER — HOSPITAL ENCOUNTER (EMERGENCY)
Age: 51
Discharge: HOME | End: 2021-11-14
Attending: EMERGENCY MEDICINE

## 2021-11-14 ENCOUNTER — APPOINTMENT (OUTPATIENT)
Dept: GENERAL RADIOLOGY | Age: 51
End: 2021-11-14

## 2021-11-14 VITALS
TEMPERATURE: 96.9 F | WEIGHT: 200 LBS | SYSTOLIC BLOOD PRESSURE: 195 MMHG | HEART RATE: 68 BPM | DIASTOLIC BLOOD PRESSURE: 95 MMHG | RESPIRATION RATE: 16 BRPM | OXYGEN SATURATION: 96 % | BODY MASS INDEX: 30.41 KG/M2

## 2021-11-14 DIAGNOSIS — H69.80 DYSFUNCTION OF EUSTACHIAN TUBE, UNSPECIFIED LATERALITY: ICD-10-CM

## 2021-11-14 DIAGNOSIS — I10 UNCONTROLLED HYPERTENSION: ICD-10-CM

## 2021-11-14 DIAGNOSIS — S93.601A SPRAIN OF RIGHT FOOT, INITIAL ENCOUNTER: Primary | ICD-10-CM

## 2021-11-14 PROCEDURE — 99282 EMERGENCY DEPT VISIT SF MDM: CPT

## 2021-11-14 PROCEDURE — 73630 X-RAY EXAM OF FOOT: CPT

## 2021-11-14 RX ORDER — CETIRIZINE HYDROCHLORIDE 10 MG/1
10 TABLET ORAL DAILY
Qty: 30 TABLET | Refills: 0 | Status: SHIPPED | OUTPATIENT
Start: 2021-11-14 | End: 2021-12-14

## 2021-11-14 RX ORDER — PSEUDOEPHEDRINE HYDROCHLORIDE 30 MG/1
30 TABLET ORAL EVERY 6 HOURS PRN
Qty: 12 TABLET | Refills: 0 | Status: SHIPPED | OUTPATIENT
Start: 2021-11-14 | End: 2021-11-14 | Stop reason: SINTOL

## 2021-11-14 ASSESSMENT — ENCOUNTER SYMPTOMS
SINUS PRESSURE: 0
EYE REDNESS: 0
EYE DISCHARGE: 0
DIARRHEA: 0
SHORTNESS OF BREATH: 0
NAUSEA: 0
WHEEZING: 0
BACK PAIN: 0
ABDOMINAL DISTENTION: 0
EYE PAIN: 0
VOMITING: 0
SORE THROAT: 0
COUGH: 0

## 2021-11-14 ASSESSMENT — PAIN DESCRIPTION - DESCRIPTORS: DESCRIPTORS: SORE

## 2021-11-14 ASSESSMENT — PAIN DESCRIPTION - FREQUENCY: FREQUENCY: CONTINUOUS

## 2021-11-14 ASSESSMENT — PAIN DESCRIPTION - ORIENTATION: ORIENTATION: RIGHT

## 2021-11-14 ASSESSMENT — PAIN DESCRIPTION - LOCATION: LOCATION: FOOT

## 2021-11-14 ASSESSMENT — PAIN SCALES - GENERAL: PAINLEVEL_OUTOF10: 4

## 2021-11-14 ASSESSMENT — ACTIVITIES OF DAILY LIVING (ADL): EFFECT OF PAIN ON DAILY ACTIVITIES: HURTS TO WALK.

## 2021-11-14 ASSESSMENT — PAIN DESCRIPTION - PAIN TYPE: TYPE: ACUTE PAIN

## 2021-11-14 NOTE — Clinical Note
Dusty Lawrence was seen and treated in our emergency department on 11/14/2021. She may return to work on 11/15/2021. If you have any questions or concerns, please don't hesitate to call.       Savita Cline, DO

## 2021-11-14 NOTE — ED PROVIDER NOTES
Ear buzzing, dizzy, rjight foot injury    The history is provided by the patient. Foot Problem  Location:  Foot  Injury: yes    Foot location:  R foot  Pain details:     Timing:  Intermittent  Chronicity:  New  Dislocation: no    Foreign body present:  No foreign bodies  Prior injury to area:  Yes  Relieved by:  Rest  Worsened by: Activity  Ineffective treatments:  None tried  Associated symptoms: decreased ROM and swelling    Associated symptoms: no back pain and no fever         Review of Systems   Constitutional: Negative for chills and fever. HENT: Positive for ear pain. Negative for sinus pressure and sore throat. Eyes: Negative for pain, discharge and redness. Respiratory: Negative for cough, shortness of breath and wheezing. Cardiovascular: Negative for chest pain. Gastrointestinal: Negative for abdominal distention, diarrhea, nausea and vomiting. Genitourinary: Negative for dysuria and frequency. Musculoskeletal: Positive for arthralgias and gait problem. Negative for back pain. Skin: Negative for rash and wound. Neurological: Positive for dizziness. Negative for weakness and headaches. Hematological: Negative for adenopathy. Psychiatric/Behavioral: Negative. All other systems reviewed and are negative. Physical Exam  Vitals and nursing note reviewed. Constitutional:       Appearance: She is well-developed. HENT:      Head: Normocephalic and atraumatic. Eyes:      Pupils: Pupils are equal, round, and reactive to light. Cardiovascular:      Rate and Rhythm: Normal rate and regular rhythm. Heart sounds: Normal heart sounds. No murmur heard. Pulmonary:      Effort: Pulmonary effort is normal.      Breath sounds: Normal breath sounds. Abdominal:      General: Bowel sounds are normal.      Palpations: Abdomen is soft. Tenderness: There is no abdominal tenderness. There is no guarding or rebound.    Musculoskeletal:      Cervical back: Normal range of motion and neck supple. Skin:     General: Skin is warm and dry. Neurological:      Mental Status: She is alert and oriented to person, place, and time. Psychiatric:         Behavior: Behavior normal.         Thought Content: Thought content normal.         Judgment: Judgment normal.        --------------------------------------------- PAST HISTORY ---------------------------------------------  Past Medical History:  has a past medical history of CAD in native artery, Claustrophobia, Colitis, Constipation, COVID-19, Diabetes mellitus ([facility] [address] Heart problem, Hyperlipidemia, Hypertension, Hypothyroidism, Sleep apnea, Thyroid disease, and Wears partial dentures. Past Surgical History:  has a past surgical history that includes Cholecystectomy; Appendectomy; knee surgery (Right); ventral hernia repair; Hysterectomy; hernia repair (12/1/15); Colonoscopy;  section; Cardiac catheterization (2020); and Coronary artery bypass graft (N/A, 2020). Social History:  reports that she has been smoking cigarettes. She has a 37.50 pack-year smoking history. She has never used smokeless tobacco. She reports previous alcohol use. She reports previous drug use. Family History: family history includes Mult Sclerosis in her maternal grandmother and mother. She was adopted. The patients home medications have been reviewed. Allergies: Seasonal    -------------------------------------------------- RESULTS -------------------------------------------------  No results found for this visit on 21. XR FOOT RIGHT (MIN 3 VIEWS)   Final Result   No acute process. ------------------------- NURSING NOTES AND VITALS REVIEWED ---------------------------   The nursing notes within the ED encounter and vital signs as below have been reviewed.    BP (!) 200/99   Pulse 68   Temp 96.9 °F (36.1 °C) (Temporal)   Resp 16   Wt 200 lb (90.7 kg)   SpO2 96%   BMI 30.41 kg/m²   Oxygen Saturation Interpretation: Normal      ------------------------------------------ PROGRESS NOTES ------------------------------------------   I have spoken with the patient and discussed todays results, in addition to providing specific details for the plan of care and counseling regarding the diagnosis and prognosis. Their questions are answered at this time and they are agreeable with the plan.      --------------------------------- ADDITIONAL PROVIDER NOTES ---------------------------------        This patient is stable for discharge. I have shared the specific conditions for return, as well as the importance of follow-up. IMPRESSION:     1. Sprain of right foot, initial encounter    2. Dysfunction of Eustachian tube, unspecified laterality    3.  Uncontrolled hypertension      Patient's Medications   New Prescriptions    CETIRIZINE (ZYRTEC) 10 MG TABLET    Take 1 tablet by mouth daily   Previous Medications    ACETAMINOPHEN (TYLENOL) 325 MG TABLET    Take 2 tablets by mouth every 4 hours as needed for Pain    ASPIRIN 81 MG EC TABLET    Take 1 tablet by mouth daily    CLOPIDOGREL (PLAVIX) 75 MG TABLET    Take 1 tablet by mouth daily    DOCUSATE SODIUM (COLACE, DULCOLAX) 100 MG CAPS    Take 100 mg by mouth 2 times daily as needed for Constipation (CONSTIPATION)    EZETIMIBE (ZETIA) 10 MG TABLET    Take 1 tablet by mouth daily    FERROUS SULFATE (IRON 325) 325 (65 FE) MG TABLET    Take 1 tablet by mouth 2 times daily (with meals)    FOLIC ACID (FOLVITE) 1 MG TABLET    Take 1 tablet by mouth daily    FUROSEMIDE (LASIX) 40 MG TABLET    Take 1 tablet by mouth daily as needed Take 1 by mouth daily, take an extra 1 for extra swelling    IBUPROFEN (ADVIL;MOTRIN) 600 MG TABLET    Take 1 tablet by mouth every 8 hours as needed for Pain    INSULIN ASPART (NOVOLOG FLEXPEN) 100 UNIT/ML INJECTION PEN    Inject into the skin 3 times daily (before meals) Sliding scale    INSULIN DETEMIR (LEVEMIR) 100 UNIT/ML INJECTION VIAL    Inject 60 Units into the skin nightly     INSULIN PEN NEEDLE ([employer] PEN NEEDLES 31G) 31G X 8 MM MISC    1 each by Does not apply route daily    INSULIN SYRINGE-NEEDLE U-100 ([employer] INSULIN SYR 0.5CC/30G) 30G X 5/16\" 0.5 ML MISC    1 each by Does not apply route 3 times daily    ISOSORBIDE MONONITRATE (IMDUR) 30 MG EXTENDED RELEASE TABLET    Take 1 tablet by mouth daily PATIENT TO TAKE FOR 6 MONTHS TO PREVENT RADIAL GRAFT SPASM    LEVOTHYROXINE (SYNTHROID) 75 MCG TABLET    Take 1 tablet by mouth Daily    LISINOPRIL (PRINIVIL;ZESTRIL) 10 MG TABLET    Take 1 tablet by mouth daily    METHADONE HCL PO    Take by mouth Takes 37 mg daily ( taking for past 20 years) history of 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  Oxycodone    METOPROLOL TARTRATE (LOPRESSOR) 25 MG TABLET    Take 0.5 tablets by mouth 2 times daily    PANTOPRAZOLE (PROTONIX) 40 MG TABLET    Take 1 tablet by mouth daily for 14 days    SENNA (SENOKOT) 8.6 MG TABLET    Take 1 tablet by mouth 2 times daily   Modified Medications    No medications on file   Discontinued Medications    LISINOPRIL-HYDROCHLOROTHIAZIDE (PRINZIDE;ZESTORETIC) 10-12.5 MG PER TABLET    Take 1 tablet by mouth daily     2    Procedures     MDM Jaquelyn Schaumann,   11/14/21 [id number]

## 2021-11-29 ENCOUNTER — HOSPITAL ENCOUNTER (EMERGENCY)
Age: 51
Discharge: LEFT BEFORE DISCHARGE | End: 2021-11-29

## 2021-11-29 VITALS — TEMPERATURE: 96.9 F | OXYGEN SATURATION: 98 % | HEART RATE: 88 BPM

## 2021-11-30 ENCOUNTER — HOSPITAL ENCOUNTER (EMERGENCY)
Age: 51
Discharge: LEFT BEFORE DISCHARGE | End: 2021-11-30

## 2021-11-30 ENCOUNTER — APPOINTMENT (OUTPATIENT)
Dept: GENERAL RADIOLOGY | Age: 51
End: 2021-11-30

## 2021-11-30 VITALS
DIASTOLIC BLOOD PRESSURE: 94 MMHG | TEMPERATURE: 96.8 F | HEART RATE: 68 BPM | RESPIRATION RATE: 22 BRPM | OXYGEN SATURATION: 98 % | BODY MASS INDEX: 28.89 KG/M2 | SYSTOLIC BLOOD PRESSURE: 180 MMHG | WEIGHT: 190 LBS

## 2021-11-30 LAB
ALBUMIN SERPL-MCNC: 4.3 G/DL (ref 3.5–5.2)
ALP BLD-CCNC: 111 U/L (ref 35–104)
ALT SERPL-CCNC: 22 U/L (ref 0–32)
ANION GAP SERPL CALCULATED.3IONS-SCNC: 12 MMOL/L (ref 7–16)
APTT: 30.1 SEC (ref 24.5–35.1)
AST SERPL-CCNC: 32 U/L (ref 0–31)
BASOPHILS ABSOLUTE: 0.15 E9/L (ref 0–0.2)
BASOPHILS RELATIVE PERCENT: 1.1 % (ref 0–2)
BILIRUB SERPL-MCNC: 0.4 MG/DL (ref 0–1.2)
BUN BLDV-MCNC: 13 MG/DL (ref 6–20)
CALCIUM SERPL-MCNC: 8.9 MG/DL (ref 8.6–10.2)
CHLORIDE BLD-SCNC: 99 MMOL/L (ref 98–107)
CO2: 23 MMOL/L (ref 22–29)
CREAT SERPL-MCNC: 0.7 MG/DL (ref 0.5–1)
EOSINOPHILS ABSOLUTE: 0.64 E9/L (ref 0.05–0.5)
EOSINOPHILS RELATIVE PERCENT: 4.7 % (ref 0–6)
GFR AFRICAN AMERICAN: >60
GFR NON-AFRICAN AMERICAN: >60 ML/MIN/1.73
GLUCOSE BLD-MCNC: 366 MG/DL (ref 74–99)
HCT VFR BLD CALC: 37.4 % (ref 34–48)
HEMOGLOBIN: 12.3 G/DL (ref 11.5–15.5)
IMMATURE GRANULOCYTES #: 0.12 E9/L
IMMATURE GRANULOCYTES %: 0.9 % (ref 0–5)
INR BLD: 1.1
LYMPHOCYTES ABSOLUTE: 3.23 E9/L (ref 1.5–4)
LYMPHOCYTES RELATIVE PERCENT: 23.7 % (ref 20–42)
MCH RBC QN AUTO: 31 PG (ref 26–35)
MCHC RBC AUTO-ENTMCNC: 32.9 % (ref 32–34.5)
MCV RBC AUTO: 94.2 FL (ref 80–99.9)
MONOCYTES ABSOLUTE: 0.61 E9/L (ref 0.1–0.95)
MONOCYTES RELATIVE PERCENT: 4.5 % (ref 2–12)
NEUTROPHILS ABSOLUTE: 8.87 E9/L (ref 1.8–7.3)
NEUTROPHILS RELATIVE PERCENT: 65.1 % (ref 43–80)
PDW BLD-RTO: 13.9 FL (ref 11.5–15)
PLATELET # BLD: 303 E9/L (ref 130–450)
PMV BLD AUTO: 12 FL (ref 7–12)
POTASSIUM SERPL-SCNC: 3.8 MMOL/L (ref 3.5–5)
PROTHROMBIN TIME: 13.2 SEC (ref 9.3–12.4)
RBC # BLD: 3.97 E12/L (ref 3.5–5.5)
SODIUM BLD-SCNC: 134 MMOL/L (ref 132–146)
TOTAL PROTEIN: 7.8 G/DL (ref 6.4–8.3)
TROPONIN, HIGH SENSITIVITY: 28 NG/L (ref 0–9)
WBC # BLD: 13.6 E9/L (ref 4.5–11.5)

## 2021-11-30 PROCEDURE — 80053 COMPREHEN METABOLIC PANEL: CPT

## 2021-11-30 PROCEDURE — 84484 ASSAY OF TROPONIN QUANT: CPT

## 2021-11-30 PROCEDURE — 71046 X-RAY EXAM CHEST 2 VIEWS: CPT

## 2021-11-30 PROCEDURE — 4500000002 HC ER NO CHARGE

## 2021-11-30 PROCEDURE — 93005 ELECTROCARDIOGRAM TRACING: CPT | Performed by: PHYSICIAN ASSISTANT

## 2021-11-30 PROCEDURE — 36415 COLL VENOUS BLD VENIPUNCTURE: CPT

## 2021-11-30 PROCEDURE — 85025 COMPLETE CBC W/AUTO DIFF WBC: CPT

## 2021-11-30 PROCEDURE — 85610 PROTHROMBIN TIME: CPT

## 2021-11-30 PROCEDURE — 85730 THROMBOPLASTIN TIME PARTIAL: CPT

## 2021-11-30 NOTE — ED NOTES
ASSESSMENT NOTE---  Electronically signed by Ning Rojo PA-C   DD: 11/30/21       Ning Rojo PA-C  11/30/21 [id number]

## 2021-12-01 LAB
EKG ATRIAL RATE: 55 BPM
EKG P AXIS: 53 DEGREES
EKG P-R INTERVAL: 168 MS
EKG Q-T INTERVAL: 516 MS
EKG QRS DURATION: 90 MS
EKG QTC CALCULATION (BAZETT): 493 MS
EKG R AXIS: 0 DEGREES
EKG T AXIS: 107 DEGREES
EKG VENTRICULAR RATE: 55 BPM

## 2022-01-17 NOTE — PROGRESS NOTES
[facility] Cardiology consult  Dr. Lc Rios      Reason for Consult: Uncontrolled HTN  Referring Physician: KATY Rosen     CHIEF COMPLAINT:   Chief Complaint   Patient presents with    Coronary Artery Disease     6 month ov- pt has complaints od sob, heaviness in chest, and swelling in feet and legs    Shortness of Breath       HISTORY OF PRESENT ILLNESS:   54year old female with history of Diabetes Hyperlipidemia and Hypertension is here due to uncontrolled hypertension and chest pain. Patient is still complaining of shortness of breath, at rest and with exertion, better with inhalers, however she ran out of her inhalers, cough, occasional orthopnea, denies any chest pain, no palpitations, no pedal edema, no PND, no syncope, no presyncopal episodes.   Functional capacity is improving    Past Medical History:   Diagnosis Date    CAD in native artery 2020    Claustrophobia     mild    Colitis     Constipation     COVID-19     Diabetes mellitus ([facility] [address]     Heart problem     Hyperlipidemia     Hypertension     Hypothyroidism     Sleep apnea     diagnosed 7-8 years ago does not use C-pap problems with cost    Thyroid disease     Wears partial dentures     upper         Past Surgical History:   Procedure Laterality Date    APPENDECTOMY      CARDIAC CATHETERIZATION  2020    Dr. Brent Shoemaker EF 55% cts consult ordered     SECTION      x2    CHOLECYSTECTOMY      COLONOSCOPY      CORONARY ARTERY BYPASS GRAFT N/A 2020    CORONARY ARTERY BYPASS, POSSIBLE LEFT RADIAL ARTERY HARVEST, MINDA performed by Robin Alexander DO at [facility]  12/1/15    laparoscopic incisional reapair with mesh    HYSTERECTOMY      KNEE SURGERY Right     VENTRAL HERNIA REPAIR           Current Outpatient Medications   Medication Sig Dispense Refill    ipratropium (ATROVENT HFA) 17 MCG/ACT inhaler Inhale 2 puffs into the lungs 4 times daily as needed for Wheezing (shortness of breath) 1 each 2    PROVENTIL  (90 Base) MCG/ACT inhaler Inhale 2 puffs into the lungs every 6 hours as needed for Wheezing or Shortness of Breath 18 g 3    lisinopril (PRINIVIL;ZESTRIL) 10 MG tablet Take 1 tablet by mouth daily 90 tablet 1    clopidogrel (PLAVIX) 75 MG tablet Take 1 tablet by mouth daily 90 tablet 2    isosorbide mononitrate (IMDUR) 30 MG extended release tablet Take 1 tablet by mouth daily PATIENT TO TAKE FOR 6 MONTHS TO PREVENT RADIAL GRAFT SPASM 90 tablet 3    metoprolol tartrate (LOPRESSOR) 25 MG tablet Take 0.5 tablets by mouth 2 times daily 90 tablet 3    pantoprazole (PROTONIX) 40 MG tablet Take 1 tablet by mouth daily for 14 days 90 tablet 2    furosemide (LASIX) 40 MG tablet Take 1 tablet by mouth daily as needed Take 1 by mouth daily, take an extra 1 for extra swelling 90 tablet 3    insulin detemir (LEVEMIR) 100 UNIT/ML injection vial Inject 50 Units into the skin 2 times daily       insulin aspart (NOVOLOG FLEXPEN) 100 UNIT/ML injection pen Inject into the skin 3 times daily (before meals) Sliding scale      ezetimibe (ZETIA) 10 MG tablet Take 1 tablet by mouth daily 90 tablet 3    aspirin 81 MG EC tablet Take 1 tablet by mouth daily 180 tablet 3    acetaminophen (TYLENOL) 325 MG tablet Take 2 tablets by mouth every 4 hours as needed for Pain 120 tablet 3    ferrous sulfate (IRON 325) 325 (65 Fe) MG tablet Take 1 tablet by mouth 2 times daily (with meals) 60 tablet 0    folic acid (FOLVITE) 1 MG tablet Take 1 tablet by mouth daily 30 tablet 0    docusate sodium (COLACE, DULCOLAX) 100 MG CAPS Take 100 mg by mouth 2 times daily as needed for Constipation (CONSTIPATION) (Patient taking differently: Take 100 mg by mouth 2 times daily ) 20 capsule 0    ibuprofen (ADVIL;MOTRIN) 600 MG tablet Take 1 tablet by mouth every 8 hours as needed for Pain 360 tablet 1    levothyroxine (SYNTHROID) 75 MCG tablet Take 1 tablet by mouth Daily (Patient taking differently: Take 100 mcg by mouth Daily ) 30 tablet 5    METHADONE HCL PO Take by mouth Takes 37 mg daily ( taking for past 20 years) history of 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  Oxycodone      senna (SENOKOT) 8.6 MG tablet Take 1 tablet by mouth 2 times daily (Patient not taking: Reported on 1/18/2022)       No current facility-administered medications for this visit. Allergies as of 01/18/2022 - Fully Reviewed 01/18/2022   Allergen Reaction Noted    Seasonal  09/01/2020       Social History     Socioeconomic History    Marital status:      Spouse name: Not on file    Number of children: Not on file    Years of education: Not on file    Highest education level: Not on file   Occupational History    Occupation: [employer]   Tobacco Use    Smoking status: Current Every Day Smoker     Packs/day: 1.50     Years: 25.00     Pack years: 37.50     Types: Cigarettes    Smokeless tobacco: Never Used    Tobacco comment: trying to quit. currently 7-8/day   Vaping Use    Vaping Use: Some days    Substances: Nicotine   Substance and Sexual Activity    Alcohol use: Not Currently    Drug use: Not Currently     Comment: methadone     Sexual activity: Yes     Partners: Male   Other Topics Concern    Not on file   Social History Narrative    Not on file     Social Determinants of Health     Financial Resource Strain:     Difficulty of Paying Living Expenses: Not on file   Food Insecurity:     Worried About Running Out of Food in the Last Year: Not on file    Fatou of Food in the Last Year: Not on file   Transportation Needs:     Lack of Transportation (Medical): Not on file    Lack of Transportation (Non-Medical):  Not on file   Physical Activity:     Days of Exercise per Week: Not on file    Minutes of Exercise per Session: Not on file   Stress:     Feeling of Stress : Not on file   Social Connections:     Frequency of Communication with Friends and Family: Not on file    Frequency of Social Gatherings with Friends and Family: Not on file    Attends Episcopal Services: Not on file    Active Member of Clubs or Organizations: Not on file    Attends Club or Organization Meetings: Not on file    Marital Status: Not on file   Intimate Partner Violence:     Fear of Current or Ex-Partner: Not on file    Emotionally Abused: Not on file    Physically Abused: Not on file    Sexually Abused: Not on file   Housing Stability:     Unable to Pay for Housing in the Last Year: Not on file    Number of [place] in the Last Year: Not on file    Unstable Housing in the Last Year: Not on file       Family History   Adopted: Yes   Problem Relation Age of Onset    Mult Sclerosis Mother     Mult Sclerosis Maternal Grandmother        REVIEW OF SYSTEMS:     CONSTITUTIONAL:  negative for  fevers, chills, sweats, + fatigue  HEENT:  negative for  tinnitus, earaches, nasal congestion and epistaxis  RESPIRATORY:  negative for  dry cough, cough with sputum, wheezing and hemoptysis  GASTROINTESTINAL:  negative for nausea, vomiting, diarrhea, constipation, pruritus and jaundice  HEMATOLOGIC/LYMPHATIC:  negative for easy bruising, bleeding, lymphadenopathy and petechiae  ENDOCRINE:  negative for heat intolerance, cold intolerance, tremor, hair loss and diabetic symptoms including neither polyuria nor polydipsia nor blurred vision  MUSCULOSKELETAL:  negative for  myalgias, arthralgias, joint swelling, stiff joints and decreased range of motion  NEUROLOGICAL:  negative for memory problems, speech problems, visual disturbance, dysphagia, weakness and numbness      PHYSICAL EXAM:   CONSTITUTIONAL:  awake, alert, cooperative, no apparent distress, and appears stated age  HEAD:  normocepalic, without obvious abnormality, atraumatic, pink, moist mucous membranes.   NECK:  Supple, symmetrical, trachea midline, no adenopathy, thyroid symmetric, not enlarged and no tenderness, skin normal  LUNGS:  No increased work of breathing, decreased air exchange, scattered wheezing  CARDIOVASCULAR:  Normal apical impulse, regular rate and rhythm, normal S1 and S2, no S3 or S4, 2/6 systolic murmur at the apex, no JVD, no carotid bruit, no pedal edema, good carotid upstroke bilaterally. ABDOMEN:  Soft, nontender, no masses, no hepatomegaly or splenomegaly, BS+  CHEST: nontender to palpation, expands symmetrically  MUSCULOSKELETAL:  No clubbing no cyanosis. there is no redness, warmth, or swelling of the joints  full range of motion noted  NEUROLOGIC:  Alert, awake,oriented x3  SKIN:  no bruising or bleeding, normal skin color, texture, turgor and no redness, warmth, or swelling    /84   Pulse 74   Resp 18   Ht 5' 8\" (1.727 m)   Wt 200 lb (90.7 kg)   BMI 30.41 kg/m²     DATA:   I personally reviewed the visit EKG with the following interpretation: Sinus rhythm, baseline artifacts, LVH, nonspecific ST changes,    EKG 11/30/21 Sinus bradycardia with premature atrial complexes  Possible Left atrial enlargement  Prolonged QT  Abnormal ECG    ECHO: 2/2/21 Summary   No previous echo for comparison. Technically adequate study. Left ventricle is mildly enlarged . Mild asymmetric septal hypertrophy. Ejection fraction is visually estimated at 45-50%. No regional wall motion abnormalities seen. There is doppler evidence of stage II diastolic dysfunction. Physiologic and/or trace mitral regurgitation is present. Physiologic and/or trace tricuspid regurgitation. RVSP is normal.    1. Stress: 9/1/20 Electrocardiographically normal regadenoson infusion with a clinicallynonischemic response. 2. Myocardial perfusion imaging showed small anterior wall fixed defect. 3. Overall left ventricular systolic function mildly reduced, EF 45% with mild global hypokinesis. 4. Low risk general pharmacologic stress test    Angiography 9/14/20 IMPRESSION:  1.  Multivessel disease involving ostial LAD, distal LAD, left  circumflex artery, OM branch, RCA and acute marginal branch.   2. Moderate disease involving the ostium and the proximal segment of  the right common iliac artery. 3.  Preserved LV function with mild inferior wall hypokinesis. 4.  Successful deployment of 6-Georgian Angio-Seal in the right common  femoral artery and deployment of 6-Georgian Angio-Seal in the left common  femoral artery. CABG 9/29/20 Coronary artery bypass grafting x3 using the left internal mammary  artery to the left anterior descending artery, radial artery to the  dominant obtuse marginal branch and circumflex and reverse saphenous  vein graft to the distal right coronary artery.   Cardiology Labs: BMP:    Lab Results   Component Value Date     11/30/2021    K 3.8 11/30/2021    K 4.1 01/20/2021    CL 99 11/30/2021    CO2 23 11/30/2021    BUN 13 11/30/2021    CREATININE 0.7 11/30/2021     CMP:    Lab Results   Component Value Date     11/30/2021    K 3.8 11/30/2021    K 4.1 01/20/2021    CL 99 11/30/2021    CO2 23 11/30/2021    BUN 13 11/30/2021    CREATININE 0.7 11/30/2021    PROT 7.8 11/30/2021     CBC:    Lab Results   Component Value Date    WBC 13.6 11/30/2021    RBC 3.97 11/30/2021    HGB 12.3 11/30/2021    HCT 37.4 11/30/2021    MCV 94.2 11/30/2021    RDW 13.9 11/30/2021     11/30/2021     PT/INR:  No results found for: PTINR  PT/INR Warfarin:  No components found for: PTPATWAR, PTINRWAR  PTT:    Lab Results   Component Value Date    APTT 30.1 11/30/2021     PTT Heparin:  No components found for: APTTHEP  Magnesium:    Lab Results   Component Value Date    MG 2.1 09/30/2020     TSH:    Lab Results   Component Value Date    TSH 25.150 10/03/2020     TROPONIN:  No components found for: TROP  BNP:  No results found for: BNP  FASTING LIPID PANEL:    Lab Results   Component Value Date    CHOL 288 10/27/2015    HDL 27 10/27/2015    TRIG 253 10/27/2015     No orders to display     I have personally reviewed the laboratory, cardiac diagnostic and radiographic testing as outlined above: IMPRESSION:   1.  Shortness of breath: Doubt cardiac, suspect pulmonary, will refer to pulmonary for further evaluation  2. Chronic diastolic congestive heart failure: Compensated, continue current treatment  3. CAD: Status post CABG with a LIMA to LAD, radial graft to OM, SVG to distal RCA, doing okay  4. Hypertension: Controlled  5. Hyperlipidemia: On statin  6. Type 2 diabetes mellitus  7. Tobacco abuse: Was counseled regarding smoking cessation    RECOMMENDATIONS:   1. Pulmonary referral  2. Continue current treatment  3.  will give Proventil and Atrovent inhalers until she is evaluated by pulmonary  4. Preventive cardiology: Low-salt, low-cholesterol diet, daily exercise, total cholesterol of less than 200, LDL of less than 70, adherence to diabetic diet and diabetic medications, smoking cessation were all advised. 4.  Follow-up with the PCP as scheduled  5. Follow-up with Dr. Jonathan Parra in 6 months, sooner if symptomatic for any reason    I have reviewed my findings and recommendations with patient      Electronically signed by Stephanie Hernandes MD on 1/18/2022 at 4:38 PM      NOTE: This report was transcribed using voice recognition software.  Every effort was made to ensure accuracy; however, inadvertent computerized transcription errors may be presen

## 2022-01-18 ENCOUNTER — OFFICE VISIT (OUTPATIENT)
Dept: CARDIOLOGY CLINIC | Age: 52
End: 2022-01-18

## 2022-01-18 VITALS
RESPIRATION RATE: 18 BRPM | BODY MASS INDEX: 30.31 KG/M2 | SYSTOLIC BLOOD PRESSURE: 132 MMHG | HEIGHT: 68 IN | WEIGHT: 200 LBS | DIASTOLIC BLOOD PRESSURE: 84 MMHG | HEART RATE: 74 BPM

## 2022-01-18 DIAGNOSIS — R06.02 SHORTNESS OF BREATH: ICD-10-CM

## 2022-01-18 DIAGNOSIS — J44.9 CHRONIC OBSTRUCTIVE PULMONARY DISEASE, UNSPECIFIED COPD TYPE (HCC): ICD-10-CM

## 2022-01-18 DIAGNOSIS — I25.10 CAD IN NATIVE ARTERY: Primary | ICD-10-CM

## 2022-01-18 PROCEDURE — 99214 OFFICE O/P EST MOD 30 MIN: CPT | Performed by: INTERNAL MEDICINE

## 2022-01-18 PROCEDURE — 93000 ELECTROCARDIOGRAM COMPLETE: CPT | Performed by: INTERNAL MEDICINE

## 2022-01-18 RX ORDER — ALBUTEROL SULFATE 90 MCG
2 HFA AEROSOL WITH ADAPTER (GRAM) INHALATION EVERY 6 HOURS PRN
Qty: 18 G | Refills: 3 | Status: ON HOLD
Start: 2022-01-18 | End: 2022-11-02 | Stop reason: HOSPADM

## 2022-01-24 ENCOUNTER — TELEPHONE (OUTPATIENT)
Dept: CARDIOLOGY CLINIC | Age: 52
End: 2022-01-24

## 2022-01-24 NOTE — TELEPHONE ENCOUNTER
You referred patient to see Dr Sabrina Farfan.   That practice is currently in transition due to one of the physicians moving - they are unable to accept the referral.    Which physician would you like her referred to none

## 2022-01-25 DIAGNOSIS — R06.02 SHORTNESS OF BREATH: Primary | ICD-10-CM

## 2022-03-16 ENCOUNTER — HOSPITAL ENCOUNTER (OUTPATIENT)
Dept: GENERAL RADIOLOGY | Age: 52
Discharge: HOME | End: 2022-03-18

## 2022-03-16 ENCOUNTER — HOSPITAL ENCOUNTER (OUTPATIENT)
Age: 52
Discharge: HOME | End: 2022-03-18

## 2022-03-16 DIAGNOSIS — R06.01 ORTHOPNEA: ICD-10-CM

## 2022-03-16 PROCEDURE — 71046 X-RAY EXAM CHEST 2 VIEWS: CPT

## 2022-04-11 ENCOUNTER — APPOINTMENT (OUTPATIENT)
Dept: CT IMAGING | Age: 52
End: 2022-04-11

## 2022-04-11 ENCOUNTER — APPOINTMENT (OUTPATIENT)
Dept: GENERAL RADIOLOGY | Age: 52
End: 2022-04-11

## 2022-04-11 ENCOUNTER — HOSPITAL ENCOUNTER (EMERGENCY)
Age: 52
Discharge: LEFT BEFORE DISCHARGE | End: 2022-04-11
Attending: EMERGENCY MEDICINE

## 2022-04-11 ENCOUNTER — APPOINTMENT (OUTPATIENT)
Dept: ULTRASOUND IMAGING | Age: 52
End: 2022-04-11

## 2022-04-11 VITALS
TEMPERATURE: 97.1 F | RESPIRATION RATE: 16 BRPM | HEART RATE: 62 BPM | DIASTOLIC BLOOD PRESSURE: 76 MMHG | OXYGEN SATURATION: 99 % | SYSTOLIC BLOOD PRESSURE: 122 MMHG

## 2022-04-11 DIAGNOSIS — R10.9 ABDOMINAL PAIN, UNSPECIFIED ABDOMINAL LOCATION: Primary | ICD-10-CM

## 2022-04-11 DIAGNOSIS — N17.9 AKI (ACUTE KIDNEY INJURY) (HCC): ICD-10-CM

## 2022-04-11 DIAGNOSIS — D72.829 LEUKOCYTOSIS, UNSPECIFIED TYPE: ICD-10-CM

## 2022-04-11 LAB
ALBUMIN SERPL-MCNC: 4.2 G/DL (ref 3.5–5.2)
ALP BLD-CCNC: 92 U/L (ref 35–104)
ALT SERPL-CCNC: 13 U/L (ref 0–32)
ANION GAP SERPL CALCULATED.3IONS-SCNC: 15 MMOL/L (ref 7–16)
AST SERPL-CCNC: 15 U/L (ref 0–31)
BASOPHILS ABSOLUTE: 0.13 E9/L (ref 0–0.2)
BASOPHILS RELATIVE PERCENT: 0.9 % (ref 0–2)
BILIRUB SERPL-MCNC: <0.2 MG/DL (ref 0–1.2)
BUN BLDV-MCNC: 28 MG/DL (ref 6–20)
CALCIUM SERPL-MCNC: 9.3 MG/DL (ref 8.6–10.2)
CHLORIDE BLD-SCNC: 93 MMOL/L (ref 98–107)
CO2: 26 MMOL/L (ref 22–29)
CREAT SERPL-MCNC: 1.7 MG/DL (ref 0.5–1)
EOSINOPHILS ABSOLUTE: 1.76 E9/L (ref 0.05–0.5)
EOSINOPHILS RELATIVE PERCENT: 11.8 % (ref 0–6)
GFR AFRICAN AMERICAN: 39
GFR NON-AFRICAN AMERICAN: 32 ML/MIN/1.73
GLUCOSE BLD-MCNC: 163 MG/DL (ref 74–99)
HCT VFR BLD CALC: 35.9 % (ref 34–48)
HEMOGLOBIN: 12.2 G/DL (ref 11.5–15.5)
IMMATURE GRANULOCYTES #: 0.06 E9/L
IMMATURE GRANULOCYTES %: 0.4 % (ref 0–5)
LACTIC ACID: 1.4 MMOL/L (ref 0.5–2.2)
LIPASE: 65 U/L (ref 13–60)
LYMPHOCYTES ABSOLUTE: 4.07 E9/L (ref 1.5–4)
LYMPHOCYTES RELATIVE PERCENT: 27.3 % (ref 20–42)
MCH RBC QN AUTO: 31.1 PG (ref 26–35)
MCHC RBC AUTO-ENTMCNC: 34 % (ref 32–34.5)
MCV RBC AUTO: 91.6 FL (ref 80–99.9)
MONOCYTES ABSOLUTE: 0.76 E9/L (ref 0.1–0.95)
MONOCYTES RELATIVE PERCENT: 5.1 % (ref 2–12)
NEUTROPHILS ABSOLUTE: 8.14 E9/L (ref 1.8–7.3)
NEUTROPHILS RELATIVE PERCENT: 54.5 % (ref 43–80)
PDW BLD-RTO: 13.1 FL (ref 11.5–15)
PLATELET # BLD: 365 E9/L (ref 130–450)
PMV BLD AUTO: 10.7 FL (ref 7–12)
POTASSIUM REFLEX MAGNESIUM: 3.7 MMOL/L (ref 3.5–5)
RBC # BLD: 3.92 E12/L (ref 3.5–5.5)
SODIUM BLD-SCNC: 134 MMOL/L (ref 132–146)
TOTAL PROTEIN: 8.4 G/DL (ref 6.4–8.3)
TROPONIN, HIGH SENSITIVITY: 30 NG/L (ref 0–9)
WBC # BLD: 14.9 E9/L (ref 4.5–11.5)

## 2022-04-11 PROCEDURE — 85025 COMPLETE CBC W/AUTO DIFF WBC: CPT

## 2022-04-11 PROCEDURE — 6360000002 HC RX W HCPCS: Performed by: STUDENT IN AN ORGANIZED HEALTH CARE EDUCATION/TRAINING PROGRAM

## 2022-04-11 PROCEDURE — 71046 X-RAY EXAM CHEST 2 VIEWS: CPT

## 2022-04-11 PROCEDURE — 6370000000 HC RX 637 (ALT 250 FOR IP): Performed by: STUDENT IN AN ORGANIZED HEALTH CARE EDUCATION/TRAINING PROGRAM

## 2022-04-11 PROCEDURE — 93005 ELECTROCARDIOGRAM TRACING: CPT | Performed by: PHYSICIAN ASSISTANT

## 2022-04-11 PROCEDURE — 83690 ASSAY OF LIPASE: CPT

## 2022-04-11 PROCEDURE — 84484 ASSAY OF TROPONIN QUANT: CPT

## 2022-04-11 PROCEDURE — 76705 ECHO EXAM OF ABDOMEN: CPT

## 2022-04-11 PROCEDURE — 80053 COMPREHEN METABOLIC PANEL: CPT

## 2022-04-11 PROCEDURE — 74176 CT ABD & PELVIS W/O CONTRAST: CPT

## 2022-04-11 PROCEDURE — 99284 EMERGENCY DEPT VISIT MOD MDM: CPT

## 2022-04-11 PROCEDURE — 83605 ASSAY OF LACTIC ACID: CPT

## 2022-04-11 PROCEDURE — 96374 THER/PROPH/DIAG INJ IV PUSH: CPT

## 2022-04-11 RX ORDER — KETOROLAC TROMETHAMINE 15 MG/ML
15 INJECTION, SOLUTION INTRAMUSCULAR; INTRAVENOUS ONCE
Status: DISCONTINUED | OUTPATIENT
Start: 2022-04-11 | End: 2022-04-11

## 2022-04-11 RX ORDER — PROCHLORPERAZINE EDISYLATE 5 MG/ML
10 INJECTION INTRAMUSCULAR; INTRAVENOUS ONCE
Status: COMPLETED | OUTPATIENT
Start: 2022-04-11 | End: 2022-04-11

## 2022-04-11 RX ORDER — 0.9 % SODIUM CHLORIDE 0.9 %
1000 INTRAVENOUS SOLUTION INTRAVENOUS ONCE
Status: DISCONTINUED | OUTPATIENT
Start: 2022-04-11 | End: 2022-04-11 | Stop reason: HOSPADM

## 2022-04-11 RX ADMIN — PROCHLORPERAZINE EDISYLATE 10 MG: 5 INJECTION INTRAMUSCULAR; INTRAVENOUS at 18:48

## 2022-04-11 RX ADMIN — LIDOCAINE HYDROCHLORIDE: 20 SOLUTION ORAL; TOPICAL at 18:49

## 2022-04-11 ASSESSMENT — ENCOUNTER SYMPTOMS
EYE REDNESS: 0
VOMITING: 0
SORE THROAT: 0
NAUSEA: 1
WHEEZING: 0
SHORTNESS OF BREATH: 0
COUGH: 0
DIARRHEA: 0
BACK PAIN: 0
SINUS PRESSURE: 0
EYE PAIN: 0
ABDOMINAL DISTENTION: 0
ABDOMINAL PAIN: 1
EYE DISCHARGE: 0

## 2022-04-11 ASSESSMENT — PAIN DESCRIPTION - DESCRIPTORS: DESCRIPTORS: HEAVINESS

## 2022-04-11 ASSESSMENT — PAIN DESCRIPTION - LOCATION: LOCATION: ABDOMEN;CHEST

## 2022-04-11 ASSESSMENT — PAIN DESCRIPTION - PAIN TYPE: TYPE: ACUTE PAIN

## 2022-04-11 NOTE — ED PROVIDER NOTES
FIRST PROVIDER CONTACT ASSESSMENT NOTE           Department of Emergency Medicine                 First Provider Note            22  4:53 PM EDT    Date of Encounter: No admission date for patient encounter. Patient Name: Shalini Elkins  : 1970  MRN: [id number]    Chief Complaint: Abdominal Pain (RT UPPER QUAD PAIN/ VOMITING X 1 WEEK) and Chest Pain (RADIATES TINTO CHEST HAS HEART SURG)      History of Present Illness:   Shalini Elkins is a 54 y.o. female who presents to the ED for RUQ pain. Pressure in chest.   SOB. Hx CABG     Focused Physical Exam:  VS:    ED Triage Vitals   BP Temp Temp src Pulse Resp SpO2 Height Weight   22 1650 -- -- 22 1624 22 1650 22 1624 -- --   124/80   82 18 99 %          Physical Ex: Constitutional: Alert and non-toxic. Medical History:  has a past medical history of CAD in native artery, Claustrophobia, Colitis, Constipation, COVID-19, Diabetes mellitus ([address] Heart problem, Hyperlipidemia, Hypertension, Hypothyroidism, Sleep apnea, Thyroid disease, and Wears partial dentures. Surgical History:  has a past surgical history that includes Cholecystectomy; Appendectomy; knee surgery (Right); ventral hernia repair; Hysterectomy; hernia repair (12/1/15); Colonoscopy;  section; Cardiac catheterization (2020); and Coronary artery bypass graft (N/A, 2020). Social History:  reports that she has been smoking cigarettes. She has a 37.50 pack-year smoking history. She has never used smokeless tobacco. She reports previous alcohol use. She reports previous drug use. Family History: family history includes Mult Sclerosis in her maternal grandmother and mother. She was adopted.     Allergies: Seasonal     Initial Plan of Care: Initiate Treatment-Testing, Proceed toTreatment Area When Bed Available for ED Attending/MLP to Continue Care      ---END OF FIRST PROVIDER CONTACT ASSESSMENT NOTE---  Electronically signed by Ang Sultana Dayana Peralta   DD: 4/11/22     Mervin Gray PA-C  04/11/22 [id number]

## 2022-04-11 NOTE — ED PROVIDER NOTES
The history is provided by the patient and medical records. 43-year-old female presents emerged part complaint of abdominal pain. States that epigastric region as well as right upper quadrant. States it is worse with greasy foods. Been going on for 2 to 3 weeks. Due to worsening pain and radiation upper chest.  She went to come in to get checked out. Describes the radiation as a tightness/pressure feeling almost like a pulling sensation. Is been going on for same amount time. States the pain is intermittent in nature does not notice anything that really makes it better. Specifically worse with greasy foods. Does have some worsening nausea and vomiting for the past week as well. Otherwise denies any fevers chills denies any change in bowel habits denies any changes to urinary habits. The patient presents with abd pain that has been going on for 2-3 weeks. These symptoms are moderaet in severity. Symptoms are made better by nothing. Symptoms are made worse by nothing. Associated symptoms include chest pain. Review of Systems   Constitutional: Negative for chills and fever. HENT: Negative for ear pain, sinus pressure and sore throat. Eyes: Negative for pain, discharge and redness. Respiratory: Negative for cough, shortness of breath and wheezing. Cardiovascular: Positive for chest pain. Gastrointestinal: Positive for abdominal pain and nausea. Negative for abdominal distention, diarrhea and vomiting. Genitourinary: Negative for dysuria and frequency. Musculoskeletal: Negative for arthralgias and back pain. Skin: Negative for rash and wound. Neurological: Negative for weakness and headaches. Hematological: Negative for adenopathy. All other systems reviewed and are negative. Physical Exam  Vitals and nursing note reviewed. Constitutional:       Appearance: Normal appearance. She is normal weight. HENT:      Head: Normocephalic and atraumatic.    Eyes: Conjunctiva/sclera: Conjunctivae normal.   Cardiovascular:      Rate and Rhythm: Normal rate and regular rhythm. Pulses: Normal pulses. Heart sounds: Normal heart sounds. No murmur heard. No gallop. Pulmonary:      Effort: Pulmonary effort is normal. No respiratory distress. Breath sounds: Normal breath sounds. No wheezing or rales. Abdominal:      General: Abdomen is flat. Bowel sounds are normal. There is no distension. Palpations: Abdomen is soft. Tenderness: There is abdominal tenderness in the right upper quadrant and epigastric area. There is no guarding or rebound. Positive signs include Jo's sign. Negative signs include Rovsing's sign and McBurney's sign. Skin:     General: Skin is warm and dry. Capillary Refill: Capillary refill takes less than 2 seconds. Neurological:      General: No focal deficit present. Mental Status: She is alert and oriented to person, place, and time. Procedures     MDM     46-year-old female presents emergency department complaint of abdominal pain. Patient did have a elevation of white count as well as acute kidney injury significant elevation of creatinine and BUN from previous level. Right upper quadrant ultrasound unremarkable for any acute pathology CT scan was done however patient does not want to be admitted to the hospital and does not want to wait for CAT scan to come back and is to be signed out [address] at this time. She was given GI cocktail as well as Compazine for her complaints. I did advise her that due to her kidney injury would be best to get the read of her CAT scan her abdomen pelvis. Patient states she is not 1 week for this and will follow up with her family care doctor. She is in no AGAINST MEDICAL ADVICE at this time.         ED Course as of 04/11/22 2101 Mon Apr 11, 2022 1918   ATTENDING PROVIDER ATTESTATION:     I have personally performed and/or participated in the history, exam, medical decision making, and procedures and agree with all pertinent clinical information unless otherwise noted. I have also reviewed and agree with the past medical, family and social history unless otherwise noted. I have discussed this patient in detail with the resident and provided the instruction and education regarding the evidence-based evaluation and treatment of chest pain. History: Patient reports that she has had chest pain substernally for several months ever since she had her cardiac surgery. She states that she has discussed this with her cardiologist and he states he has never done anything to further evaluated. She states that chronic. She states she is here today because she has pain in the right upper quadrant and right side. It is associated nausea and vomiting. She has no also noticed that she gets discomfort with eating. She reports that she has had a cholecystectomy. My findings: Ev Nelson is a 54 y.o. female whom is in no distress. Physical exam reveals patient sitting at the bedside comfortably. On exam she has no signs of respiratory distress. Heart regular rate and rhythm. Mild tenderness in the right upper quadrant. No jaundice or scleral icterus. No hepatomegaly appreciated. My plan: Symptomatic and supportive care. Appropriate labs and imaging. Electronically signed by Jaskaran Ruth DO on 4/11/22 at 7:18 PM EDT       [MS]      ED Course User Index  [MS] Jaskaran Ruth DO        EKG: This EKG is signed by emergency department physician.     Rate: 70  Rhythm: Sinus  Interpretation: No acute ST elevation normal axis does have T wave inversions noted leads V5 V6 as well as in lead aVF and II and 3  Comparison: changes compared to previous EKG noted in leads III and aVF with T wave inversions      ED Course as of 04/11/22 2101 Mon Apr 11, 2022 1918   ATTENDING PROVIDER ATTESTATION:     I have personally performed and/or participated in the Appendectomy; knee surgery (Right); ventral hernia repair; Hysterectomy; hernia repair (12/1/15); Colonoscopy;  section; Cardiac catheterization (2020); and Coronary artery bypass graft (N/A, 2020). Social History:  reports that she has been smoking cigarettes. She has a 37.50 pack-year smoking history. She has never used smokeless tobacco. She reports previous alcohol use. She reports previous drug use. Family History: family history includes Mult Sclerosis in her maternal grandmother and mother. She was adopted. The patients home medications have been reviewed.     Allergies: Seasonal    -------------------------------------------------- RESULTS -------------------------------------------------  Labs:  Results for orders placed or performed during the hospital encounter of 22   Comprehensive Metabolic Panel w/ Reflex to MG   Result Value Ref Range    Sodium 134 132 - 146 mmol/L    Potassium reflex Magnesium 3.7 3.5 - 5.0 mmol/L    Chloride 93 (L) 98 - 107 mmol/L    CO2 26 22 - 29 mmol/L    Anion Gap 15 7 - 16 mmol/L    Glucose 163 (H) 74 - 99 mg/dL    BUN 28 (H) 6 - 20 mg/dL    CREATININE 1.7 (H) 0.5 - 1.0 mg/dL    GFR Non-African American 32 >=60 mL/min/1.73    GFR African American 39     Calcium 9.3 8.6 - 10.2 mg/dL    Total Protein 8.4 (H) 6.4 - 8.3 g/dL    Albumin 4.2 3.5 - 5.2 g/dL    Total Bilirubin <0.2 0.0 - 1.2 mg/dL    Alkaline Phosphatase 92 35 - 104 U/L    ALT 13 0 - 32 U/L    AST 15 0 - 31 U/L   CBC with Auto Differential   Result Value Ref Range    WBC 14.9 (H) 4.5 - 11.5 E9/L    RBC 3.92 3.50 - 5.50 E12/L    Hemoglobin 12.2 11.5 - 15.5 g/dL    Hematocrit 35.9 34.0 - 48.0 %    MCV 91.6 80.0 - 99.9 fL    MCH 31.1 26.0 - 35.0 pg    MCHC 34.0 32.0 - 34.5 %    RDW 13.1 11.5 - 15.0 fL    Platelets 383 574 - 404 E9/L    MPV 10.7 7.0 - 12.0 fL    Neutrophils % 54.5 43.0 - 80.0 %    Immature Granulocytes % 0.4 0.0 - 5.0 %    Lymphocytes % 27.3 20.0 - 42.0 %    Monocytes % 5.1 2.0 - 12.0 %    Eosinophils % 11.8 (H) 0.0 - 6.0 %    Basophils % 0.9 0.0 - 2.0 %    Neutrophils Absolute 8.14 (H) 1.80 - 7.30 E9/L    Immature Granulocytes # 0.06 E9/L    Lymphocytes Absolute 4.07 (H) 1.50 - 4.00 E9/L    Monocytes Absolute 0.76 0.10 - 0.95 E9/L    Eosinophils Absolute 1.76 (H) 0.05 - 0.50 E9/L    Basophils Absolute 0.13 0.00 - 0.20 E9/L   Troponin   Result Value Ref Range    Troponin, High Sensitivity 30 (H) 0 - 9 ng/L   Lactic Acid   Result Value Ref Range    Lactic Acid 1.4 0.5 - 2.2 mmol/L   Lipase   Result Value Ref Range    Lipase 65 (H) 13 - 60 U/L   EKG 12 Lead   Result Value Ref Range    Ventricular Rate 70 BPM    Atrial Rate 70 BPM    P-R Interval 166 ms    QRS Duration 100 ms    Q-T Interval 434 ms    QTc Calculation (Bazett) 468 ms    P Axis 64 degrees    R Axis 65 degrees    T Axis 49 degrees       Radiology:  XR CHEST (2 VW)    Result Date: 4/11/2022  EXAMINATION: TWO XRAY VIEWS OF THE CHEST 4/11/2022 8:33 pm COMPARISON: None. HISTORY: ORDERING SYSTEM PROVIDED HISTORY: CP TECHNOLOGIST PROVIDED HISTORY: Reason for exam:->CP FINDINGS: The lungs are without acute focal process. There is no effusion or pneumothorax. The cardiomediastinal silhouette is without acute process. The osseous structures are without acute process. No acute process. Cardiomegaly status post median sternotomy. XR CHEST (2 VW)    Result Date: 3/16/2022  EXAMINATION: TWO XRAY VIEWS OF THE CHEST 3/16/2022 11:00 am COMPARISON: 11/30/2021 HISTORY: ORDERING SYSTEM PROVIDED HISTORY: Orthopnea TECHNOLOGIST PROVIDED HISTORY: Reason for exam:->Orthopnea FINDINGS: The heart is enlarged. Postoperative sternotomy changes are noted The lungs are clear. There is no focal infiltrate or effusion. 1. Cardiomegaly. There is no evidence of failure or pneumonia. US GALLBLADDER RUQ    Result Date: 4/11/2022  EXAMINATION: RIGHT UPPER QUADRANT ULTRASOUND 4/11/2022 5:56 pm COMPARISON: None.  HISTORY: ORDERING SYSTEM PROVIDED HISTORY: concern for cholecystitis TECHNOLOGIST PROVIDED HISTORY: Reason for exam:->concern for cholecystitis FINDINGS: LIVER:  The liver is mildly enlarged with increased echogenicity without evidence of intrahepatic biliary ductal dilatation. BILIARY SYSTEM:  Gallbladder is surgically absent. Common bile duct is within normal limits measuring 3.0 mm. RIGHT KIDNEY: The right kidney is grossly unremarkable without evidence of hydronephrosis. PANCREAS:  Visualized portions of the pancreas are unremarkable. OTHER: No evidence of right upper quadrant ascites. Enlarged fatty liver. Status post cholecystectomy. ------------------------- NURSING NOTES AND VITALS REVIEWED ---------------------------  Date / Time Roomed:  4/11/2022  5:05 PM  ED Bed Assignment:  [id number]    The nursing notes within the ED encounter and vital signs as below have been reviewed. /80   Pulse 63   Temp 97.1 °F (36.2 °C)   Resp 16   SpO2 96%   Oxygen Saturation Interpretation: Normal      ------------------------------------------ PROGRESS NOTES ------------------------------------------  Time: 2101  Re-evaluation. Patients symptoms show no change  Repeat physical examination is not changed      I have spoken with the patient and discussed todays availabe results to this point, in addition to providing specific details for the plan of care and counseling regarding the diagnosis and prognosis. Their questions are answered, however they are not agreeable to admission.     --------------------------------- ADDITIONAL PROVIDER NOTES ---------------------------------  This patient has chosen to leave against medical advice. I have personally explained to them that choosing to do so may result in permanent bodily harm or death.   I discussed at length that without further evaluation and monitoring there may be unforeseen circumstances and deterioration resulting in permanent bodily harm or death as a result of their choice. They are alert, oriented, and competent at this time. They state that they are aware of the serious risks as explained, but they continue to wish to leave against medical   advice. In light of their decision to leave against medical advice, follow-up has been arranged and they are aware of the importance of following up as instructed. They have been advised that they should return to the ED immediately if they change their mind at any time, or if their condition begins to change or worsen. The follow up plan has been discussed in detail and they are aware of the specific conditions for emergent return, as well as the importance of follow-up. New Prescriptions    No medications on file       Diagnosis:  1. Abdominal pain, unspecified abdominal location    2. Leukocytosis, unspecified type    3. ANIRUDH (acute kidney injury) ([facility] [address]        Disposition:  Patient's disposition: Left against medical advice. Patient's condition is stable.        Marielena Daniels MD  Resident  04/11/22 [id number]

## 2022-04-12 LAB
EKG ATRIAL RATE: 70 BPM
EKG P AXIS: 64 DEGREES
EKG P-R INTERVAL: 166 MS
EKG Q-T INTERVAL: 434 MS
EKG QRS DURATION: 100 MS
EKG QTC CALCULATION (BAZETT): 468 MS
EKG R AXIS: 65 DEGREES
EKG T AXIS: 49 DEGREES
EKG VENTRICULAR RATE: 70 BPM

## 2022-04-12 PROCEDURE — 93010 ELECTROCARDIOGRAM REPORT: CPT | Performed by: INTERNAL MEDICINE

## 2022-06-20 ENCOUNTER — TELEPHONE (OUTPATIENT)
Dept: CARDIOLOGY CLINIC | Age: 52
End: 2022-06-20

## 2022-06-22 RX ORDER — FUROSEMIDE 40 MG/1
40 TABLET ORAL DAILY PRN
Qty: 90 TABLET | Refills: 1 | Status: SHIPPED | OUTPATIENT
Start: 2022-06-22

## 2022-07-14 NOTE — PROGRESS NOTES
Problem: At Risk for Falls  Goal: # Patient does not fall  Outcome: Outcome Met, Continue evaluating goal progress toward completion     Problem: At Risk for Injury Due to Fall  Goal: # Patient does not fall  Outcome: Outcome Met, Continue evaluating goal progress toward completion     Problem: Pain  Goal: #Acceptable pain level achieved/maintained at rest using NRS/Faces  Description: This goal is used for patients who can self-report.  Acceptable means the level is at or below the identified comfort/function goal.  Outcome: Outcome Met, Continue evaluating goal progress toward completion     Problem: Pressure Injury, Risk for  Goal: # Skin remains intact  Outcome: Outcome Met, Continue evaluating goal progress toward completion     Problem: Delirium, Risk for  Goal: # No symptoms of delirium  Description: Evaluate delirium symptoms under active problem when present  Outcome: Outcome Met, Continue evaluating goal progress toward completion     Problem: VTE, Risk for  Goal: # No s/s of VTE  Outcome: Outcome Met, Continue evaluating goal progress toward completion     Problem: Stroke: Hemorrhagic  Goal: Normal temperature is maintained  Outcome: Outcome Met, Continue evaluating goal progress toward completion     Problem: Impaired Physical Mobility  Goal: # Bed mobility, ambulation, and ADLs are maintained or returned to baseline during hospitalization  Outcome: Outcome Not Met, Continue to Monitor  Note: Patient still needs moderate assistance when walking.     Problem: At Risk for Injury Due to Fall  Goal: # Takes action to control condition specific risks  Outcome: Outcome Not Met, Continue to Monitor  Note: Patient was going to attempt to get up by himself was stopped by his wife.      kg)(9/30/20, bedscale)    · Usual Body Weight: 197 lb (89.4 kg)(8/11/20, actual)     · Ideal Body Weight: 140 lbs; % Ideal Body Weight 143.6 %   · BMI: 30.6   · BMI Categories: Obese Class 1 (BMI 30.0-34. 9)       Nutrition Diagnosis:   · Increased nutrient needs related to increase demand for energy/nutrients as evidenced by wounds      Nutrition Interventions:   Food and/or Nutrient Delivery:  Continue Current Diet, Continue Oral Nutrition Supplement, Modify Oral Nutrition Supplement  Nutrition Education/Counseling:  Education completed   Coordination of Nutrition Care:  Continued Inpatient Monitoring    Goals:  Patient will consume 50-75% of meals served       Nutrition Monitoring and Evaluation:   Behavioral-Environmental Outcomes:  Knowledge or Skill   Food/Nutrient Intake Outcomes:  Food and Nutrient Intake, Supplement Intake  Physical Signs/Symptoms Outcomes:  Biochemical Data, Chewing or Swallowing, GI Status, Fluid Status or Edema, Hemodynamic Status, Meal Time Behavior, Nutrition Focused Physical Findings, Skin, Weight     Discharge Planning:    Continue current diet, Continue Oral Nutrition Supplement     Electronically signed by Mara Boss RD, LD on 10/1/20 at 10:34 AM EDT    Contact: [phone]

## 2022-07-28 ENCOUNTER — HOSPITAL ENCOUNTER (OUTPATIENT)
Dept: CT IMAGING | Age: 52
Discharge: HOME | End: 2022-07-28
Payer: MEDICAID

## 2022-07-28 DIAGNOSIS — R06.01 ORTHOPNEA: ICD-10-CM

## 2022-07-28 PROCEDURE — 71250 CT THORAX DX C-: CPT

## 2022-08-03 RX ORDER — ISOSORBIDE MONONITRATE 30 MG/1
30 TABLET, EXTENDED RELEASE ORAL DAILY
Qty: 90 TABLET | Refills: 3 | Status: SHIPPED | OUTPATIENT
Start: 2022-08-03 | End: 2023-01-30

## 2022-08-07 ENCOUNTER — HOSPITAL ENCOUNTER (EMERGENCY)
Age: 52
Discharge: HOME | End: 2022-08-07
Attending: FAMILY MEDICINE
Payer: MEDICAID

## 2022-08-07 VITALS
HEIGHT: 68 IN | DIASTOLIC BLOOD PRESSURE: 90 MMHG | OXYGEN SATURATION: 100 % | SYSTOLIC BLOOD PRESSURE: 152 MMHG | BODY MASS INDEX: 28.79 KG/M2 | TEMPERATURE: 97.3 F | WEIGHT: 190 LBS | HEART RATE: 70 BPM | RESPIRATION RATE: 16 BRPM

## 2022-08-07 DIAGNOSIS — S90.811A ABRASION OF RIGHT FOOT, INITIAL ENCOUNTER: Primary | ICD-10-CM

## 2022-08-07 PROCEDURE — 99283 EMERGENCY DEPT VISIT LOW MDM: CPT

## 2022-08-07 RX ORDER — SULFAMETHOXAZOLE AND TRIMETHOPRIM 800; 160 MG/1; MG/1
1 TABLET ORAL 2 TIMES DAILY
Qty: 20 TABLET | Refills: 0 | Status: SHIPPED | OUTPATIENT
Start: 2022-08-07 | End: 2022-08-17

## 2022-08-07 ASSESSMENT — PAIN DESCRIPTION - LOCATION: LOCATION: FOOT

## 2022-08-07 ASSESSMENT — PAIN DESCRIPTION - FREQUENCY: FREQUENCY: CONTINUOUS

## 2022-08-07 ASSESSMENT — PAIN - FUNCTIONAL ASSESSMENT: PAIN_FUNCTIONAL_ASSESSMENT: 0-10

## 2022-08-07 ASSESSMENT — PAIN DESCRIPTION - DESCRIPTORS: DESCRIPTORS: SORE

## 2022-08-07 ASSESSMENT — PAIN SCALES - GENERAL: PAINLEVEL_OUTOF10: 3

## 2022-08-07 ASSESSMENT — PAIN DESCRIPTION - PAIN TYPE: TYPE: ACUTE PAIN

## 2022-08-07 ASSESSMENT — PAIN DESCRIPTION - ORIENTATION: ORIENTATION: RIGHT

## 2022-08-07 NOTE — ED PROVIDER NOTES
HPI:  22,   Time: 5:00 PM EDT         Nicole Arevalo is a 54 y.o. female presenting to the ED for a skin injury to the right foot suffered earlier today when that she was running a  and accidentally hit her barefoot on the top causing an abrasion. Her tetanus immunization status is up-to-date. Bleeding was well controlled. She does have a history of neuropathy and peripheral vascular disease that does affect her sensation in her foot, therefore she denies any significant pain. ROS:   Pertinent positives and negatives are stated within HPI, all other systems reviewed and are negative.  --------------------------------------------- PAST HISTORY ---------------------------------------------  Past Medical History:  has a past medical history of CAD in native artery, Claustrophobia, Colitis, Constipation, COVID-19, Diabetes mellitus ([facility] [address] Heart problem, Hyperlipidemia, Hypertension, Hypothyroidism, Sleep apnea, Thyroid disease, and Wears partial dentures. Past Surgical History:  has a past surgical history that includes Cholecystectomy; Appendectomy; knee surgery (Right); ventral hernia repair; Hysterectomy; hernia repair (12/1/15); Colonoscopy;  section; Cardiac catheterization (2020); and Coronary artery bypass graft (N/A, 2020). Social History:  reports that she has been smoking cigarettes. She has a 37.50 pack-year smoking history. She has never used smokeless tobacco. She reports that she does not currently use alcohol. She reports that she does not currently use drugs. Family History: family history includes Mult Sclerosis in her maternal grandmother and mother. She was adopted. The patients home medications have been reviewed.     Allergies: Seasonal    -------------------------------------------------- RESULTS -------------------------------------------------  All laboratory and radiology results have been personally reviewed by myself   LABS:  No results found for this visit on 08/07/22. RADIOLOGY:  Interpreted by Radiologist.  No orders to display       ------------------------- NURSING NOTES AND VITALS REVIEWED ---------------------------   The nursing notes within the ED encounter and vital signs as below have been reviewed. BP (!) 152/90   Pulse 70   Temp 97.3 °F (36.3 °C) (Temporal)   Resp 16   Ht 5' 8\" (1.727 m)   Wt 190 lb (86.2 kg)   SpO2 100%   BMI 28.89 kg/m²   Oxygen Saturation Interpretation: Normal      ---------------------------------------------------PHYSICAL EXAM--------------------------------------    Constitutional/General: Alert and oriented x3, well appearing, non toxic in NAD  Head: NC/AT  Eyes: PERRL, EOMI  Mouth: Oropharynx clear, handling secretions, no trismus  Neck: Supple, full ROM, no meningeal signs  Pulmonary: Lungs clear to auscultation bilaterally, no wheezes, rales, or rhonchi. Not in respiratory distress  Cardiovascular:  Regular rate and rhythm, no murmurs, gallops, or rubs. 2+ distal pulses  Abdomen: Soft, non tender, non distended,   Extremities: Moves all extremities x 4. Warm and well perfused  Right foot: Space of the dorsal aspect shows a V-shaped abrasion, no active bleeding. Skin: warm and dry without rash  Neurologic: GCS 15,  Psych: Normal Affect      ------------------------------ ED COURSE/MEDICAL DECISION MAKING----------------------  Medications - No data to display      Medical Decision Making:    Straightforward  Because she is diabetic, antibiotics will be prescribed an antibiotic ointment, Bactroban will be prescribed for the abrasion site. She was advised to return to ED if any worsening symptoms follow-up with primary care physician as needed. Counseling: The emergency provider has spoken with the patient and discussed todays results, in addition to providing specific details for the plan of care and counseling regarding the diagnosis and prognosis.   Questions are answered at this time and they are agreeable with the plan.      --------------------------------- IMPRESSION AND DISPOSITION ---------------------------------    IMPRESSION  1.  Abrasion of right foot, initial encounter        DISPOSITION  Disposition: Discharge to home  Patient condition is stable                 Kizzy Acevedo MD  08/07/22 [id number]

## 2022-08-09 ENCOUNTER — HOSPITAL ENCOUNTER (OUTPATIENT)
Dept: INTERVENTIONAL RADIOLOGY/VASCULAR | Age: 52
Discharge: HOME | End: 2022-08-11
Payer: MEDICAID

## 2022-08-09 DIAGNOSIS — L97.519 ULCER OF RIGHT FOOT, UNSPECIFIED ULCER STAGE (HCC): ICD-10-CM

## 2022-08-09 PROCEDURE — 93923 UPR/LXTR ART STDY 3+ LVLS: CPT

## 2022-09-08 ENCOUNTER — HOSPITAL ENCOUNTER (OUTPATIENT)
Dept: NON INVASIVE DIAGNOSTICS | Age: 52
Discharge: HOME | End: 2022-09-08

## 2022-09-08 LAB
LV EF: 55 %
LVEF MODALITY: NORMAL

## 2022-09-08 PROCEDURE — 93306 TTE W/DOPPLER COMPLETE: CPT

## 2022-09-21 ENCOUNTER — OFFICE VISIT (OUTPATIENT)
Dept: CARDIOLOGY CLINIC | Age: 52
End: 2022-09-21
Payer: MEDICAID

## 2022-09-21 VITALS
SYSTOLIC BLOOD PRESSURE: 138 MMHG | WEIGHT: 195 LBS | RESPIRATION RATE: 16 BRPM | HEIGHT: 68 IN | HEART RATE: 68 BPM | DIASTOLIC BLOOD PRESSURE: 76 MMHG | BODY MASS INDEX: 29.55 KG/M2

## 2022-09-21 DIAGNOSIS — I25.10 CAD IN NATIVE ARTERY: ICD-10-CM

## 2022-09-21 DIAGNOSIS — Z01.818 PREOP TESTING: Primary | ICD-10-CM

## 2022-09-21 DIAGNOSIS — R07.9 CHEST PAIN, UNSPECIFIED TYPE: Primary | ICD-10-CM

## 2022-09-21 PROCEDURE — 93000 ELECTROCARDIOGRAM COMPLETE: CPT | Performed by: INTERNAL MEDICINE

## 2022-09-21 PROCEDURE — 99214 OFFICE O/P EST MOD 30 MIN: CPT | Performed by: INTERNAL MEDICINE

## 2022-09-21 NOTE — PROGRESS NOTES
[facility] Cardiology consult  Dr. Lata Sadler      Reason for visit: CAD  Referring Physician: KATY Powell     CHIEF COMPLAINT:   Chief Complaint   Patient presents with    Coronary Artery Disease     Follow up. Patient c/o chest and jaw pains and had to take nitro a few days ago        HISTORY OF PRESENT ILLNESS:   54year old female with history of CAD s/p CABG, diabetes mellitus, hyperlipidemia and Hypertension is here for evaluation    Patient is complaining of exertional chest pain, radiating to the neck and jaw, relieved by rest and nitroglycerin, symptoms started few weeks ago has been getting progressively worse, limiting her physical activities, associated symptoms included shortness of breath, denies any palpitations, no pedal edema, no PND, no orthopnea, no syncope, no presyncopal episodes.   Functional capacity is limited due to above-mentioned symptoms    Past Medical History:   Diagnosis Date    CAD in native artery 2020    Claustrophobia     mild    Colitis     Constipation     COVID-19     Diabetes mellitus ([address]     Heart problem     Hyperlipidemia     Hypertension     Hypothyroidism     Sleep apnea     diagnosed 7-8 years ago does not use C-pap problems with cost    Thyroid disease     Wears partial dentures     upper         Past Surgical History:   Procedure Laterality Date    APPENDECTOMY      CARDIAC CATHETERIZATION  2020    Dr. Andre Gu EF 55% cts consult ordered     SECTION      x2    CHOLECYSTECTOMY      COLONOSCOPY      CORONARY ARTERY BYPASS GRAFT N/A 2020    CORONARY ARTERY BYPASS, POSSIBLE LEFT RADIAL ARTERY HARVEST, MINDA performed by Katrine Saint, DO at [address]  12/1/15    laparoscopic incisional reapair with mesh    HYSTERECTOMY (CERVIX STATUS UNKNOWN)      KNEE SURGERY Right     VENTRAL HERNIA REPAIR           Current Outpatient Medications   Medication Sig Dispense Refill    isosorbide mononitrate (IMDUR) 30 MG extended release tablet Take 1 tablet 1    levothyroxine (SYNTHROID) 75 MCG tablet Take 1 tablet by mouth Daily 30 tablet 5    METHADONE HCL PO Take by mouth Takes 37 mg daily ( taking for past 20 years) history of 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  Oxycodone      lisinopril (PRINIVIL;ZESTRIL) 10 MG tablet Take 1 tablet by mouth daily (Patient not taking: Reported on 9/21/2022) 90 tablet 1     No current facility-administered medications for this visit. Allergies as of 09/21/2022 - Fully Reviewed 08/07/2022   Allergen Reaction Noted    Seasonal  09/01/2020       Social History     Socioeconomic History    Marital status:      Spouse name: Not on file    Number of children: Not on file    Years of education: Not on file    Highest education level: Not on file   Occupational History    Occupation:    Tobacco Use    Smoking status: Every Day     Packs/day: 1.50     Years: 25.00     Pack years: 37.50     Types: Cigarettes    Smokeless tobacco: Never    Tobacco comments:     trying to quit.  currently 7-8/day   Vaping Use    Vaping Use: Some days    Substances: Nicotine   Substance and Sexual Activity    Alcohol use: Not Currently    Drug use: Not Currently     Comment: methadone     Sexual activity: Yes     Partners: Male   Other Topics Concern    Not on file   Social History Narrative    Not on file     Social Determinants of Health     Financial Resource Strain: Not on file   Food Insecurity: Not on file   Transportation Needs: Not on file   Physical Activity: Not on file   Stress: Not on file   Social Connections: Not on file   Intimate Partner Violence: Not on file   Housing Stability: Not on file       Family History   Adopted: Yes   Problem Relation Age of Onset    Mult Sclerosis Mother     Mult Sclerosis Maternal Grandmother        REVIEW OF SYSTEMS:   CONSTITUTIONAL:  negative for  fevers, chills, sweats, + fatigue  HEENT:  negative for  tinnitus, earaches, nasal congestion and epistaxis  RESPIRATORY:  negative for  dry cough, cough with sputum, wheezing and hemoptysis  GASTROINTESTINAL:  negative for nausea, vomiting, diarrhea, constipation, pruritus and jaundice  HEMATOLOGIC/LYMPHATIC:  negative for easy bruising, bleeding, lymphadenopathy and petechiae  ENDOCRINE:  negative for heat intolerance, cold intolerance, tremor, hair loss and diabetic symptoms including neither polyuria nor polydipsia nor blurred vision  MUSCULOSKELETAL:  negative for  myalgias, arthralgias, joint swelling, stiff joints and decreased range of motion  NEUROLOGICAL:  negative for memory problems, speech problems, visual disturbance, dysphagia, weakness and numbness      PHYSICAL EXAM:   CONSTITUTIONAL:  awake, alert, cooperative, no apparent distress, and appears stated age  HEAD:  normocepalic, without obvious abnormality, atraumatic, pink, moist mucous membranes. NECK:  Supple, symmetrical, trachea midline, no adenopathy, thyroid symmetric, not enlarged and no tenderness, skin normal  LUNGS:  No increased work of breathing, decreased air exchange, scattered wheezing  CARDIOVASCULAR:  Normal apical impulse, regular rate and rhythm, normal S1 and S2, no S3 or S4, 2/6 systolic murmur at the apex, no JVD, no carotid bruit, no pedal edema, good carotid upstroke bilaterally. ABDOMEN:  Soft, nontender, no masses, no hepatomegaly or splenomegaly, BS+  CHEST: nontender to palpation, expands symmetrically  MUSCULOSKELETAL:  No clubbing no cyanosis. there is no redness, warmth, or swelling of the joints  full range of motion noted  NEUROLOGIC:  Alert, awake,oriented x3  SKIN:  no bruising or bleeding, normal skin color, texture, turgor and no redness, warmth, or swelling    /76   Pulse 68   Resp 16   Ht 5' 8\" (1.727 m)   Wt 195 lb (88.5 kg)   BMI 29.65 kg/m²     DATA:   I personally reviewed the visit EKG with the following interpretation: Sinus rhythm, poor R wave progression, nonspecific T wave changes    EKG 1/18/2022, sinus rhythm, baseline artifacts, LVH, nonspecific ST changes,    EKG 11/30/21 Sinus bradycardia with premature atrial complexes  Possible Left atrial enlargement  Prolonged QT  Abnormal ECG    ECHO: 2/2/21 Summary   No previous echo for comparison. Technically adequate study. Left ventricle is mildly enlarged . Mild asymmetric septal hypertrophy. Ejection fraction is visually estimated at 45-50%. No regional wall motion abnormalities seen. There is doppler evidence of stage II diastolic dysfunction. Physiologic and/or trace mitral regurgitation is present. Physiologic and/or trace tricuspid regurgitation. RVSP is normal.    Stress: 9/1/20 Electrocardiographically normal regadenoson infusion with a clinicallynonischemic response. Myocardial perfusion imaging showed small anterior wall fixed defect. Overall left ventricular systolic function mildly reduced, EF 45% with mild global hypokinesis. 4. Low risk general pharmacologic stress test    Angiography 9/14/20 IMPRESSION:  1.  Multivessel disease involving ostial LAD, distal LAD, left  circumflex artery, OM branch, RCA and acute marginal branch. 2.  Moderate disease involving the ostium and the proximal segment of  the right common iliac artery. 3.  Preserved LV function with mild inferior wall hypokinesis. 4.  Successful deployment of 6-Persian Angio-Seal in the right common  femoral artery and deployment of 6-Persian Angio-Seal in the left common  femoral artery. CABG 9/29/20 Coronary artery bypass grafting x3 using the left internal mammary  artery to the left anterior descending artery, radial artery to the  dominant obtuse marginal branch and circumflex and reverse saphenous  vein graft to the distal right coronary artery.     Cardiology Labs: BMP:    Lab Results   Component Value Date/Time     04/11/2022 06:42 PM    K 3.7 04/11/2022 06:42 PM    CL 93 04/11/2022 06:42 PM    CO2 26 04/11/2022 06:42 PM    BUN 28 04/11/2022 06:42 PM    CREATININE 1.7 04/11/2022 06:42 PM CMP:    Lab Results   Component Value Date/Time     04/11/2022 06:42 PM    K 3.7 04/11/2022 06:42 PM    CL 93 04/11/2022 06:42 PM    CO2 26 04/11/2022 06:42 PM    BUN 28 04/11/2022 06:42 PM    CREATININE 1.7 04/11/2022 06:42 PM    PROT 8.4 04/11/2022 06:42 PM     CBC:    Lab Results   Component Value Date/Time    WBC 14.9 04/11/2022 06:42 PM    RBC 3.92 04/11/2022 06:42 PM    HGB 12.2 04/11/2022 06:42 PM    HCT 35.9 04/11/2022 06:42 PM    MCV 91.6 04/11/2022 06:42 PM    RDW 13.1 04/11/2022 06:42 PM     04/11/2022 06:42 PM     PT/INR:  No results found for: PTINR  PT/INR Warfarin:  No components found for: PTPATWAR, PTINRWAR  PTT:    Lab Results   Component Value Date/Time    APTT 30.1 11/30/2021 03:31 PM     PTT Heparin:  No components found for: APTTHEP  Magnesium:    Lab Results   Component Value Date/Time    MG 2.1 09/30/2020 04:45 AM     TSH:    Lab Results   Component Value Date/Time    TSH 25.150 10/03/2020 06:13 AM     TROPONIN:  No components found for: TROP  BNP:  No results found for: BNP  FASTING LIPID PANEL:    Lab Results   Component Value Date/Time    CHOL 288 10/27/2015 11:30 AM    HDL 27 10/27/2015 11:30 AM    TRIG 253 10/27/2015 11:30 AM     No orders to display     I have personally reviewed the laboratory, cardiac diagnostic and radiographic testing as outlined above:      IMPRESSION:   1. Unstable angina: Will need cardiac catheterization, patient is high pretest probability for Lexiscan stress test given her symptoms and her history of CAD, cardiac catheterization, alternatives, risks, benefits, discussed with her, she is willing to proceed. 2.  Chronic diastolic congestive heart failure: Compensated, continue current treatment  3. CAD: Status post CABG with a LIMA to LAD, radial graft to OM, SVG to distal RCA, doing okay  4. Hypertension: Controlled  5. Hyperlipidemia: On statin  6. Type 2 diabetes mellitus  7. Tobacco abuse: Was counseled regarding smoking cessation    RECOMMENDATIONS:   1. Cardiac catheterization: Indications, procedures, risks and benefits of cardiac catheterization were discussed with the patient with risks including, but not limited to, infection, vessel damage, bleeding, dye allergy, nephrotoxicity, dysrhythmia, MI, CVA and death as well as the potential need for emergent cardiac surgery. Patient verbalized understanding and is agreeable to proceed. 2.  Continue current treatment  3. Patient was strongly advised to call [phone] if symptoms recur or get worse for any reason  4. Follow-up with the PCP as scheduled  5. Follow-up with Dr. Alex Benedict after her cardiac cath    I have reviewed my findings and recommendations with patient      Electronically signed by Anatoly Ibarra MD on 9/21/2022 at 10:14 AM      NOTE: This report was transcribed using voice recognition software.  Every effort was made to ensure accuracy; however, inadvertent computerized transcription errors may be presen

## 2022-09-23 ENCOUNTER — TELEPHONE (OUTPATIENT)
Dept: CARDIAC CATH/INVASIVE PROCEDURES | Age: 52
End: 2022-09-23

## 2022-09-23 ENCOUNTER — HOSPITAL ENCOUNTER (OUTPATIENT)
Age: 52
Discharge: HOME | End: 2022-09-23

## 2022-09-23 DIAGNOSIS — Z01.818 PREOP TESTING: ICD-10-CM

## 2022-09-23 LAB
ABO/RH: NORMAL
ANION GAP SERPL CALCULATED.3IONS-SCNC: 13 MMOL/L (ref 7–16)
ANTIBODY SCREEN: NORMAL
APTT: 33.3 SEC (ref 24.5–35.1)
BUN BLDV-MCNC: 14 MG/DL (ref 6–20)
CALCIUM SERPL-MCNC: 9.5 MG/DL (ref 8.6–10.2)
CHLORIDE BLD-SCNC: 99 MMOL/L (ref 98–107)
CO2: 24 MMOL/L (ref 22–29)
CREAT SERPL-MCNC: 0.9 MG/DL (ref 0.5–1)
GFR AFRICAN AMERICAN: >60
GFR NON-AFRICAN AMERICAN: >60 ML/MIN/1.73
GLUCOSE BLD-MCNC: 181 MG/DL (ref 74–99)
HCT VFR BLD CALC: 34.2 % (ref 34–48)
HEMOGLOBIN: 11.3 G/DL (ref 11.5–15.5)
INR BLD: 1.1
MCH RBC QN AUTO: 32 PG (ref 26–35)
MCHC RBC AUTO-ENTMCNC: 33 % (ref 32–34.5)
MCV RBC AUTO: 96.9 FL (ref 80–99.9)
PDW BLD-RTO: 15.1 FL (ref 11.5–15)
PLATELET # BLD: 351 E9/L (ref 130–450)
PMV BLD AUTO: 11.2 FL (ref 7–12)
POTASSIUM SERPL-SCNC: 3.8 MMOL/L (ref 3.5–5)
PROTHROMBIN TIME: 13 SEC (ref 9.3–12.4)
RBC # BLD: 3.53 E12/L (ref 3.5–5.5)
SODIUM BLD-SCNC: 136 MMOL/L (ref 132–146)
WBC # BLD: 12.4 E9/L (ref 4.5–11.5)

## 2022-09-23 PROCEDURE — 85730 THROMBOPLASTIN TIME PARTIAL: CPT

## 2022-09-23 PROCEDURE — 85610 PROTHROMBIN TIME: CPT

## 2022-09-23 PROCEDURE — 86901 BLOOD TYPING SEROLOGIC RH(D): CPT

## 2022-09-23 PROCEDURE — 80048 BASIC METABOLIC PNL TOTAL CA: CPT

## 2022-09-23 PROCEDURE — 86850 RBC ANTIBODY SCREEN: CPT

## 2022-09-23 PROCEDURE — 85027 COMPLETE CBC AUTOMATED: CPT

## 2022-09-23 PROCEDURE — 86900 BLOOD TYPING SEROLOGIC ABO: CPT

## 2022-09-23 PROCEDURE — 36415 COLL VENOUS BLD VENIPUNCTURE: CPT

## 2022-09-26 ENCOUNTER — HOSPITAL ENCOUNTER (OUTPATIENT)
Dept: CARDIAC CATH/INVASIVE PROCEDURES | Age: 52
Setting detail: OBSERVATION
Discharge: HOME | End: 2022-09-27
Attending: INTERNAL MEDICINE | Admitting: INTERNAL MEDICINE
Payer: MEDICAID

## 2022-09-26 DIAGNOSIS — R07.9 CHEST PAIN, UNSPECIFIED TYPE: ICD-10-CM

## 2022-09-26 LAB
ABO/RH: NORMAL
ANTIBODY SCREEN: NORMAL
METER GLUCOSE: 117 MG/DL (ref 74–99)
METER GLUCOSE: 170 MG/DL (ref 74–99)
POC ACT LR: 269 SECONDS

## 2022-09-26 PROCEDURE — 92928 PRQ TCAT PLMT NTRAC ST 1 LES: CPT | Performed by: INTERNAL MEDICINE

## 2022-09-26 PROCEDURE — C1887 CATHETER, GUIDING: HCPCS

## 2022-09-26 PROCEDURE — 2709999900 HC NON-CHARGEABLE SUPPLY

## 2022-09-26 PROCEDURE — 86900 BLOOD TYPING SEROLOGIC ABO: CPT

## 2022-09-26 PROCEDURE — G0378 HOSPITAL OBSERVATION PER HR: HCPCS

## 2022-09-26 PROCEDURE — 82962 GLUCOSE BLOOD TEST: CPT

## 2022-09-26 PROCEDURE — 86901 BLOOD TYPING SEROLOGIC RH(D): CPT

## 2022-09-26 PROCEDURE — 93454 CORONARY ARTERY ANGIO S&I: CPT

## 2022-09-26 PROCEDURE — 93455 CORONARY ART/GRFT ANGIO S&I: CPT | Performed by: INTERNAL MEDICINE

## 2022-09-26 PROCEDURE — 85347 COAGULATION TIME ACTIVATED: CPT

## 2022-09-26 PROCEDURE — S5553 INSULIN LONG ACTING 5 U: HCPCS | Performed by: INTERNAL MEDICINE

## 2022-09-26 PROCEDURE — 36415 COLL VENOUS BLD VENIPUNCTURE: CPT

## 2022-09-26 PROCEDURE — 2500000003 HC RX 250 WO HCPCS

## 2022-09-26 PROCEDURE — 6360000002 HC RX W HCPCS

## 2022-09-26 PROCEDURE — 93005 ELECTROCARDIOGRAM TRACING: CPT | Performed by: INTERNAL MEDICINE

## 2022-09-26 PROCEDURE — C1760 CLOSURE DEV, VASC: HCPCS

## 2022-09-26 PROCEDURE — C1769 GUIDE WIRE: HCPCS

## 2022-09-26 PROCEDURE — C1725 CATH, TRANSLUMIN NON-LASER: HCPCS

## 2022-09-26 PROCEDURE — C1894 INTRO/SHEATH, NON-LASER: HCPCS

## 2022-09-26 PROCEDURE — 6360000002 HC RX W HCPCS: Performed by: INTERNAL MEDICINE

## 2022-09-26 PROCEDURE — 2580000003 HC RX 258: Performed by: INTERNAL MEDICINE

## 2022-09-26 PROCEDURE — 6370000000 HC RX 637 (ALT 250 FOR IP): Performed by: INTERNAL MEDICINE

## 2022-09-26 PROCEDURE — 92928 PRQ TCAT PLMT NTRAC ST 1 LES: CPT

## 2022-09-26 PROCEDURE — C1874 STENT, COATED/COV W/DEL SYS: HCPCS

## 2022-09-26 PROCEDURE — 86850 RBC ANTIBODY SCREEN: CPT

## 2022-09-26 PROCEDURE — 6370000000 HC RX 637 (ALT 250 FOR IP)

## 2022-09-26 PROCEDURE — G0379 DIRECT REFER HOSPITAL OBSERV: HCPCS

## 2022-09-26 RX ORDER — SODIUM CHLORIDE 9 MG/ML
INJECTION, SOLUTION INTRAVENOUS ONCE
Status: COMPLETED | OUTPATIENT
Start: 2022-09-26 | End: 2022-09-27

## 2022-09-26 RX ORDER — CLOPIDOGREL BISULFATE 75 MG/1
75 TABLET ORAL DAILY
Status: DISCONTINUED | OUTPATIENT
Start: 2022-09-27 | End: 2022-09-27 | Stop reason: HOSPADM

## 2022-09-26 RX ORDER — FERROUS SULFATE 325(65) MG
325 TABLET ORAL 2 TIMES DAILY WITH MEALS
Status: DISCONTINUED | OUTPATIENT
Start: 2022-09-26 | End: 2022-09-27 | Stop reason: HOSPADM

## 2022-09-26 RX ORDER — SODIUM CHLORIDE 0.9 % (FLUSH) 0.9 %
5-40 SYRINGE (ML) INJECTION EVERY 12 HOURS SCHEDULED
Status: DISCONTINUED | OUTPATIENT
Start: 2022-09-26 | End: 2022-09-27 | Stop reason: HOSPADM

## 2022-09-26 RX ORDER — SODIUM CHLORIDE 0.9 % (FLUSH) 0.9 %
5-40 SYRINGE (ML) INJECTION PRN
Status: DISCONTINUED | OUTPATIENT
Start: 2022-09-26 | End: 2022-09-27 | Stop reason: HOSPADM

## 2022-09-26 RX ORDER — TRIAMTERENE AND HYDROCHLOROTHIAZIDE 37.5; 25 MG/1; MG/1
1 TABLET ORAL DAILY
Status: ON HOLD | COMMUNITY
End: 2022-09-27 | Stop reason: HOSPADM

## 2022-09-26 RX ORDER — PANTOPRAZOLE SODIUM 40 MG/1
40 TABLET, DELAYED RELEASE ORAL DAILY
Status: DISCONTINUED | OUTPATIENT
Start: 2022-09-27 | End: 2022-09-27 | Stop reason: HOSPADM

## 2022-09-26 RX ORDER — FUROSEMIDE 40 MG/1
40 TABLET ORAL DAILY PRN
Status: DISCONTINUED | OUTPATIENT
Start: 2022-09-26 | End: 2022-09-27 | Stop reason: HOSPADM

## 2022-09-26 RX ORDER — SODIUM CHLORIDE 9 MG/ML
INJECTION, SOLUTION INTRAVENOUS CONTINUOUS
Status: ACTIVE | OUTPATIENT
Start: 2022-09-26 | End: 2022-09-27

## 2022-09-26 RX ORDER — LOSARTAN POTASSIUM 50 MG/1
25 TABLET ORAL DAILY
Status: DISCONTINUED | OUTPATIENT
Start: 2022-09-26 | End: 2022-09-27 | Stop reason: HOSPADM

## 2022-09-26 RX ORDER — ALBUTEROL SULFATE 90 UG/1
2 AEROSOL, METERED RESPIRATORY (INHALATION) EVERY 6 HOURS PRN
Status: DISCONTINUED | OUTPATIENT
Start: 2022-09-26 | End: 2022-09-26 | Stop reason: CLARIF

## 2022-09-26 RX ORDER — HYDRALAZINE HYDROCHLORIDE 20 MG/ML
10 INJECTION INTRAMUSCULAR; INTRAVENOUS ONCE
Status: COMPLETED | OUTPATIENT
Start: 2022-09-26 | End: 2022-09-26

## 2022-09-26 RX ORDER — ALBUTEROL SULFATE 2.5 MG/3ML
2.5 SOLUTION RESPIRATORY (INHALATION) EVERY 6 HOURS PRN
Status: DISCONTINUED | OUTPATIENT
Start: 2022-09-26 | End: 2022-09-27 | Stop reason: HOSPADM

## 2022-09-26 RX ORDER — INSULIN LISPRO 100 [IU]/ML
0-4 INJECTION, SOLUTION INTRAVENOUS; SUBCUTANEOUS NIGHTLY
Status: DISCONTINUED | OUTPATIENT
Start: 2022-09-26 | End: 2022-09-27 | Stop reason: HOSPADM

## 2022-09-26 RX ORDER — ASPIRIN 81 MG/1
81 TABLET ORAL DAILY
Status: DISCONTINUED | OUTPATIENT
Start: 2022-09-27 | End: 2022-09-27 | Stop reason: HOSPADM

## 2022-09-26 RX ORDER — SENNA PLUS 8.6 MG/1
1 TABLET ORAL 2 TIMES DAILY
Status: DISCONTINUED | OUTPATIENT
Start: 2022-09-26 | End: 2022-09-27 | Stop reason: HOSPADM

## 2022-09-26 RX ORDER — DEXTROSE MONOHYDRATE 100 MG/ML
INJECTION, SOLUTION INTRAVENOUS CONTINUOUS PRN
Status: DISCONTINUED | OUTPATIENT
Start: 2022-09-26 | End: 2022-09-27 | Stop reason: HOSPADM

## 2022-09-26 RX ORDER — METHADONE HYDROCHLORIDE 10 MG/ML
47 CONCENTRATE ORAL DAILY
Status: DISCONTINUED | OUTPATIENT
Start: 2022-09-26 | End: 2022-09-27 | Stop reason: SDUPTHER

## 2022-09-26 RX ORDER — ACETAMINOPHEN 325 MG/1
650 TABLET ORAL EVERY 4 HOURS PRN
Status: DISCONTINUED | OUTPATIENT
Start: 2022-09-26 | End: 2022-09-27 | Stop reason: HOSPADM

## 2022-09-26 RX ORDER — INSULIN LISPRO 100 [IU]/ML
0-16 INJECTION, SOLUTION INTRAVENOUS; SUBCUTANEOUS
Status: DISCONTINUED | OUTPATIENT
Start: 2022-09-26 | End: 2022-09-27 | Stop reason: HOSPADM

## 2022-09-26 RX ORDER — HYDROCODONE BITARTRATE AND ACETAMINOPHEN 7.5; 325 MG/1; MG/1
2 TABLET ORAL EVERY 4 HOURS PRN
Status: DISCONTINUED | OUTPATIENT
Start: 2022-09-26 | End: 2022-09-27 | Stop reason: HOSPADM

## 2022-09-26 RX ORDER — INSULIN GLARGINE-YFGN 100 [IU]/ML
16 INJECTION, SOLUTION SUBCUTANEOUS NIGHTLY
Status: DISCONTINUED | OUTPATIENT
Start: 2022-09-26 | End: 2022-09-27 | Stop reason: HOSPADM

## 2022-09-26 RX ORDER — ISOSORBIDE MONONITRATE 30 MG/1
30 TABLET, EXTENDED RELEASE ORAL DAILY
Status: DISCONTINUED | OUTPATIENT
Start: 2022-09-27 | End: 2022-09-27 | Stop reason: HOSPADM

## 2022-09-26 RX ORDER — CLONIDINE HYDROCHLORIDE 0.1 MG/1
0.1 TABLET ORAL ONCE
Status: COMPLETED | OUTPATIENT
Start: 2022-09-26 | End: 2022-09-26

## 2022-09-26 RX ORDER — ALPRAZOLAM 1 MG/1
1 TABLET ORAL 3 TIMES DAILY PRN
Status: DISCONTINUED | OUTPATIENT
Start: 2022-09-26 | End: 2022-09-27 | Stop reason: HOSPADM

## 2022-09-26 RX ORDER — SODIUM CHLORIDE 9 MG/ML
INJECTION, SOLUTION INTRAVENOUS PRN
Status: DISCONTINUED | OUTPATIENT
Start: 2022-09-26 | End: 2022-09-27 | Stop reason: HOSPADM

## 2022-09-26 RX ORDER — ACETAMINOPHEN 500 MG
500 TABLET ORAL EVERY 4 HOURS PRN
Status: DISCONTINUED | OUTPATIENT
Start: 2022-09-26 | End: 2022-09-26 | Stop reason: SDUPTHER

## 2022-09-26 RX ORDER — LEVOTHYROXINE SODIUM 0.07 MG/1
75 TABLET ORAL DAILY
Status: DISCONTINUED | OUTPATIENT
Start: 2022-09-26 | End: 2022-09-27 | Stop reason: HOSPADM

## 2022-09-26 RX ORDER — DOCUSATE SODIUM 100 MG/1
100 CAPSULE, LIQUID FILLED ORAL 2 TIMES DAILY
Status: DISCONTINUED | OUTPATIENT
Start: 2022-09-26 | End: 2022-09-27 | Stop reason: HOSPADM

## 2022-09-26 RX ORDER — EZETIMIBE 10 MG/1
10 TABLET ORAL DAILY
Status: DISCONTINUED | OUTPATIENT
Start: 2022-09-27 | End: 2022-09-27 | Stop reason: HOSPADM

## 2022-09-26 RX ADMIN — HYDRALAZINE HYDROCHLORIDE 10 MG: 20 INJECTION INTRAMUSCULAR; INTRAVENOUS at 15:03

## 2022-09-26 RX ADMIN — SODIUM CHLORIDE: 9 INJECTION, SOLUTION INTRAVENOUS at 17:15

## 2022-09-26 RX ADMIN — CLONIDINE HYDROCHLORIDE 0.1 MG: 0.1 TABLET ORAL at 15:03

## 2022-09-26 RX ADMIN — SENNOSIDES 8.6 MG: 8.6 TABLET, FILM COATED ORAL at 22:14

## 2022-09-26 RX ADMIN — INSULIN GLARGINE-YFGN 16 UNITS: 100 INJECTION, SOLUTION SUBCUTANEOUS at 22:53

## 2022-09-26 RX ADMIN — ACETAMINOPHEN 650 MG: 325 TABLET, FILM COATED ORAL at 18:05

## 2022-09-26 RX ADMIN — LOSARTAN POTASSIUM 25 MG: 50 TABLET, FILM COATED ORAL at 19:35

## 2022-09-26 RX ADMIN — SODIUM CHLORIDE: 9 INJECTION, SOLUTION INTRAVENOUS at 10:05

## 2022-09-26 RX ADMIN — SODIUM CHLORIDE, PRESERVATIVE FREE 10 ML: 5 INJECTION INTRAVENOUS at 22:14

## 2022-09-26 RX ADMIN — LEVOTHYROXINE SODIUM 75 MCG: 75 TABLET ORAL at 22:14

## 2022-09-26 RX ADMIN — METOPROLOL TARTRATE 12.5 MG: 25 TABLET, FILM COATED ORAL at 22:14

## 2022-09-26 RX ADMIN — DOCUSATE SODIUM 100 MG: 100 CAPSULE, LIQUID FILLED ORAL at 22:14

## 2022-09-26 ASSESSMENT — PAIN DESCRIPTION - LOCATION: LOCATION: HEAD

## 2022-09-26 ASSESSMENT — LIFESTYLE VARIABLES
HOW MANY STANDARD DRINKS CONTAINING ALCOHOL DO YOU HAVE ON A TYPICAL DAY: PATIENT DOES NOT DRINK
HOW OFTEN DO YOU HAVE A DRINK CONTAINING ALCOHOL: NEVER

## 2022-09-26 ASSESSMENT — PAIN - FUNCTIONAL ASSESSMENT: PAIN_FUNCTIONAL_ASSESSMENT: ACTIVITIES ARE NOT PREVENTED

## 2022-09-26 ASSESSMENT — PAIN DESCRIPTION - DESCRIPTORS: DESCRIPTORS: ACHING

## 2022-09-27 VITALS
HEIGHT: 68 IN | RESPIRATION RATE: 16 BRPM | SYSTOLIC BLOOD PRESSURE: 130 MMHG | DIASTOLIC BLOOD PRESSURE: 77 MMHG | TEMPERATURE: 98.6 F | WEIGHT: 195 LBS | OXYGEN SATURATION: 99 % | BODY MASS INDEX: 29.55 KG/M2 | HEART RATE: 52 BPM

## 2022-09-27 PROBLEM — R07.9 CHEST PAIN: Status: ACTIVE | Noted: 2022-09-27

## 2022-09-27 PROBLEM — I20.0 UNSTABLE ANGINA (HCC): Status: ACTIVE | Noted: 2022-09-27

## 2022-09-27 LAB
ANION GAP SERPL CALCULATED.3IONS-SCNC: 11 MMOL/L (ref 7–16)
BUN BLDV-MCNC: 20 MG/DL (ref 6–20)
CALCIUM SERPL-MCNC: 8.7 MG/DL (ref 8.6–10.2)
CHLORIDE BLD-SCNC: 103 MMOL/L (ref 98–107)
CO2: 22 MMOL/L (ref 22–29)
CREAT SERPL-MCNC: 1 MG/DL (ref 0.5–1)
EKG ATRIAL RATE: 64 BPM
EKG P AXIS: 61 DEGREES
EKG P-R INTERVAL: 170 MS
EKG Q-T INTERVAL: 520 MS
EKG QRS DURATION: 94 MS
EKG QTC CALCULATION (BAZETT): 536 MS
EKG R AXIS: 34 DEGREES
EKG T AXIS: 127 DEGREES
EKG VENTRICULAR RATE: 64 BPM
GFR AFRICAN AMERICAN: >60
GFR NON-AFRICAN AMERICAN: 59 ML/MIN/1.73
GLUCOSE BLD-MCNC: 128 MG/DL (ref 74–99)
HCT VFR BLD CALC: 31.8 % (ref 34–48)
HEMOGLOBIN: 10.4 G/DL (ref 11.5–15.5)
MCH RBC QN AUTO: 31.9 PG (ref 26–35)
MCHC RBC AUTO-ENTMCNC: 32.7 % (ref 32–34.5)
MCV RBC AUTO: 97.5 FL (ref 80–99.9)
METER GLUCOSE: 231 MG/DL (ref 74–99)
PDW BLD-RTO: 15.2 FL (ref 11.5–15)
PLATELET # BLD: 284 E9/L (ref 130–450)
PMV BLD AUTO: 11.1 FL (ref 7–12)
POTASSIUM SERPL-SCNC: 4 MMOL/L (ref 3.5–5)
RBC # BLD: 3.26 E12/L (ref 3.5–5.5)
SODIUM BLD-SCNC: 136 MMOL/L (ref 132–146)
WBC # BLD: 10.9 E9/L (ref 4.5–11.5)

## 2022-09-27 PROCEDURE — 99217 PR OBSERVATION CARE DISCHARGE MANAGEMENT: CPT | Performed by: INTERNAL MEDICINE

## 2022-09-27 PROCEDURE — 6370000000 HC RX 637 (ALT 250 FOR IP): Performed by: INTERNAL MEDICINE

## 2022-09-27 PROCEDURE — 80048 BASIC METABOLIC PNL TOTAL CA: CPT

## 2022-09-27 PROCEDURE — 2700000000 HC OXYGEN THERAPY PER DAY

## 2022-09-27 PROCEDURE — 2580000003 HC RX 258: Performed by: INTERNAL MEDICINE

## 2022-09-27 PROCEDURE — 85027 COMPLETE CBC AUTOMATED: CPT

## 2022-09-27 PROCEDURE — 36415 COLL VENOUS BLD VENIPUNCTURE: CPT

## 2022-09-27 PROCEDURE — G0378 HOSPITAL OBSERVATION PER HR: HCPCS

## 2022-09-27 PROCEDURE — 6370000000 HC RX 637 (ALT 250 FOR IP)

## 2022-09-27 PROCEDURE — 82962 GLUCOSE BLOOD TEST: CPT

## 2022-09-27 RX ORDER — METHADONE HYDROCHLORIDE 10 MG/ML
47 CONCENTRATE ORAL DAILY
Status: DISCONTINUED | OUTPATIENT
Start: 2022-09-27 | End: 2022-09-27 | Stop reason: HOSPADM

## 2022-09-27 RX ORDER — LOSARTAN POTASSIUM 25 MG/1
25 TABLET ORAL DAILY
Qty: 90 TABLET | Refills: 3 | Status: ON HOLD | OUTPATIENT
Start: 2022-09-28 | End: 2022-11-02 | Stop reason: SDUPTHER

## 2022-09-27 RX ADMIN — METOPROLOL TARTRATE 12.5 MG: 25 TABLET, FILM COATED ORAL at 11:09

## 2022-09-27 RX ADMIN — ISOSORBIDE MONONITRATE 30 MG: 30 TABLET, EXTENDED RELEASE ORAL at 08:57

## 2022-09-27 RX ADMIN — FERROUS SULFATE TAB 325 MG (65 MG ELEMENTAL FE) 325 MG: 325 (65 FE) TAB at 08:57

## 2022-09-27 RX ADMIN — ASPIRIN 81 MG: 81 TABLET, COATED ORAL at 08:57

## 2022-09-27 RX ADMIN — CLOPIDOGREL BISULFATE 75 MG: 75 TABLET ORAL at 08:57

## 2022-09-27 RX ADMIN — SODIUM CHLORIDE, PRESERVATIVE FREE 10 ML: 5 INJECTION INTRAVENOUS at 11:09

## 2022-09-27 RX ADMIN — LOSARTAN POTASSIUM 25 MG: 50 TABLET, FILM COATED ORAL at 11:09

## 2022-09-27 RX ADMIN — SENNOSIDES 8.6 MG: 8.6 TABLET, FILM COATED ORAL at 08:57

## 2022-09-27 RX ADMIN — INSULIN LISPRO 4 UNITS: 100 INJECTION, SOLUTION INTRAVENOUS; SUBCUTANEOUS at 11:59

## 2022-09-27 RX ADMIN — EZETIMIBE 10 MG: 10 TABLET ORAL at 08:57

## 2022-09-27 RX ADMIN — DOCUSATE SODIUM 100 MG: 100 CAPSULE, LIQUID FILLED ORAL at 08:57

## 2022-09-27 RX ADMIN — METHADONE HYDROCHLORIDE 47 MG: 10 CONCENTRATE ORAL at 08:59

## 2022-09-27 ASSESSMENT — PAIN SCALES - GENERAL
PAINLEVEL_OUTOF10: 0

## 2022-09-27 NOTE — CARE COORDINATION
9/27/22 Transition of Care: patient is observation due to c/o chest pain and completion of heart cath. She is to be treated medically. She resides at home with her . She is independent. She follows with KATY Garcia at [facility]. Her pharmacy is [facility]. She plans on returning home with no needs when discharged.  Electronically signed by Christiano Orozco RN on 9/27/2022 at 12:30 PM

## 2022-09-27 NOTE — PROGRESS NOTES
Discharge instructions provided to patient and . Information regarding medications and follow up appointments given. Stent card given. IVs removed and dressings applied. Monitor removed, cleaned, and returned to nurses station. Importance of taking medications as prescribed discussed at length with verbalized understanding.

## 2022-09-27 NOTE — PATIENT CARE CONFERENCE
P Quality Flow/Interdisciplinary Rounds Progress Note        Quality Flow Rounds held on September 27, 2022    Disciplines Attending:  Bedside Nurse, , , and Nursing Unit Leadership    Felicia Dior was admitted on 9/26/2022  4:20 PM    Anticipated Discharge Date:       Disposition:    Esau Score:  Esau Scale Score: 21    Readmission Risk              Risk of Unplanned Readmission:  0           Discussed patient goal for the day, patient clinical progression, and barriers to discharge.   The following Goal(s) of the Day/Commitment(s) have been identified:  Labs - Report Results      Javier Carr RN  September 27, 2022

## 2022-09-27 NOTE — DISCHARGE INSTRUCTIONS
Cardiac Rehabilitation: Discharge instructions        Cardiac rehabilitation is a program for people who have a heart problem, such as a heart attack, coronary stent placed, heart failure, or a heart valve disease. The program includes exercise, lifestyle changes, education, and emotional support. Cardiac rehab can help you improve the quality of your life through better overall health. It can help you lose weight and feel better about yourself. On your cardiac rehab team, you may have your doctor, a nurse specialist, an exercise physiologist, and a dietitian. They will design your cardiac rehab program specifically for you. You will learn how to reduce your risk for heart problems, how to manage stress, and how to eat a heart-healthy diet. By the end of the program, you will be ready to maintain a healthier lifestyle on your own. Follow-up care is a key part of your treatment and safety. Be sure to make and go to all appointments, and call your doctor if you are having problems. It's also a good idea to know your test results and keep a list of the medicines you take. Please call to schedule your first appointment once you have been cleared by your cardiologist to attend Phase II Outpatient Cardiac Rehabilitation. Cardiac Rehabilitation Options:  [address] [place] [address].                                                                                           Cardiology Services  [place], [address] [facility] [address], [address] [facility]  Hours: M/W/F 7am-7pm & T/Th 7am-12pm                                                  [phone] [address]  Cardiac Rehab  [facility]. [address] [place]  P- [phone]                                                                                         Cardiac Rehabilitation  Hours: M/W/F 7am-6pm                                                                                [place], [address]  [facility]                                                          [phone]  Cardiac Rehabilitation                                                                                   [phone]  [address] [place] [address] Dr, [address]                                                                                        [facility]  [phone]                                                                                          Cardiac Rehabilitation  [phone]                                                                                          [address] [place] [address].  [facility] [address]                                                                                                                        [phone]                                                                                                                        [phone]

## 2022-09-27 NOTE — CONSULTS
Met with patient and discussed that their physician has ordered a referral to our outpatient Phase II Cardiac Rehabilitation program. Reviewed the benefits of cardiac rehabilitation based on their diagnosis and personal risk factors. Patient demonstrates strong interest in Cardiac Rehabilitation at this time. Cardiac Rehabilitation brochure provided to patient/family. The Cardiac Rehabilitation Program has been provided the patient's referral information and pertinent patient details and history. The patient may call [facility] at [phone] for additional information or questions. Contact information for [facility] and other choices close to the patient's residence have been provided in the discharge instructions so that the patient may call and schedule an appointment when cleared by their physician. Thank you for the referral.      Patients wants to attend [facility] CR. However she is waiting on insurance coverage.

## 2022-09-27 NOTE — PROGRESS NOTES
CLINICAL PHARMACY NOTE: MEDS TO BEDS    Total # of Prescriptions Filled: 1   The following medications were delivered to the patient:  Losartan 25mg    Additional Documentation:  Delivered to patient 9/27/22 @2:42pm

## 2022-09-27 NOTE — DISCHARGE SUMMARY
Physician Discharge Summary     Patient ID:  Paige Alonso  [id number]  2 y.o.  1970    Admit date: 9/26/2022    Discharge date and time: No discharge date for patient encounter. Admitting Physician: Theodore Quiroz MD     Discharge Physician: Theodore Quiroz MD    Admission Diagnoses: CAD in native artery [I25.10]    Discharge Diagnoses: CAD    Admission Condition: fair    Discharged Condition: good    Indication for Admission: S/p PCI    Hospital Course: Patient did well postprocedure, on the day of discharge she was ambulating in the hallway without cardiac complaints    Consults: none    Significant Diagnostic Studies: angiography: 9/26/2022,IMPRESSION:  1. Successful deployment of 2.5 x 38 mm [place] Resolute drug-eluting  stent in mid left circumflex artery extending into I believe to  unbypassed large OM branch (pre-PCI MONY 2 to 3 flow, post-PCI MONY-3  flow; pre-PCI stenosis was 90%, post PCI stenosis was 0%). 2.  Patent radial artery conduit to an OM branch. 3.  Significant disease involving the ostial LAD, not significantly  changed from before, and that is when the vessel gives two good-sized  diagonal branches before it was bypassed. 4.  Patent LIMA to LAD with distal competitive flow in LAD. 5.  Totally occluded small RCA with patent SVG to RCA. 6.  Moderate disease involving the ostial left subclavian artery.     Outstanding Order Results       Date and Time Order Name Status Description    9/26/2022  5:28 PM EKG 12 lead Preliminary               Discharge Exam:  /77   Pulse 52   Temp 98.6 °F (37 °C) (Temporal)   Resp 16   Ht 5' 8\" (1.727 m)   Wt 195 lb (88.5 kg)   SpO2 99%   BMI 29.65 kg/m²   CONSTITUTIONAL:  awake, alert, cooperative, no apparent distress, and appears stated age  HEAD:  normocepalic, without obvious abnormality, atraumatic  NECK:  Supple, symmetrical, trachea midline, no adenopathy, thyroid symmetric, not enlarged and no tenderness, skin normal  LUNGS:  No increased work of breathing, good air exchange, clear to auscultation bilaterally, no crackles or wheezing  CARDIOVASCULAR:  Normal apical impulse, regular rate and rhythm, normal S1 and S2, no S3 or S4, and no murmur noted, no edema, no JVD, no carotid bruit. ABDOMEN:  Soft, nontender, no masses, no hepatomegaly, no splenomegaly, BS+  MUSCULOSKELETAL:  No clubbing no cyanosis. there is no redness, warmth, or swelling of the joints  full range of motion noted  NEUROLOGIC:  Alert, awake,oriented x3  SKIN:  no bruising or bleeding, normal skin color, texture, turgor and no redness, warmth, or swelling      Disposition: home    Patient Instructions:      Medication List        START taking these medications      losartan 25 MG tablet  Commonly known as: COZAAR  Take 1 tablet by mouth daily            CHANGE how you take these medications      docusate 100 MG Caps  Commonly known as: COLACE, DULCOLAX  Take 100 mg by mouth 2 times daily as needed for Constipation (CONSTIPATION)  What changed: when to take this            CONTINUE taking these medications      acetaminophen 325 MG tablet  Commonly known as: TYLENOL  Take 2 tablets by mouth every 4 hours as needed for Pain     aspirin 81 MG EC tablet  Take 1 tablet by mouth daily     clopidogrel 75 MG tablet  Commonly known as: PLAVIX  Take 1 tablet by mouth daily     ezetimibe 10 MG tablet  Commonly known as: ZETIA  Take 1 tablet by mouth daily     ferrous sulfate 325 (65 Fe) MG tablet  Commonly known as: IRON 325  Take 1 tablet by mouth 2 times daily (with meals)     folic acid 1 MG tablet  Commonly known as: FOLVITE  Take 1 tablet by mouth daily     furosemide 40 MG tablet  Commonly known as: LASIX  Take 1 tablet by mouth daily as needed (pedal edema) Take 1 by mouth daily, take an extra 1 for extra swelling     ipratropium 17 MCG/ACT inhaler  Commonly known as:  Atrovent HFA  Inhale 2 puffs into the lungs 4 times daily as needed for Wheezing (shortness of breath) isosorbide mononitrate 30 MG extended release tablet  Commonly known as: IMDUR  Take 1 tablet by mouth in the morning. PATIENT TO TAKE FOR 6 MONTHS TO PREVENT RADIAL GRAFT SPASM.      Levemir 100 UNIT/ML injection vial  Generic drug: insulin detemir     levothyroxine 75 MCG tablet  Commonly known as: SYNTHROID  Take 1 tablet by mouth Daily     METHADONE HCL PO     NovoLOG FlexPen 100 UNIT/ML injection pen  Generic drug: insulin aspart     pantoprazole 40 MG tablet  Commonly known as: PROTONIX  Take 1 tablet by mouth daily for 14 days     Proventil  (90 Base) MCG/ACT inhaler  Generic drug: albuterol sulfate HFA  Inhale 2 puffs into the lungs every 6 hours as needed for Wheezing or Shortness of Breath     senna 8.6 MG tablet  Commonly known as: SENOKOT            STOP taking these medications      ibuprofen 600 MG tablet  Commonly known as: ADVIL;MOTRIN     metoprolol tartrate 25 MG tablet  Commonly known as: LOPRESSOR     triamterene-hydroCHLOROthiazide 37.5-25 MG per tablet  Commonly known as: MAXZIDE-25               Where to Get Your Medications        These medications were sent to [facility] [address]      Phone: [phone]   losartan 25 MG tablet         Activity: activity as tolerated  Diet: diabetic diet  Wound Care: none needed    Risk of instent thrombosis due to premature discontinuation of either ASA or Plavix discussed with patient at length    Cardiac rehab    Follow-up with PCP in 1 to 2 weeks, follow-up with Dr. Jaja Ascencio in 3 to 4 weeks    Note that 31 minutes was spent in preparing discharge papers, discussing discharge with patient and family, medication review  Signed:  José Manuel Almaguer MD  9/27/2022  1:02 PM

## 2022-09-27 NOTE — PROCEDURES
[address]                  [address] [place],  [facility]                            CARDIAC CATHETERIZATION    PATIENT NAME: Jorge Camilo                   :        1970  MED REC NO:   [id number]                            ROOM:  ACCOUNT NO:   [de-identified]                           ADMIT DATE: 2022  PROVIDER:     Nicol Arredondo MD    DATE OF PROCEDURE:  2022    PROCEDURES:  1. Coronary angiography. 2.  Surgical grafts and LIMA to LAD angiography. 3.  Percutaneous coronary intervention with deployment of 2.5 x 38 mm  Maximiliano Resolute drug-eluting stent into left circumflex artery to OM  branch. 4.  Deployment of Perclose ProStyle suture closure device in the left  femoral artery with effective hemostasis. 5.  Conscious sedation using Versed and fentanyl. Indication #4, score of 8. INDICATION FOR PCI:  16 and 8. INDICATION FOR PROCEDURE:  The patient is a 44-year-old female with  history of CAD, CABG with LIMA to LAD, SVG to RCA and radial artery  conduit to second OM branch, has been having unstable angina-like  symptoms. The patient was brought to the cath lab for further  evaluation. DESCRIPTION OF PROCEDURE:  After the appropriate informed consent, the  left groin area was prepped and draped in the usual sterile fashion. A  timeout was called. The left groin area was locally anesthetized with  10 mL of 2% lidocaine. A Cook needle was used to access the left  femoral artery using real-time ultrasound guidance. A 6-Uzbek  introducer sheath was used to cannulate the left femoral artery. 6-Uzbek [id number] and 6-Uzbek JR4 catheters were used for left and right  coronary angiography in multiple projections. A 6-Uzbek JR4 catheter  was used for LIMA to LAD, radial artery conduit to OM, and SVG to distal  RCA angiography. ANGIOGRAPHIC FINDINGS:  1.   The left main coronary artery arises normally from the left sinus of  Valsalva. It is a good-sized vessel that bifurcates into left anterior  descending artery and left circumflex artery. 2.  The left anterior descending artery has 70% ostial and proximal  disease and that is before it gives off two diagonal branches that they  were not bypassed. There was distal competitive flow in the LAD. 3.  The left circumflex artery is a large vessel that gives off a high  OM branch, a small second one, and there is a third one which I believe  that was the one that was bypassed. The fourth OM branch is large and  has 90% ostial disease. The left PLV and the left PA have no  significant CAD. The left circumflex artery itself has a long lesion  involving most of the mid segment estimated at worst point at 80% to 90%  stenosis. 4.  The right coronary artery is small, nondominant vessel with total  occlusion proximally. SVG to RCA is patent with MNOY-3 flow distally  into distal RCA. Radial artery conduit to second OM branch is patent. There is no significant retrograde filling. There is antegrade filling  through small subbranches of OM branch. LIMA to LAD is a mature vessel  with distal filling of the LAD and distal retrograde competitive flow to  a part of the mid LAD. The left subclavian artery is a large vessel  that has 50% stenosis with 20% pressure gradient across the ostium of  the left circumflex artery. After completing coronary angiography, we proceeded with PCI to left  circumflex artery and OM branch. The ostial and proximal LAD does not  look much worse than before, so that was left alone for now. We used  6-Italian EBU 3.5 guiding catheter to engage the left main coronary  artery. We used a BMW wire to cross into the left circumflex artery  into the OM branch. The lesions were predilated using 2.5 x 12 mm  balloon, then the lesions were stented using 2.5 x 38 mm Maximiliano Resolute  drug-eluting stent.   The proximal segment proximal to the takeoff of the  OM branch was postdilated using 3.0 x 15 mm noncompliant balloon. Followup angiography showed 0% residual stenosis and MONY-3 flow  distally. Limited left femoral angiography confirmed that the  arteriotomy was in the left common femoral artery, so a Perclose   closure device was used with effective hemostasis. COMPLICATIONS:  None. ESTIMATED TOTAL BLOOD LOSS:  50 to 60 mL. MEDICATIONS USED DURING THE PROCEDURE:  We used intravenous heparin for  anticoagulation. The patient was given 300 mg of Plavix. Of note, the  patient is chronically on Plavix. CONSCIOUS SEDATION:  We used Versed and fentanyl for conscious sedation. First dose was given at [id number], procedure ended at 1330. There was 85  minutes of direct face-to-face supervision during conscious sedation  administration. TOTAL CONTRAST USED:  250 mL of Isovue. TOTAL FLUOROSCOPY TIME:  16.9 minutes. IMPRESSION:  1. Successful deployment of 2.5 x 38 mm [place] Resolute drug-eluting  stent in mid left circumflex artery extending into I believe to  unbypassed large OM branch (pre-PCI MONY 2 to 3 flow, post-PCI MONY-3  flow; pre-PCI stenosis was 90%, post PCI stenosis was 0%). 2.  Patent radial artery conduit to an OM branch. 3.  Significant disease involving the ostial LAD, not significantly  changed from before, and that is when the vessel gives two good-sized  diagonal branches before it was bypassed. 4.  Patent LIMA to LAD with distal competitive flow in LAD. 5.  Totally occluded small RCA with patent SVG to RCA. 6.  Moderate disease involving the ostial left subclavian artery. RECOMMENDATIONS:  1. Aspirin for life. 2.  Plavix for at least a year, preferably longer. 3.  Aggressive coronary artery disease risk factor modifications. 4.  Smoking cessation. 5.  Outpatient Vascular Surgery consultation.   6.  If patient continued to have significant symptoms, may bring back  for PCI to ostial LAD, which angiographically did not look much  different from what it looked two years ago.         Ramy Mattson MD    D: 09/26/2022 14:02:02       T: 09/26/2022 15:42:12     [email or web address]  Job#: [id number]     Doc#: [id number]    CC:

## 2022-10-05 ENCOUNTER — TELEPHONE (OUTPATIENT)
Dept: CARDIOLOGY CLINIC | Age: 52
End: 2022-10-05

## 2022-10-05 NOTE — TELEPHONE ENCOUNTER
Patient calling in asking when she should follow up after her recent heart cath on 9/26/22. She also states she has a small lump in her groin where the incision was made & would like to know if this is normal as well. Please advise.

## 2022-10-06 ENCOUNTER — OFFICE VISIT (OUTPATIENT)
Dept: CARDIOLOGY CLINIC | Age: 52
End: 2022-10-06
Payer: MEDICAID

## 2022-10-06 VITALS
WEIGHT: 203 LBS | DIASTOLIC BLOOD PRESSURE: 104 MMHG | SYSTOLIC BLOOD PRESSURE: 150 MMHG | HEART RATE: 68 BPM | BODY MASS INDEX: 30.77 KG/M2 | RESPIRATION RATE: 16 BRPM | HEIGHT: 68 IN

## 2022-10-06 DIAGNOSIS — I25.10 CAD IN NATIVE ARTERY: Primary | ICD-10-CM

## 2022-10-06 PROCEDURE — 99214 OFFICE O/P EST MOD 30 MIN: CPT | Performed by: INTERNAL MEDICINE

## 2022-10-06 PROCEDURE — 93000 ELECTROCARDIOGRAM COMPLETE: CPT | Performed by: INTERNAL MEDICINE

## 2022-10-06 NOTE — PROGRESS NOTES
[facility] Cardiology consult  Dr. Ana Jones      Reason for visit: CAD  Referring Physician: KATY Kline     CHIEF COMPLAINT:   Chief Complaint   Patient presents with    Chest Pain     Small lump in groin        HISTORY OF PRESENT ILLNESS:   54year old female with history of CAD s/p CABG, s/p PCI to left circumflex artery to OM branch in 1720, diastolic congestive heart failure diabetes mellitus, hyperlipidemia and Hypertension is here for follow-up appointment  Shortness of breath especially with exertion, at baseline, no more chest pain, patient denies any lightheadedness, no dizziness, no palpitations, no pedal edema, no PND, no orthopnea, no syncope, no presyncopal episodes.   Functional capacity is slowly improving    Past Medical History:   Diagnosis Date    CAD in native artery 2020    Claustrophobia     mild    Colitis     Constipation     COPD (chronic obstructive pulmonary disease) ([facility] [address]     COVID-19     Diabetes mellitus ([facility] [address]     Heart problem     Hyperlipidemia     Hypertension     Hypothyroidism     Sleep apnea     diagnosed 7-8 years ago does not use C-pap problems with cost    Thyroid disease     Wears partial dentures     upper         Past Surgical History:   Procedure Laterality Date    APPENDECTOMY      CARDIAC CATHETERIZATION  2020    Dr. Calin Merino EF 55% cts consult ordered     SECTION      x2    CHOLECYSTECTOMY      COLONOSCOPY      CORONARY ARTERY BYPASS GRAFT N/A 2020    CORONARY ARTERY BYPASS, POSSIBLE LEFT RADIAL ARTERY HARVEST, MINDA performed by Renee Suresh DO at [address] [place] [address]  12/1/15    laparoscopic incisional reapair with mesh    HYSTERECTOMY (CERVIX STATUS UNKNOWN)      KNEE SURGERY Right     VENTRAL HERNIA REPAIR           Current Outpatient Medications   Medication Sig Dispense Refill    losartan (COZAAR) 25 MG tablet Take 1 tablet by mouth daily 90 tablet 3    isosorbide mononitrate (IMDUR) 30 MG extended release tablet Take 1 tablet by mouth in the morning. PATIENT TO TAKE FOR 6 MONTHS TO PREVENT RADIAL GRAFT SPASM. 90 tablet 3    furosemide (LASIX) 40 MG tablet Take 1 tablet by mouth daily as needed (pedal edema) Take 1 by mouth daily, take an extra 1 for extra swelling 90 tablet 1    ipratropium (ATROVENT HFA) 17 MCG/ACT inhaler Inhale 2 puffs into the lungs 4 times daily as needed for Wheezing (shortness of breath) 1 each 2    PROVENTIL  (90 Base) MCG/ACT inhaler Inhale 2 puffs into the lungs every 6 hours as needed for Wheezing or Shortness of Breath 18 g 3    senna (SENOKOT) 8.6 MG tablet Take 1 tablet by mouth 2 times daily      clopidogrel (PLAVIX) 75 MG tablet Take 1 tablet by mouth daily 90 tablet 2    pantoprazole (PROTONIX) 40 MG tablet Take 1 tablet by mouth daily for 14 days 90 tablet 2    insulin detemir (LEVEMIR) 100 UNIT/ML injection vial Inject 16 Units into the skin 2 times daily      insulin aspart (NOVOLOG FLEXPEN) 100 UNIT/ML injection pen Inject into the skin 3 times daily (before meals) Sliding scale      ezetimibe (ZETIA) 10 MG tablet Take 1 tablet by mouth daily 90 tablet 3    aspirin 81 MG EC tablet Take 1 tablet by mouth daily 180 tablet 3    acetaminophen (TYLENOL) 325 MG tablet Take 2 tablets by mouth every 4 hours as needed for Pain 120 tablet 3    ferrous sulfate (IRON 325) 325 (65 Fe) MG tablet Take 1 tablet by mouth 2 times daily (with meals) 60 tablet 0    folic acid (FOLVITE) 1 MG tablet Take 1 tablet by mouth daily 30 tablet 0    docusate sodium (COLACE, DULCOLAX) 100 MG CAPS Take 100 mg by mouth 2 times daily as needed for Constipation (CONSTIPATION) 20 capsule 0    levothyroxine (SYNTHROID) 75 MCG tablet Take 1 tablet by mouth Daily 30 tablet 5    METHADONE HCL PO Take by mouth Takes 47 mg daily ( taking for past 20 years) history of 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  Oxycodone       No current facility-administered medications for this visit.          Allergies as of 10/06/2022 - Fully Reviewed 09/26/2022 Allergen Reaction Noted    Seasonal  09/01/2020       Social History     Socioeconomic History    Marital status:      Spouse name: Not on file    Number of children: Not on file    Years of education: Not on file    Highest education level: Not on file   Occupational History    Occupation: [employer]   Tobacco Use    Smoking status: Every Day     Packs/day: 1.50     Years: 25.00     Pack years: 37.50     Types: Cigarettes    Smokeless tobacco: Never    Tobacco comments:     trying to quit.  currently 7-8/day   Vaping Use    Vaping Use: Some days   Substance and Sexual Activity    Alcohol use: Not Currently    Drug use: Not Currently     Comment: methadone     Sexual activity: Yes     Partners: Male   Other Topics Concern    Not on file   Social History Narrative    Not on file     Social Determinants of Health     Financial Resource Strain: Not on file   Food Insecurity: Not on file   Transportation Needs: Not on file   Physical Activity: Not on file   Stress: Not on file   Social Connections: Not on file   Intimate Partner Violence: Not on file   Housing Stability: Not on file       Family History   Adopted: Yes   Problem Relation Age of Onset    Mult Sclerosis Mother     Mult Sclerosis Maternal Grandmother        REVIEW OF SYSTEMS:   CONSTITUTIONAL:  negative for  fevers, chills, sweats, + fatigue  HEENT:  negative for  tinnitus, earaches, nasal congestion and epistaxis  RESPIRATORY:  negative for  dry cough, cough with sputum, wheezing and hemoptysis  GASTROINTESTINAL:  negative for nausea, vomiting, diarrhea, constipation, pruritus and jaundice  HEMATOLOGIC/LYMPHATIC:  negative for easy bruising, bleeding, lymphadenopathy and petechiae  ENDOCRINE:  negative for heat intolerance, cold intolerance, tremor, hair loss and diabetic symptoms including neither polyuria nor polydipsia nor blurred vision  MUSCULOSKELETAL:  negative for  myalgias, arthralgias, joint swelling, stiff joints and decreased range of motion  NEUROLOGICAL:  negative for memory problems, speech problems, visual disturbance, dysphagia, weakness and numbness      PHYSICAL EXAM:   CONSTITUTIONAL:  awake, alert, cooperative, no apparent distress, and appears stated age  HEAD:  normocepalic, without obvious abnormality, atraumatic, pink, moist mucous membranes. NECK:  Supple, symmetrical, trachea midline, no adenopathy, thyroid symmetric, not enlarged and no tenderness, skin normal  LUNGS:  No increased work of breathing, decreased air exchange, scattered wheezing  CARDIOVASCULAR:  Normal apical impulse, regular rate and rhythm, normal S1 and S2, no S3 or S4, 2/6 systolic murmur at the apex, no JVD, no carotid bruit, no pedal edema, good carotid upstroke bilaterally. ABDOMEN:  Soft, nontender, no masses, no hepatomegaly or splenomegaly, BS+  CHEST: nontender to palpation, expands symmetrically  MUSCULOSKELETAL:  No clubbing no cyanosis. there is no redness, warmth, or swelling of the joints  full range of motion noted  NEUROLOGIC:  Alert, awake,oriented x3  SKIN:  no bruising or bleeding, normal skin color, texture, turgor and no redness, warmth, or swelling    BP (!) 150/104   Pulse 68   Resp 16   Ht 5' 8\" (1.727 m)   Wt 203 lb (92.1 kg)   BMI 30.87 kg/m²     DATA:   I personally reviewed the visit EKG with the following interpretation: Sinus rhythm, poor R wave progression, nonspecific T wave changes in the lateral leads, no significant changes when compared to previous    EKG 9/21/2022, sinus rhythm, poor R wave progression, nonspecific T wave changes    EKG 1/18/2022, sinus rhythm, baseline artifacts, LVH, nonspecific ST changes,    EKG 11/30/21 Sinus bradycardia with premature atrial complexes  Possible Left atrial enlargement  Prolonged QT  Abnormal ECG    ECHO: 2/2/21 Summary   No previous echo for comparison. Technically adequate study. Left ventricle is mildly enlarged . Mild asymmetric septal hypertrophy.    Ejection fraction is visually estimated at 45-50%. No regional wall motion abnormalities seen. There is doppler evidence of stage II diastolic dysfunction. Physiologic and/or trace mitral regurgitation is present. Physiologic and/or trace tricuspid regurgitation. RVSP is normal.    Stress: 9/1/20 Electrocardiographically normal regadenoson infusion with a clinicallynonischemic response. Myocardial perfusion imaging showed small anterior wall fixed defect. Overall left ventricular systolic function mildly reduced, EF 45% with mild global hypokinesis. 4. Low risk general pharmacologic stress test    Angiography:  9/26/2022 IMPRESSION:  1. Successful deployment of 2.5 x 38 mm [place] Resolute drug-eluting  stent in mid left circumflex artery extending into I believe to  unbypassed large OM branch (pre-PCI MONY 2 to 3 flow, post-PCI MONY-3  flow; pre-PCI stenosis was 90%, post PCI stenosis was 0%). 2.  Patent radial artery conduit to an OM branch. 3.  Significant disease involving the ostial LAD, not significantly  changed from before, and that is when the vessel gives two good-sized  diagonal branches before it was bypassed. 4.  Patent LIMA to LAD with distal competitive flow in LAD. 5.  Totally occluded small RCA with patent SVG to RCA. 6.  Moderate disease involving the ostial left subclavian artery. 9/14/20 IMPRESSION:  1.  Multivessel disease involving ostial LAD, distal LAD, left  circumflex artery, OM branch, RCA and acute marginal branch. 2.  Moderate disease involving the ostium and the proximal segment of  the right common iliac artery. 3.  Preserved LV function with mild inferior wall hypokinesis. 4.  Successful deployment of 6-Togolese Angio-Seal in the right common  femoral artery and deployment of 6-[place]ogolese Angio-Seal in the left common  femoral artery.     CABG 9/29/20 Coronary artery bypass grafting x3 using the left internal mammary  artery to the left anterior descending artery, radial artery to the  dominant obtuse marginal branch and circumflex and reverse saphenous  vein graft to the distal right coronary artery.     Cardiology Labs: BMP:    Lab Results   Component Value Date/Time     09/27/2022 05:25 AM    K 4.0 09/27/2022 05:25 AM    K 3.7 04/11/2022 06:42 PM     09/27/2022 05:25 AM    CO2 22 09/27/2022 05:25 AM    BUN 20 09/27/2022 05:25 AM    CREATININE 1.0 09/27/2022 05:25 AM     CMP:    Lab Results   Component Value Date/Time     09/27/2022 05:25 AM    K 4.0 09/27/2022 05:25 AM    K 3.7 04/11/2022 06:42 PM     09/27/2022 05:25 AM    CO2 22 09/27/2022 05:25 AM    BUN 20 09/27/2022 05:25 AM    CREATININE 1.0 09/27/2022 05:25 AM    PROT 8.4 04/11/2022 06:42 PM     CBC:    Lab Results   Component Value Date/Time    WBC 10.9 09/27/2022 05:25 AM    RBC 3.26 09/27/2022 05:25 AM    HGB 10.4 09/27/2022 05:25 AM    HCT 31.8 09/27/2022 05:25 AM    MCV 97.5 09/27/2022 05:25 AM    RDW 15.2 09/27/2022 05:25 AM     09/27/2022 05:25 AM     PT/INR:  No results found for: PTINR  PT/INR Warfarin:  No components found for: PTPATWAR, PTINRWAR  PTT:    Lab Results   Component Value Date/Time    APTT 33.3 09/23/2022 04:49 PM     PTT Heparin:  No components found for: APTTHEP  Magnesium:    Lab Results   Component Value Date/Time    MG 2.1 09/30/2020 04:45 AM     TSH:    Lab Results   Component Value Date/Time    TSH 25.150 10/03/2020 06:13 AM     TROPONIN:  No components found for: TROP  BNP:  No results found for: BNP  FASTING LIPID PANEL:    Lab Results   Component Value Date/Time    CHOL 288 10/27/2015 11:30 AM    HDL 27 10/27/2015 11:30 AM    TRIG 253 10/27/2015 11:30 AM     No orders to display     I have personally reviewed the laboratory, cardiac diagnostic and radiographic testing as outlined above:      IMPRESSION:   1.  CAD: S/p CABG with a LIMA to LAD, radial graft to OM, SVG to distal RCA, had cardiac catheterization on 9/26/2022 with the following findings:  #  Successful deployment of 2.5 x 38 mm [place] Resolute drug-eluting stent in mid left circumflex artery extending into I believe to unbypassed large OM branch (pre-PCI MONY 2 to 3 flow, post-PCI MONY-3 flow; pre-PCI stenosis was 90%, post PCI stenosis was 0%). #  Patent radial artery conduit to an OM branch. # Significant disease involving the ostial LAD, not significantly changed from before, and that is when the vessel gives two good-sized diagonal branches before it was bypassed. # Patent LIMA to LAD with distal competitive flow in LAD. #  Totally occluded small RCA with patent SVG to RCA. #  Moderate disease involving the ostial left subclavian artery. 2.  Chronic diastolic congestive heart failure: Compensated, continue current treatment  3. CAD: Status post CABG with a LIMA to LAD, radial graft to OM, SVG to distal RCA, doing okay  4. Hypertension: Controlled  5. Hyperlipidemia: On statin  6. Type 2 diabetes mellitus  7. Tobacco abuse: Was counseled regarding smoking cessation    RECOMMENDATIONS:   1. Risk of instent thrombosis due to premature discontinuation of either ASA or Plavix discussed with patient at length  2. Continue current treatment  3. Disability form was filled out  4. Follow-up with KATY Cruz in as scheduled  5. Follow-up with Dr. Salud Fitzgerald in 3 months, sooner if symptomatic for any reason    I have reviewed my findings and recommendations with patient      Electronically signed by Abdelrahman Person MD on 10/6/2022 at 10:07 AM      NOTE: This report was transcribed using voice recognition software.  Every effort was made to ensure accuracy; however, inadvertent computerized transcription errors may be presen

## 2022-10-19 ENCOUNTER — TELEPHONE (OUTPATIENT)
Dept: CARDIOLOGY CLINIC | Age: 52
End: 2022-10-19

## 2022-10-19 NOTE — TELEPHONE ENCOUNTER
Patient had recent heart cath and saw you in office after. She said she is still having problems and that you mentioned another cath if they did not improve. She said they have not improved and she is actually feeling worse.  Please advise

## 2022-10-31 ENCOUNTER — TELEPHONE (OUTPATIENT)
Dept: CARDIAC CATH/INVASIVE PROCEDURES | Age: 52
End: 2022-10-31

## 2022-11-01 ENCOUNTER — HOSPITAL ENCOUNTER (OUTPATIENT)
Dept: CARDIAC CATH/INVASIVE PROCEDURES | Age: 52
Setting detail: OBSERVATION
Discharge: HOME | End: 2022-11-02
Attending: INTERNAL MEDICINE | Admitting: INTERNAL MEDICINE
Payer: MEDICAID

## 2022-11-01 PROBLEM — Z95.5 S/P CORONARY ARTERY STENT PLACEMENT: Status: ACTIVE | Noted: 2022-11-01

## 2022-11-01 LAB
ABO/RH: NORMAL
ANION GAP SERPL CALCULATED.3IONS-SCNC: 10 MMOL/L (ref 7–16)
ANTIBODY SCREEN: NORMAL
BASOPHILS ABSOLUTE: 0.14 E9/L (ref 0–0.2)
BASOPHILS RELATIVE PERCENT: 1 % (ref 0–2)
BUN BLDV-MCNC: 12 MG/DL (ref 6–20)
CALCIUM SERPL-MCNC: 9.6 MG/DL (ref 8.6–10.2)
CHLORIDE BLD-SCNC: 100 MMOL/L (ref 98–107)
CO2: 27 MMOL/L (ref 22–29)
CREAT SERPL-MCNC: 0.9 MG/DL (ref 0.5–1)
EKG ATRIAL RATE: 60 BPM
EKG P AXIS: 55 DEGREES
EKG P-R INTERVAL: 176 MS
EKG Q-T INTERVAL: 506 MS
EKG QRS DURATION: 96 MS
EKG QTC CALCULATION (BAZETT): 506 MS
EKG R AXIS: 29 DEGREES
EKG T AXIS: 131 DEGREES
EKG VENTRICULAR RATE: 60 BPM
EOSINOPHILS ABSOLUTE: 0.84 E9/L (ref 0.05–0.5)
EOSINOPHILS RELATIVE PERCENT: 6 % (ref 0–6)
GFR SERPL CREATININE-BSD FRML MDRD: >60 ML/MIN/1.73
GLUCOSE BLD-MCNC: 165 MG/DL (ref 74–99)
HCT VFR BLD CALC: 35.1 % (ref 34–48)
HEMOGLOBIN: 11.7 G/DL (ref 11.5–15.5)
IMMATURE GRANULOCYTES #: 0.08 E9/L
IMMATURE GRANULOCYTES %: 0.6 % (ref 0–5)
LYMPHOCYTES ABSOLUTE: 3.4 E9/L (ref 1.5–4)
LYMPHOCYTES RELATIVE PERCENT: 24.3 % (ref 20–42)
MCH RBC QN AUTO: 32 PG (ref 26–35)
MCHC RBC AUTO-ENTMCNC: 33.3 % (ref 32–34.5)
MCV RBC AUTO: 95.9 FL (ref 80–99.9)
METER GLUCOSE: 129 MG/DL (ref 74–99)
METER GLUCOSE: 158 MG/DL (ref 74–99)
MONOCYTES ABSOLUTE: 0.52 E9/L (ref 0.1–0.95)
MONOCYTES RELATIVE PERCENT: 3.7 % (ref 2–12)
NEUTROPHILS ABSOLUTE: 9 E9/L (ref 1.8–7.3)
NEUTROPHILS RELATIVE PERCENT: 64.4 % (ref 43–80)
PDW BLD-RTO: 14 FL (ref 11.5–15)
PLATELET # BLD: 316 E9/L (ref 130–450)
PMV BLD AUTO: 10.9 FL (ref 7–12)
POC ACT LR: 292 SECONDS
POTASSIUM SERPL-SCNC: 3.4 MMOL/L (ref 3.5–5)
RBC # BLD: 3.66 E12/L (ref 3.5–5.5)
SODIUM BLD-SCNC: 137 MMOL/L (ref 132–146)
WBC # BLD: 14 E9/L (ref 4.5–11.5)

## 2022-11-01 PROCEDURE — C1874 STENT, COATED/COV W/DEL SYS: HCPCS

## 2022-11-01 PROCEDURE — 86900 BLOOD TYPING SEROLOGIC ABO: CPT

## 2022-11-01 PROCEDURE — G0378 HOSPITAL OBSERVATION PER HR: HCPCS

## 2022-11-01 PROCEDURE — 2709999900 HC NON-CHARGEABLE SUPPLY

## 2022-11-01 PROCEDURE — 93010 ELECTROCARDIOGRAM REPORT: CPT | Performed by: INTERNAL MEDICINE

## 2022-11-01 PROCEDURE — 85347 COAGULATION TIME ACTIVATED: CPT

## 2022-11-01 PROCEDURE — G0379 DIRECT REFER HOSPITAL OBSERV: HCPCS

## 2022-11-01 PROCEDURE — 85025 COMPLETE CBC W/AUTO DIFF WBC: CPT

## 2022-11-01 PROCEDURE — C1894 INTRO/SHEATH, NON-LASER: HCPCS

## 2022-11-01 PROCEDURE — 93005 ELECTROCARDIOGRAM TRACING: CPT | Performed by: INTERNAL MEDICINE

## 2022-11-01 PROCEDURE — 6370000000 HC RX 637 (ALT 250 FOR IP)

## 2022-11-01 PROCEDURE — C1769 GUIDE WIRE: HCPCS

## 2022-11-01 PROCEDURE — C1725 CATH, TRANSLUMIN NON-LASER: HCPCS

## 2022-11-01 PROCEDURE — 2580000003 HC RX 258: Performed by: INTERNAL MEDICINE

## 2022-11-01 PROCEDURE — 36415 COLL VENOUS BLD VENIPUNCTURE: CPT

## 2022-11-01 PROCEDURE — C1887 CATHETER, GUIDING: HCPCS

## 2022-11-01 PROCEDURE — 86901 BLOOD TYPING SEROLOGIC RH(D): CPT

## 2022-11-01 PROCEDURE — 6360000002 HC RX W HCPCS

## 2022-11-01 PROCEDURE — S5553 INSULIN LONG ACTING 5 U: HCPCS | Performed by: INTERNAL MEDICINE

## 2022-11-01 PROCEDURE — 92928 PRQ TCAT PLMT NTRAC ST 1 LES: CPT | Performed by: INTERNAL MEDICINE

## 2022-11-01 PROCEDURE — 86850 RBC ANTIBODY SCREEN: CPT

## 2022-11-01 PROCEDURE — 80048 BASIC METABOLIC PNL TOTAL CA: CPT

## 2022-11-01 PROCEDURE — 6370000000 HC RX 637 (ALT 250 FOR IP): Performed by: INTERNAL MEDICINE

## 2022-11-01 PROCEDURE — 92928 PRQ TCAT PLMT NTRAC ST 1 LES: CPT

## 2022-11-01 PROCEDURE — 82962 GLUCOSE BLOOD TEST: CPT

## 2022-11-01 PROCEDURE — 2500000003 HC RX 250 WO HCPCS

## 2022-11-01 RX ORDER — CLOPIDOGREL BISULFATE 75 MG/1
75 TABLET ORAL DAILY
Status: DISCONTINUED | OUTPATIENT
Start: 2022-11-02 | End: 2022-11-02 | Stop reason: HOSPADM

## 2022-11-01 RX ORDER — LEVOTHYROXINE SODIUM 0.07 MG/1
75 TABLET ORAL DAILY
Status: DISCONTINUED | OUTPATIENT
Start: 2022-11-01 | End: 2022-11-02 | Stop reason: HOSPADM

## 2022-11-01 RX ORDER — INSULIN GLARGINE-YFGN 100 [IU]/ML
16 INJECTION, SOLUTION SUBCUTANEOUS 2 TIMES DAILY
Status: DISCONTINUED | OUTPATIENT
Start: 2022-11-01 | End: 2022-11-02 | Stop reason: HOSPADM

## 2022-11-01 RX ORDER — AMLODIPINE BESYLATE 5 MG/1
5 TABLET ORAL DAILY
Status: DISCONTINUED | OUTPATIENT
Start: 2022-11-01 | End: 2022-11-02 | Stop reason: HOSPADM

## 2022-11-01 RX ORDER — LOSARTAN POTASSIUM 50 MG/1
50 TABLET ORAL DAILY
Status: DISCONTINUED | OUTPATIENT
Start: 2022-11-02 | End: 2022-11-02 | Stop reason: HOSPADM

## 2022-11-01 RX ORDER — DEXTROSE MONOHYDRATE 100 MG/ML
INJECTION, SOLUTION INTRAVENOUS CONTINUOUS PRN
Status: DISCONTINUED | OUTPATIENT
Start: 2022-11-01 | End: 2022-11-02 | Stop reason: HOSPADM

## 2022-11-01 RX ORDER — ALPRAZOLAM 1 MG/1
1 TABLET ORAL 4 TIMES DAILY PRN
Status: DISCONTINUED | OUTPATIENT
Start: 2022-11-01 | End: 2022-11-02 | Stop reason: HOSPADM

## 2022-11-01 RX ORDER — DOCUSATE SODIUM 100 MG/1
100 CAPSULE, LIQUID FILLED ORAL 2 TIMES DAILY PRN
Status: DISCONTINUED | OUTPATIENT
Start: 2022-11-01 | End: 2022-11-02 | Stop reason: HOSPADM

## 2022-11-01 RX ORDER — INSULIN LISPRO 100 [IU]/ML
0-16 INJECTION, SOLUTION INTRAVENOUS; SUBCUTANEOUS
Status: DISCONTINUED | OUTPATIENT
Start: 2022-11-01 | End: 2022-11-02 | Stop reason: HOSPADM

## 2022-11-01 RX ORDER — SODIUM CHLORIDE 9 MG/ML
INJECTION, SOLUTION INTRAVENOUS CONTINUOUS
Status: ACTIVE | OUTPATIENT
Start: 2022-11-01 | End: 2022-11-01

## 2022-11-01 RX ORDER — ACETAMINOPHEN 500 MG
500 TABLET ORAL EVERY 4 HOURS PRN
Status: DISCONTINUED | OUTPATIENT
Start: 2022-11-01 | End: 2022-11-01 | Stop reason: SDUPTHER

## 2022-11-01 RX ORDER — LOSARTAN POTASSIUM 50 MG/1
25 TABLET ORAL DAILY
Status: DISCONTINUED | OUTPATIENT
Start: 2022-11-02 | End: 2022-11-01

## 2022-11-01 RX ORDER — INSULIN LISPRO 100 [IU]/ML
0-4 INJECTION, SOLUTION INTRAVENOUS; SUBCUTANEOUS NIGHTLY
Status: DISCONTINUED | OUTPATIENT
Start: 2022-11-01 | End: 2022-11-02 | Stop reason: HOSPADM

## 2022-11-01 RX ORDER — SENNA PLUS 8.6 MG/1
1 TABLET ORAL 2 TIMES DAILY
Status: DISCONTINUED | OUTPATIENT
Start: 2022-11-01 | End: 2022-11-02 | Stop reason: HOSPADM

## 2022-11-01 RX ORDER — SODIUM CHLORIDE 0.9 % (FLUSH) 0.9 %
5-40 SYRINGE (ML) INJECTION EVERY 12 HOURS SCHEDULED
Status: DISCONTINUED | OUTPATIENT
Start: 2022-11-01 | End: 2022-11-02 | Stop reason: HOSPADM

## 2022-11-01 RX ORDER — LOSARTAN POTASSIUM 50 MG/1
25 TABLET ORAL ONCE
Status: DISCONTINUED | OUTPATIENT
Start: 2022-11-01 | End: 2022-11-01

## 2022-11-01 RX ORDER — ACETAMINOPHEN 325 MG/1
650 TABLET ORAL EVERY 4 HOURS PRN
Status: DISCONTINUED | OUTPATIENT
Start: 2022-11-01 | End: 2022-11-02 | Stop reason: HOSPADM

## 2022-11-01 RX ORDER — ISOSORBIDE MONONITRATE 30 MG/1
30 TABLET, EXTENDED RELEASE ORAL DAILY
Status: DISCONTINUED | OUTPATIENT
Start: 2022-11-02 | End: 2022-11-02 | Stop reason: HOSPADM

## 2022-11-01 RX ORDER — FERROUS SULFATE 325(65) MG
325 TABLET ORAL 2 TIMES DAILY WITH MEALS
Status: DISCONTINUED | OUTPATIENT
Start: 2022-11-01 | End: 2022-11-02 | Stop reason: HOSPADM

## 2022-11-01 RX ORDER — SODIUM CHLORIDE 0.9 % (FLUSH) 0.9 %
5-40 SYRINGE (ML) INJECTION PRN
Status: DISCONTINUED | OUTPATIENT
Start: 2022-11-01 | End: 2022-11-02 | Stop reason: HOSPADM

## 2022-11-01 RX ORDER — SODIUM CHLORIDE 9 MG/ML
INJECTION, SOLUTION INTRAVENOUS PRN
Status: DISCONTINUED | OUTPATIENT
Start: 2022-11-01 | End: 2022-11-02 | Stop reason: HOSPADM

## 2022-11-01 RX ORDER — LOSARTAN POTASSIUM 50 MG/1
50 TABLET ORAL ONCE
Status: COMPLETED | OUTPATIENT
Start: 2022-11-01 | End: 2022-11-01

## 2022-11-01 RX ORDER — ASPIRIN 81 MG/1
81 TABLET ORAL DAILY
Status: DISCONTINUED | OUTPATIENT
Start: 2022-11-02 | End: 2022-11-02 | Stop reason: HOSPADM

## 2022-11-01 RX ORDER — METHADONE HYDROCHLORIDE 10 MG/ML
47 CONCENTRATE ORAL DAILY
Status: DISCONTINUED | OUTPATIENT
Start: 2022-11-02 | End: 2022-11-02 | Stop reason: HOSPADM

## 2022-11-01 RX ORDER — PANTOPRAZOLE SODIUM 40 MG/1
40 TABLET, DELAYED RELEASE ORAL DAILY
Status: DISCONTINUED | OUTPATIENT
Start: 2022-11-01 | End: 2022-11-02 | Stop reason: HOSPADM

## 2022-11-01 RX ADMIN — SODIUM CHLORIDE, PRESERVATIVE FREE 10 ML: 5 INJECTION INTRAVENOUS at 22:33

## 2022-11-01 RX ADMIN — ALPRAZOLAM 1 MG: 1 TABLET ORAL at 17:41

## 2022-11-01 RX ADMIN — POTASSIUM BICARBONATE 40 MEQ: 782 TABLET, EFFERVESCENT ORAL at 11:19

## 2022-11-01 RX ADMIN — INSULIN GLARGINE-YFGN 16 UNITS: 100 INJECTION, SOLUTION SUBCUTANEOUS at 22:33

## 2022-11-01 RX ADMIN — SODIUM CHLORIDE: 9 INJECTION, SOLUTION INTRAVENOUS at 11:04

## 2022-11-01 RX ADMIN — INSULIN GLARGINE-YFGN 16 UNITS: 100 INJECTION, SOLUTION SUBCUTANEOUS at 11:20

## 2022-11-01 RX ADMIN — FERROUS SULFATE TAB 325 MG (65 MG ELEMENTAL FE) 325 MG: 325 (65 FE) TAB at 11:19

## 2022-11-01 RX ADMIN — PANTOPRAZOLE SODIUM 40 MG: 40 TABLET, DELAYED RELEASE ORAL at 11:19

## 2022-11-01 RX ADMIN — LOSARTAN POTASSIUM 50 MG: 50 TABLET, FILM COATED ORAL at 12:28

## 2022-11-01 RX ADMIN — SODIUM CHLORIDE, PRESERVATIVE FREE 10 ML: 5 INJECTION INTRAVENOUS at 11:19

## 2022-11-01 RX ADMIN — FERROUS SULFATE TAB 325 MG (65 MG ELEMENTAL FE) 325 MG: 325 (65 FE) TAB at 16:52

## 2022-11-01 RX ADMIN — LEVOTHYROXINE SODIUM 75 MCG: 0.07 TABLET ORAL at 23:34

## 2022-11-01 RX ADMIN — AMLODIPINE BESYLATE 5 MG: 5 TABLET ORAL at 16:52

## 2022-11-01 ASSESSMENT — PAIN SCALES - GENERAL: PAINLEVEL_OUTOF10: 0

## 2022-11-01 NOTE — PROCEDURES
angiography  showed 0% residual stenosis and MONY-3 flow distally. At the end of the  procedure, the catheter and the wire were pulled out from the body, the  sheath was pulled out from the body and a Vasc Band was applied to the  right wrist area with effective hemostasis. COMPLICATIONS:  None. ESTIMATED TOTAL BLOOD LOSS:  20-25 mL. MEDICATIONS USED DURING THE PROCEDURE:  We used intraarterial verapamil  to prevent vasospasm. We used intraarterial heparin for  anticoagulation. The patient was given 300 mg of Plavix after PCI. Of  note, the patient is chronically on aspirin and Plavix. CONSCIOUS SEDATION:  We used Versed and fentanyl for conscious sedation. First dose was given at 09:35, procedure ended at 10:10. There was 35  minutes of direct face-to-face supervision during conscious sedation  administration. TOTAL CONTRAST USED:  130 mL of Isovue. TOTAL FLUOROSCOPY TIME:  7.9 minutes. IMPRESSION:  Percutaneous coronary intervention with deployment of 3.0 x  12 mm Maximiliano [place] drug-eluting stent in ostial LAD, (pre-PCI MONY-3  flow, post PCI MONY-3 flow, pre-PCI stenosis was 90%, post-PCI stenosis  was 0%). RECOMMENDATIONS:  1. Aspirin for life. 2.  P2Y12 inhibitors for at least a year, preferably longer. 3.  Aggressive coronary artery disease risk factor modifications. 4.  Smoking cessation. 5.  Optimize blood pressure control.         Rufino Carroll MD    D: 11/01/2022 10:29:30       T: 11/01/2022 11:01:34     [email or web address]  Job#: [id number]     Doc#: [id number]    CC:  Hasmukh Rodriges

## 2022-11-02 VITALS
OXYGEN SATURATION: 93 % | TEMPERATURE: 96.8 F | BODY MASS INDEX: 29.55 KG/M2 | HEART RATE: 61 BPM | WEIGHT: 195 LBS | SYSTOLIC BLOOD PRESSURE: 147 MMHG | DIASTOLIC BLOOD PRESSURE: 71 MMHG | RESPIRATION RATE: 18 BRPM | HEIGHT: 68 IN

## 2022-11-02 PROBLEM — I25.119 CORONARY ARTERY DISEASE INVOLVING NATIVE CORONARY ARTERY OF NATIVE HEART WITH ANGINA PECTORIS (HCC): Status: ACTIVE | Noted: 2020-09-14

## 2022-11-02 LAB
ANION GAP SERPL CALCULATED.3IONS-SCNC: 12 MMOL/L (ref 7–16)
BUN BLDV-MCNC: 12 MG/DL (ref 6–20)
CALCIUM SERPL-MCNC: 9.3 MG/DL (ref 8.6–10.2)
CHLORIDE BLD-SCNC: 102 MMOL/L (ref 98–107)
CO2: 25 MMOL/L (ref 22–29)
CREAT SERPL-MCNC: 0.9 MG/DL (ref 0.5–1)
GFR SERPL CREATININE-BSD FRML MDRD: >60 ML/MIN/1.73
GLUCOSE BLD-MCNC: 108 MG/DL (ref 74–99)
HCT VFR BLD CALC: 32.4 % (ref 34–48)
HEMOGLOBIN: 10.4 G/DL (ref 11.5–15.5)
MCH RBC QN AUTO: 31.6 PG (ref 26–35)
MCHC RBC AUTO-ENTMCNC: 32.1 % (ref 32–34.5)
MCV RBC AUTO: 98.5 FL (ref 80–99.9)
METER GLUCOSE: 144 MG/DL (ref 74–99)
PDW BLD-RTO: 14 FL (ref 11.5–15)
PLATELET # BLD: 271 E9/L (ref 130–450)
PMV BLD AUTO: 10.5 FL (ref 7–12)
POTASSIUM SERPL-SCNC: 3.7 MMOL/L (ref 3.5–5)
RBC # BLD: 3.29 E12/L (ref 3.5–5.5)
SODIUM BLD-SCNC: 139 MMOL/L (ref 132–146)
WBC # BLD: 10.4 E9/L (ref 4.5–11.5)

## 2022-11-02 PROCEDURE — 36415 COLL VENOUS BLD VENIPUNCTURE: CPT

## 2022-11-02 PROCEDURE — 2580000003 HC RX 258: Performed by: INTERNAL MEDICINE

## 2022-11-02 PROCEDURE — S5553 INSULIN LONG ACTING 5 U: HCPCS | Performed by: INTERNAL MEDICINE

## 2022-11-02 PROCEDURE — 85027 COMPLETE CBC AUTOMATED: CPT

## 2022-11-02 PROCEDURE — 80048 BASIC METABOLIC PNL TOTAL CA: CPT

## 2022-11-02 PROCEDURE — 6370000000 HC RX 637 (ALT 250 FOR IP): Performed by: INTERNAL MEDICINE

## 2022-11-02 PROCEDURE — 99217 PR OBSERVATION CARE DISCHARGE MANAGEMENT: CPT | Performed by: INTERNAL MEDICINE

## 2022-11-02 PROCEDURE — 2700000000 HC OXYGEN THERAPY PER DAY

## 2022-11-02 PROCEDURE — G0378 HOSPITAL OBSERVATION PER HR: HCPCS

## 2022-11-02 PROCEDURE — 82962 GLUCOSE BLOOD TEST: CPT

## 2022-11-02 RX ORDER — LOSARTAN POTASSIUM 50 MG/1
50 TABLET ORAL DAILY
Qty: 90 TABLET | Refills: 3 | Status: SHIPPED | OUTPATIENT
Start: 2022-11-02

## 2022-11-02 RX ORDER — AMLODIPINE BESYLATE 5 MG/1
5 TABLET ORAL DAILY
Qty: 30 TABLET | Refills: 3 | Status: SHIPPED | OUTPATIENT
Start: 2022-11-03

## 2022-11-02 RX ORDER — AMLODIPINE BESYLATE 5 MG/1
5 TABLET ORAL DAILY
Qty: 30 TABLET | Refills: 3 | Status: SHIPPED | OUTPATIENT
Start: 2022-11-03 | End: 2022-11-02 | Stop reason: SDUPTHER

## 2022-11-02 RX ADMIN — AMLODIPINE BESYLATE 5 MG: 5 TABLET ORAL at 09:22

## 2022-11-02 RX ADMIN — SODIUM CHLORIDE, PRESERVATIVE FREE 10 ML: 5 INJECTION INTRAVENOUS at 09:23

## 2022-11-02 RX ADMIN — ISOSORBIDE MONONITRATE 30 MG: 30 TABLET, EXTENDED RELEASE ORAL at 09:22

## 2022-11-02 RX ADMIN — CLOPIDOGREL BISULFATE 75 MG: 75 TABLET ORAL at 09:22

## 2022-11-02 RX ADMIN — ASPIRIN 81 MG: 81 TABLET, COATED ORAL at 09:22

## 2022-11-02 RX ADMIN — INSULIN GLARGINE-YFGN 16 UNITS: 100 INJECTION, SOLUTION SUBCUTANEOUS at 09:13

## 2022-11-02 RX ADMIN — PANTOPRAZOLE SODIUM 40 MG: 40 TABLET, DELAYED RELEASE ORAL at 09:23

## 2022-11-02 RX ADMIN — LOSARTAN POTASSIUM 50 MG: 50 TABLET, FILM COATED ORAL at 09:22

## 2022-11-02 RX ADMIN — Medication 47 MG: at 09:19

## 2022-11-02 RX ADMIN — SENNOSIDES 8.6 MG: 8.6 TABLET, FILM COATED ORAL at 09:22

## 2022-11-02 RX ADMIN — FERROUS SULFATE TAB 325 MG (65 MG ELEMENTAL FE) 325 MG: 325 (65 FE) TAB at 09:23

## 2022-11-02 ASSESSMENT — PAIN SCALES - GENERAL: PAINLEVEL_OUTOF10: 0

## 2022-11-02 NOTE — CONSULTS
Met with patient and discussed that their physician has ordered a referral to our outpatient Phase II Cardiac Rehabilitation program. Reviewed the benefits of cardiac rehabilitation based on their diagnosis and personal risk factors. Patient demonstrates mild interest in Cardiac Rehabilitation at this time. Cardiac Rehabilitation brochure provided to patient/family. The Cardiac Rehabilitation Program has been provided the patient's referral information and pertinent patient details and history. The patient may call [facility] at [phone] for additional information or questions. Contact information for [facility] and other choices close to the patient's residence have been provided in the discharge instructions so that the patient may call and schedule an appointment when cleared by their physician.  Thank you for the referral.

## 2022-11-02 NOTE — DISCHARGE SUMMARY
Physician Discharge Summary     Patient ID:  Lizette Kim  [id number]  50 y.o.  1970    Admit date: 11/1/2022    Discharge date and time: 11/2/2022,    Admitting Physician: Emi Gonzalez MD     Discharge Physician: Emi Gonzalez MD    Admission Diagnoses: Atherosclerotic heart disease of native coronary artery without angina pectoris [I25.10]  S/P coronary artery stent placement [Z95.5]    Discharge Diagnoses: CAD    Admission Condition: fair    Discharged Condition: good    Indication for Admission: PCI to LAD    Hospital Course: Did well postprocedure, on the day of discharge she was ambulating in the room without any cardiac complaints    Consults: Cardiac rehab    Significant Diagnostic Studies: angiography: 11/1/2022,IMPRESSION:  Percutaneous coronary intervention with deployment of 3.0 x  12 mm Th[place] drug-eluting stent in ostial LAD, (pre-PCI MONY-3  flow, post PCI MONY-3 flow, pre-PCI stenosis was 90%, post-PCI stenosis  was 0%). RECOMMENDATIONS:  1. Aspirin for life. 2.  P2Y12 inhibitors for at least a year, preferably longer. 3.  Aggressive coronary artery disease risk factor modifications. 4.  Smoking cessation. 5.  Optimize blood pressure control. Outstanding Order Results       No orders found for last 30 day(s).               Discharge Exam:  BP (!) 147/71   Pulse 61   Temp 96.8 °F (36 °C) (Temporal)   Resp 18   Ht 5' 8\" (1.727 m)   Wt 195 lb (88.5 kg)   SpO2 93%   BMI 29.65 kg/m²   CONSTITUTIONAL:  awake, alert, cooperative, no apparent distress, and appears stated age  HEAD:  normocepalic, without obvious abnormality, atraumatic  NECK:  Supple, symmetrical, trachea midline, no adenopathy, thyroid symmetric, not enlarged and no tenderness, skin normal  LUNGS:  No increased work of breathing, good air exchange, clear to auscultation bilaterally, no crackles or wheezing  CARDIOVASCULAR:  Normal apical impulse, regular rate and rhythm, normal S1 and S2, no S3 or S4, and no murmur noted, no edema, no JVD, no carotid bruit. ABDOMEN:  Soft, nontender, no masses, no hepatomegaly, no splenomegaly, BS+  MUSCULOSKELETAL:  No clubbing no cyanosis. there is no redness, warmth, or swelling of the joints, the right wrist area looks okay, no swelling, no hematoma, pulses +2  NEUROLOGIC:  Alert, awake,oriented x3  SKIN:  no bruising or bleeding, normal skin color, texture, turgor and no redness, warmth, or swelling     Disposition: home    Patient Instructions:      Medication List        START taking these medications      amLODIPine 5 MG tablet  Commonly known as: NORVASC  Take 1 tablet by mouth daily  Start taking on: November 3, 2022            CHANGE how you take these medications      losartan 50 MG tablet  Commonly known as: COZAAR  Take 1 tablet by mouth daily  What changed:   medication strength  how much to take            CONTINUE taking these medications      acetaminophen 325 MG tablet  Commonly known as: TYLENOL  Take 2 tablets by mouth every 4 hours as needed for Pain     aspirin 81 MG EC tablet  Take 1 tablet by mouth daily     clopidogrel 75 MG tablet  Commonly known as: PLAVIX  Take 1 tablet by mouth daily     docusate 100 MG Caps  Commonly known as: COLACE, DULCOLAX  Take 100 mg by mouth 2 times daily as needed for Constipation (CONSTIPATION)     ezetimibe 10 MG tablet  Commonly known as: ZETIA  Take 1 tablet by mouth daily     ferrous sulfate 325 (65 Fe) MG tablet  Commonly known as: IRON 325  Take 1 tablet by mouth 2 times daily (with meals)     folic acid 1 MG tablet  Commonly known as: FOLVITE  Take 1 tablet by mouth daily     furosemide 40 MG tablet  Commonly known as: LASIX  Take 1 tablet by mouth daily as needed (pedal edema) Take 1 by mouth daily, take an extra 1 for extra swelling     ipratropium 17 MCG/ACT inhaler  Commonly known as:  Atrovent HFA  Inhale 2 puffs into the lungs 4 times daily as needed for Wheezing (shortness of breath)     isosorbide mononitrate 30 MG extended release tablet  Commonly known as: IMDUR  Take 1 tablet by mouth in the morning. PATIENT TO TAKE FOR 6 MONTHS TO PREVENT RADIAL GRAFT SPASM.      Levemir 100 UNIT/ML injection vial  Generic drug: insulin detemir     levothyroxine 75 MCG tablet  Commonly known as: SYNTHROID  Take 1 tablet by mouth Daily     METHADONE HCL PO     NovoLOG FlexPen 100 UNIT/ML injection pen  Generic drug: insulin aspart     pantoprazole 40 MG tablet  Commonly known as: PROTONIX  Take 1 tablet by mouth daily for 14 days     senna 8.6 MG tablet  Commonly known as: SENOKOT            STOP taking these medications      Proventil  (90 Base) MCG/ACT inhaler  Generic drug: albuterol sulfate HFA               Where to Get Your Medications        These medications were sent to [facility], [address] [address]      Phone: [phone]   losartan 50 MG tablet       You can get these medications from any pharmacy    Bring a paper prescription for each of these medications  amLODIPine 5 MG tablet         Activity: activity as tolerated  Diet: diabetic diet  Wound Care: none needed    Risk of instent thrombosis due to premature discontinuation of either ASA or Plavix discussed with patient at length    Follow-up with [facility] Luna as scheduled, Dr. Fatou Vickers in 3 to 4 weeks    Note that 31 minutes was spent in preparing discharge papers, discussing discharge with patient and family, medication review    Signed:  Audrey Paredes MD  11/2/2022  3:05 PM

## 2022-11-02 NOTE — DISCHARGE INSTRUCTIONS
Coronary Angioplasty: What to Expect at [facility]     Coronary angioplasty is a procedure that is used to open a narrowed or blocked coronary artery. It may also be called a percutaneous coronary intervention (PCI). The doctor opened your narrowed or blocked artery by putting a thin tube, called a catheter, into your heart through a blood vessel. The catheter was inserted into the blood vessel in your groin or wrist. The doctor may have placed a small tube, called a stent, in the artery. Your groin or wrist may have a bruise and feel sore for a few days after the procedure. You can do light activities around the house. But don't do anything strenuous until your doctor says it is okay. This may be for several days. This care sheet gives you a general idea about how long it will take for you to recover. But each person recovers at a different pace. Follow the steps below to get better as quickly as possible. How can you care for yourself at home? Activity    If the doctor gave you a sedative: For 24 hours, don't do anything that requires attention to detail, such as going to work, making important decisions, or signing any legal documents. It takes time for the medicine's effects to completely wear off. For your safety, do not drive or operate any machinery that could be dangerous. Wait until the medicine wears off and you can think clearly and react easily. Do not do strenuous exercise and do not lift, pull, or push anything heavy until your doctor says it is okay. This may be for several days. You can walk around the house and do light activity, such as cooking. If the catheter was placed in your groin, try not to walk up stairs for the first couple of days. If the catheter was placed in your arm near your wrist, do not bend your wrist deeply for the first couple of days. Be careful using your hand to get into and out of a chair or bed.      Carry your stent identification card with you at all times. If your doctor recommends it, get more exercise. Walking is a good choice. Bit by bit, increase the amount you walk every day. Try for at least 30 minutes on most days of the week. If you haven't been set up with a cardiac rehab program, talk to your doctor about whether rehab is right for you. Cardiac rehab includes supervised exercise. It also includes help with diet and lifestyle changes and emotional support. Diet    Drink plenty of fluids to help your body flush out the dye. If you have kidney, heart, or liver disease and have to limit fluids, talk with your doctor before you increase the amount of fluids you drink. Keep eating a heart-healthy diet that has lots of fruits, vegetables, and whole grains. If you have not been eating this way, talk to your doctor. You also may want to talk to a dietitian. This expert can help you to learn about healthy foods and plan meals. Medicines    Your doctor will tell you if and when you can restart your medicines. Your doctor will also give you instructions about taking any new medicines. If you stopped taking aspirin or some other blood thinner, your doctor will tell you when to start taking it again. You will take medicine that prevents blood clots. You may take aspirin plus another antiplatelet. It is very important that you take these medicines exactly as directed. These medicines help keep the coronary artery open and reduce your risk of a heart attack. Call your doctor if you think you are having a problem with your medicine. Care of the catheter site    For 1 or 2 days, keep a bandage over the spot where the catheter was inserted. The bandage probably will fall off in this time. Put ice or a cold pack on the area for 10 to 20 minutes at a time to help with soreness or swelling. Put a thin cloth between the ice and your skin. You may shower 24 to 48 hours after the procedure, if your doctor okays it.  Pat the incision dry. Do not soak the catheter site until it is healed. Don't take a bath for 1 week, or until your doctor tells you it is okay. Watch for bleeding from the site. A small amount of blood (up to the size of a quarter) on the bandage can be normal.     If you are bleeding, lie down and press on the area for 15 minutes to try to make it stop. If the bleeding does not stop, call your doctor or seek immediate medical care. Follow-up care is a key part of your treatment and safety. Be sure to make and go to all appointments, and call your doctor if you are having problems. It's also a good idea to know your test results and keep a list of the medicines you take. When should you call for help? Call 911 anytime you think you may need emergency care. For example, call if:    You passed out (lost consciousness). You have severe trouble breathing. You have sudden chest pain and shortness of breath, or you cough up blood. You have symptoms of a heart attack, such as:  Chest pain or pressure. Sweating. Shortness of breath. Nausea or vomiting. Pain that spreads from the chest to the neck, jaw, or one or both shoulders or arms. Dizziness or lightheadedness. A fast or uneven pulse. After calling 911, chew 1 adult-strength aspirin. Wait for an ambulance. Do not try to drive yourself. You have been diagnosed with angina, and you have angina symptoms that do not go away with rest or are not getting better within 5 minutes after you take one dose of nitroglycerin. Call your doctor now or seek immediate medical care if:    You are bleeding from the area where the catheter was put in your artery. You have a fast-growing, painful lump at the catheter site. You have signs of infection, such as: Increased pain, swelling, warmth, or redness. Red streaks leading from the catheter site. Pus draining from the catheter site. A fever.      Your leg or hand is painful, looks blue, or feels cold, numb, or tingly. Watch closely for changes in your health, and be sure to contact your doctor if you have any problems. Where can you learn more? Go to [email or web address]://[email or web address] org and sign in to your [employer] account. Enter R052 in the [facility] box to learn more about \"Coronary Angioplasty: What to Expect at Home. \"     If you do not have an account, please click on the \"Sign Up Now\" link. Current as of: January 10, 2022               Content Version: 13.4  © 2006-2022 [employer]. Care instructions adapted under license by [facility] ([facility]). If you have questions about a medical condition or this instruction, always ask your healthcare professional. [address] any warranty or liability for your use of this information. Cardiac Rehabilitation: Discharge instructions        Cardiac rehabilitation is a program for people who have a heart problem, such as a heart attack, coronary stent placed, heart failure, or a heart valve disease. The program includes exercise, lifestyle changes, education, and emotional support. Cardiac rehab can help you improve the quality of your life through better overall health. It can help you lose weight and feel better about yourself. On your cardiac rehab team, you may have your doctor, a nurse specialist, an exercise physiologist, and a dietitian. They will design your cardiac rehab program specifically for you. You will learn how to reduce your risk for heart problems, how to manage stress, and how to eat a heart-healthy diet. By the end of the program, you will be ready to maintain a healthier lifestyle on your own. Follow-up care is a key part of your treatment and safety. Be sure to make and go to all appointments, and call your doctor if you are having problems. It's also a good idea to know your test results and keep a list of the medicines you take.     Please call to schedule your first appointment once you have been cleared by your cardiologist to attend Phase II Outpatient Cardiac Rehabilitation. Cardiac Rehabilitation Options:  [address]  [facility] Card[facility] Services  [address]                                                                              Nura Wilson  [address] [facility] [phone]                                                                                         [address]  Hours: M/W/F 7am-7pm & T/Th 7am-12pm                                                  [phone]                                                                                                                          [phone] [address]  Cardiac Rehab  [facility].                                                                                          [facility], [address] [facility]  P- [phone]                                                                                         Cardiac Rehabilitation  Hours: M/W/F 7am-6pm                                                                                [address]  [facility]                                                          [phone]  Cardiac Rehabilitation                                                                                   [phone]  [address] [facility]  [phone]                                                                                          Cardiac Rehabilitation  [phone] [facility], [place]                                                                                                                        [phone]                                                                                                                        [phone]

## 2022-11-02 NOTE — PLAN OF CARE
Problem: Chronic Conditions and Co-morbidities  Goal: Patient's chronic conditions and co-morbidity symptoms are monitored and maintained or improved  Outcome: Adequate for Discharge     Problem: Discharge Planning  Goal: Discharge to home or other facility with appropriate resources  Outcome: Adequate for Discharge     Problem: ABCDS Injury Assessment  Goal: Absence of physical injury  Outcome: Adequate for Discharge     Problem: Pain  Goal: Verbalizes/displays adequate comfort level or baseline comfort level  Outcome: Adequate for Discharge

## 2022-11-03 NOTE — PROGRESS NOTES
CLINICAL PHARMACY NOTE: MEDS TO BEDS    Total # of Prescriptions Filled: 1   The following medications were delivered to the patient:  Losartan potassium 50 mg  Delivered to pt    Additional Documentation:

## 2022-11-21 NOTE — H&P
Department of Medicine  Section of Cardiology  Attending Procedure History and Physical        Pre-Procedural Diagnosis: CAD, chest pain    Indications: CAD, chest pain    Procedure Planned: PCI    History obtained from patient    History of Present Illness: The patient is a 50 y.o. female who presents for the above procedure. With the CAD, has been having unstable anginal-like symptoms, patient is here for staged PCI to proximal LAD    Past Medical History:        Diagnosis Date    CAD in native artery 2020    Claustrophobia     mild    Colitis     Constipation     COPD (chronic obstructive pulmonary disease) ([facility] [address]     COVID-19     Diabetes mellitus ([facility] [address]     Heart problem     Hyperlipidemia     Hypertension     Hypothyroidism     Sleep apnea     diagnosed 7-8 years ago does not use C-pap problems with cost    Thyroid disease     Wears partial dentures     upper         Past Surgical History:        Procedure Laterality Date    APPENDECTOMY      CARDIAC CATHETERIZATION  2020    Dr. Dandy Htichcock EF 55% cts consult ordered     SECTION      x2    CHOLECYSTECTOMY      COLONOSCOPY      CORONARY ANGIOPLASTY WITH STENT PLACEMENT  2022    Dr. Mookie Rice 3.0x12    CORONARY ARTERY BYPASS GRAFT N/A 2020    CORONARY ARTERY BYPASS, POSSIBLE LEFT RADIAL ARTERY HARVEST, MINDA performed by Pedro Monsivais DO at [facility]  2015    laparoscopic incisional reapair with mesh    HYSTERECTOMY (CERVIX STATUS UNKNOWN)      KNEE SURGERY Right     VENTRAL HERNIA REPAIR       Medications:    Current Outpatient Rx   Medication Sig Dispense Refill    losartan (COZAAR) 50 MG tablet Take 1 tablet by mouth daily 90 tablet 3    amLODIPine (NORVASC) 5 MG tablet Take 1 tablet by mouth daily 30 tablet 3    isosorbide mononitrate (IMDUR) 30 MG extended release tablet Take 1 tablet by mouth in the morning. PATIENT TO TAKE FOR 6 MONTHS TO PREVENT RADIAL GRAFT SPASM.  90 tablet 3 furosemide (LASIX) 40 MG tablet Take 1 tablet by mouth daily as needed (pedal edema) Take 1 by mouth daily, take an extra 1 for extra swelling 90 tablet 1    ipratropium (ATROVENT HFA) 17 MCG/ACT inhaler Inhale 2 puffs into the lungs 4 times daily as needed for Wheezing (shortness of breath) 1 each 2    senna (SENOKOT) 8.6 MG tablet Take 1 tablet by mouth 2 times daily      clopidogrel (PLAVIX) 75 MG tablet Take 1 tablet by mouth daily 90 tablet 2    pantoprazole (PROTONIX) 40 MG tablet Take 1 tablet by mouth daily for 14 days 90 tablet 2    insulin detemir (LEVEMIR) 100 UNIT/ML injection vial Inject 16 Units into the skin 2 times daily      insulin aspart (NOVOLOG FLEXPEN) 100 UNIT/ML injection pen Inject into the skin 3 times daily (before meals) Sliding scale      ezetimibe (ZETIA) 10 MG tablet Take 1 tablet by mouth daily 90 tablet 3    aspirin 81 MG EC tablet Take 1 tablet by mouth daily 180 tablet 3    acetaminophen (TYLENOL) 325 MG tablet Take 2 tablets by mouth every 4 hours as needed for Pain 120 tablet 3    ferrous sulfate (IRON 325) 325 (65 Fe) MG tablet Take 1 tablet by mouth 2 times daily (with meals) 60 tablet 0    folic acid (FOLVITE) 1 MG tablet Take 1 tablet by mouth daily 30 tablet 0    docusate sodium (COLACE, DULCOLAX) 100 MG CAPS Take 100 mg by mouth 2 times daily as needed for Constipation (CONSTIPATION) 20 capsule 0    levothyroxine (SYNTHROID) 75 MCG tablet Take 1 tablet by mouth Daily 30 tablet 5    METHADONE HCL PO Take by mouth Takes 47 mg daily ( taking for past 20 years) history of 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  Oxycodone         Allergies:  Seasonal and Statins    History of allergic reaction to anesthesia:  no    Social History  Current smoker    REVIEW OF SYSTEMS  ROS:  CONSTITUTIONAL:  negative for  fevers, chills  HEENT:  negative for earaches, nasal congestion and epistaxis  RESPIRATORY:  negative for  dry cough, cough with sputum,wheezing and hemoptysis  GASTROINTESTINAL:  negative for nausea, vomiting  MUSCULOSKELETAL:  negative for  myalgias, arthralgias  NEUROLOGICAL:  negative for visual disturbance, dysphagia     PHYSICAL EXAM    BP (!) 147/71   Pulse 61   Temp 96.8 °F (36 °C) (Temporal)   Resp 18   Ht 5' 8\" (1.727 m)   Wt 195 lb (88.5 kg)   SpO2 93%   BMI 29.65 kg/m²   CONSTITUTIONAL:  awake, alert, cooperative, no apparent distress, and appears stated age  HEAD:  normocepalic, without obvious abnormality, atraumatic  NECK:  Supple, symmetrical, trachea midline, no adenopathy, thyroid symmetric, not enlarged and no tenderness, skin normal  LUNGS:  No increased work of breathing, good air exchange, clear to auscultation bilaterally, no crackles or wheezing  CARDIOVASCULAR:  Normal apical impulse, regular rate and rhythm, normal S1 and S2, no S3 or S4, and no murmur noted, no edema, no JVD, no carotid bruit. ABDOMEN:  Soft, nontender, no masses, no hepatomegaly, no splenomegaly, BS+  MUSCULOSKELETAL:  No clubbing no cyanosis. there is no redness, warmth, or swelling of the joints  full range of motion noted  NEUROLOGIC:  Alert, awake,oriented x3  SKIN:  no bruising or bleeding, normal skin color, texture, turgor and no redness, warmth, or swelling    DATA    CBC:    Lab Results   Component Value Date/Time    WBC 10.4 11/02/2022 06:27 AM    RBC 3.29 11/02/2022 06:27 AM    HGB 10.4 11/02/2022 06:27 AM    HCT 32.4 11/02/2022 06:27 AM    MCV 98.5 11/02/2022 06:27 AM    RDW 14.0 11/02/2022 06:27 AM     11/02/2022 06:27 AM     Platelet:    Lab Results   Component Value Date/Time     11/02/2022 06:27 AM     BUN/Cr:    Lab Results   Component Value Date/Time    BUN 12 11/02/2022 06:27 AM     Potassium:    Lab Results   Component Value Date/Time    K 3.7 11/02/2022 06:27 AM    K 3.7 04/11/2022 06:42 PM     PT/INR:  No results found for: PTINR  PTT:    Lab Results   Component Value Date/Time    APTT 33.3 09/23/2022 04:49 PM           ASSESSMENT AND PLAN    1. Patient is a 50 y.o. female with above specified procedure planned PCI to LAD under conscious sedation    Expected Sedation/Anesthesia Type:  conscious sedation    2. ASA ([address] Anesthesiology) Anesthesia Status:  2    3. Procedure options, risks and benefits reviewed with Patient. Patient expresses understanding    4. Patient has not been on anticoagulation medications    5. Consent has been signed:   Yes

## 2023-01-09 ENCOUNTER — HOSPITAL ENCOUNTER (OUTPATIENT)
Age: 53
Discharge: HOME | End: 2023-01-11
Payer: MEDICAID

## 2023-01-09 ENCOUNTER — HOSPITAL ENCOUNTER (OUTPATIENT)
Dept: GENERAL RADIOLOGY | Age: 53
Discharge: HOME | End: 2023-01-11
Payer: MEDICAID

## 2023-01-09 DIAGNOSIS — R09.89 ABNORMAL CHEST SOUNDS: ICD-10-CM

## 2023-01-09 PROCEDURE — 71046 X-RAY EXAM CHEST 2 VIEWS: CPT

## 2023-02-24 ENCOUNTER — HOSPITAL ENCOUNTER (OUTPATIENT)
Dept: CT IMAGING | Age: 53
Discharge: HOME | End: 2023-02-24
Payer: MEDICAID

## 2023-02-24 DIAGNOSIS — R91.1 SOLITARY PULMONARY NODULE: ICD-10-CM

## 2023-02-24 PROCEDURE — 71250 CT THORAX DX C-: CPT

## 2023-05-04 RX ORDER — CLOPIDOGREL BISULFATE 75 MG/1
75 TABLET ORAL DAILY
Qty: 90 TABLET | Refills: 3 | Status: SHIPPED
Start: 2023-05-04 | End: 2023-05-05

## 2023-05-05 ENCOUNTER — OFFICE VISIT (OUTPATIENT)
Dept: CARDIOLOGY CLINIC | Age: 53
End: 2023-05-05

## 2023-05-05 VITALS
SYSTOLIC BLOOD PRESSURE: 158 MMHG | DIASTOLIC BLOOD PRESSURE: 82 MMHG | HEART RATE: 66 BPM | BODY MASS INDEX: 30.01 KG/M2 | WEIGHT: 198 LBS | RESPIRATION RATE: 16 BRPM | HEIGHT: 68 IN

## 2023-05-05 DIAGNOSIS — I25.10 CAD IN NATIVE ARTERY: Primary | ICD-10-CM

## 2023-05-05 RX ORDER — CLOPIDOGREL BISULFATE 75 MG/1
75 TABLET ORAL DAILY
Qty: 90 TABLET | Refills: 3 | Status: SHIPPED | OUTPATIENT
Start: 2023-05-05

## 2023-05-05 RX ORDER — LOSARTAN POTASSIUM 25 MG/1
25 TABLET ORAL DAILY
COMMUNITY

## 2023-05-05 NOTE — PROGRESS NOTES
[facility] consult  Dr. Anel Delacruz      Reason for visit: CAD  Referring Physician: WALLACE Hollis - LINDSAY     CHIEF COMPLAINT:   Chief Complaint   Patient presents with    Coronary Artery Disease     Post stent        HISTORY OF PRESENT ILLNESS:   Patient is 50year old female with history of CAD s/p CABG, s/p PCI to left circumflex artery to OM branch in 5718, diastolic congestive heart failure diabetes mellitus, hyperlipidemia and Hypertension is here for follow-up appointment  Shortness of breath especially with exertion, at baseline, attributed to COPD, no chest pain, patient denies any lightheadedness, no dizziness, no palpitations, no pedal edema, no PND, no orthopnea, no syncope, no presyncopal episodes.   Functional capacity is at baseline    Past Medical History:   Diagnosis Date    CAD in native artery 2020    Claustrophobia     mild    Colitis     Constipation     COPD (chronic obstructive pulmonary disease) ([facility]     COVID-19     Diabetes mellitus ([facility] [address]     Heart problem     Hyperlipidemia     Hypertension     Hypothyroidism     Sleep apnea     diagnosed 7-8 years ago does not use C-pap problems with cost    Thyroid disease     Wears partial dentures     upper         Past Surgical History:   Procedure Laterality Date    APPENDECTOMY      CARDIAC CATHETERIZATION  2020    Dr. Miriam Nance EF 55% cts consult ordered     SECTION      x2    CHOLECYSTECTOMY      COLONOSCOPY      CORONARY ANGIOPLASTY WITH STENT PLACEMENT  2022    Dr. Marilyn Ramos 3.0x12    CORONARY ARTERY BYPASS GRAFT N/A 2020    CORONARY ARTERY BYPASS, POSSIBLE LEFT RADIAL ARTERY HARVEST, MINDA performed by Mamadou Kaur DO at [facility]  2015    laparoscopic incisional reapair with mesh    HYSTERECTOMY (CERVIX STATUS UNKNOWN)      KNEE SURGERY Right     VENTRAL HERNIA REPAIR           Current Outpatient Medications   Medication Sig Dispense Refill    losartan (COZAAR)

## 2023-07-25 ENCOUNTER — TELEPHONE (OUTPATIENT)
Dept: CARDIOLOGY CLINIC | Age: 53
End: 2023-07-25

## 2023-07-25 NOTE — TELEPHONE ENCOUNTER
Eleazar Gil from [facility] called stating Patient was in to see PCP 7/24/2023. Had some SOB and Edema. Last EF was 44-45% in 2021. PCP wants to know if its safe to put her on a Ad-blocker. PCP also wanted to order an echo but patient doesn't have insurance at this time.        Please advise     [phone]

## 2023-07-28 NOTE — TELEPHONE ENCOUNTER
Echo in September 2022 revealed normal ejection fraction, no need to repeat echo at this point of time  Can increase Lasix to twice a day until pedal edema and shortness of breath improved  Okay to put her on low-dose beta-blocker Toprol-XL 25 mg daily

## 2023-08-03 ENCOUNTER — TELEPHONE (OUTPATIENT)
Dept: CARDIOLOGY CLINIC | Age: 53
End: 2023-08-03

## 2023-08-03 NOTE — TELEPHONE ENCOUNTER
Patient called stating her legs are swelling really badly its starting to affecting her breathing.      Please advise

## 2023-11-13 ENCOUNTER — HOSPITAL ENCOUNTER (OUTPATIENT)
Age: 53
Discharge: HOME | End: 2023-11-15

## 2023-11-13 ENCOUNTER — HOSPITAL ENCOUNTER (OUTPATIENT)
Dept: GENERAL RADIOLOGY | Age: 53
Discharge: HOME | End: 2023-11-15

## 2023-11-13 DIAGNOSIS — I50.9 CHRONIC CONGESTIVE HEART FAILURE, UNSPECIFIED HEART FAILURE TYPE (HCC): ICD-10-CM

## 2023-11-13 PROCEDURE — 71046 X-RAY EXAM CHEST 2 VIEWS: CPT

## 2023-11-30 ENCOUNTER — HOSPITAL ENCOUNTER (OUTPATIENT)
Dept: MAMMOGRAPHY | Age: 53
Discharge: HOME | End: 2023-12-02

## 2023-11-30 VITALS — WEIGHT: 200 LBS | BODY MASS INDEX: 30.31 KG/M2 | HEIGHT: 68 IN

## 2023-11-30 DIAGNOSIS — Z12.31 ENCOUNTER FOR SCREENING MAMMOGRAM FOR MALIGNANT NEOPLASM OF BREAST: ICD-10-CM

## 2023-11-30 PROCEDURE — 77063 BREAST TOMOSYNTHESIS BI: CPT

## 2023-12-12 ENCOUNTER — OFFICE VISIT (OUTPATIENT)
Dept: CARDIOLOGY CLINIC | Age: 53
End: 2023-12-12

## 2023-12-12 VITALS
DIASTOLIC BLOOD PRESSURE: 94 MMHG | SYSTOLIC BLOOD PRESSURE: 164 MMHG | RESPIRATION RATE: 16 BRPM | HEIGHT: 68 IN | HEART RATE: 69 BPM | WEIGHT: 200 LBS | BODY MASS INDEX: 30.31 KG/M2

## 2023-12-12 DIAGNOSIS — I50.31 ACUTE DIASTOLIC CHF (CONGESTIVE HEART FAILURE) (HCC): ICD-10-CM

## 2023-12-12 DIAGNOSIS — I10 ESSENTIAL HYPERTENSION: Primary | ICD-10-CM

## 2023-12-12 PROCEDURE — 93000 ELECTROCARDIOGRAM COMPLETE: CPT | Performed by: INTERNAL MEDICINE

## 2023-12-12 PROCEDURE — 3080F DIAST BP >= 90 MM HG: CPT | Performed by: INTERNAL MEDICINE

## 2023-12-12 PROCEDURE — 3077F SYST BP >= 140 MM HG: CPT | Performed by: INTERNAL MEDICINE

## 2023-12-12 PROCEDURE — 99214 OFFICE O/P EST MOD 30 MIN: CPT | Performed by: INTERNAL MEDICINE

## 2023-12-12 NOTE — PROGRESS NOTES
branches before it was bypassed. # Patent LIMA to LAD with distal competitive flow in LAD. #  Totally occluded small RCA with patent SVG to RCA. #  Moderate disease involving the ostial left subclavian artery. 2.  Acute on chronic diastolic congestive heart failure: Decompensated, will adjust treatment  3. Hypertension: Controlled  4. Hyperlipidemia: On statin  5. Type 2 diabetes mellitus  6. Tobacco abuse: Was counseled regarding smoking cessation    RECOMMENDATIONS:   1. Increase Lasix to 3 times daily for the next 3 days, patient is to call with progress on Friday  2. Continue to rest of medications  3. Basic metabolic panel and BNP in 1 week  4. Patient was strongly advised to call 911 if symptoms recur or get worse for any reason  5. Preventive cardiology: Low-salt, low-cholesterol diet, daily exercise, total cholesterol of less than 200, LDL of less than 70, adherence to diabetic diet and diabetic medications, smoking cessation were all advised. 6.  Follow-up with KATY Preciado in as scheduled  7. Follow-up with Dr. Shi Henry in 1 month, sooner if symptomatic for any reason    I have reviewed my findings and recommendations with patient and her son at bedside      Electronically signed by Emily Jara MD on 12/12/2023 at 4:34 PM      NOTE: This report was transcribed using voice recognition software.  Every effort was made to ensure accuracy; however, inadvertent computerized transcription errors may be presen

## 2024-01-08 ENCOUNTER — HOSPITAL ENCOUNTER (OUTPATIENT)
Age: 54
Discharge: HOME | End: 2024-01-08

## 2024-01-08 DIAGNOSIS — I50.31 ACUTE DIASTOLIC CHF (CONGESTIVE HEART FAILURE) (HCC): ICD-10-CM

## 2024-01-08 LAB
ANION GAP SERPL CALCULATED.3IONS-SCNC: 12 MMOL/L (ref 7–16)
BNP SERPL-MCNC: 1385 PG/ML (ref 0–450)
BUN SERPL-MCNC: 12 MG/DL (ref 6–20)
CALCIUM SERPL-MCNC: 9 MG/DL (ref 8.6–10.2)
CHLORIDE SERPL-SCNC: 100 MMOL/L (ref 98–107)
CO2 SERPL-SCNC: 22 MMOL/L (ref 22–29)
CREAT SERPL-MCNC: 0.7 MG/DL (ref 0.5–1)
GFR SERPL CREATININE-BSD FRML MDRD: >60 ML/MIN/1.73M2
GLUCOSE SERPL-MCNC: 366 MG/DL (ref 74–99)
POTASSIUM SERPL-SCNC: 4.3 MMOL/L (ref 3.5–5)
SODIUM SERPL-SCNC: 134 MMOL/L (ref 132–146)

## 2024-01-08 PROCEDURE — 80048 BASIC METABOLIC PNL TOTAL CA: CPT

## 2024-01-08 PROCEDURE — 83880 ASSAY OF NATRIURETIC PEPTIDE: CPT

## 2024-01-10 ENCOUNTER — OFFICE VISIT (OUTPATIENT)
Dept: CARDIOLOGY CLINIC | Age: 54
End: 2024-01-10

## 2024-01-10 VITALS
BODY MASS INDEX: 30.31 KG/M2 | HEART RATE: 63 BPM | SYSTOLIC BLOOD PRESSURE: 198 MMHG | HEIGHT: 68 IN | RESPIRATION RATE: 18 BRPM | WEIGHT: 200 LBS | DIASTOLIC BLOOD PRESSURE: 90 MMHG

## 2024-01-10 DIAGNOSIS — I10 ESSENTIAL HYPERTENSION: Primary | ICD-10-CM

## 2024-01-10 PROCEDURE — 3080F DIAST BP >= 90 MM HG: CPT | Performed by: INTERNAL MEDICINE

## 2024-01-10 PROCEDURE — 99214 OFFICE O/P EST MOD 30 MIN: CPT | Performed by: INTERNAL MEDICINE

## 2024-01-10 PROCEDURE — 93000 ELECTROCARDIOGRAM COMPLETE: CPT | Performed by: INTERNAL MEDICINE

## 2024-01-10 PROCEDURE — 3077F SYST BP >= 140 MM HG: CPT | Performed by: INTERNAL MEDICINE

## 2024-01-10 RX ORDER — AMLODIPINE BESYLATE 5 MG/1
5 TABLET ORAL DAILY
Qty: 90 TABLET | Refills: 2 | Status: SHIPPED | OUTPATIENT
Start: 2024-01-10

## 2024-01-10 RX ORDER — SPIRONOLACTONE 25 MG/1
25 TABLET ORAL DAILY
COMMUNITY

## 2024-01-10 NOTE — PROGRESS NOTES
[facility] Cardiology progress note  Dr. Daniela Sargent      Reason for visit: CAD  Referring Physician: Myke Mcduffie APRN - NP     CHIEF COMPLAINT: Shortness of breath    HISTORY OF PRESENT ILLNESS:   Patient is 53 year old female with history of CAD s/p CABG, s/p PCI to left circumflex artery to OM branch in 2022, diastolic congestive heart failure diabetes mellitus, hyperlipidemia and Hypertension is here for follow-up appointment  Was seen in the office last month for worsening shortness of breath, patient was found to have acute exacerbation of congestive heart failure, diuretic therapy was adjusted, patient is here for follow-up appointment, shortness of breath is better as per her report, however it is not completely gone, she has not been taking Lasix twice a day due to leg cramps, denies noncompliance with the rest of her medications, denies any chest pain, no lightheadedness or dizziness, no palpitations, no PND, no syncope, no presyncopal episodes.    Past Medical History:   Diagnosis Date    CAD in native artery 2020    Claustrophobia     mild    Colitis     Constipation     COPD (chronic obstructive pulmonary disease) ([facility])     COVID-19     Diabetes mellitus (HCC)     Heart problem     Hyperlipidemia     Hypertension     Hypothyroidism     Sleep apnea     diagnosed 7-8 years ago does not use C-pap problems with cost    Thyroid disease     Wears partial dentures     upper         Past Surgical History:   Procedure Laterality Date    APPENDECTOMY      CARDIAC CATHETERIZATION  2020    Dr. Sargent EF 55% cts consult ordered     SECTION      x2    CHOLECYSTECTOMY      COLONOSCOPY      CORONARY ANGIOPLASTY WITH STENT PLACEMENT  2022    Dr. Sargent [place] 3.0x12    CORONARY ARTERY BYPASS GRAFT N/A 2020    CORONARY ARTERY BYPASS, POSSIBLE LEFT RADIAL ARTERY HARVEST, MINDA performed by Kit Leyva DO at [facility] OR    HERNIA REPAIR  2015    laparoscopic incisional

## 2024-01-22 RX ORDER — ISOSORBIDE MONONITRATE 30 MG/1
30 TABLET, EXTENDED RELEASE ORAL DAILY
Qty: 90 TABLET | Refills: 3 | Status: SHIPPED | OUTPATIENT
Start: 2024-01-22 | End: 2025-01-16

## 2024-02-07 ENCOUNTER — OFFICE VISIT (OUTPATIENT)
Dept: CARDIOLOGY CLINIC | Age: 54
End: 2024-02-07

## 2024-02-07 VITALS
BODY MASS INDEX: 28.34 KG/M2 | DIASTOLIC BLOOD PRESSURE: 58 MMHG | SYSTOLIC BLOOD PRESSURE: 96 MMHG | RESPIRATION RATE: 18 BRPM | HEIGHT: 68 IN | HEART RATE: 60 BPM | WEIGHT: 187 LBS

## 2024-02-07 DIAGNOSIS — I10 ESSENTIAL HYPERTENSION: Primary | ICD-10-CM

## 2024-02-07 PROCEDURE — 99214 OFFICE O/P EST MOD 30 MIN: CPT | Performed by: INTERNAL MEDICINE

## 2024-02-07 PROCEDURE — 3074F SYST BP LT 130 MM HG: CPT | Performed by: INTERNAL MEDICINE

## 2024-02-07 PROCEDURE — 3078F DIAST BP <80 MM HG: CPT | Performed by: INTERNAL MEDICINE

## 2024-02-07 PROCEDURE — 93000 ELECTROCARDIOGRAM COMPLETE: CPT | Performed by: INTERNAL MEDICINE

## 2024-02-07 RX ORDER — ROSUVASTATIN CALCIUM 10 MG/1
10 TABLET, COATED ORAL DAILY
COMMUNITY

## 2024-02-07 RX ORDER — CANAGLIFLOZIN 300 MG/1
300 TABLET, FILM COATED ORAL
COMMUNITY

## 2024-02-07 RX ORDER — HYDRALAZINE HYDROCHLORIDE 100 MG/1
100 TABLET, FILM COATED ORAL DAILY
COMMUNITY

## 2024-02-07 NOTE — PROGRESS NOTES
with mild inferior wall hypokinesis.  4.  Successful deployment of 6-[place] Angio-Seal in the right common  femoral artery and deployment of 6-[place] Angio-Seal in the left common  femoral artery.    CABG 9/29/20 Coronary artery bypass grafting x3 using the left internal mammary  artery to the left anterior descending artery, radial artery to the  dominant obtuse marginal branch and circumflex and reverse saphenous  vein graft to the distal right coronary artery.    Cardiology Labs: BMP:    Lab Results   Component Value Date/Time     01/08/2024 04:23 PM    K 4.3 01/08/2024 04:23 PM    K 3.7 04/11/2022 06:42 PM     01/08/2024 04:23 PM    CO2 22 01/08/2024 04:23 PM    BUN 12 01/08/2024 04:23 PM    CREATININE 0.7 01/08/2024 04:23 PM     CMP:    Lab Results   Component Value Date/Time     01/08/2024 04:23 PM    K 4.3 01/08/2024 04:23 PM    K 3.7 04/11/2022 06:42 PM     01/08/2024 04:23 PM    CO2 22 01/08/2024 04:23 PM    BUN 12 01/08/2024 04:23 PM    CREATININE 0.7 01/08/2024 04:23 PM    PROT 8.4 04/11/2022 06:42 PM     CBC:    Lab Results   Component Value Date/Time    WBC 10.4 11/02/2022 06:27 AM    RBC 3.29 11/02/2022 06:27 AM    HGB 10.4 11/02/2022 06:27 AM    HCT 32.4 11/02/2022 06:27 AM    MCV 98.5 11/02/2022 06:27 AM    RDW 14.0 11/02/2022 06:27 AM     11/02/2022 06:27 AM     PT/INR:  No results found for: \"PTINR\"  PT/INR Warfarin:  No components found for: \"PTPATWAR\", \"PTINRWAR\"  PTT:    Lab Results   Component Value Date/Time    APTT 33.3 09/23/2022 04:49 PM     PTT Heparin:  No components found for: \"APTTHEP\"  Magnesium:    Lab Results   Component Value Date/Time    MG 2.1 09/30/2020 04:45 AM     TSH:    Lab Results   Component Value Date/Time    TSH 25.150 10/03/2020 06:13 AM     TROPONIN:  No components found for: \"TROP\"  BNP:  No results found for: \"BNP\"  FASTING LIPID PANEL:    Lab Results   Component Value Date/Time    CHOL 288 10/27/2015 11:30 AM    HDL 27 10/27/2015 11:30

## 2024-02-27 ENCOUNTER — HOSPITAL ENCOUNTER (EMERGENCY)
Age: 54
Discharge: HOME | End: 2024-02-27
Attending: EMERGENCY MEDICINE
Payer: MEDICAID

## 2024-02-27 VITALS
RESPIRATION RATE: 16 BRPM | SYSTOLIC BLOOD PRESSURE: 149 MMHG | DIASTOLIC BLOOD PRESSURE: 59 MMHG | TEMPERATURE: 97.1 F | BODY MASS INDEX: 28.13 KG/M2 | OXYGEN SATURATION: 99 % | WEIGHT: 185 LBS | HEART RATE: 60 BPM

## 2024-02-27 DIAGNOSIS — R30.0 DYSURIA: Primary | ICD-10-CM

## 2024-02-27 LAB
BILIRUB UR QL STRIP: NEGATIVE
CLARITY UR: CLEAR
COLOR UR: YELLOW
EPI CELLS #/AREA URNS HPF: ABNORMAL /HPF
GLUCOSE UR STRIP-MCNC: >=1000 MG/DL
HGB UR QL STRIP.AUTO: NEGATIVE
KETONES UR STRIP-MCNC: NEGATIVE MG/DL
LEUKOCYTE ESTERASE UR QL STRIP: NEGATIVE
NITRITE UR QL STRIP: NEGATIVE
PH UR STRIP: 5.5 [PH] (ref 5–9)
PROT UR STRIP-MCNC: NEGATIVE MG/DL
RBC #/AREA URNS HPF: ABNORMAL /HPF
SP GR UR STRIP: 1.02 (ref 1–1.03)
UROBILINOGEN UR STRIP-ACNC: 0.2 EU/DL (ref 0–1)
WBC #/AREA URNS HPF: ABNORMAL /HPF

## 2024-02-27 PROCEDURE — 99283 EMERGENCY DEPT VISIT LOW MDM: CPT

## 2024-02-27 PROCEDURE — 81001 URINALYSIS AUTO W/SCOPE: CPT

## 2024-02-27 RX ORDER — PHENAZOPYRIDINE HYDROCHLORIDE 100 MG/1
100 TABLET, FILM COATED ORAL 3 TIMES DAILY PRN
Qty: 6 TABLET | Refills: 0 | Status: SHIPPED | OUTPATIENT
Start: 2024-02-27 | End: 2024-03-01

## 2024-02-27 ASSESSMENT — PAIN - FUNCTIONAL ASSESSMENT: PAIN_FUNCTIONAL_ASSESSMENT: NONE - DENIES PAIN

## 2024-02-27 NOTE — ED PROVIDER NOTES
HPI:  24,   Time: 4:21 PM LEE Romero is a 53 y.o. female presenting to the ED for chief complaint: Patient seen last week for urinary discomfort and burning urination at PCP office.  Today's urine culture patient believes says bacterial growth.    I personally reviewed patient's MyChart which revealed urine culture was negative for growth.    ROS:   Pertinent positives and negatives are stated within HPI, all other systems reviewed and are negative.  --------------------------------------------- PAST HISTORY ---------------------------------------------  Past Medical History:  has a past medical history of CAD in native artery, Claustrophobia, Colitis, Constipation, COPD (chronic obstructive pulmonary disease) ([facility]), COVID-19, Diabetes mellitus ([facility]), Heart problem, Hyperlipidemia, Hypertension, Hypothyroidism, Sleep apnea, Thyroid disease, and Wears partial dentures.    Past Surgical History:  has a past surgical history that includes Cholecystectomy; Appendectomy; knee surgery (Right); ventral hernia repair; Hysterectomy; hernia repair (2015); Colonoscopy;  section; Cardiac catheterization (2020); Coronary artery bypass graft (N/A, 2020); and Coronary angioplasty with stent (2022).    Social History:  reports that she has been smoking cigarettes. She has a 37.5 pack-year smoking history. She has never used smokeless tobacco. She reports that she does not currently use alcohol. She reports that she does not currently use drugs.    Family History: family history includes Mult Sclerosis in her maternal grandmother and mother. She was adopted.     The patient’s home medications have been reviewed.    Allergies: Seasonal and Statins    -------------------------------------------------- RESULTS -------------------------------------------------  All laboratory and radiology results have been personally reviewed by myself   LABS:  Results for orders placed or

## 2024-04-23 ENCOUNTER — OFFICE VISIT (OUTPATIENT)
Dept: CARDIOLOGY CLINIC | Age: 54
End: 2024-04-23

## 2024-04-23 VITALS
DIASTOLIC BLOOD PRESSURE: 80 MMHG | SYSTOLIC BLOOD PRESSURE: 178 MMHG | HEART RATE: 68 BPM | WEIGHT: 190 LBS | HEIGHT: 68 IN | RESPIRATION RATE: 16 BRPM | BODY MASS INDEX: 28.79 KG/M2

## 2024-04-23 DIAGNOSIS — I25.10 CAD IN NATIVE ARTERY: Primary | ICD-10-CM

## 2024-04-23 DIAGNOSIS — R07.9 CHEST PAIN, UNSPECIFIED TYPE: ICD-10-CM

## 2024-04-23 RX ORDER — REGADENOSON 0.08 MG/ML
0.4 INJECTION, SOLUTION INTRAVENOUS
Status: CANCELLED | OUTPATIENT
Start: 2024-04-23

## 2024-04-23 NOTE — PROGRESS NOTES
[facility] Cardiology progress note  Dr. Daniela Sargent      Reason for visit: CAD  Referring Physician: Myke Mcduffie APRN - NP     CHIEF COMPLAINT: Shortness of breath    HISTORY OF PRESENT ILLNESS:   Patient is 53 year old female with history of CAD s/p CABG, s/p PCI to left circumflex artery to OM branch in 2022, diastolic congestive heart failure diabetes mellitus, hyperlipidemia and Hypertension is here for follow-up appointment  Patient is here today complaining of worsening chest discomfort, exertional, worsening exertional shortness of breath, better with rest, easy fatigability, denies any cough, no lightheadedness or dizziness, no palpitations, no PND, no syncope, no presyncopal episodes.    Past Medical History:   Diagnosis Date    CAD in native artery 2020    Claustrophobia     mild    Colitis     Constipation     COPD (chronic obstructive pulmonary disease) (HCC)     COVID-19     Diabetes mellitus (HCC)     Heart problem     Hyperlipidemia     Hypertension     Hypothyroidism     Sleep apnea     diagnosed 7-8 years ago does not use C-pap problems with cost    Thyroid disease     Wears partial dentures     upper         Past Surgical History:   Procedure Laterality Date    APPENDECTOMY      CARDIAC CATHETERIZATION  2020    Dr. Sargent EF 55% cts consult ordered     SECTION      x2    CHOLECYSTECTOMY      COLONOSCOPY      CORONARY ANGIOPLASTY WITH STENT PLACEMENT  2022    Dr. Sargent [place] 3.0x12    CORONARY ARTERY BYPASS GRAFT N/A 2020    CORONARY ARTERY BYPASS, POSSIBLE LEFT RADIAL ARTERY HARVEST, MINDA performed by Kit Leyva DO at [facility] OR    HERNIA REPAIR  2015    laparoscopic incisional reapair with mesh    HYSTERECTOMY (CERVIX STATUS UNKNOWN)      KNEE SURGERY Right     VENTRAL HERNIA REPAIR           Current Outpatient Medications   Medication Sig Dispense Refill    hydrALAZINE (APRESOLINE) 100 MG tablet Take 1 tablet by mouth daily

## 2024-04-26 ENCOUNTER — TELEPHONE (OUTPATIENT)
Dept: CARDIOLOGY | Age: 54
End: 2024-04-26

## 2024-04-26 NOTE — TELEPHONE ENCOUNTER
Left message on voice mail to remind patient of pharmacological stress test appointment on April 30, 2024 at 0800. Instructions for test,including holding all diabetic medications morning of test and bringing atrovent inhaler to appointment, and COVID-19 preprocedure information left on voice mail. Asked patient to call with any questions or if unable to keep appointment.

## 2024-04-29 ENCOUNTER — TELEPHONE (OUTPATIENT)
Dept: CARDIOLOGY | Age: 54
End: 2024-04-29

## 2024-04-29 NOTE — TELEPHONE ENCOUNTER
Message left to confirm appointment for April 30, 2024 stress test and to call back with insurance information

## 2024-04-30 ENCOUNTER — HOSPITAL ENCOUNTER (OUTPATIENT)
Dept: CARDIOLOGY | Age: 54
Discharge: HOME | End: 2024-05-02
Attending: INTERNAL MEDICINE
Payer: MEDICAID

## 2024-04-30 VITALS
RESPIRATION RATE: 16 BRPM | WEIGHT: 190 LBS | HEIGHT: 68 IN | HEART RATE: 65 BPM | DIASTOLIC BLOOD PRESSURE: 69 MMHG | BODY MASS INDEX: 28.79 KG/M2 | SYSTOLIC BLOOD PRESSURE: 139 MMHG

## 2024-04-30 DIAGNOSIS — I25.10 CAD IN NATIVE ARTERY: ICD-10-CM

## 2024-04-30 DIAGNOSIS — R07.9 CHEST PAIN, UNSPECIFIED TYPE: ICD-10-CM

## 2024-04-30 LAB
ECHO BSA: 2.03 M2
NUC STRESS EJECTION FRACTION: 47 %
STRESS BASELINE DIAS BP: 69 MMHG
STRESS BASELINE HR: 64 BPM
STRESS BASELINE SYS BP: 139 MMHG
STRESS ESTIMATED WORKLOAD: 1 METS
STRESS PEAK DIAS BP: 103 MMHG
STRESS PEAK SYS BP: 241 MMHG
STRESS PERCENT HR ACHIEVED: 56 %
STRESS POST PEAK HR: 95 BPM
STRESS RATE PRESSURE PRODUCT: NORMAL BPM*MMHG
STRESS TARGET HR: 169 BPM

## 2024-04-30 PROCEDURE — 3430000000 HC RX DIAGNOSTIC RADIOPHARMACEUTICAL: Performed by: INTERNAL MEDICINE

## 2024-04-30 PROCEDURE — 93017 CV STRESS TEST TRACING ONLY: CPT

## 2024-04-30 PROCEDURE — 6360000002 HC RX W HCPCS: Performed by: INTERNAL MEDICINE

## 2024-04-30 PROCEDURE — 2580000003 HC RX 258: Performed by: INTERNAL MEDICINE

## 2024-04-30 PROCEDURE — 93018 CV STRESS TEST I&R ONLY: CPT | Performed by: INTERNAL MEDICINE

## 2024-04-30 PROCEDURE — 93016 CV STRESS TEST SUPVJ ONLY: CPT | Performed by: INTERNAL MEDICINE

## 2024-04-30 PROCEDURE — A9502 TC99M TETROFOSMIN: HCPCS | Performed by: INTERNAL MEDICINE

## 2024-04-30 PROCEDURE — 78452 HT MUSCLE IMAGE SPECT MULT: CPT | Performed by: INTERNAL MEDICINE

## 2024-04-30 RX ORDER — SODIUM CHLORIDE 0.9 % (FLUSH) 0.9 %
10 SYRINGE (ML) INJECTION PRN
Status: DISCONTINUED | OUTPATIENT
Start: 2024-04-30 | End: 2024-05-03 | Stop reason: HOSPADM

## 2024-04-30 RX ORDER — GLIPIZIDE 5 MG/1
5 TABLET ORAL
COMMUNITY

## 2024-04-30 RX ORDER — REGADENOSON 0.08 MG/ML
0.4 INJECTION, SOLUTION INTRAVENOUS
Status: COMPLETED | OUTPATIENT
Start: 2024-04-30 | End: 2024-04-30

## 2024-04-30 RX ADMIN — SODIUM CHLORIDE, PRESERVATIVE FREE 10 ML: 5 INJECTION INTRAVENOUS at 08:18

## 2024-04-30 RX ADMIN — REGADENOSON 0.4 MG: 0.08 INJECTION, SOLUTION INTRAVENOUS at 09:28

## 2024-04-30 RX ADMIN — SODIUM CHLORIDE, PRESERVATIVE FREE 10 ML: 5 INJECTION INTRAVENOUS at 09:29

## 2024-04-30 RX ADMIN — TETROFOSMIN 10.5 MILLICURIE: 0.23 INJECTION, POWDER, LYOPHILIZED, FOR SOLUTION INTRAVENOUS at 08:18

## 2024-04-30 RX ADMIN — TETROFOSMIN 30.6 MILLICURIE: 0.23 INJECTION, POWDER, LYOPHILIZED, FOR SOLUTION INTRAVENOUS at 09:28

## 2024-05-01 ENCOUNTER — TELEPHONE (OUTPATIENT)
Dept: CARDIOLOGY CLINIC | Age: 54
End: 2024-05-01

## 2024-08-12 ENCOUNTER — OFFICE VISIT (OUTPATIENT)
Dept: CARDIOLOGY CLINIC | Age: 54
End: 2024-08-12
Payer: MEDICAID

## 2024-08-12 VITALS
HEIGHT: 68 IN | BODY MASS INDEX: 28.49 KG/M2 | RESPIRATION RATE: 18 BRPM | DIASTOLIC BLOOD PRESSURE: 52 MMHG | HEART RATE: 58 BPM | SYSTOLIC BLOOD PRESSURE: 160 MMHG | WEIGHT: 188 LBS

## 2024-08-12 DIAGNOSIS — I25.10 CAD IN NATIVE ARTERY: Primary | ICD-10-CM

## 2024-08-12 DIAGNOSIS — Z01.818 PREOP TESTING: Primary | ICD-10-CM

## 2024-08-12 DIAGNOSIS — I20.0 UNSTABLE ANGINA (HCC): ICD-10-CM

## 2024-08-12 PROCEDURE — 3078F DIAST BP <80 MM HG: CPT | Performed by: INTERNAL MEDICINE

## 2024-08-12 PROCEDURE — 3077F SYST BP >= 140 MM HG: CPT | Performed by: INTERNAL MEDICINE

## 2024-08-12 PROCEDURE — 99214 OFFICE O/P EST MOD 30 MIN: CPT | Performed by: INTERNAL MEDICINE

## 2024-08-12 PROCEDURE — 93000 ELECTROCARDIOGRAM COMPLETE: CPT | Performed by: INTERNAL MEDICINE

## 2024-08-12 PROCEDURE — 85610 PROTHROMBIN TIME: CPT | Performed by: INTERNAL MEDICINE

## 2024-08-12 RX ORDER — NITROGLYCERIN 0.4 MG/1
0.4 TABLET SUBLINGUAL EVERY 5 MIN PRN
Qty: 25 TABLET | Refills: 2 | Status: SHIPPED | OUTPATIENT
Start: 2024-08-12

## 2024-08-12 RX ORDER — AMLODIPINE BESYLATE 10 MG/1
10 TABLET ORAL DAILY
Qty: 90 TABLET | Refills: 3 | Status: SHIPPED | OUTPATIENT
Start: 2024-08-12

## 2024-08-12 RX ORDER — LOSARTAN POTASSIUM 100 MG/1
100 TABLET ORAL DAILY
Qty: 90 TABLET | Refills: 2 | Status: SHIPPED | OUTPATIENT
Start: 2024-08-12

## 2024-08-12 NOTE — PROGRESS NOTES
catheterization were discussed with the patient with risks including, but not limited to, infection, vessel damage, bleeding, dye allergy, nephrotoxicity, dysrhythmia, MI, CVA and death as well as the potential need for emergent cardiac surgery. Patient verbalized understanding and is agreeable to proceed.  2.  Patient was strongly advised to call 911 if symptoms recur or get worse for any reason  3.  Sublingual nitroglycerin to be used as needed  4.  Increase losartan to 100 mg daily  5.  Increase amlodipine to 10 mg daily  6.  Continue the rest of medications  7.  Patient was strongly advised to call 911 if symptoms recur or get worse for any reason   8.  Follow-up with GISELLA Ro in as scheduled  9.  Follow-up with Dr. Sargent after her test    I have reviewed my findings and recommendations with patient and her son at bedside  Electronically signed by Daniela Sargent MD on 8/12/2024 at 2:41 PM    NOTE: This report was transcribed using voice recognition software. Every effort was made to ensure accuracy; however, inadvertent computerized transcription errors may be presen

## 2024-08-13 ENCOUNTER — TELEPHONE (OUTPATIENT)
Dept: CARDIOLOGY CLINIC | Age: 54
End: 2024-08-13

## 2024-08-13 NOTE — TELEPHONE ENCOUNTER
Patient need prior auth for heart cath   patient       Date: 8/26/2024 at 9am   Dx: Cad I25.10   [facility] [place] [id number]   CPT [id number]

## 2024-08-26 ENCOUNTER — HOSPITAL ENCOUNTER (OUTPATIENT)
Age: 54
Discharge: HOME | End: 2024-08-26
Attending: INTERNAL MEDICINE | Admitting: INTERNAL MEDICINE
Payer: MEDICAID

## 2024-08-26 VITALS
DIASTOLIC BLOOD PRESSURE: 62 MMHG | OXYGEN SATURATION: 95 % | HEIGHT: 68 IN | HEART RATE: 48 BPM | BODY MASS INDEX: 28.95 KG/M2 | TEMPERATURE: 97.5 F | RESPIRATION RATE: 18 BRPM | WEIGHT: 191 LBS | SYSTOLIC BLOOD PRESSURE: 126 MMHG

## 2024-08-26 DIAGNOSIS — I25.10 ATHEROSCLEROSIS OF NATIVE CORONARY ARTERY, UNSPECIFIED WHETHER ANGINA PRESENT, UNSPECIFIED WHETHER NATIVE OR TRANSPLANTED HEART: ICD-10-CM

## 2024-08-26 LAB
ABO + RH BLD: NORMAL
ANION GAP SERPL CALCULATED.3IONS-SCNC: 16 MMOL/L (ref 7–16)
ARM BAND NUMBER: NORMAL
BLOOD BANK SAMPLE EXPIRATION: NORMAL
BLOOD GROUP ANTIBODIES SERPL: NEGATIVE
BUN SERPL-MCNC: 13 MG/DL (ref 6–20)
CALCIUM SERPL-MCNC: 9.6 MG/DL (ref 8.6–10.2)
CHLORIDE SERPL-SCNC: 92 MMOL/L (ref 98–107)
CO2 SERPL-SCNC: 25 MMOL/L (ref 22–29)
CREAT SERPL-MCNC: 1.2 MG/DL (ref 0.5–1)
ECHO BSA: 2.04 M2
ERYTHROCYTE [DISTWIDTH] IN BLOOD BY AUTOMATED COUNT: 14.3 % (ref 11.5–15)
GFR, ESTIMATED: 58 ML/MIN/1.73M2
GLUCOSE SERPL-MCNC: 177 MG/DL (ref 74–99)
HCT VFR BLD AUTO: 42.5 % (ref 34–48)
HGB BLD-MCNC: 13.6 G/DL (ref 11.5–15.5)
MCH RBC QN AUTO: 30.6 PG (ref 26–35)
MCHC RBC AUTO-ENTMCNC: 32 G/DL (ref 32–34.5)
MCV RBC AUTO: 95.7 FL (ref 80–99.9)
PLATELET # BLD AUTO: 417 K/UL (ref 130–450)
PMV BLD AUTO: 11 FL (ref 7–12)
POTASSIUM SERPL-SCNC: 3.7 MMOL/L (ref 3.5–5)
RBC # BLD AUTO: 4.44 M/UL (ref 3.5–5.5)
SODIUM SERPL-SCNC: 133 MMOL/L (ref 132–146)
WBC OTHER # BLD: 18.5 K/UL (ref 4.5–11.5)

## 2024-08-26 PROCEDURE — 2500000003 HC RX 250 WO HCPCS: Performed by: INTERNAL MEDICINE

## 2024-08-26 PROCEDURE — 86901 BLOOD TYPING SEROLOGIC RH(D): CPT

## 2024-08-26 PROCEDURE — 86900 BLOOD TYPING SEROLOGIC ABO: CPT

## 2024-08-26 PROCEDURE — C1760 CLOSURE DEV, VASC: HCPCS | Performed by: INTERNAL MEDICINE

## 2024-08-26 PROCEDURE — 93455 CORONARY ART/GRFT ANGIO S&I: CPT | Performed by: INTERNAL MEDICINE

## 2024-08-26 PROCEDURE — 6360000004 HC RX CONTRAST MEDICATION: Performed by: INTERNAL MEDICINE

## 2024-08-26 PROCEDURE — 85027 COMPLETE CBC AUTOMATED: CPT

## 2024-08-26 PROCEDURE — C1894 INTRO/SHEATH, NON-LASER: HCPCS | Performed by: INTERNAL MEDICINE

## 2024-08-26 PROCEDURE — 6370000000 HC RX 637 (ALT 250 FOR IP): Performed by: INTERNAL MEDICINE

## 2024-08-26 PROCEDURE — 6360000002 HC RX W HCPCS: Performed by: INTERNAL MEDICINE

## 2024-08-26 PROCEDURE — C1769 GUIDE WIRE: HCPCS | Performed by: INTERNAL MEDICINE

## 2024-08-26 PROCEDURE — 2709999900 HC NON-CHARGEABLE SUPPLY: Performed by: INTERNAL MEDICINE

## 2024-08-26 PROCEDURE — 2580000003 HC RX 258: Performed by: INTERNAL MEDICINE

## 2024-08-26 PROCEDURE — 80048 BASIC METABOLIC PNL TOTAL CA: CPT

## 2024-08-26 PROCEDURE — 86850 RBC ANTIBODY SCREEN: CPT

## 2024-08-26 RX ORDER — MIDAZOLAM HYDROCHLORIDE 1 MG/ML
INJECTION INTRAMUSCULAR; INTRAVENOUS PRN
Status: DISCONTINUED | OUTPATIENT
Start: 2024-08-26 | End: 2024-08-26 | Stop reason: HOSPADM

## 2024-08-26 RX ORDER — SODIUM CHLORIDE 9 MG/ML
INJECTION, SOLUTION INTRAVENOUS CONTINUOUS
Status: DISCONTINUED | OUTPATIENT
Start: 2024-08-26 | End: 2024-08-26 | Stop reason: HOSPADM

## 2024-08-26 RX ORDER — IOPAMIDOL 755 MG/ML
INJECTION, SOLUTION INTRAVASCULAR PRN
Status: DISCONTINUED | OUTPATIENT
Start: 2024-08-26 | End: 2024-08-26 | Stop reason: HOSPADM

## 2024-08-26 RX ORDER — ASPIRIN 325 MG
325 TABLET ORAL DAILY
Status: DISCONTINUED | OUTPATIENT
Start: 2024-08-26 | End: 2024-08-26

## 2024-08-26 RX ORDER — SODIUM CHLORIDE 0.9 % (FLUSH) 0.9 %
5-40 SYRINGE (ML) INJECTION EVERY 12 HOURS SCHEDULED
Status: DISCONTINUED | OUTPATIENT
Start: 2024-08-26 | End: 2024-08-26 | Stop reason: HOSPADM

## 2024-08-26 RX ORDER — CLOPIDOGREL BISULFATE 75 MG/1
75 TABLET ORAL ONCE
Status: COMPLETED | OUTPATIENT
Start: 2024-08-26 | End: 2024-08-26

## 2024-08-26 RX ORDER — SODIUM CHLORIDE 0.9 % (FLUSH) 0.9 %
5-40 SYRINGE (ML) INJECTION PRN
Status: DISCONTINUED | OUTPATIENT
Start: 2024-08-26 | End: 2024-08-26 | Stop reason: HOSPADM

## 2024-08-26 RX ORDER — ACETAMINOPHEN 325 MG/1
650 TABLET ORAL EVERY 4 HOURS PRN
Status: DISCONTINUED | OUTPATIENT
Start: 2024-08-26 | End: 2024-08-26 | Stop reason: HOSPADM

## 2024-08-26 RX ORDER — FENTANYL CITRATE 50 UG/ML
INJECTION, SOLUTION INTRAMUSCULAR; INTRAVENOUS PRN
Status: DISCONTINUED | OUTPATIENT
Start: 2024-08-26 | End: 2024-08-26 | Stop reason: HOSPADM

## 2024-08-26 RX ORDER — SODIUM CHLORIDE 9 MG/ML
INJECTION, SOLUTION INTRAVENOUS PRN
Status: DISCONTINUED | OUTPATIENT
Start: 2024-08-26 | End: 2024-08-26 | Stop reason: HOSPADM

## 2024-08-26 RX ORDER — SODIUM CHLORIDE 9 MG/ML
INJECTION, SOLUTION INTRAVENOUS CONTINUOUS PRN
Status: COMPLETED | OUTPATIENT
Start: 2024-08-26 | End: 2024-08-26

## 2024-08-26 RX ORDER — METOPROLOL TARTRATE 25 MG/1
25 TABLET, FILM COATED ORAL DAILY
COMMUNITY

## 2024-08-26 RX ORDER — ASPIRIN 325 MG
325 TABLET ORAL ONCE
Status: DISCONTINUED | OUTPATIENT
Start: 2024-08-26 | End: 2024-08-26

## 2024-08-26 RX ADMIN — CLOPIDOGREL 75 MG: 75 TABLET, FILM COATED ORAL at 08:40

## 2024-08-26 RX ADMIN — SODIUM CHLORIDE: 9 INJECTION, SOLUTION INTRAVENOUS at 10:33

## 2024-08-26 RX ADMIN — ASPIRIN 325 MG: 325 TABLET ORAL at 07:33

## 2024-08-26 NOTE — PROGRESS NOTES
Extended Stay Recovery Discharge Documentation    Final procedure site check completed, stable for discharge- Yes  Ambulated without issue post recovery completion, gait steady.   Peripheral IV sites removed (see IV Flowsheet) and site asymptomatic- Yes  Patient dressed self without assistance.   Discharge instructions reviewed with Patient  Other:   and verbalized understanding.   Patient discharged Home with spouse at 5PM  Monitor cleaned and placed back in nurses' station- Yes

## 2024-08-26 NOTE — DISCHARGE INSTRUCTIONS
FEMORAL DISCHARGE INSTRUCTIONS      Discharge Instructions:    Please follow instructions closely for prevention of complications.    Special Instructions:  Splint catheterization site with activity today.  Splint catheterization site with:             ~Changing Positions             ~Coughing             ~Sneezing             ~Laughing    Call your physician if you notice:  ~increased pain at the catheterization site.  ~increase swelling at the catheterization site.  ~redness or drainage at the catheterization site.  ~numbness, tingling or cramping in the catheterization leg at rest or with activity.        *Remove Dressing on 8/27   After 4 pm       ~Soak dressing with water in shower and gently peel off. Clean site with soap and water, dry, and apply band aide for 24 hours. Remove band aide after another 24 hours.   *Drink 3-4 extra glasses of water today.  *No tub bathing or swimming for 3 days.  *No stairs for 24 hours. Minimal walking today.  *No driving, working, or sexual activity for 48 hours.  *Do not lift anything heavier than 10 pounds for 3 days.  *Continue low fat diet.      **If you have any questions or problems after discharge, please notify your doctor. Call 9-1-1 if you have active bleeding from your catheterization site.     ***DO NOT DRIVE YOURSELF TO THE HOSPITAL. Lay down and apply pressure to site until help arrives.

## 2024-09-22 ENCOUNTER — HOSPITAL ENCOUNTER (EMERGENCY)
Age: 54
Discharge: HOME | End: 2024-09-22
Attending: FAMILY MEDICINE
Payer: MEDICAID

## 2024-09-22 ENCOUNTER — APPOINTMENT (OUTPATIENT)
Dept: ULTRASOUND IMAGING | Age: 54
End: 2024-09-22
Payer: MEDICAID

## 2024-09-22 VITALS
DIASTOLIC BLOOD PRESSURE: 81 MMHG | WEIGHT: 190 LBS | TEMPERATURE: 98.6 F | SYSTOLIC BLOOD PRESSURE: 156 MMHG | RESPIRATION RATE: 18 BRPM | HEART RATE: 65 BPM | HEIGHT: 68 IN | OXYGEN SATURATION: 98 % | BODY MASS INDEX: 28.79 KG/M2

## 2024-09-22 DIAGNOSIS — R31.9 HEMATURIA, UNSPECIFIED TYPE: Primary | ICD-10-CM

## 2024-09-22 LAB
BACTERIA URNS QL MICRO: ABNORMAL
BILIRUB UR QL STRIP: NEGATIVE
CLARITY UR: ABNORMAL
COLOR UR: YELLOW
EPI CELLS #/AREA URNS HPF: ABNORMAL /HPF
GLUCOSE UR STRIP-MCNC: 500 MG/DL
HGB UR QL STRIP.AUTO: ABNORMAL
KETONES UR STRIP-MCNC: NEGATIVE MG/DL
LEUKOCYTE ESTERASE UR QL STRIP: ABNORMAL
NITRITE UR QL STRIP: NEGATIVE
PH UR STRIP: 6 [PH] (ref 5–9)
PROT UR STRIP-MCNC: 30 MG/DL
RBC #/AREA URNS HPF: ABNORMAL /HPF
SP GR UR STRIP: 1.01 (ref 1–1.03)
UROBILINOGEN UR STRIP-ACNC: 0.2 EU/DL (ref 0–1)
WBC #/AREA URNS HPF: ABNORMAL /HPF

## 2024-09-22 PROCEDURE — 76770 US EXAM ABDO BACK WALL COMP: CPT

## 2024-09-22 PROCEDURE — 99284 EMERGENCY DEPT VISIT MOD MDM: CPT

## 2024-09-22 PROCEDURE — 81001 URINALYSIS AUTO W/SCOPE: CPT

## 2024-09-22 RX ORDER — SULFAMETHOXAZOLE/TRIMETHOPRIM 800-160 MG
1 TABLET ORAL 2 TIMES DAILY
Qty: 14 TABLET | Refills: 0 | Status: SHIPPED | OUTPATIENT
Start: 2024-09-22 | End: 2024-09-29

## 2024-09-22 ASSESSMENT — PAIN - FUNCTIONAL ASSESSMENT: PAIN_FUNCTIONAL_ASSESSMENT: NONE - DENIES PAIN

## 2024-12-09 ENCOUNTER — APPOINTMENT (OUTPATIENT)
Dept: CT IMAGING | Age: 54
End: 2024-12-09

## 2024-12-09 ENCOUNTER — HOSPITAL ENCOUNTER (EMERGENCY)
Age: 54
Discharge: HOME | End: 2024-12-09
Attending: EMERGENCY MEDICINE

## 2024-12-09 VITALS
SYSTOLIC BLOOD PRESSURE: 164 MMHG | DIASTOLIC BLOOD PRESSURE: 84 MMHG | TEMPERATURE: 98 F | WEIGHT: 190 LBS | OXYGEN SATURATION: 98 % | HEART RATE: 68 BPM | RESPIRATION RATE: 20 BRPM | BODY MASS INDEX: 28.89 KG/M2

## 2024-12-09 DIAGNOSIS — R10.9 FLANK PAIN: Primary | ICD-10-CM

## 2024-12-09 DIAGNOSIS — R73.9 HYPERGLYCEMIA: ICD-10-CM

## 2024-12-09 LAB
ALBUMIN SERPL-MCNC: 4.3 G/DL (ref 3.5–5.2)
ALP SERPL-CCNC: 188 U/L (ref 35–104)
ALT SERPL-CCNC: 19 U/L (ref 0–32)
ANION GAP SERPL CALCULATED.3IONS-SCNC: 12 MMOL/L (ref 7–16)
AST SERPL-CCNC: 20 U/L (ref 0–31)
BASOPHILS # BLD: 0.12 K/UL (ref 0–0.2)
BASOPHILS NFR BLD: 1 % (ref 0–2)
BILIRUB DIRECT SERPL-MCNC: <0.2 MG/DL (ref 0–0.3)
BILIRUB INDIRECT SERPL-MCNC: ABNORMAL MG/DL (ref 0–1)
BILIRUB SERPL-MCNC: 0.3 MG/DL (ref 0–1.2)
BILIRUB UR QL STRIP: NEGATIVE
BUN SERPL-MCNC: 17 MG/DL (ref 6–20)
CALCIUM SERPL-MCNC: 9.4 MG/DL (ref 8.6–10.2)
CHLORIDE SERPL-SCNC: 95 MMOL/L (ref 98–107)
CLARITY UR: CLEAR
CO2 SERPL-SCNC: 25 MMOL/L (ref 22–29)
COLOR UR: YELLOW
COMMENT: ABNORMAL
CREAT SERPL-MCNC: 1.2 MG/DL (ref 0.5–1)
EOSINOPHIL # BLD: 1.28 K/UL (ref 0.05–0.5)
EOSINOPHILS RELATIVE PERCENT: 8 % (ref 0–6)
ERYTHROCYTE [DISTWIDTH] IN BLOOD BY AUTOMATED COUNT: 13.4 % (ref 11.5–15)
GFR, ESTIMATED: 54 ML/MIN/1.73M2
GLUCOSE SERPL-MCNC: 381 MG/DL (ref 74–99)
GLUCOSE UR STRIP-MCNC: >=1000 MG/DL
HCT VFR BLD AUTO: 38.9 % (ref 34–48)
HGB BLD-MCNC: 13.2 G/DL (ref 11.5–15.5)
HGB UR QL STRIP.AUTO: NEGATIVE
IMM GRANULOCYTES # BLD AUTO: 0.1 K/UL (ref 0–0.58)
IMM GRANULOCYTES NFR BLD: 1 % (ref 0–5)
KETONES UR STRIP-MCNC: NEGATIVE MG/DL
LEUKOCYTE ESTERASE UR QL STRIP: NEGATIVE
LIPASE SERPL-CCNC: 32 U/L (ref 13–60)
LYMPHOCYTES NFR BLD: 3.51 K/UL (ref 1.5–4)
LYMPHOCYTES RELATIVE PERCENT: 23 % (ref 20–42)
MAGNESIUM SERPL-MCNC: 2.1 MG/DL (ref 1.6–2.6)
MCH RBC QN AUTO: 31.2 PG (ref 26–35)
MCHC RBC AUTO-ENTMCNC: 33.9 G/DL (ref 32–34.5)
MCV RBC AUTO: 92 FL (ref 80–99.9)
MONOCYTES NFR BLD: 0.76 K/UL (ref 0.1–0.95)
MONOCYTES NFR BLD: 5 % (ref 2–12)
NEUTROPHILS NFR BLD: 62 % (ref 43–80)
NEUTS SEG NFR BLD: 9.55 K/UL (ref 1.8–7.3)
NITRITE UR QL STRIP: NEGATIVE
PH UR STRIP: 5.5 [PH] (ref 5–9)
PLATELET # BLD AUTO: 324 K/UL (ref 130–450)
PMV BLD AUTO: 10.9 FL (ref 7–12)
POTASSIUM SERPL-SCNC: 4.1 MMOL/L (ref 3.5–5)
PROT SERPL-MCNC: 8.5 G/DL (ref 6.4–8.3)
PROT UR STRIP-MCNC: NEGATIVE MG/DL
RBC # BLD AUTO: 4.23 M/UL (ref 3.5–5.5)
SODIUM SERPL-SCNC: 132 MMOL/L (ref 132–146)
SP GR UR STRIP: <1.005 (ref 1–1.03)
UROBILINOGEN UR STRIP-ACNC: 0.2 EU/DL (ref 0–1)
WBC OTHER # BLD: 15.3 K/UL (ref 4.5–11.5)

## 2024-12-09 PROCEDURE — 80053 COMPREHEN METABOLIC PANEL: CPT

## 2024-12-09 PROCEDURE — 83735 ASSAY OF MAGNESIUM: CPT

## 2024-12-09 PROCEDURE — 74176 CT ABD & PELVIS W/O CONTRAST: CPT

## 2024-12-09 PROCEDURE — 82248 BILIRUBIN DIRECT: CPT

## 2024-12-09 PROCEDURE — 99284 EMERGENCY DEPT VISIT MOD MDM: CPT

## 2024-12-09 PROCEDURE — 81003 URINALYSIS AUTO W/O SCOPE: CPT

## 2024-12-09 PROCEDURE — 85025 COMPLETE CBC W/AUTO DIFF WBC: CPT

## 2024-12-09 PROCEDURE — 83690 ASSAY OF LIPASE: CPT

## 2024-12-09 ASSESSMENT — ENCOUNTER SYMPTOMS
ABDOMINAL DISTENTION: 0
SORE THROAT: 0
WHEEZING: 0
NAUSEA: 0
SHORTNESS OF BREATH: 0
VOMITING: 0
DIARRHEA: 0
COUGH: 0
CHEST TIGHTNESS: 0
BACK PAIN: 0
ABDOMINAL PAIN: 0

## 2024-12-09 ASSESSMENT — PAIN SCALES - GENERAL: PAINLEVEL_OUTOF10: 5

## 2024-12-09 ASSESSMENT — PAIN DESCRIPTION - ORIENTATION: ORIENTATION: RIGHT

## 2024-12-09 ASSESSMENT — LIFESTYLE VARIABLES
HOW OFTEN DO YOU HAVE A DRINK CONTAINING ALCOHOL: NEVER
HOW MANY STANDARD DRINKS CONTAINING ALCOHOL DO YOU HAVE ON A TYPICAL DAY: PATIENT DOES NOT DRINK

## 2024-12-09 ASSESSMENT — PAIN - FUNCTIONAL ASSESSMENT: PAIN_FUNCTIONAL_ASSESSMENT: 0-10

## 2024-12-09 ASSESSMENT — PAIN DESCRIPTION - LOCATION: LOCATION: FLANK

## 2024-12-09 NOTE — ED TRIAGE NOTES
FIRST PROVIDER CONTACT ASSESSMENT NOTE       Department of Emergency Medicine                 First Provider Note            24  1:44 PM EST    Date of Encounter: No admission date for patient encounter.    Patient Name: Faiza Romero  : 1970  MRN: [id number]    Chief Complaint: No chief complaint on file.      History of Present Illness:   Faiza Romero is a 54 y.o. female who presents to the ED for right flank pain.   States her PCP told her she is in kidney failure.   Was supposed to see nephrologist but they want her to see urologist.   Reports urgency.   Remote history of kidney stones.           Focused Physical Exam:  VS:    ED Triage Vitals [24 1314]   BP Systolic BP Percentile Diastolic BP Percentile Temp Temp src Pulse Resp SpO2   -- -- -- 98 °F (36.7 °C) -- 68 -- 98 %      Height Weight         -- --              Physical Ex: Constitutional: Alert and non-toxic.    Medical History:  has a past medical history of CAD in native artery, Claustrophobia, Colitis, Constipation, COPD (chronic obstructive pulmonary disease) ([facility]), COVID-19, Diabetes mellitus ([facility]), Heart problem, Hyperlipidemia, Hypertension, Hypothyroidism, Sleep apnea, Thyroid disease, and Wears partial dentures.  Surgical History:  has a past surgical history that includes Cholecystectomy; Appendectomy; knee surgery (Right); ventral hernia repair; Hysterectomy; hernia repair (2015); Colonoscopy;  section; Cardiac catheterization (2020); Coronary artery bypass graft (N/A, 2020); Coronary angioplasty with stent (2022); and Cardiac procedure (N/A, 2024).  Social History:  reports that she has been smoking cigarettes. She has a 37.5 pack-year smoking history. She has never used smokeless tobacco. She reports that she does not currently use alcohol. She reports that she does not currently use drugs.  Family History: family history includes Mult Sclerosis in her maternal grandmother and

## 2024-12-09 NOTE — ED PROVIDER NOTES
Temp 98 °F (36.7 °C)   Resp 20   Wt 86.2 kg (190 lb)   SpO2 98%   BMI 28.89 kg/m²   Oxygen Saturation Interpretation: Normal      ------------------------------------------ PROGRESS NOTES ------------------------------------------  I have spoken with the patient and discussed today’s results, in addition to providing specific details for the plan of care and counseling regarding the diagnosis and prognosis.  Their questions are answered at this time and they are agreeable with the plan. I discussed at length with them reasons for immediate return here for re evaluation. They will followup with primary care by calling their office tomorrow.      --------------------------------- ADDITIONAL PROVIDER NOTES ---------------------------------  At this time the patient is without objective evidence of an acute process requiring hospitalization or inpatient management.  They have remained hemodynamically stable throughout their entire ED visit and are stable for discharge with outpatient follow-up.     The plan has been discussed in detail and they are aware of the specific conditions for emergent return, as well as the importance of follow-up.      New Prescriptions    No medications on file       Diagnosis:  1. Flank pain    2. Hyperglycemia        Disposition:  Patient's disposition: Discharge to home  Patient's condition is stable.         Josh Lomas DO  12/09/24 2034

## 2025-03-03 ENCOUNTER — TELEPHONE (OUTPATIENT)
Dept: ADMINISTRATIVE | Age: 55
End: 2025-03-03

## 2025-03-25 ENCOUNTER — HOSPITAL ENCOUNTER (INPATIENT)
Age: 55
LOS: 9 days | Discharge: HOME | DRG: 208 | End: 2025-04-03
Attending: INTERNAL MEDICINE | Admitting: INTERNAL MEDICINE
Payer: MEDICARE

## 2025-03-25 ENCOUNTER — APPOINTMENT (OUTPATIENT)
Dept: CT IMAGING | Age: 55
DRG: 208 | End: 2025-03-25
Attending: INTERNAL MEDICINE
Payer: MEDICARE

## 2025-03-25 ENCOUNTER — APPOINTMENT (OUTPATIENT)
Dept: GENERAL RADIOLOGY | Age: 55
DRG: 208 | End: 2025-03-25
Attending: INTERNAL MEDICINE
Payer: MEDICARE

## 2025-03-25 DIAGNOSIS — I42.9 CARDIOMYOPATHY, UNSPECIFIED TYPE (HCC): Primary | ICD-10-CM

## 2025-03-25 PROBLEM — J44.1 COPD WITH ACUTE EXACERBATION (HCC): Status: ACTIVE | Noted: 2025-03-25

## 2025-03-25 PROBLEM — J44.9 COPD (CHRONIC OBSTRUCTIVE PULMONARY DISEASE) (HCC): Status: ACTIVE | Noted: 2025-03-25

## 2025-03-25 LAB
B.E.: 0.6 MMOL/L (ref -3–3)
COHB: 1.4 % (ref 0–1.5)
CRITICAL: ABNORMAL
DATE ANALYZED: ABNORMAL
DATE OF COLLECTION: ABNORMAL
GLUCOSE BLD-MCNC: 170 MG/DL (ref 74–99)
HCO3: 28.3 MMOL/L (ref 22–26)
HHB: 8.4 % (ref 0–5)
LAB: ABNORMAL
Lab: 2158
METHB: 0.3 % (ref 0–1.5)
MODE: ABNORMAL
O2 CONTENT: 12 ML/DL
O2 SATURATION: 91.5 % (ref 92–98.5)
O2HB: 89.9 % (ref 94–97)
OPERATOR ID: ABNORMAL
PATIENT TEMP: 37 C
PCO2: 63.4 MMHG (ref 35–45)
PH BLOOD GAS: 7.27 (ref 7.35–7.45)
PO2: 73.3 MMHG (ref 75–100)
SOURCE, BLOOD GAS: ABNORMAL
THB: 9.4 G/DL (ref 11.5–16.5)
TIME ANALYZED: 2204

## 2025-03-25 PROCEDURE — 6360000002 HC RX W HCPCS: Performed by: INTERNAL MEDICINE

## 2025-03-25 PROCEDURE — 2700000000 HC OXYGEN THERAPY PER DAY

## 2025-03-25 PROCEDURE — 6360000002 HC RX W HCPCS

## 2025-03-25 PROCEDURE — 70450 CT HEAD/BRAIN W/O DYE: CPT

## 2025-03-25 PROCEDURE — 80143 DRUG ASSAY ACETAMINOPHEN: CPT

## 2025-03-25 PROCEDURE — 73030 X-RAY EXAM OF SHOULDER: CPT

## 2025-03-25 PROCEDURE — 82962 GLUCOSE BLOOD TEST: CPT

## 2025-03-25 PROCEDURE — 73560 X-RAY EXAM OF KNEE 1 OR 2: CPT

## 2025-03-25 PROCEDURE — 2500000003 HC RX 250 WO HCPCS: Performed by: INTERNAL MEDICINE

## 2025-03-25 PROCEDURE — 72040 X-RAY EXAM NECK SPINE 2-3 VW: CPT

## 2025-03-25 PROCEDURE — 80307 DRUG TEST PRSMV CHEM ANLYZR: CPT

## 2025-03-25 PROCEDURE — 6360000002 HC RX W HCPCS: Performed by: NURSE PRACTITIONER

## 2025-03-25 PROCEDURE — 6370000000 HC RX 637 (ALT 250 FOR IP): Performed by: INTERNAL MEDICINE

## 2025-03-25 PROCEDURE — 2060000000 HC ICU INTERMEDIATE R&B

## 2025-03-25 PROCEDURE — 80179 DRUG ASSAY SALICYLATE: CPT

## 2025-03-25 PROCEDURE — G0480 DRUG TEST DEF 1-7 CLASSES: HCPCS

## 2025-03-25 PROCEDURE — 99291 CRITICAL CARE FIRST HOUR: CPT | Performed by: INTERNAL MEDICINE

## 2025-03-25 PROCEDURE — 94640 AIRWAY INHALATION TREATMENT: CPT

## 2025-03-25 PROCEDURE — 82805 BLOOD GASES W/O2 SATURATION: CPT

## 2025-03-25 RX ORDER — GUAIFENESIN 600 MG/1
600 TABLET, EXTENDED RELEASE ORAL 2 TIMES DAILY
Status: DISCONTINUED | OUTPATIENT
Start: 2025-03-25 | End: 2025-03-25 | Stop reason: CLARIF

## 2025-03-25 RX ORDER — ENOXAPARIN SODIUM 100 MG/ML
40 INJECTION SUBCUTANEOUS DAILY
Status: DISCONTINUED | OUTPATIENT
Start: 2025-03-25 | End: 2025-03-27

## 2025-03-25 RX ORDER — SODIUM CHLORIDE 9 MG/ML
INJECTION, SOLUTION INTRAVENOUS CONTINUOUS
Status: DISCONTINUED | OUTPATIENT
Start: 2025-03-25 | End: 2025-03-26

## 2025-03-25 RX ORDER — AZITHROMYCIN 250 MG/1
500 TABLET, FILM COATED ORAL DAILY
Status: DISCONTINUED | OUTPATIENT
Start: 2025-03-25 | End: 2025-03-26

## 2025-03-25 RX ORDER — GLUCAGON 1 MG/ML
1 KIT INJECTION PRN
Status: DISCONTINUED | OUTPATIENT
Start: 2025-03-25 | End: 2025-04-03 | Stop reason: HOSPADM

## 2025-03-25 RX ORDER — METOPROLOL TARTRATE 25 MG/1
25 TABLET, FILM COATED ORAL DAILY
Status: DISCONTINUED | OUTPATIENT
Start: 2025-03-25 | End: 2025-03-27

## 2025-03-25 RX ORDER — IPRATROPIUM BROMIDE AND ALBUTEROL SULFATE 2.5; .5 MG/3ML; MG/3ML
1 SOLUTION RESPIRATORY (INHALATION)
Status: DISCONTINUED | OUTPATIENT
Start: 2025-03-25 | End: 2025-04-03 | Stop reason: HOSPADM

## 2025-03-25 RX ORDER — LORAZEPAM 2 MG/ML
1 INJECTION INTRAMUSCULAR ONCE
Status: COMPLETED | OUTPATIENT
Start: 2025-03-26 | End: 2025-03-25

## 2025-03-25 RX ORDER — DEXTROSE MONOHYDRATE 100 MG/ML
INJECTION, SOLUTION INTRAVENOUS CONTINUOUS PRN
Status: DISCONTINUED | OUTPATIENT
Start: 2025-03-25 | End: 2025-04-03 | Stop reason: HOSPADM

## 2025-03-25 RX ORDER — LORAZEPAM 2 MG/ML
1 INJECTION INTRAMUSCULAR ONCE
Status: COMPLETED | OUTPATIENT
Start: 2025-03-25 | End: 2025-03-25

## 2025-03-25 RX ORDER — SODIUM CHLORIDE 9 MG/ML
INJECTION, SOLUTION INTRAVENOUS PRN
Status: DISCONTINUED | OUTPATIENT
Start: 2025-03-25 | End: 2025-04-03 | Stop reason: HOSPADM

## 2025-03-25 RX ORDER — GUAIFENESIN 400 MG/1
400 TABLET ORAL EVERY 8 HOURS SCHEDULED
Status: DISCONTINUED | OUTPATIENT
Start: 2025-03-25 | End: 2025-04-03 | Stop reason: HOSPADM

## 2025-03-25 RX ORDER — BENZONATATE 100 MG/1
100 CAPSULE ORAL 3 TIMES DAILY PRN
Status: DISCONTINUED | OUTPATIENT
Start: 2025-03-25 | End: 2025-04-03 | Stop reason: HOSPADM

## 2025-03-25 RX ORDER — PREDNISONE 20 MG/1
40 TABLET ORAL DAILY
Status: DISCONTINUED | OUTPATIENT
Start: 2025-03-25 | End: 2025-03-26

## 2025-03-25 RX ORDER — FUROSEMIDE 40 MG/1
40 TABLET ORAL 2 TIMES DAILY
Status: DISCONTINUED | OUTPATIENT
Start: 2025-03-25 | End: 2025-03-26

## 2025-03-25 RX ORDER — SODIUM CHLORIDE 0.9 % (FLUSH) 0.9 %
5-40 SYRINGE (ML) INJECTION PRN
Status: DISCONTINUED | OUTPATIENT
Start: 2025-03-25 | End: 2025-04-03 | Stop reason: HOSPADM

## 2025-03-25 RX ORDER — ACETAMINOPHEN 325 MG/1
650 TABLET ORAL EVERY 6 HOURS PRN
Status: DISCONTINUED | OUTPATIENT
Start: 2025-03-25 | End: 2025-04-03 | Stop reason: HOSPADM

## 2025-03-25 RX ORDER — ASPIRIN 81 MG/1
81 TABLET ORAL DAILY
Status: DISCONTINUED | OUTPATIENT
Start: 2025-03-25 | End: 2025-04-03 | Stop reason: HOSPADM

## 2025-03-25 RX ORDER — PANTOPRAZOLE SODIUM 40 MG/1
40 TABLET, DELAYED RELEASE ORAL DAILY
Status: DISCONTINUED | OUTPATIENT
Start: 2025-03-25 | End: 2025-03-27

## 2025-03-25 RX ORDER — SODIUM CHLORIDE 0.9 % (FLUSH) 0.9 %
5-40 SYRINGE (ML) INJECTION EVERY 12 HOURS SCHEDULED
Status: DISCONTINUED | OUTPATIENT
Start: 2025-03-25 | End: 2025-04-03 | Stop reason: HOSPADM

## 2025-03-25 RX ORDER — ARFORMOTEROL TARTRATE 15 UG/2ML
15 SOLUTION RESPIRATORY (INHALATION)
Status: DISCONTINUED | OUTPATIENT
Start: 2025-03-25 | End: 2025-04-03 | Stop reason: HOSPADM

## 2025-03-25 RX ORDER — ISOSORBIDE MONONITRATE 30 MG/1
30 TABLET, EXTENDED RELEASE ORAL DAILY
Status: DISCONTINUED | OUTPATIENT
Start: 2025-03-25 | End: 2025-03-29

## 2025-03-25 RX ORDER — NALOXONE HYDROCHLORIDE 0.4 MG/ML
INJECTION, SOLUTION INTRAMUSCULAR; INTRAVENOUS; SUBCUTANEOUS
Status: COMPLETED
Start: 2025-03-25 | End: 2025-03-25

## 2025-03-25 RX ORDER — AMLODIPINE BESYLATE 10 MG/1
10 TABLET ORAL DAILY
Status: DISCONTINUED | OUTPATIENT
Start: 2025-03-26 | End: 2025-03-28

## 2025-03-25 RX ORDER — ROSUVASTATIN CALCIUM 10 MG/1
10 TABLET, COATED ORAL DAILY
Status: DISCONTINUED | OUTPATIENT
Start: 2025-03-25 | End: 2025-04-03 | Stop reason: HOSPADM

## 2025-03-25 RX ORDER — INSULIN GLARGINE 100 [IU]/ML
50 INJECTION, SOLUTION SUBCUTANEOUS 2 TIMES DAILY
Status: DISCONTINUED | OUTPATIENT
Start: 2025-03-25 | End: 2025-03-28

## 2025-03-25 RX ORDER — NALOXONE HYDROCHLORIDE 0.4 MG/ML
0.4 INJECTION, SOLUTION INTRAMUSCULAR; INTRAVENOUS; SUBCUTANEOUS PRN
Status: DISCONTINUED | OUTPATIENT
Start: 2025-03-25 | End: 2025-04-03 | Stop reason: HOSPADM

## 2025-03-25 RX ORDER — ACETAMINOPHEN 650 MG/1
650 SUPPOSITORY RECTAL EVERY 6 HOURS PRN
Status: DISCONTINUED | OUTPATIENT
Start: 2025-03-25 | End: 2025-04-03 | Stop reason: HOSPADM

## 2025-03-25 RX ORDER — POLYETHYLENE GLYCOL 3350 17 G/17G
17 POWDER, FOR SOLUTION ORAL DAILY PRN
Status: DISCONTINUED | OUTPATIENT
Start: 2025-03-25 | End: 2025-03-27

## 2025-03-25 RX ORDER — LOSARTAN POTASSIUM 50 MG/1
100 TABLET ORAL DAILY
Status: DISCONTINUED | OUTPATIENT
Start: 2025-03-25 | End: 2025-03-30

## 2025-03-25 RX ORDER — ONDANSETRON 2 MG/ML
4 INJECTION INTRAMUSCULAR; INTRAVENOUS EVERY 6 HOURS PRN
Status: DISCONTINUED | OUTPATIENT
Start: 2025-03-25 | End: 2025-04-03 | Stop reason: HOSPADM

## 2025-03-25 RX ORDER — ONDANSETRON 4 MG/1
4 TABLET, ORALLY DISINTEGRATING ORAL EVERY 8 HOURS PRN
Status: DISCONTINUED | OUTPATIENT
Start: 2025-03-25 | End: 2025-04-03 | Stop reason: HOSPADM

## 2025-03-25 RX ORDER — INSULIN LISPRO 100 [IU]/ML
0.08 INJECTION, SOLUTION INTRAVENOUS; SUBCUTANEOUS
Status: DISCONTINUED | OUTPATIENT
Start: 2025-03-25 | End: 2025-03-28

## 2025-03-25 RX ORDER — CLOPIDOGREL BISULFATE 75 MG/1
75 TABLET ORAL DAILY
Status: DISCONTINUED | OUTPATIENT
Start: 2025-03-25 | End: 2025-04-03 | Stop reason: HOSPADM

## 2025-03-25 RX ORDER — INSULIN LISPRO 100 [IU]/ML
0-8 INJECTION, SOLUTION INTRAVENOUS; SUBCUTANEOUS
Status: DISCONTINUED | OUTPATIENT
Start: 2025-03-25 | End: 2025-03-26

## 2025-03-25 RX ADMIN — ONDANSETRON 4 MG: 2 INJECTION, SOLUTION INTRAMUSCULAR; INTRAVENOUS at 19:44

## 2025-03-25 RX ADMIN — LORAZEPAM 1 MG: 2 INJECTION INTRAMUSCULAR; INTRAVENOUS at 23:57

## 2025-03-25 RX ADMIN — IPRATROPIUM BROMIDE AND ALBUTEROL SULFATE 1 DOSE: 2.5; .5 SOLUTION RESPIRATORY (INHALATION) at 20:27

## 2025-03-25 RX ADMIN — LORAZEPAM 1 MG: 2 INJECTION INTRAMUSCULAR; INTRAVENOUS at 22:31

## 2025-03-25 RX ADMIN — PREDNISONE 40 MG: 20 TABLET ORAL at 21:48

## 2025-03-25 RX ADMIN — IPRATROPIUM BROMIDE AND ALBUTEROL SULFATE 1 DOSE: 2.5; .5 SOLUTION RESPIRATORY (INHALATION) at 22:13

## 2025-03-25 RX ADMIN — NALXONE HYDROCHLORIDE 0.4 MG: 0.4 INJECTION INTRAMUSCULAR; INTRAVENOUS; SUBCUTANEOUS at 22:00

## 2025-03-25 RX ADMIN — ROSUVASTATIN CALCIUM 10 MG: 20 TABLET, FILM COATED ORAL at 21:42

## 2025-03-25 RX ADMIN — ARFORMOTEROL TARTRATE 15 MCG: 15 SOLUTION RESPIRATORY (INHALATION) at 20:27

## 2025-03-25 RX ADMIN — SODIUM CHLORIDE, PRESERVATIVE FREE 10 ML: 5 INJECTION INTRAVENOUS at 21:48

## 2025-03-25 NOTE — H&P
History & Physicial  Faiza Romero  [id number]  1970  03/25/25  Primary Care:  Mariana London, APN  [address] [facility] / [place] [address]        No chief complaint on file.      HPI:    54-year-old female was seen at the [facility] emergency department.  I was contacted that she had an acute exacerbation of COPD and required 4 L of oxygen.  While this is true, she uses 3 L oxygen normally.  I was not made aware of this.  The patient stated that she did have some struggling with dyspnea.  She does not have a durable medical equipment nebulizer.  She received 1 treatment and improved within 45 minutes.  She had a recent influenza A infection.  I did ask [place] to test her for COVID which I do not have a result from them.      Prior to Visit Medications    Medication Sig Taking? Authorizing Provider   metoprolol tartrate (LOPRESSOR) 25 MG tablet Take 1 tablet by mouth daily Yes Samson Aly MD   losartan (COZAAR) 100 MG tablet Take 1 tablet by mouth daily Yes Daniela Sargent MD   nitroGLYCERIN (NITROSTAT) 0.4 MG SL tablet Place 1 tablet under the tongue every 5 minutes as needed for Chest pain up to max of 3 total doses. If no relief after 1 dose, call 911. Yes Daniela Sargent MD   glipiZIDE (GLUCOTROL) 5 MG tablet Take 1 tablet by mouth 2 times daily (before meals) Yes ProviderSamson MD   rosuvastatin (CRESTOR) 10 MG tablet Take 1 tablet by mouth daily Yes ProviderSamson MD   clopidogrel (PLAVIX) 75 MG tablet Take 1 tablet by mouth daily Yes Daniela Sargent MD   furosemide (LASIX) 40 MG tablet Take 1 tablet by mouth daily as needed (pedal edema) Take 1 by mouth daily, take an extra 1 for extra swelling  Patient taking differently: Take 1 tablet by mouth 2 times daily Take 1 by mouth daily, take an extra 1 for extra swelling Yes Daniela Sargent MD   aspirin 81 MG EC tablet Take 1 tablet by mouth daily Yes Daniela Sargent MD   acetaminophen (TYLENOL) 325 MG tablet Take  2 tablets by mouth every 4 hours as needed for Pain Yes Aura Escobar PA   levothyroxine (SYNTHROID) 75 MCG tablet Take 1 tablet by mouth Daily  Patient taking differently: Take 125 mcg by mouth Daily Yes Alex Montanez MD   METHADONE HCL PO Take by mouth Takes 47 mg daily ( taking for past 20 years) history of 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  Oxycodone Yes Samson Aly MD   amLODIPine (NORVASC) 10 MG tablet Take 1 tablet by mouth daily  Patient not taking: Reported on 3/25/2025  Daniela Sargent MD   canagliflozin (INVOKANA) 300 MG TABS tablet Take 1 tablet by mouth every morning (before breakfast)  Patient not taking: Reported on 3/25/2025  Samson Aly MD   isosorbide mononitrate (IMDUR) 30 MG extended release tablet Take 1 tablet by mouth daily PATIENT TO TAKE FOR 6 MONTHS TO PREVENT RADIAL GRAFT SPASM  Daniela Sargent MD   pantoprazole (PROTONIX) 40 MG tablet Take 1 tablet by mouth daily for 14 days  Daniela Sargent MD   insulin detemir (LEVEMIR) 100 UNIT/ML injection vial Inject 65 Units into the skin 2 times daily  Patient not taking: Reported on 3/25/2025  Samson Aly MD     Social History     Tobacco Use    Smoking status: Every Day     Current packs/day: 1.50     Average packs/day: 1.5 packs/day for 25.0 years (37.5 ttl pk-yrs)     Types: Cigarettes    Smokeless tobacco: Never    Tobacco comments:     trying to quit. currently 7-8/day   Vaping Use    Vaping status: Some Days   Substance Use Topics    Alcohol use: Not Currently    Drug use: Not Currently     Comment: methadone      Family History   Adopted: Yes   Problem Relation Age of Onset    Mult Sclerosis Mother     Mult Sclerosis Maternal Grandmother      Past Surgical History:   Procedure Laterality Date    APPENDECTOMY      CARDIAC CATHETERIZATION  09/14/2020    Dr. Sargent EF 55% cts consult ordered    CARDIAC PROCEDURE N/A 8/26/2024    Left heart cath / coronary angiography w grafts performed by Daniela Sargent MD at [facility] CARDIAC

## 2025-03-25 NOTE — PROGRESS NOTES
Secure message sent to Barbara Torrez NP re: pt/pt family stating she fell this AM, unsure if she hit head and is on plavix. Pt also c/o neck, shoulder, and right knee pain. New orders received.

## 2025-03-25 NOTE — PROGRESS NOTES
4 Eyes Skin Assessment     NAME:  Faiza Romero  YOB: 1970  MEDICAL RECORD NUMBER:  [id number]    The patient is being assessed for  Admission    I agree that at least one RN has performed a thorough Head to Toe Skin Assessment on the patient. ALL assessment sites listed below have been assessed.      Areas assessed by both nurses:    Head, Face, Ears, Shoulders, Back, Chest, Arms, Elbows, Hands, Sacrum. Buttock, Coccyx, Ischium, Legs. Feet and Heels, and Under Medical Devices         Does the Patient have a Wound? No noted wound(s)       Esau Prevention initiated by RN: Yes  Wound Care Orders initiated by RN: No    Pressure Injury (Stage 3,4, Unstageable, DTI, NWPT, and Complex wounds) if present, place Wound referral order by RN under : No    New Ostomies, if present place, Ostomy referral order under : No     Nurse 1 eSignature: Electronically signed by Gaston Page RN on 3/25/25 at 6:55 PM EDT    **SHARE this note so that the co-signing nurse can place an eSignature**    Nurse 2 eSignature: Electronically signed by Yuly Bautista RN on 3/25/25 at 6:56 PM EDT

## 2025-03-26 ENCOUNTER — APPOINTMENT (OUTPATIENT)
Dept: GENERAL RADIOLOGY | Age: 55
DRG: 208 | End: 2025-03-26
Attending: INTERNAL MEDICINE
Payer: MEDICARE

## 2025-03-26 LAB
ALBUMIN SERPL-MCNC: 2.8 G/DL (ref 3.5–5.2)
ALP SERPL-CCNC: 103 U/L (ref 35–104)
ALT SERPL-CCNC: 12 U/L (ref 0–32)
AMPHET UR QL SCN: NEGATIVE
ANION GAP SERPL CALCULATED.3IONS-SCNC: 11 MMOL/L (ref 7–16)
ANION GAP SERPL CALCULATED.3IONS-SCNC: 14 MMOL/L (ref 7–16)
ANION GAP SERPL CALCULATED.3IONS-SCNC: 16 MMOL/L (ref 7–16)
APAP SERPL-MCNC: <5 UG/ML (ref 10–30)
AST SERPL-CCNC: 30 U/L (ref 0–31)
B.E.: -0.3 MMOL/L (ref -3–3)
B.E.: 2.1 MMOL/L (ref -3–3)
B.E.: 2.4 MMOL/L (ref -3–3)
B.E.: 2.5 MMOL/L (ref -3–3)
B.E.: 2.8 MMOL/L (ref -3–3)
BARBITURATES UR QL SCN: NEGATIVE
BASOPHILS # BLD: 0.03 K/UL (ref 0–0.2)
BASOPHILS # BLD: 0.04 K/UL (ref 0–0.2)
BASOPHILS NFR BLD: 0 % (ref 0–2)
BASOPHILS NFR BLD: 0 % (ref 0–2)
BENZODIAZ UR QL: NEGATIVE
BILIRUB DIRECT SERPL-MCNC: <0.2 MG/DL (ref 0–0.3)
BILIRUB INDIRECT SERPL-MCNC: ABNORMAL MG/DL (ref 0–1)
BILIRUB SERPL-MCNC: 0.3 MG/DL (ref 0–1.2)
BNP SERPL-MCNC: 7270 PG/ML (ref 0–125)
BUN SERPL-MCNC: 20 MG/DL (ref 6–20)
BUN SERPL-MCNC: 23 MG/DL (ref 6–20)
BUN SERPL-MCNC: 23 MG/DL (ref 6–20)
BUPRENORPHINE UR QL: NEGATIVE
CALCIUM SERPL-MCNC: 7.3 MG/DL (ref 8.6–10.2)
CALCIUM SERPL-MCNC: 8.5 MG/DL (ref 8.6–10.2)
CALCIUM SERPL-MCNC: 8.6 MG/DL (ref 8.6–10.2)
CANNABINOIDS UR QL SCN: POSITIVE
CHLORIDE SERPL-SCNC: 101 MMOL/L (ref 98–107)
CHLORIDE SERPL-SCNC: 91 MMOL/L (ref 98–107)
CHLORIDE SERPL-SCNC: 97 MMOL/L (ref 98–107)
CO2 SERPL-SCNC: 25 MMOL/L (ref 22–29)
CO2 SERPL-SCNC: 25 MMOL/L (ref 22–29)
CO2 SERPL-SCNC: 26 MMOL/L (ref 22–29)
COCAINE UR QL SCN: NEGATIVE
COHB: 0.2 % (ref 0–1.5)
COHB: 0.3 % (ref 0–1.5)
COHB: 0.6 % (ref 0–1.5)
COHB: 0.7 % (ref 0–1.5)
COHB: 0.8 % (ref 0–1.5)
COMMENT: ABNORMAL
CREAT SERPL-MCNC: 1 MG/DL (ref 0.5–1)
CREAT SERPL-MCNC: 1.1 MG/DL (ref 0.5–1)
CREAT SERPL-MCNC: 1.2 MG/DL (ref 0.5–1)
CREAT SERPL-MCNC: 1.2 MG/DL (ref 0.5–1)
CRITICAL: ABNORMAL
CRP SERPL HS-MCNC: 228 MG/L (ref 0–5)
DATE ANALYZED: ABNORMAL
DATE OF COLLECTION: ABNORMAL
EOSINOPHIL # BLD: 0.05 K/UL (ref 0.05–0.5)
EOSINOPHIL # BLD: 0.08 K/UL (ref 0.05–0.5)
EOSINOPHILS RELATIVE PERCENT: 0 % (ref 0–6)
EOSINOPHILS RELATIVE PERCENT: 1 % (ref 0–6)
ERYTHROCYTE [DISTWIDTH] IN BLOOD BY AUTOMATED COUNT: 14 % (ref 11.5–15)
ERYTHROCYTE [DISTWIDTH] IN BLOOD BY AUTOMATED COUNT: 14.1 % (ref 11.5–15)
ERYTHROCYTE [SEDIMENTATION RATE] IN BLOOD BY WESTERGREN METHOD: 72 MM/HR (ref 0–20)
ETHANOLAMINE SERPL-MCNC: <10 MG/DL (ref 0–0.08)
FENTANYL UR QL: NEGATIVE
FIO2: 100 %
FIO2: 80 %
FIO2: 95 %
GFR, ESTIMATED: 57 ML/MIN/1.73M2
GFR, ESTIMATED: 57 ML/MIN/1.73M2
GFR, ESTIMATED: 61 ML/MIN/1.73M2
GFR, ESTIMATED: 66 ML/MIN/1.73M2
GLUCOSE BLD-MCNC: 117 MG/DL (ref 74–99)
GLUCOSE BLD-MCNC: 117 MG/DL (ref 74–99)
GLUCOSE BLD-MCNC: 99 MG/DL (ref 74–99)
GLUCOSE SERPL-MCNC: 105 MG/DL (ref 74–99)
GLUCOSE SERPL-MCNC: 115 MG/DL (ref 74–99)
GLUCOSE SERPL-MCNC: 70 MG/DL (ref 74–99)
HBA1C MFR BLD: 8.3 % (ref 4–5.6)
HCO3: 27.4 MMOL/L (ref 22–26)
HCO3: 28 MMOL/L (ref 22–26)
HCO3: 28.6 MMOL/L (ref 22–26)
HCO3: 28.7 MMOL/L (ref 22–26)
HCO3: 30.6 MMOL/L (ref 22–26)
HCT VFR BLD AUTO: 22.5 % (ref 34–48)
HCT VFR BLD AUTO: 25.2 % (ref 34–48)
HGB BLD-MCNC: 7 G/DL (ref 11.5–15.5)
HGB BLD-MCNC: 8 G/DL (ref 11.5–15.5)
HHB: 0.7 % (ref 0–5)
HHB: 1.7 % (ref 0–5)
HHB: 3.4 % (ref 0–5)
HHB: 3.7 % (ref 0–5)
HHB: 45.4 % (ref 0–5)
IMM GRANULOCYTES # BLD AUTO: 0.09 K/UL (ref 0–0.58)
IMM GRANULOCYTES # BLD AUTO: 0.12 K/UL (ref 0–0.58)
IMM GRANULOCYTES NFR BLD: 1 % (ref 0–5)
IMM GRANULOCYTES NFR BLD: 1 % (ref 0–5)
LAB: ABNORMAL
LACTATE BLDV-SCNC: 1.1 MMOL/L (ref 0.5–2.2)
LACTATE BLDV-SCNC: 1.4 MMOL/L (ref 0.5–2.2)
LYMPHOCYTES NFR BLD: 1.1 K/UL (ref 1.5–4)
LYMPHOCYTES NFR BLD: 1.1 K/UL (ref 1.5–4)
LYMPHOCYTES RELATIVE PERCENT: 6 % (ref 20–42)
LYMPHOCYTES RELATIVE PERCENT: 7 % (ref 20–42)
Lab: 1000
Lab: 1412
Lab: 1745
Lab: 1843
Lab: 552
MAGNESIUM SERPL-MCNC: 1.7 MG/DL (ref 1.6–2.6)
MAGNESIUM SERPL-MCNC: 2 MG/DL (ref 1.6–2.6)
MCH RBC QN AUTO: 29.2 PG (ref 26–35)
MCH RBC QN AUTO: 29.5 PG (ref 26–35)
MCHC RBC AUTO-ENTMCNC: 31.1 G/DL (ref 32–34.5)
MCHC RBC AUTO-ENTMCNC: 31.7 G/DL (ref 32–34.5)
MCV RBC AUTO: 93 FL (ref 80–99.9)
MCV RBC AUTO: 93.8 FL (ref 80–99.9)
METHADONE UR QL: POSITIVE
METHB: 0.3 % (ref 0–1.5)
MODE: ABNORMAL
MODE: AC
MODE: AC
MONOCYTES NFR BLD: 0.75 K/UL (ref 0.1–0.95)
MONOCYTES NFR BLD: 0.8 K/UL (ref 0.1–0.95)
MONOCYTES NFR BLD: 5 % (ref 2–12)
MONOCYTES NFR BLD: 5 % (ref 2–12)
NEUTROPHILS NFR BLD: 86 % (ref 43–80)
NEUTROPHILS NFR BLD: 88 % (ref 43–80)
NEUTS SEG NFR BLD: 12.74 K/UL (ref 1.8–7.3)
NEUTS SEG NFR BLD: 15.66 K/UL (ref 1.8–7.3)
O2 CONTENT: 12.2 ML/DL
O2 CONTENT: 13.2 ML/DL
O2 CONTENT: 13.6 ML/DL
O2 CONTENT: 13.8 ML/DL
O2 CONTENT: 6.4 ML/DL
O2 SATURATION: 54.1 % (ref 92–98.5)
O2 SATURATION: 96.3 % (ref 92–98.5)
O2 SATURATION: 96.6 % (ref 92–98.5)
O2 SATURATION: 98.3 % (ref 92–98.5)
O2 SATURATION: 99.3 % (ref 92–98.5)
O2HB: 53.6 % (ref 94–97)
O2HB: 95.8 % (ref 94–97)
O2HB: 96 % (ref 94–97)
O2HB: 97.4 % (ref 94–97)
O2HB: 98.2 % (ref 94–97)
OPERATOR ID: 166
OPERATOR ID: 1661
OPERATOR ID: 3186
OPERATOR ID: 5132
OPERATOR ID: ABNORMAL
OPIATES UR QL SCN: NEGATIVE
OXYCODONE UR QL SCN: NEGATIVE
PATIENT TEMP: 37 C
PCO2: 44 MMHG (ref 35–45)
PCO2: 52.8 MMHG (ref 35–45)
PCO2: 56.2 MMHG (ref 35–45)
PCO2: 66.8 MMHG (ref 35–45)
PCO2: 67.1 MMHG (ref 35–45)
PCP UR QL SCN: NEGATIVE
PEEP/CPAP: 10 CMH2O
PEEP/CPAP: 10 CMH2O
PEEP/CPAP: 8 CMH2O
PFO2: 0.3 MMHG/%
PFO2: 0.88 MMHG/%
PFO2: 1.05 MMHG/%
PFO2: 1.44 MMHG/%
PFO2: 1.52 MMHG/%
PH BLOOD GAS: 7.24 (ref 7.35–7.45)
PH BLOOD GAS: 7.28 (ref 7.35–7.45)
PH BLOOD GAS: 7.33 (ref 7.35–7.45)
PH BLOOD GAS: 7.35 (ref 7.35–7.45)
PH BLOOD GAS: 7.41 (ref 7.35–7.45)
PHOSPHATE SERPL-MCNC: 3.6 MG/DL (ref 2.5–4.5)
PLATELET # BLD AUTO: 263 K/UL (ref 130–450)
PLATELET # BLD AUTO: 297 K/UL (ref 130–450)
PMV BLD AUTO: 10.9 FL (ref 7–12)
PMV BLD AUTO: 10.9 FL (ref 7–12)
PO2: 100 MMHG (ref 75–100)
PO2: 121.6 MMHG (ref 75–100)
PO2: 143.7 MMHG (ref 75–100)
PO2: 30.3 MMHG (ref 75–100)
PO2: 88.1 MMHG (ref 75–100)
POTASSIUM SERPL-SCNC: 3.1 MMOL/L (ref 3.5–5)
POTASSIUM SERPL-SCNC: 3.2 MMOL/L (ref 3.5–5)
POTASSIUM SERPL-SCNC: 3.8 MMOL/L (ref 3.5–5)
PROCALCITONIN SERPL-MCNC: 0.45 NG/ML (ref 0–0.08)
PROT SERPL-MCNC: 5.9 G/DL (ref 6.4–8.3)
PS: 12 CMH20
RBC # BLD AUTO: 2.4 M/UL (ref 3.5–5.5)
RBC # BLD AUTO: 2.71 M/UL (ref 3.5–5.5)
RI(T): 3.11
RI(T): 3.66
RI(T): 5.09
RI(T): 6.49
RI(T): ABNORMAL
RR MECHANICAL: 18 B/MIN
RR MECHANICAL: 18 B/MIN
RR MECHANICAL: 20 B/MIN
RR MECHANICAL: 20 B/MIN
SALICYLATES SERPL-MCNC: <0.3 MG/DL (ref 0–30)
SODIUM SERPL-SCNC: 131 MMOL/L (ref 132–146)
SODIUM SERPL-SCNC: 137 MMOL/L (ref 132–146)
SODIUM SERPL-SCNC: 138 MMOL/L (ref 132–146)
SOURCE, BLOOD GAS: ABNORMAL
T4 FREE SERPL-MCNC: 0.5 NG/DL (ref 0.9–1.7)
TEST INFORMATION: ABNORMAL
THB: 10 G/DL (ref 11.5–16.5)
THB: 10.1 G/DL (ref 11.5–16.5)
THB: 8.5 G/DL (ref 11.5–16.5)
THB: 8.6 G/DL (ref 11.5–16.5)
THB: 9.5 G/DL (ref 11.5–16.5)
TIME ANALYZED: 1007
TIME ANALYZED: 1419
TIME ANALYZED: 1756
TIME ANALYZED: 1847
TIME ANALYZED: 558
TOXIC TRICYCLIC SC,BLOOD: NEGATIVE
TROPONIN I SERPL HS-MCNC: 30 NG/L (ref 0–9)
TROPONIN I SERPL HS-MCNC: 36 NG/L (ref 0–9)
TSH SERPL DL<=0.05 MIU/L-ACNC: 4.59 UIU/ML (ref 0.27–4.2)
VT MECHANICAL: 400 ML
WBC OTHER # BLD: 14.8 K/UL (ref 4.5–11.5)
WBC OTHER # BLD: 17.8 K/UL (ref 4.5–11.5)

## 2025-03-26 PROCEDURE — 2580000003 HC RX 258: Performed by: INTERNAL MEDICINE

## 2025-03-26 PROCEDURE — P9016 RBC LEUKOCYTES REDUCED: HCPCS

## 2025-03-26 PROCEDURE — 94002 VENT MGMT INPAT INIT DAY: CPT

## 2025-03-26 PROCEDURE — 36430 TRANSFUSION BLD/BLD COMPNT: CPT

## 2025-03-26 PROCEDURE — 6360000002 HC RX W HCPCS: Performed by: INTERNAL MEDICINE

## 2025-03-26 PROCEDURE — 86140 C-REACTIVE PROTEIN: CPT

## 2025-03-26 PROCEDURE — 86923 COMPATIBILITY TEST ELECTRIC: CPT

## 2025-03-26 PROCEDURE — 71045 X-RAY EXAM CHEST 1 VIEW: CPT

## 2025-03-26 PROCEDURE — 80307 DRUG TEST PRSMV CHEM ANLYZR: CPT

## 2025-03-26 PROCEDURE — 51702 INSERT TEMP BLADDER CATH: CPT

## 2025-03-26 PROCEDURE — 0CJS8ZZ INSPECTION OF LARYNX, VIA NATURAL OR ARTIFICIAL OPENING ENDOSCOPIC: ICD-10-PCS | Performed by: INTERNAL MEDICINE

## 2025-03-26 PROCEDURE — 2500000003 HC RX 250 WO HCPCS: Performed by: INTERNAL MEDICINE

## 2025-03-26 PROCEDURE — 36415 COLL VENOUS BLD VENIPUNCTURE: CPT

## 2025-03-26 PROCEDURE — 83036 HEMOGLOBIN GLYCOSYLATED A1C: CPT

## 2025-03-26 PROCEDURE — 6370000000 HC RX 637 (ALT 250 FOR IP)

## 2025-03-26 PROCEDURE — 2500000003 HC RX 250 WO HCPCS

## 2025-03-26 PROCEDURE — 84484 ASSAY OF TROPONIN QUANT: CPT

## 2025-03-26 PROCEDURE — 83605 ASSAY OF LACTIC ACID: CPT

## 2025-03-26 PROCEDURE — 6370000000 HC RX 637 (ALT 250 FOR IP): Performed by: INTERNAL MEDICINE

## 2025-03-26 PROCEDURE — 6360000002 HC RX W HCPCS

## 2025-03-26 PROCEDURE — 87081 CULTURE SCREEN ONLY: CPT

## 2025-03-26 PROCEDURE — 6360000002 HC RX W HCPCS: Performed by: NURSE PRACTITIONER

## 2025-03-26 PROCEDURE — 5A09357 ASSISTANCE WITH RESPIRATORY VENTILATION, LESS THAN 24 CONSECUTIVE HOURS, CONTINUOUS POSITIVE AIRWAY PRESSURE: ICD-10-PCS | Performed by: NURSE PRACTITIONER

## 2025-03-26 PROCEDURE — 87205 SMEAR GRAM STAIN: CPT

## 2025-03-26 PROCEDURE — 80048 BASIC METABOLIC PNL TOTAL CA: CPT

## 2025-03-26 PROCEDURE — 99233 SBSQ HOSP IP/OBS HIGH 50: CPT | Performed by: INTERNAL MEDICINE

## 2025-03-26 PROCEDURE — 84443 ASSAY THYROID STIM HORMONE: CPT

## 2025-03-26 PROCEDURE — 84145 PROCALCITONIN (PCT): CPT

## 2025-03-26 PROCEDURE — 2700000000 HC OXYGEN THERAPY PER DAY

## 2025-03-26 PROCEDURE — 82565 ASSAY OF CREATININE: CPT

## 2025-03-26 PROCEDURE — 86900 BLOOD TYPING SEROLOGIC ABO: CPT

## 2025-03-26 PROCEDURE — 0BH17EZ INSERTION OF ENDOTRACHEAL AIRWAY INTO TRACHEA, VIA NATURAL OR ARTIFICIAL OPENING: ICD-10-PCS | Performed by: INTERNAL MEDICINE

## 2025-03-26 PROCEDURE — 5A1945Z RESPIRATORY VENTILATION, 24-96 CONSECUTIVE HOURS: ICD-10-PCS | Performed by: INTERNAL MEDICINE

## 2025-03-26 PROCEDURE — 82805 BLOOD GASES W/O2 SATURATION: CPT

## 2025-03-26 PROCEDURE — 87106 FUNGI IDENTIFICATION YEAST: CPT

## 2025-03-26 PROCEDURE — 84100 ASSAY OF PHOSPHORUS: CPT

## 2025-03-26 PROCEDURE — 51798 US URINE CAPACITY MEASURE: CPT

## 2025-03-26 PROCEDURE — 85025 COMPLETE CBC W/AUTO DIFF WBC: CPT

## 2025-03-26 PROCEDURE — 86901 BLOOD TYPING SEROLOGIC RH(D): CPT

## 2025-03-26 PROCEDURE — 83735 ASSAY OF MAGNESIUM: CPT

## 2025-03-26 PROCEDURE — 87040 BLOOD CULTURE FOR BACTERIA: CPT

## 2025-03-26 PROCEDURE — 36600 WITHDRAWAL OF ARTERIAL BLOOD: CPT

## 2025-03-26 PROCEDURE — 30233N1 TRANSFUSION OF NONAUTOLOGOUS RED BLOOD CELLS INTO PERIPHERAL VEIN, PERCUTANEOUS APPROACH: ICD-10-PCS | Performed by: INTERNAL MEDICINE

## 2025-03-26 PROCEDURE — 94660 CPAP INITIATION&MGMT: CPT

## 2025-03-26 PROCEDURE — 74018 RADEX ABDOMEN 1 VIEW: CPT

## 2025-03-26 PROCEDURE — 85652 RBC SED RATE AUTOMATED: CPT

## 2025-03-26 PROCEDURE — 2000000000 HC ICU R&B

## 2025-03-26 PROCEDURE — 82248 BILIRUBIN DIRECT: CPT

## 2025-03-26 PROCEDURE — 84439 ASSAY OF FREE THYROXINE: CPT

## 2025-03-26 PROCEDURE — 87070 CULTURE OTHR SPECIMN AEROBIC: CPT

## 2025-03-26 PROCEDURE — 94640 AIRWAY INHALATION TREATMENT: CPT

## 2025-03-26 PROCEDURE — 82962 GLUCOSE BLOOD TEST: CPT

## 2025-03-26 PROCEDURE — 80053 COMPREHEN METABOLIC PANEL: CPT

## 2025-03-26 PROCEDURE — 51701 INSERT BLADDER CATHETER: CPT

## 2025-03-26 PROCEDURE — 86850 RBC ANTIBODY SCREEN: CPT

## 2025-03-26 PROCEDURE — 83880 ASSAY OF NATRIURETIC PEPTIDE: CPT

## 2025-03-26 RX ORDER — FUROSEMIDE 10 MG/ML
40 INJECTION INTRAMUSCULAR; INTRAVENOUS ONCE
Status: COMPLETED | OUTPATIENT
Start: 2025-03-26 | End: 2025-03-26

## 2025-03-26 RX ORDER — POTASSIUM CHLORIDE 7.45 MG/ML
10 INJECTION INTRAVENOUS
Status: DISPENSED | OUTPATIENT
Start: 2025-03-26 | End: 2025-03-26

## 2025-03-26 RX ORDER — ETOMIDATE 2 MG/ML
INJECTION INTRAVENOUS
Status: COMPLETED
Start: 2025-03-26 | End: 2025-03-26

## 2025-03-26 RX ORDER — HYDROXYZINE HYDROCHLORIDE 50 MG/ML
INJECTION, SOLUTION INTRAMUSCULAR
Status: COMPLETED | OUTPATIENT
Start: 2025-03-26 | End: 2025-03-26

## 2025-03-26 RX ORDER — POTASSIUM CHLORIDE 7.45 MG/ML
10 INJECTION INTRAVENOUS
Status: COMPLETED | OUTPATIENT
Start: 2025-03-26 | End: 2025-03-26

## 2025-03-26 RX ORDER — POTASSIUM CHLORIDE 7.45 MG/ML
10 INJECTION INTRAVENOUS ONCE
Status: DISCONTINUED | OUTPATIENT
Start: 2025-03-26 | End: 2025-03-27

## 2025-03-26 RX ORDER — NALOXONE HYDROCHLORIDE 0.4 MG/ML
0.4 INJECTION, SOLUTION INTRAMUSCULAR; INTRAVENOUS; SUBCUTANEOUS ONCE
Status: DISCONTINUED | OUTPATIENT
Start: 2025-03-26 | End: 2025-03-27

## 2025-03-26 RX ORDER — HYDROXYZINE HYDROCHLORIDE 50 MG/ML
INJECTION, SOLUTION INTRAMUSCULAR
Status: DISPENSED
Start: 2025-03-26 | End: 2025-03-26

## 2025-03-26 RX ORDER — FENTANYL CITRATE-0.9 % NACL/PF 10 MCG/ML
25-300 PLASTIC BAG, INJECTION (ML) INTRAVENOUS CONTINUOUS
Status: DISCONTINUED | OUTPATIENT
Start: 2025-03-26 | End: 2025-03-29

## 2025-03-26 RX ORDER — LORAZEPAM 2 MG/ML
INJECTION INTRAMUSCULAR
Status: DISCONTINUED
Start: 2025-03-26 | End: 2025-03-26 | Stop reason: WASHOUT

## 2025-03-26 RX ORDER — FUROSEMIDE 10 MG/ML
20 INJECTION INTRAMUSCULAR; INTRAVENOUS 2 TIMES DAILY
Status: DISCONTINUED | OUTPATIENT
Start: 2025-03-26 | End: 2025-03-28

## 2025-03-26 RX ORDER — SUCCINYLCHOLINE CHLORIDE 20 MG/ML
100 INJECTION INTRAMUSCULAR; INTRAVENOUS ONCE
Status: COMPLETED | OUTPATIENT
Start: 2025-03-26 | End: 2025-03-26

## 2025-03-26 RX ORDER — MIDAZOLAM HYDROCHLORIDE 2 MG/2ML
2 INJECTION, SOLUTION INTRAMUSCULAR; INTRAVENOUS ONCE
Status: COMPLETED | OUTPATIENT
Start: 2025-03-26 | End: 2025-03-26

## 2025-03-26 RX ORDER — ETOMIDATE 2 MG/ML
30 INJECTION INTRAVENOUS ONCE
Status: COMPLETED | OUTPATIENT
Start: 2025-03-26 | End: 2025-03-26

## 2025-03-26 RX ORDER — MIDAZOLAM HYDROCHLORIDE 1 MG/ML
1-10 INJECTION, SOLUTION INTRAVENOUS CONTINUOUS
Status: DISCONTINUED | OUTPATIENT
Start: 2025-03-26 | End: 2025-03-29

## 2025-03-26 RX ORDER — FENTANYL CITRATE 50 UG/ML
INJECTION, SOLUTION INTRAMUSCULAR; INTRAVENOUS
Status: DISPENSED
Start: 2025-03-26 | End: 2025-03-27

## 2025-03-26 RX ORDER — SUCCINYLCHOLINE CHLORIDE 20 MG/ML
INJECTION INTRAMUSCULAR; INTRAVENOUS
Status: COMPLETED
Start: 2025-03-26 | End: 2025-03-26

## 2025-03-26 RX ORDER — METHYLPREDNISOLONE SODIUM SUCCINATE 40 MG/ML
40 INJECTION INTRAMUSCULAR; INTRAVENOUS EVERY 6 HOURS
Status: DISCONTINUED | OUTPATIENT
Start: 2025-03-26 | End: 2025-03-28

## 2025-03-26 RX ORDER — SODIUM CHLORIDE 9 MG/ML
INJECTION, SOLUTION INTRAVENOUS PRN
Status: DISCONTINUED | OUTPATIENT
Start: 2025-03-26 | End: 2025-04-03 | Stop reason: HOSPADM

## 2025-03-26 RX ORDER — FENTANYL CITRATE 50 UG/ML
50 INJECTION, SOLUTION INTRAMUSCULAR; INTRAVENOUS ONCE
Status: COMPLETED | OUTPATIENT
Start: 2025-03-26 | End: 2025-03-26

## 2025-03-26 RX ORDER — LORAZEPAM 2 MG/ML
0.5 INJECTION INTRAMUSCULAR EVERY 8 HOURS PRN
Status: DISCONTINUED | OUTPATIENT
Start: 2025-03-26 | End: 2025-03-30

## 2025-03-26 RX ORDER — POTASSIUM CHLORIDE 7.45 MG/ML
10 INJECTION INTRAVENOUS ONCE
Status: DISCONTINUED | OUTPATIENT
Start: 2025-03-26 | End: 2025-03-26

## 2025-03-26 RX ORDER — INSULIN LISPRO 100 [IU]/ML
0-8 INJECTION, SOLUTION INTRAVENOUS; SUBCUTANEOUS EVERY 6 HOURS
Status: DISCONTINUED | OUTPATIENT
Start: 2025-03-26 | End: 2025-03-30

## 2025-03-26 RX ADMIN — MIDAZOLAM HYDROCHLORIDE 2 MG: 1 INJECTION, SOLUTION INTRAMUSCULAR; INTRAVENOUS at 15:36

## 2025-03-26 RX ADMIN — PIPERACILLIN AND TAZOBACTAM 4500 MG: 4; .5 INJECTION, POWDER, LYOPHILIZED, FOR SOLUTION INTRAVENOUS at 17:59

## 2025-03-26 RX ADMIN — IPRATROPIUM BROMIDE AND ALBUTEROL SULFATE 1 DOSE: 2.5; .5 SOLUTION RESPIRATORY (INHALATION) at 16:11

## 2025-03-26 RX ADMIN — ETOMIDATE 30 MG: 2 INJECTION, SOLUTION INTRAVENOUS at 15:05

## 2025-03-26 RX ADMIN — SUCCINYLCHOLINE CHLORIDE 100 MG: 20 INJECTION, SOLUTION INTRAMUSCULAR; INTRAVENOUS at 15:06

## 2025-03-26 RX ADMIN — POTASSIUM CHLORIDE 10 MEQ: 7.46 INJECTION, SOLUTION INTRAVENOUS at 18:00

## 2025-03-26 RX ADMIN — METHYLPREDNISOLONE SODIUM SUCCINATE 40 MG: 40 INJECTION INTRAMUSCULAR; INTRAVENOUS at 17:14

## 2025-03-26 RX ADMIN — IPRATROPIUM BROMIDE AND ALBUTEROL SULFATE 1 DOSE: 2.5; .5 SOLUTION RESPIRATORY (INHALATION) at 20:01

## 2025-03-26 RX ADMIN — FUROSEMIDE 40 MG: 10 INJECTION, SOLUTION INTRAMUSCULAR; INTRAVENOUS at 06:29

## 2025-03-26 RX ADMIN — AZITHROMYCIN MONOHYDRATE 500 MG: 500 INJECTION, POWDER, LYOPHILIZED, FOR SOLUTION INTRAVENOUS at 10:45

## 2025-03-26 RX ADMIN — HYDROXYZINE HYDROCHLORIDE 50 MG: 50 INJECTION, SOLUTION INTRAMUSCULAR at 10:12

## 2025-03-26 RX ADMIN — Medication 100 MCG/HR: at 15:26

## 2025-03-26 RX ADMIN — POTASSIUM CHLORIDE 10 MEQ: 7.46 INJECTION, SOLUTION INTRAVENOUS at 11:04

## 2025-03-26 RX ADMIN — FUROSEMIDE 20 MG: 10 INJECTION, SOLUTION INTRAMUSCULAR; INTRAVENOUS at 17:55

## 2025-03-26 RX ADMIN — ARFORMOTEROL TARTRATE 15 MCG: 15 SOLUTION RESPIRATORY (INHALATION) at 20:01

## 2025-03-26 RX ADMIN — Medication 2 MG/HR: at 15:46

## 2025-03-26 RX ADMIN — POTASSIUM CHLORIDE 10 MEQ: 7.46 INJECTION, SOLUTION INTRAVENOUS at 19:03

## 2025-03-26 RX ADMIN — FUROSEMIDE 20 MG: 10 INJECTION, SOLUTION INTRAMUSCULAR; INTRAVENOUS at 10:57

## 2025-03-26 RX ADMIN — SODIUM CHLORIDE, PRESERVATIVE FREE 10 ML: 5 INJECTION INTRAVENOUS at 10:46

## 2025-03-26 RX ADMIN — FENTANYL CITRATE 50 MCG: 50 INJECTION INTRAMUSCULAR; INTRAVENOUS at 15:49

## 2025-03-26 RX ADMIN — IPRATROPIUM BROMIDE AND ALBUTEROL SULFATE 1 DOSE: 2.5; .5 SOLUTION RESPIRATORY (INHALATION) at 09:19

## 2025-03-26 RX ADMIN — Medication 200 MCG/HR: at 19:19

## 2025-03-26 RX ADMIN — SODIUM CHLORIDE, PRESERVATIVE FREE 10 ML: 5 INJECTION INTRAVENOUS at 21:12

## 2025-03-26 RX ADMIN — POTASSIUM CHLORIDE 10 MEQ: 7.46 INJECTION, SOLUTION INTRAVENOUS at 19:53

## 2025-03-26 RX ADMIN — ETOMIDATE 30 MG: 2 INJECTION INTRAVENOUS at 15:05

## 2025-03-26 RX ADMIN — Medication 200 MCG/HR: at 23:54

## 2025-03-26 RX ADMIN — NALXONE HYDROCHLORIDE 0.4 MG: 0.4 INJECTION INTRAMUSCULAR; INTRAVENOUS; SUBCUTANEOUS at 09:49

## 2025-03-26 RX ADMIN — METHYLPREDNISOLONE SODIUM SUCCINATE 40 MG: 40 INJECTION INTRAMUSCULAR; INTRAVENOUS at 21:12

## 2025-03-26 RX ADMIN — POTASSIUM BICARBONATE 40 MEQ: 782 TABLET, EFFERVESCENT ORAL at 17:54

## 2025-03-26 RX ADMIN — NALXONE HYDROCHLORIDE 0.4 MG: 0.4 INJECTION INTRAMUSCULAR; INTRAVENOUS; SUBCUTANEOUS at 04:55

## 2025-03-26 RX ADMIN — POTASSIUM CHLORIDE 10 MEQ: 7.46 INJECTION, SOLUTION INTRAVENOUS at 11:56

## 2025-03-26 RX ADMIN — POTASSIUM CHLORIDE 10 MEQ: 7.46 INJECTION, SOLUTION INTRAVENOUS at 13:09

## 2025-03-26 RX ADMIN — ARFORMOTEROL TARTRATE 15 MCG: 15 SOLUTION RESPIRATORY (INHALATION) at 09:19

## 2025-03-26 RX ADMIN — SUCCINYLCHOLINE CHLORIDE 100 MG: 20 INJECTION INTRAMUSCULAR; INTRAVENOUS at 15:06

## 2025-03-26 RX ADMIN — ENOXAPARIN SODIUM 40 MG: 100 INJECTION SUBCUTANEOUS at 10:56

## 2025-03-26 RX ADMIN — GUAIFENESIN 400 MG: 400 TABLET ORAL at 21:12

## 2025-03-26 RX ADMIN — VANCOMYCIN HYDROCHLORIDE 1000 MG: 1 INJECTION, POWDER, LYOPHILIZED, FOR SOLUTION INTRAVENOUS at 20:19

## 2025-03-26 RX ADMIN — IPRATROPIUM BROMIDE AND ALBUTEROL SULFATE 1 DOSE: 2.5; .5 SOLUTION RESPIRATORY (INHALATION) at 13:06

## 2025-03-26 ASSESSMENT — PULMONARY FUNCTION TESTS
PIF_VALUE: 33
PIF_VALUE: 28
PIF_VALUE: 31
PIF_VALUE: 32
PIF_VALUE: 35
PIF_VALUE: 32
PIF_VALUE: 29
PIF_VALUE: 30
PIF_VALUE: 29

## 2025-03-26 NOTE — PROGRESS NOTES
[facility] Quality Flow/Interdisciplinary Rounds Progress Note        Quality Flow Rounds held on March 26, 2025    Disciplines Attending:  Bedside Nurse, , , and Nursing Unit Leadership    Faiza Romero was admitted on 3/25/2025  6:20 PM    Anticipated Discharge Date:       Disposition:    Esau Score:  Esau Scale Score: 12    [facility] RISK OF UNPLANNED READMISSION 2.0             14.1 Total Score        Discussed patient goal for the day, patient clinical progression, and barriers to discharge.  The following Goal(s) of the Day/Commitment(s) have been identified:   methadone pt , iv fluids, RRT HS, PRN narcan,  iv abx, nebs, po pred      Mala Ramos RN  March 26, 2025

## 2025-03-26 NOTE — PROGRESS NOTES
Went in patients room to give breathing tx's at 9:15am.  Patient was completely unresponsive even with sternal rub and painful stimuli on BIPAP 12/8 95% HR 54 Sat 99%.  RT called RN to room who also was unable to get pt to respond.  Dr. Rae was on the floor and called to room.  Dr. Rae said to not sternal rub so hard and she was stable and it was okay to give more narcan. RT talked with Dr. Rae to place pt on AVAPS for she was not assisting BiPAP. BiPAP was changed to AVAPS 20 400 50% +8.

## 2025-03-26 NOTE — PLAN OF CARE
Patient's chart updated to reflect:      .    - HF care plan, HF education points and HF discharge instructions.  -Orders: 2 gram sodium diet, daily weights, I/O.  -PCP or cardiology follow up appointments to be scheduled within 7 days of hospital discharge.  -CHF education session will be provided to the patient prior to hospital discharge.    Mandy Tariq RN   Heart Failure Navigator

## 2025-03-26 NOTE — PROGRESS NOTES
4 Eyes Skin Assessment     NAME:  Faiza Romero  YOB: 1970  MEDICAL RECORD NUMBER:  [id number]    The patient is being assessed for  Admission    I agree that at least one RN has performed a thorough Head to Toe Skin Assessment on the patient. ALL assessment sites listed below have been assessed.      Areas assessed by both nurses:    Head, Face, Ears, Shoulders, Back, Chest, Arms, Elbows, Hands, Sacrum. Buttock, Coccyx, Ischium, Legs. Feet and Heels, and Under Medical Devices         Does the Patient have a Wound? Yes wound(s) were present on assessment. LDA wound assessment was Initiated and completed by RN       Esau Prevention initiated by RN: Yes  Wound Care Orders initiated by RN: Yes    Pressure Injury (Stage 3,4, Unstageable, DTI, NWPT, and Complex wounds) if present, place Wound referral order by RN under : No    New Ostomies, if present place, Ostomy referral order under : No     Nurse 1 eSignature: Electronically signed by Flor Figueroa RN on 3/26/25 at 6:31 PM EDT    **SHARE this note so that the co-signing nurse can place an eSignature**    Nurse 2 eSignature: Electronically signed by Talya Danielson RN on 3/26/25 at 7:43 PM EDT

## 2025-03-26 NOTE — PROGRESS NOTES
Secure message sent to LINDSAY Jimenez re: if OK to hold lantus since pt is now NPO on cont bipap. New orders to hold lantus at this time.

## 2025-03-26 NOTE — CONSULTS
Critical Care Admit/Consult Note         Patient - Faiza Romero   MRN -  [id number]   [facility] # - [id number]   - 1970      Date of Admission -  3/25/2025  6:20 PM  Date of evaluation -  3/26/2025  021/021-A   Hospital Day - 1          ADMIT/CONSULT DETAILS     Reason for Admit/Consult   Impending respiratory failure    Consulting Service/Physician   Consulting - Balwinder Rae MD  Primary Care Physician - Mariana London APN HPI   The patient is a 54 y.o. female with significant past medical history of COPD normally on 3 L of O2, CHF, type II DM, CAD with stent placement who was transferred from [place] yesterday for acute COPD exacerbation.  Patient had reported some dyspnea.  Patient was also noted to recently have influenza A.  Treated for COPD exacerbation.  Of note, patient is on methadone.    Patient had RRT called this morning due to hypoxia and increasing lethargy.  Patient had received 3 doses of Narcan during this hospitalization thus far.  Patient was on BiPAP and O2 was increased to 100%.  Patient continuing to be lethargic with concerns of impending respiratory failure.  Transferred to the ICU for further management.  Repeat ABG shows worsening pCO2.  Patient was intubated in the ICU.      Past Medical History         Diagnosis Date    CAD in native artery 2020    Claustrophobia     mild    Colitis     Constipation     COPD (chronic obstructive pulmonary disease) (HCC)     COVID-19     Diabetes mellitus (HCC)     Heart problem     Hyperlipidemia     Hypertension     Hypothyroidism     Sleep apnea     diagnosed 7-8 years ago does not use C-pap problems with cost    Thyroid disease     Wears partial dentures     upper        Past Surgical History           Procedure Laterality Date    APPENDECTOMY      CARDIAC CATHETERIZATION  2020    Dr. Sargent EF 55% cts consult ordered    CARDIAC PROCEDURE N/A 2024    Left heart cath / coronary angiography w grafts  to ensure accuracy; however, inadvertent computerized transcription errors may be present. Please excuse any transcriptional grammatical or spelling errors that may have escaped my editorial review.    Total critical care time caring for this patient with life threatening, unstable organ failure, including direct patient contact, management of life support systems, review of data including imaging and labs, discussions with other team members and physicians is at least 45 Min so far today, excluding procedures.    Hazel Maldonado MD

## 2025-03-26 NOTE — SIGNIFICANT EVENT
Rapid Response/Code Blue Intervention provided by   [facility]   \"The Hospitalists\" provide 24/7 in-house care with an attending physician on campus.    Faiza E Leonelvidal  Cesar Abbasi MD  3/25/2025  6:20 PM  COPD (chronic obstructive pulmonary disease) ([facility]) [J44.9]  COPD with acute exacerbation ([facility]) [J44.1]  Patient Active Problem List   Diagnosis    Leukocytosis    Essential hypertension    History of drug abuse ([facility])    Chronic nonintractable headache    Cervical disc herniation    Coronary artery disease involving native coronary artery of native heart with angina pectoris    COVID-19    Unstable angina ([facility])    Chest pain    S/P coronary artery stent placement    CAD (coronary artery disease)    COPD with acute exacerbation ([facility])    COPD (chronic obstructive pulmonary disease) ([facility])       Called to floor regarding, RRT  Hypopneas to nearly stopping breathing. It occurred acutely. Eyes constricted, pinpoint. No response to sternal rub.        Current Facility-Administered Medications   Medication Dose Route Frequency Provider Last Rate Last Admin    [START ON 3/26/2025] amLODIPine (NORVASC) tablet 10 mg  10 mg Oral Daily Cesar Abbasi MD        aspirin EC tablet 81 mg  81 mg Oral Daily Cesar Abbasi MD        furosemide (LASIX) tablet 40 mg  40 mg Oral BID Cesar Abbasi MD   40 mg at 03/25/25 2142    clopidogrel (PLAVIX) tablet 75 mg  75 mg Oral Daily Cesar Abbasi MD        insulin glargine (LANTUS) injection vial 50 Units  50 Units SubCUTAneous BID Cesar Abbasi MD        isosorbide mononitrate (IMDUR) extended release tablet 30 mg  30 mg Oral Daily Cesar Abbasi MD        [START ON 3/26/2025] levothyroxine (SYNTHROID) tablet 125 mcg  125 mcg Oral Daily Cesar Abbasi MD        losartan (COZAAR) tablet 100 mg  100 mg Oral Daily Cesar Abbasi MD        metoprolol tartrate (LOPRESSOR) tablet 25 mg  25 mg Oral Daily Cesar Abbasi MD        pantoprazole (PROTONIX) tablet 40 mg   40 mg Oral Daily Cesar Abbasi MD        rosuvastatin (CRESTOR) tablet 10 mg  10 mg Oral Daily Cesar Abbasi MD   10 mg at 03/25/25 2142    sodium chloride flush 0.9 % injection 5-40 mL  5-40 mL IntraVENous 2 times per day Cesar Abbasi MD   10 mL at 03/25/25 2148    sodium chloride flush 0.9 % injection 5-40 mL  5-40 mL IntraVENous PRN Cesar Abbasi MD        0.9 % sodium chloride infusion   IntraVENous PRN Cesar Abbasi MD        ondansetron (ZOFRAN-ODT) disintegrating tablet 4 mg  4 mg Oral Q8H PRN Cesar Abbasi MD        Or    ondansetron (ZOFRAN) injection 4 mg  4 mg IntraVENous Q6H PRN Cesar Abbasi MD   4 mg at 03/25/25 1944    polyethylene glycol (GLYCOLAX) packet 17 g  17 g Oral Daily PRN Cesar Abbasi MD        enoxaparin (LOVENOX) injection 40 mg  40 mg SubCUTAneous Daily Cesar Abbasi MD        acetaminophen (TYLENOL) tablet 650 mg  650 mg Oral Q6H PRN Cesar Abbasi MD        Or    acetaminophen (TYLENOL) suppository 650 mg  650 mg Rectal Q6H PRN Cesar Abbasi MD        azithromycin (ZITHROMAX) tablet 500 mg  500 mg Oral Daily Cesar Abbasi MD   500 mg at 03/25/25 2142    ipratropium 0.5 mg-albuterol 2.5 mg (DUONEB) nebulizer solution 1 Dose  1 Dose Inhalation Q4H WA RT Cesar Abbasi MD   1 Dose at 03/25/25 2213    predniSONE (DELTASONE) tablet 40 mg  40 mg Oral Daily Cesar Abbasi MD   40 mg at 03/25/25 2148    benzonatate (TESSALON) capsule 100 mg  100 mg Oral TID PRN Cesar Abbasi MD        arformoterol tartrate (BROVANA) nebulizer solution 15 mcg  15 mcg Nebulization BID RT Cesar Abbasi MD   15 mcg at 03/25/25 2027    glucose chewable tablet 16 g  4 tablet Oral PRN Cesar Abbasi MD        dextrose bolus 10% 125 mL  125 mL IntraVENous PRN Cesar Abbasi MD        Or    dextrose bolus 10% 250 mL  250 mL IntraVENous PRN Cesar Abbasi MD        glucagon injection 1 mg  1 mg SubCUTAneous PRN Cesar Abbasi MD        dextrose 10 % infusion   IntraVENous Continuous PRN Cesar Abbasi MD

## 2025-03-26 NOTE — PROGRESS NOTES
Spiritual Health History and Assessment/Progress Note  [facility]    Crisis, Rapid Response,  ,      Name: Faiza Romero MRN: [id number]    Age: 54 y.o.     Sex: female   Language: English   Congregational: [employer]   COPD with acute exacerbation (HCC)     Date: 3/26/2025                           Spiritual Assessment began in [facility]S INTERMEDIATE            Encounter Overview/Reason: Crisis  Service Provided For: Patient and family together    Marcella, Belief, Meaning:   Patient is connected with a marcella tradition or spiritual practice  Family/Friends are connected with a marcella tradition or spiritual practice      Importance and Influence:  Patient unable to assess at this time  Family/Friends Other: didn't assess at this time.    Community:  Patient feels well-supported. Support system includes: Spouse/Partner, Children, and Extended family  Family/Friends feel well-supported. Support system includes: Spouse/Partner, Children, Friends, and Extended family    Assessment and Plan of Care:     Patient Interventions include: Facilitated life review and/ or legacy  Family/Friends Interventions include: Facilitated expression of thoughts and feelings and Affirmed coping skills/support systems    Patient Plan of Care: Spiritual Care available upon further referral  Family/Friends Plan of Care: No spiritual needs identified for follow-up    Electronically signed by Chaplain Brianna on 3/26/2025 at 10:22 AM

## 2025-03-26 NOTE — PLAN OF CARE
Patient unresponsive again. Given Narcan again. Patient evaluated at bedside. Now drowsy but oriented. Able to follow commands. HOOK. Is restless and thrashing in bed at times. BiPAP on with FIO2 95%. Stat ABG's being obtain. Stat CXR ordered and stat Lasix dose being given. Patient did not have oral dose of lasix last night. Ct of head done earlier last night and negative. WBC going up. Check LA, procal, CRP, and sed rate. Family at bedside and updated.  reports methadone in lock box at home. Reports that he or daughter has control of key. Does not think his daughter would have given anything to her extra. Drug screen pending.

## 2025-03-26 NOTE — DISCHARGE INSTRUCTIONS

## 2025-03-26 NOTE — PROGRESS NOTES
[facility] Hospitalist Progress Note    Admitting Date and Time: 3/25/2025  6:20 PM  Admit Dx: COPD (chronic obstructive pulmonary disease) (HCC) [J44.9]  COPD with acute exacerbation (HCC) [J44.1]    Subjective:  Patient is being followed for COPD (chronic obstructive pulmonary disease) (HCC) [J44.9]  COPD with acute exacerbation (HCC) [J44.1]     Patient is very lethargic but protecting her airway.        naloxone  0.4 mg IntraVENous Once    potassium chloride  10 mEq IntraVENous Q1H    furosemide  20 mg IntraVENous BID    azithromycin (ZITHROMAX) 500 mg in sodium chloride 0.9 % 250 mL IVPB  500 mg IntraVENous Q24H    hydrOXYzine        [Held by provider] amLODIPine  10 mg Oral Daily    aspirin  81 mg Oral Daily    clopidogrel  75 mg Oral Daily    insulin glargine  50 Units SubCUTAneous BID    isosorbide mononitrate  30 mg Oral Daily    levothyroxine  125 mcg Oral Daily    [Held by provider] losartan  100 mg Oral Daily    [Held by provider] metoprolol tartrate  25 mg Oral Daily    pantoprazole  40 mg Oral Daily    [Held by provider] rosuvastatin  10 mg Oral Daily    sodium chloride flush  5-40 mL IntraVENous 2 times per day    enoxaparin  40 mg SubCUTAneous Daily    ipratropium 0.5 mg-albuterol 2.5 mg  1 Dose Inhalation Q4H WA RT    predniSONE  40 mg Oral Daily    arformoterol tartrate  15 mcg Nebulization BID RT    insulin lispro  0.08 Units/kg SubCUTAneous TID WC    insulin lispro  0-8 Units SubCUTAneous 4x Daily AC & HS    guaiFENesin  400 mg Oral 3 times per day     hydrOXYzine, ,   LORazepam, 0.5 mg, Q8H PRN  sodium chloride flush, 5-40 mL, PRN  sodium chloride, , PRN  ondansetron, 4 mg, Q8H PRN   Or  ondansetron, 4 mg, Q6H PRN  polyethylene glycol, 17 g, Daily PRN  acetaminophen, 650 mg, Q6H PRN   Or  acetaminophen, 650 mg, Q6H PRN  benzonatate, 100 mg, TID PRN  glucose, 4 tablet, PRN  dextrose bolus, 125 mL, PRN   Or  dextrose bolus, 250 mL, PRN  glucagon (rDNA), 1 mg, PRN  dextrose, , Continuous  PRN  naloxone, 0.4 mg, PRN         Objective:    BP 93/60   Pulse 63   Temp 98.6 °F (37 °C) (Axillary)   Resp 24   Ht 1.727 m (5' 8\")   Wt 90.7 kg (200 lb)   SpO2 99%   BMI 30.41 kg/m²     General Appearance: somnolent, briefly follow commands.   Skin: warm and dry  Head: normocephalic and atraumatic  Eyes: pupils equal, round, and reactive to light, extraocular eye movements intact, conjunctivae normal  Neck: neck supple and non tender without mass   Pulmonary/Chest: clear to auscultation bilaterally- no wheezes, rales or rhonchi, normal air movement, no respiratory distress  Cardiovascular: normal rate, normal S1 and S2 and no carotid bruits  Abdomen: soft, non-tender, non-distended, normal bowel sounds, no masses or organomegaly  Extremities: no cyanosis, no clubbing and no edema  Neurologic: no cranial nerve deficit and speech normal        Recent Labs     03/26/25  0430   *   K 3.1*   CL 91*   CO2 26   BUN 23*   CREATININE 1.2*  1.2*   GLUCOSE 105*   CALCIUM 8.5*       Recent Labs     03/26/25  0430   WBC 17.8*   RBC 2.71*   HGB 8.0*   HCT 25.2*   MCV 93.0   MCH 29.5   MCHC 31.7*   RDW 14.1      MPV 10.9         Assessment:    Principal Problem:    COPD with acute exacerbation (HCC)  Active Problems:    COPD (chronic obstructive pulmonary disease) (HCC)  Resolved Problems:    * No resolved hospital problems. *      Multiple calls to bedside d/t lethargy in past 12hrs. RRT this morning for agitation after narcan. Patient given vistaril and continue on AVAPS. Discuss with intensivist Dr Maldonado . Ok for patient to transfer to unit for close monitoring.     Plan:  Acute hypoxic respiratory failure: Multifactorial. COPD exacerbations w/ drug overdose and infection r/o. ERS 72, procal 0.45. , LA 1.1. CXR reviewed, worsening interstitial markings. UDS + for cannabinoid, methadone. SDS neg. Continue PPV. pH imp[roved since admission. Transfer to unit for further management.   Hyponatremia:  unclear etiology. Check serum/urine Osm. Monitor.   Chronic Leukocytosis: unclear etiology WBC 17.8, r/o infection/pneumonitis. Abx, f/u resp panel. D/c Abx if not needed. Monitor.   Acute Drug intoxication: UDS +. Narcan. Close monitoring of vital.   AMS: 2/2 above. CT head negative. No hx of fall. Continue current mangment.   Hx of drug abuse on methadone: resume as able. Monitor for withdrawal.   Anemia: hgb 8.0 on admit. Normal at baseline. Check FOBT. monitor CBC daily. Transfuse to keep hgb > 7  Marijuana use: monitor      Code Status: Full Code  DVT/GI Prophylaxis: Lovenox/PPI    Dispo: Pending clinical course.     NOTE: This report was transcribed using voice recognition software. Every effort was made to ensure accuracy; however, inadvertent computerized transcription errors may be present.  Electronically signed by Balwinder Rae MD on 3/26/2025 at 12:47 PM

## 2025-03-26 NOTE — PROGRESS NOTES
Upon entering pt's room, pt was found in bed asleep. When trying to awaken pt, pt was unresponsiveness to sternal rub with shallow breaths noted. RRT was called.

## 2025-03-26 NOTE — CARE COORDINATION
Social Work / Discharge Planning : Patient admitted form [facility] with COPD exacerbation.. Patient had RRT called this am. Spouse is in room but soundly sleeping on couch. Sitter at bed side and continuous Bi-pap. Patient from HOME. Positive drug screen. SW did note no insurance listed and did speak to Celsa from Public benefits. EDSON updated Celsa to speak to  due to patient current status she is unable. Await treatment plan and recommendations. SW to follow. Electronically signed by JOHNNY Smith on 3/26/25 at 12:53 PM EDT

## 2025-03-26 NOTE — PLAN OF CARE
I examined the patient at bedside. She appears extremely lethargic.  at bedside. Who provides further hx. He denies knowledge of patient either overdosing on her Methadone or taking any other meds. He also mentions he was out of town when patient was admitted yesterday. His daughter informs him patient received appropriate dose of Methadone.   On exam, patient wakes up to vigorous sternal rub. She is able to follow commands while on the BiPAP. She is unable to speak at this point. Heart and lung sounds unremarkable. However she appears to be belly breathing.   High suspicion for patient either overdosed or ingestion additional sedating meds. Will give another dose of narcan. Monitor on BiPAP, in-person sitter, breathing Tx. Will follow.      Electronically signed by Balwinder Rae MD on 3/26/2025 at 8:19 AM

## 2025-03-26 NOTE — PROGRESS NOTES
Patient was given Narcan. Very quickly started shaking. Had just been told by Respiratory therapy patients O2 was 79% and not breathing assisting the  BIPAP. Called and RRT..

## 2025-03-26 NOTE — PROGRESS NOTES
Pharmacy Consultation Note  (Antibiotic Dosing and Monitoring)    Initial consult date: 3/26  Consulting physician/provider: Joel  Drug: Vancomycin  Indication: Sepsis    Age/  Gender Height Weight IBW  Allergy Information   54 y.o./female 172.7 cm (5' 8\") 90.7 kg (200 lb)     Ideal body weight: 63.9 kg (140 lb 14 oz)  Adjusted ideal body weight: 75.9 kg (167 lb 5.3 oz)   Seasonal and Statins      Renal Function:  Recent Labs     03/26/25  0430 03/26/25  1500   BUN 23* 20   CREATININE 1.2*  1.2* 1.0       Intake/Output Summary (Last 24 hours) at 3/26/2025 1711  Last data filed at 3/26/2025 1426  Gross per 24 hour   Intake --   Output 2100 ml   Net -2100 ml       Vancomycin Monitoring:  Trough:  No results for input(s): \"VANCOTROUGH\" in the last 72 hours.  Random:  No results for input(s): \"VANCORANDOM\" in the last 72 hours.    Vancomycin Administration Times:  Recent vancomycin administrations        No vancomycin IV orders with administrations found.                    Assessment:  Patient is a 54 y.o. female who has been initiated on vancomycin  Estimated Creatinine Clearance: 79 mL/min (based on SCr of 1 mg/dL).  To dose vancomycin, pharmacy will be utilizing [employer] calculation software for goal AUC/ANTHONY 400-600 mg/L-hr (predicted AUC/ANTHONY = 552, Tr =14.9 mcg/mL)    Plan:  Will start vancomycin 1000 mg IV every 12 hours  Will check vancomycin levels when appropriate  Will continue to monitor renal function   Pharmacy to follow      Lesvia Bryson PharmD 3/26/2025 5:11 PM      ALICIA: [phone]

## 2025-03-26 NOTE — PROGRESS NOTES
Updated Cristal Andrea NP about patient responding to narcan dose however patient respirations now in the 40s-50s, responding to name but no verbal response given, patient thrashing around and unable to keep calm.   Cristal to be on floor to see patient.      at bedside and updated on condition.

## 2025-03-26 NOTE — PROGRESS NOTES
Patient admitted from 639 to 211, with the following belongings; Ring on left finger,shoes, pants, underwear, socks, hoodie, placed on monitor, patient oriented to room and unit visiting hours.  Patient guide at bedside, reviewed patient rights and responsibilities. MRSA nasal swab obtained.  Bed alarm on.  Call light within reach.

## 2025-03-26 NOTE — PROCEDURES
PROCEDURE NOTE  Date: 3/26/2025   Name: Faiza Romero  YOB: 1970    Procedures    PROCEDURE  3/26/25       Time: 1500    INTUBATION  Risks, benefits and alternatives if able (for applicable procedures below) described.   Performed By: Harriet Bailon MD and ICU Attending Physician.    Indication:  impending respiratory failure and airway protection.   Informed consent: Verbal consent obtained.  The spouse was counseled regarding the procedure in person, it's indications, risks, potential complications and alternatives and any questions were answered. Verbal consent was obtained.  Procedure: Following Preoxygenation the patient was pretreated with etomidate followed by succinylcholine. Intubation was performed after single attempt(s) by direct laryngoscopy using a Glidescope and 8.0 mm cuffed endotracheal tube was inserted .  Initial post procedure placement:  confirmed by bilateral breath sounds, ETCO2 detection, and absence of sounds over stomach.  Tube Secured @ 24cm at the Lip.   Post procedure chest x-ray: has been ordered but is still pending.  Procedural Complications: None.   Anesthesia Consult:  No.          Harriet Bailon MD    I was present and providing direct supervision.    Hazel Maldonado MD MD  3/26/2025 4:00 PM

## 2025-03-26 NOTE — PROGRESS NOTES
Pt's family came to nurses station stating that pt is \"not acting herself.\" Upon assessment, pt is A&Ox4, answers questions appropriately, neuro assessment negative. Pt does state that she has \"brain fog\" and sometimes is talking or doing something and forgets what she is saying/doing. LINDSAY Jimenez notified re: this.

## 2025-03-27 ENCOUNTER — APPOINTMENT (OUTPATIENT)
Age: 55
DRG: 208 | End: 2025-03-27
Attending: INTERNAL MEDICINE
Payer: MEDICARE

## 2025-03-27 ENCOUNTER — APPOINTMENT (OUTPATIENT)
Dept: GENERAL RADIOLOGY | Age: 55
DRG: 208 | End: 2025-03-27
Attending: INTERNAL MEDICINE
Payer: MEDICARE

## 2025-03-27 LAB
ABO/RH: NORMAL
ANION GAP SERPL CALCULATED.3IONS-SCNC: 17 MMOL/L (ref 7–16)
ANTIBODY SCREEN: NEGATIVE
ARM BAND NUMBER: NORMAL
B.E.: 0.5 MMOL/L (ref -3–3)
BASOPHILS # BLD: 0 K/UL (ref 0–0.2)
BASOPHILS # BLD: 0 K/UL (ref 0–0.2)
BASOPHILS NFR BLD: 0 % (ref 0–2)
BASOPHILS NFR BLD: 0 % (ref 0–2)
BLOOD BANK BLOOD PRODUCT EXPIRATION DATE: NORMAL
BLOOD BANK DISPENSE STATUS: NORMAL
BLOOD BANK ISBT PRODUCT BLOOD TYPE: 5100
BLOOD BANK PRODUCT CODE: NORMAL
BLOOD BANK SAMPLE EXPIRATION: NORMAL
BLOOD BANK UNIT TYPE AND RH: NORMAL
BPU ID: NORMAL
BUN SERPL-MCNC: 23 MG/DL (ref 6–20)
CALCIUM SERPL-MCNC: 8.5 MG/DL (ref 8.6–10.2)
CHLORIDE SERPL-SCNC: 96 MMOL/L (ref 98–107)
CO2 SERPL-SCNC: 24 MMOL/L (ref 22–29)
COHB: 0.5 % (ref 0–1.5)
COMPONENT: NORMAL
CREAT SERPL-MCNC: 1.1 MG/DL (ref 0.5–1)
CRITICAL: ABNORMAL
CROSSMATCH RESULT: NORMAL
DATE ANALYZED: ABNORMAL
DATE OF COLLECTION: ABNORMAL
ECHO AO ASC DIAM: 2.9 CM
ECHO AO ASCENDING AORTA INDEX: 1.4 CM/M2
ECHO AV AREA PEAK VELOCITY: 2.2 CM2
ECHO AV AREA VTI: 2.3 CM2
ECHO AV AREA/BSA PEAK VELOCITY: 1.1 CM2/M2
ECHO AV AREA/BSA VTI: 1.1 CM2/M2
ECHO AV CUSP MM: 1.7 CM
ECHO AV MEAN GRADIENT: 9 MMHG
ECHO AV MEAN VELOCITY: 1.4 M/S
ECHO AV PEAK GRADIENT: 16 MMHG
ECHO AV PEAK VELOCITY: 2 M/S
ECHO AV VELOCITY RATIO: 0.7
ECHO AV VTI: 38.8 CM
ECHO BSA: 2.09 M2
ECHO EST RA PRESSURE: 3 MMHG
ECHO LA DIAMETER INDEX: 1.93 CM/M2
ECHO LA DIAMETER: 4 CM
ECHO LA VOL A-L A2C: 74 ML (ref 22–52)
ECHO LA VOL A-L A4C: 73 ML (ref 22–52)
ECHO LA VOL MOD A2C: 70 ML (ref 22–52)
ECHO LA VOL MOD A4C: 69 ML (ref 22–52)
ECHO LA VOLUME AREA LENGTH: 76 ML
ECHO LA VOLUME INDEX A-L A2C: 36 ML/M2 (ref 16–34)
ECHO LA VOLUME INDEX A-L A4C: 35 ML/M2 (ref 16–34)
ECHO LA VOLUME INDEX AREA LENGTH: 37 ML/M2 (ref 16–34)
ECHO LA VOLUME INDEX MOD A2C: 34 ML/M2 (ref 16–34)
ECHO LA VOLUME INDEX MOD A4C: 33 ML/M2 (ref 16–34)
ECHO LV E' LATERAL VELOCITY: 4 CM/S
ECHO LV E' SEPTAL VELOCITY: 4 CM/S
ECHO LV EF PHYSICIAN: 55 %
ECHO LV FRACTIONAL SHORTENING: 29 % (ref 28–44)
ECHO LV INTERNAL DIMENSION DIASTOLE INDEX: 2.66 CM/M2
ECHO LV INTERNAL DIMENSION DIASTOLIC: 5.5 CM (ref 3.9–5.3)
ECHO LV INTERNAL DIMENSION SYSTOLIC INDEX: 1.88 CM/M2
ECHO LV INTERNAL DIMENSION SYSTOLIC: 3.9 CM
ECHO LV ISOVOLUMETRIC RELAXATION TIME (IVRT): 69.2 MS
ECHO LV IVSD: 1.5 CM (ref 0.6–0.9)
ECHO LV IVSS: 1.8 CM
ECHO LV MASS 2D: 373.1 G (ref 67–162)
ECHO LV MASS INDEX 2D: 180.3 G/M2 (ref 43–95)
ECHO LV POSTERIOR WALL DIASTOLIC: 1.5 CM (ref 0.6–0.9)
ECHO LV POSTERIOR WALL SYSTOLIC: 1.8 CM
ECHO LV RELATIVE WALL THICKNESS RATIO: 0.55
ECHO LVOT AREA: 3.1 CM2
ECHO LVOT AV VTI INDEX: 0.73
ECHO LVOT DIAM: 2 CM
ECHO LVOT MEAN GRADIENT: 4 MMHG
ECHO LVOT PEAK GRADIENT: 8 MMHG
ECHO LVOT PEAK VELOCITY: 1.4 M/S
ECHO LVOT STROKE VOLUME INDEX: 43.2 ML/M2
ECHO LVOT SV: 89.5 ML
ECHO LVOT VTI: 28.5 CM
ECHO MV "A" WAVE DURATION: 124.6 MSEC
ECHO MV A VELOCITY: 1.22 M/S
ECHO MV AREA PHT: 3.6 CM2
ECHO MV AREA VTI: 2 CM2
ECHO MV E DECELERATION TIME (DT): 228.9 MS
ECHO MV E VELOCITY: 1.58 M/S
ECHO MV E/A RATIO: 1.3
ECHO MV E/E' LATERAL: 39.5
ECHO MV E/E' RATIO (AVERAGED): 39.5
ECHO MV E/E' SEPTAL: 39.5
ECHO MV LVOT VTI INDEX: 1.58
ECHO MV MAX VELOCITY: 1.7 M/S
ECHO MV MEAN GRADIENT: 6 MMHG
ECHO MV MEAN VELOCITY: 1.2 M/S
ECHO MV PEAK GRADIENT: 12 MMHG
ECHO MV PRESSURE HALF TIME (PHT): 60.4 MS
ECHO MV VTI: 44.9 CM
ECHO PV MAX VELOCITY: 1.2 M/S
ECHO PV MEAN GRADIENT: 3 MMHG
ECHO PV MEAN VELOCITY: 0.8 M/S
ECHO PV PEAK GRADIENT: 5 MMHG
ECHO PV VTI: 22 CM
ECHO PVEIN PEAK D VELOCITY: 0.8 M/S
ECHO PVEIN PEAK S VELOCITY: 0.7 M/S
ECHO PVEIN S/D RATIO: 0.9
ECHO RIGHT VENTRICULAR SYSTOLIC PRESSURE (RVSP): 30 MMHG
ECHO RV INTERNAL DIMENSION: 2.4 CM
ECHO RV TAPSE: 1.9 CM (ref 1.7–?)
ECHO TV REGURGITANT MAX VELOCITY: 2.61 M/S
ECHO TV REGURGITANT PEAK GRADIENT: 27 MMHG
EOSINOPHIL # BLD: 0 K/UL (ref 0.05–0.5)
EOSINOPHIL # BLD: 0 K/UL (ref 0.05–0.5)
EOSINOPHILS RELATIVE PERCENT: 0 % (ref 0–6)
EOSINOPHILS RELATIVE PERCENT: 0 % (ref 0–6)
ERYTHROCYTE [DISTWIDTH] IN BLOOD BY AUTOMATED COUNT: 14.7 % (ref 11.5–15)
ERYTHROCYTE [DISTWIDTH] IN BLOOD BY AUTOMATED COUNT: 14.8 % (ref 11.5–15)
FIO2: 85 %
GFR, ESTIMATED: 61 ML/MIN/1.73M2
GLUCOSE BLD-MCNC: 146 MG/DL (ref 74–99)
GLUCOSE BLD-MCNC: 154 MG/DL (ref 74–99)
GLUCOSE BLD-MCNC: 190 MG/DL (ref 74–99)
GLUCOSE BLD-MCNC: 203 MG/DL (ref 74–99)
GLUCOSE BLD-MCNC: 219 MG/DL (ref 74–99)
GLUCOSE SERPL-MCNC: 152 MG/DL (ref 74–99)
HCO3: 24.1 MMOL/L (ref 22–26)
HCT VFR BLD AUTO: 28 % (ref 34–48)
HCT VFR BLD AUTO: 28.5 % (ref 34–48)
HGB BLD-MCNC: 9 G/DL (ref 11.5–15.5)
HGB BLD-MCNC: 9.2 G/DL (ref 11.5–15.5)
HHB: 0.9 % (ref 0–5)
LAB: ABNORMAL
LACTATE BLDV-SCNC: 1.6 MMOL/L (ref 0.5–2.2)
LYMPHOCYTES NFR BLD: 0.28 K/UL (ref 1.5–4)
LYMPHOCYTES NFR BLD: 0.35 K/UL (ref 1.5–4)
LYMPHOCYTES RELATIVE PERCENT: 3 % (ref 20–42)
LYMPHOCYTES RELATIVE PERCENT: 4 % (ref 20–42)
Lab: 406
MAGNESIUM SERPL-MCNC: 2 MG/DL (ref 1.6–2.6)
MCH RBC QN AUTO: 29.4 PG (ref 26–35)
MCH RBC QN AUTO: 29.4 PG (ref 26–35)
MCHC RBC AUTO-ENTMCNC: 32.1 G/DL (ref 32–34.5)
MCHC RBC AUTO-ENTMCNC: 32.3 G/DL (ref 32–34.5)
MCV RBC AUTO: 91.1 FL (ref 80–99.9)
MCV RBC AUTO: 91.5 FL (ref 80–99.9)
METAMYELOCYTES ABSOLUTE COUNT: 0.09 K/UL (ref 0–0.12)
METAMYELOCYTES: 1 % (ref 0–1)
METHB: 0.3 % (ref 0–1.5)
MODE: AC
MONOCYTES NFR BLD: 0 % (ref 2–12)
MONOCYTES NFR BLD: 0 K/UL (ref 0.1–0.95)
MONOCYTES NFR BLD: 0.09 K/UL (ref 0.1–0.95)
MONOCYTES NFR BLD: 1 % (ref 2–12)
NEUTROPHILS NFR BLD: 96 % (ref 43–80)
NEUTROPHILS NFR BLD: 97 % (ref 43–80)
NEUTS SEG NFR BLD: 10.42 K/UL (ref 1.8–7.3)
NEUTS SEG NFR BLD: 9.76 K/UL (ref 1.8–7.3)
O2 CONTENT: 14 ML/DL
O2 SATURATION: 99.1 % (ref 92–98.5)
O2HB: 98.3 % (ref 94–97)
OPERATOR ID: ABNORMAL
PATIENT TEMP: 37 C
PCO2: 35.1 MMHG (ref 35–45)
PEEP/CPAP: 10 CMH2O
PFO2: 1.67 MMHG/%
PH BLOOD GAS: 7.46 (ref 7.35–7.45)
PHOSPHATE SERPL-MCNC: 3.3 MG/DL (ref 2.5–4.5)
PLATELET # BLD AUTO: 292 K/UL (ref 130–450)
PLATELET # BLD AUTO: 301 K/UL (ref 130–450)
PMV BLD AUTO: 10.7 FL (ref 7–12)
PMV BLD AUTO: 10.8 FL (ref 7–12)
PO2: 142.1 MMHG (ref 75–100)
POTASSIUM SERPL-SCNC: 4 MMOL/L (ref 3.5–5)
RBC # BLD AUTO: 3.06 M/UL (ref 3.5–5.5)
RBC # BLD AUTO: 3.13 M/UL (ref 3.5–5.5)
RBC # BLD: ABNORMAL 10*6/UL
RI(T): 3.01
RR MECHANICAL: 18 B/MIN
SODIUM SERPL-SCNC: 137 MMOL/L (ref 132–146)
SOURCE, BLOOD GAS: ABNORMAL
THB: 9.9 G/DL (ref 11.5–16.5)
TIME ANALYZED: 415
TRANSFUSION STATUS: NORMAL
UNIT DIVISION: 0
UNIT ISSUE DATE/TIME: NORMAL
VANCOMYCIN SERPL-MCNC: 8.4 UG/ML (ref 5–40)
VT MECHANICAL: 400 ML
WBC OTHER # BLD: 10.2 K/UL (ref 4.5–11.5)
WBC OTHER # BLD: 10.8 K/UL (ref 4.5–11.5)

## 2025-03-27 PROCEDURE — 94003 VENT MGMT INPAT SUBQ DAY: CPT

## 2025-03-27 PROCEDURE — 84100 ASSAY OF PHOSPHORUS: CPT

## 2025-03-27 PROCEDURE — 6360000002 HC RX W HCPCS

## 2025-03-27 PROCEDURE — 71045 X-RAY EXAM CHEST 1 VIEW: CPT

## 2025-03-27 PROCEDURE — 83605 ASSAY OF LACTIC ACID: CPT

## 2025-03-27 PROCEDURE — 93306 TTE W/DOPPLER COMPLETE: CPT | Performed by: INTERNAL MEDICINE

## 2025-03-27 PROCEDURE — 6370000000 HC RX 637 (ALT 250 FOR IP): Performed by: INTERNAL MEDICINE

## 2025-03-27 PROCEDURE — 80202 ASSAY OF VANCOMYCIN: CPT

## 2025-03-27 PROCEDURE — 2580000003 HC RX 258: Performed by: INTERNAL MEDICINE

## 2025-03-27 PROCEDURE — 6370000000 HC RX 637 (ALT 250 FOR IP)

## 2025-03-27 PROCEDURE — 2500000003 HC RX 250 WO HCPCS: Performed by: INTERNAL MEDICINE

## 2025-03-27 PROCEDURE — 82962 GLUCOSE BLOOD TEST: CPT

## 2025-03-27 PROCEDURE — 6360000002 HC RX W HCPCS: Performed by: INTERNAL MEDICINE

## 2025-03-27 PROCEDURE — 2500000003 HC RX 250 WO HCPCS

## 2025-03-27 PROCEDURE — 83735 ASSAY OF MAGNESIUM: CPT

## 2025-03-27 PROCEDURE — 82805 BLOOD GASES W/O2 SATURATION: CPT

## 2025-03-27 PROCEDURE — 94640 AIRWAY INHALATION TREATMENT: CPT

## 2025-03-27 PROCEDURE — 85025 COMPLETE CBC W/AUTO DIFF WBC: CPT

## 2025-03-27 PROCEDURE — 2000000000 HC ICU R&B

## 2025-03-27 PROCEDURE — 99232 SBSQ HOSP IP/OBS MODERATE 35: CPT | Performed by: STUDENT IN AN ORGANIZED HEALTH CARE EDUCATION/TRAINING PROGRAM

## 2025-03-27 PROCEDURE — 93306 TTE W/DOPPLER COMPLETE: CPT

## 2025-03-27 PROCEDURE — 80048 BASIC METABOLIC PNL TOTAL CA: CPT

## 2025-03-27 RX ORDER — POLYETHYLENE GLYCOL 3350 17 G/17G
17 POWDER, FOR SOLUTION ORAL DAILY
Status: DISCONTINUED | OUTPATIENT
Start: 2025-03-27 | End: 2025-04-03 | Stop reason: HOSPADM

## 2025-03-27 RX ORDER — ENOXAPARIN SODIUM 100 MG/ML
40 INJECTION SUBCUTANEOUS DAILY
Status: DISCONTINUED | OUTPATIENT
Start: 2025-03-27 | End: 2025-04-03 | Stop reason: HOSPADM

## 2025-03-27 RX ORDER — LANSOPRAZOLE 30 MG/1
30 TABLET, ORALLY DISINTEGRATING, DELAYED RELEASE ORAL
Status: DISCONTINUED | OUTPATIENT
Start: 2025-03-28 | End: 2025-04-01

## 2025-03-27 RX ORDER — METOPROLOL TARTRATE 25 MG/1
25 TABLET, FILM COATED ORAL DAILY
Status: DISCONTINUED | OUTPATIENT
Start: 2025-03-27 | End: 2025-03-28

## 2025-03-27 RX ORDER — HYDRALAZINE HYDROCHLORIDE 20 MG/ML
10 INJECTION INTRAMUSCULAR; INTRAVENOUS EVERY 6 HOURS PRN
Status: DISCONTINUED | OUTPATIENT
Start: 2025-03-27 | End: 2025-04-03 | Stop reason: HOSPADM

## 2025-03-27 RX ORDER — METHADONE HYDROCHLORIDE 10 MG/ML
47 CONCENTRATE ORAL DAILY
Refills: 0 | Status: DISCONTINUED | OUTPATIENT
Start: 2025-03-27 | End: 2025-04-03 | Stop reason: HOSPADM

## 2025-03-27 RX ORDER — FENTANYL CITRATE-0.9 % NACL/PF 20 MCG/2ML
50 SYRINGE (ML) INTRAVENOUS EVERY 30 MIN PRN
Refills: 0 | Status: DISCONTINUED | OUTPATIENT
Start: 2025-03-27 | End: 2025-03-30

## 2025-03-27 RX ORDER — MIDAZOLAM HYDROCHLORIDE 2 MG/2ML
1 INJECTION, SOLUTION INTRAMUSCULAR; INTRAVENOUS EVERY 30 MIN PRN
Status: DISCONTINUED | OUTPATIENT
Start: 2025-03-27 | End: 2025-03-30

## 2025-03-27 RX ADMIN — METHYLPREDNISOLONE SODIUM SUCCINATE 40 MG: 40 INJECTION INTRAMUSCULAR; INTRAVENOUS at 21:47

## 2025-03-27 RX ADMIN — SODIUM CHLORIDE, PRESERVATIVE FREE 10 ML: 5 INJECTION INTRAVENOUS at 08:45

## 2025-03-27 RX ADMIN — Medication 7 MG/HR: at 07:49

## 2025-03-27 RX ADMIN — GUAIFENESIN 400 MG: 400 TABLET ORAL at 05:05

## 2025-03-27 RX ADMIN — HYDRALAZINE HYDROCHLORIDE 10 MG: 20 INJECTION INTRAMUSCULAR; INTRAVENOUS at 05:55

## 2025-03-27 RX ADMIN — LEVOTHYROXINE SODIUM 125 MCG: 0.03 TABLET ORAL at 05:05

## 2025-03-27 RX ADMIN — PIPERACILLIN AND TAZOBACTAM 4500 MG: 4; .5 INJECTION, POWDER, LYOPHILIZED, FOR SOLUTION INTRAVENOUS at 02:03

## 2025-03-27 RX ADMIN — DOCUSATE SODIUM LIQUID 100 MG: 100 LIQUID ORAL at 20:05

## 2025-03-27 RX ADMIN — METHADONE HYDROCHLORIDE 47 MG: 10 CONCENTRATE ORAL at 11:40

## 2025-03-27 RX ADMIN — VANCOMYCIN HYDROCHLORIDE 1000 MG: 1 INJECTION, POWDER, LYOPHILIZED, FOR SOLUTION INTRAVENOUS at 08:42

## 2025-03-27 RX ADMIN — SODIUM CHLORIDE, PRESERVATIVE FREE 10 ML: 5 INJECTION INTRAVENOUS at 20:05

## 2025-03-27 RX ADMIN — ARFORMOTEROL TARTRATE 15 MCG: 15 SOLUTION RESPIRATORY (INHALATION) at 20:59

## 2025-03-27 RX ADMIN — ASPIRIN 81 MG: 81 TABLET, COATED ORAL at 08:45

## 2025-03-27 RX ADMIN — IPRATROPIUM BROMIDE AND ALBUTEROL SULFATE 1 DOSE: 2.5; .5 SOLUTION RESPIRATORY (INHALATION) at 20:59

## 2025-03-27 RX ADMIN — POLYETHYLENE GLYCOL 3350 17 G: 17 POWDER, FOR SOLUTION ORAL at 10:49

## 2025-03-27 RX ADMIN — GUAIFENESIN 400 MG: 400 TABLET ORAL at 20:05

## 2025-03-27 RX ADMIN — VANCOMYCIN HYDROCHLORIDE 1000 MG: 1 INJECTION, POWDER, LYOPHILIZED, FOR SOLUTION INTRAVENOUS at 20:29

## 2025-03-27 RX ADMIN — GUAIFENESIN 400 MG: 400 TABLET ORAL at 14:40

## 2025-03-27 RX ADMIN — SODIUM CHLORIDE, PRESERVATIVE FREE 10 ML: 5 INJECTION INTRAVENOUS at 21:47

## 2025-03-27 RX ADMIN — Medication 150 MCG/HR: at 22:19

## 2025-03-27 RX ADMIN — ARFORMOTEROL TARTRATE 15 MCG: 15 SOLUTION RESPIRATORY (INHALATION) at 07:57

## 2025-03-27 RX ADMIN — DOCUSATE SODIUM LIQUID 100 MG: 100 LIQUID ORAL at 10:41

## 2025-03-27 RX ADMIN — Medication 200 MCG/HR: at 10:14

## 2025-03-27 RX ADMIN — FUROSEMIDE 20 MG: 10 INJECTION, SOLUTION INTRAMUSCULAR; INTRAVENOUS at 18:18

## 2025-03-27 RX ADMIN — IPRATROPIUM BROMIDE AND ALBUTEROL SULFATE 1 DOSE: 2.5; .5 SOLUTION RESPIRATORY (INHALATION) at 15:54

## 2025-03-27 RX ADMIN — ENOXAPARIN SODIUM 40 MG: 100 INJECTION SUBCUTANEOUS at 10:41

## 2025-03-27 RX ADMIN — METHYLPREDNISOLONE SODIUM SUCCINATE 40 MG: 40 INJECTION INTRAMUSCULAR; INTRAVENOUS at 17:22

## 2025-03-27 RX ADMIN — CLOPIDOGREL BISULFATE 75 MG: 75 TABLET, FILM COATED ORAL at 08:45

## 2025-03-27 RX ADMIN — INSULIN LISPRO 2 UNITS: 100 INJECTION, SOLUTION INTRAVENOUS; SUBCUTANEOUS at 17:25

## 2025-03-27 RX ADMIN — ISOSORBIDE MONONITRATE 30 MG: 30 TABLET, EXTENDED RELEASE ORAL at 08:45

## 2025-03-27 RX ADMIN — METOPROLOL TARTRATE 25 MG: 25 TABLET, FILM COATED ORAL at 10:40

## 2025-03-27 RX ADMIN — Medication 175 MCG/HR: at 16:30

## 2025-03-27 RX ADMIN — Medication 8 MG/HR: at 21:35

## 2025-03-27 RX ADMIN — PIPERACILLIN AND TAZOBACTAM 4500 MG: 4; .5 INJECTION, POWDER, LYOPHILIZED, FOR SOLUTION INTRAVENOUS at 18:05

## 2025-03-27 RX ADMIN — PIPERACILLIN AND TAZOBACTAM 4500 MG: 4; .5 INJECTION, POWDER, LYOPHILIZED, FOR SOLUTION INTRAVENOUS at 10:03

## 2025-03-27 RX ADMIN — Medication 200 MCG/HR: at 05:05

## 2025-03-27 RX ADMIN — FUROSEMIDE 20 MG: 10 INJECTION, SOLUTION INTRAMUSCULAR; INTRAVENOUS at 08:55

## 2025-03-27 RX ADMIN — INSULIN LISPRO 2 UNITS: 100 INJECTION, SOLUTION INTRAVENOUS; SUBCUTANEOUS at 10:49

## 2025-03-27 RX ADMIN — IPRATROPIUM BROMIDE AND ALBUTEROL SULFATE 1 DOSE: 2.5; .5 SOLUTION RESPIRATORY (INHALATION) at 13:03

## 2025-03-27 RX ADMIN — PANTOPRAZOLE SODIUM 40 MG: 40 TABLET, DELAYED RELEASE ORAL at 08:45

## 2025-03-27 RX ADMIN — METHYLPREDNISOLONE SODIUM SUCCINATE 40 MG: 40 INJECTION INTRAMUSCULAR; INTRAVENOUS at 10:36

## 2025-03-27 RX ADMIN — IPRATROPIUM BROMIDE AND ALBUTEROL SULFATE 1 DOSE: 2.5; .5 SOLUTION RESPIRATORY (INHALATION) at 07:57

## 2025-03-27 RX ADMIN — METHYLPREDNISOLONE SODIUM SUCCINATE 40 MG: 40 INJECTION INTRAMUSCULAR; INTRAVENOUS at 03:53

## 2025-03-27 ASSESSMENT — PULMONARY FUNCTION TESTS
PIF_VALUE: 29
PIF_VALUE: 28
PIF_VALUE: 28
PIF_VALUE: 27
PIF_VALUE: 40
PIF_VALUE: 28
PIF_VALUE: 26
PIF_VALUE: 28
PIF_VALUE: 28
PIF_VALUE: 27
PIF_VALUE: 29
PIF_VALUE: 26
PIF_VALUE: 27
PIF_VALUE: 27
PIF_VALUE: 28
PIF_VALUE: 30
PIF_VALUE: 33
PIF_VALUE: 35
PIF_VALUE: 29
PIF_VALUE: 27
PIF_VALUE: 26

## 2025-03-27 NOTE — PROGRESS NOTES
DAILY VENTILATOR WEANING ASSESSMENT PERFORMED    P/FIO2 Ratio =  167        (<100= do not Wean)                  Cs =    19                      (<32= Instability)  Plat. Pressure = 29  MV =6.94  RSBI =    Instabilities:       Cardiovascular =       CNS =       Respiratory =low compliance       Metabolic =    Parameters    no    Ask Physician for a weaning plan no    Was SAT completed no    Was SBT completed no    Additional Comments:     Performed by Mary Thomason ASIM RRT      Reference Table:    Cardiovascular     CNS      1. Mean BP less than or equal to 75   1. Neuromuscular blockade  2. Heart Rate greater than 130   2. RASS of -3, -4, -5  3. Myocardial Ischemia    3. RASS of +3, +4  4. Mechanical Assist Device    4. ICP greater than 15 or             Intracranial Hypertension         Respiratory      Metabolic  1. PEEP equal to or greater than 10cm/H20  1.Temp. (8hrs) less than 95 or > 103  2. Respiratory Rate greater than 35   2. WBC < 5000 or > 38769  3. Minute Volume greater than 15L  4. pH less than 7.30  5. Deteriorating chest X-ray      DAILY VENTILATOR WEANING ASSESSMENT PERFORMED    P/FIO2 Ratio =          (<100= do not Wean)                  Cs =                          (<32= Instability)  Plat. Pressure =   MV =  RSBI =    Instabilities:       Cardiovascular =       CNS =       Respiratory =       Metabolic =    Parameters    no    Ask Physician for a weaning plan no    Was SAT completed no    Was SBT completed no    Additional Comments:     Performed by Mary Thomason ASIM RRT      Reference Table:    Cardiovascular     CNS      1. Mean BP less than or equal to 75   1. Neuromuscular blockade  2. Heart Rate greater than 130   2. RASS of -3, -4, -5  3. Myocardial Ischemia    3. RASS of +3, +4  4. Mechanical Assist Device    4. ICP greater than 15 or             Intracranial Hypertension         Respiratory      Metabolic  1. PEEP equal to or greater than 10cm/H20  1.Temp. (8hrs) less than 95 or >  103  2. Respiratory Rate greater than 35   2. WBC < 5000 or > 34555  3. Minute Volume greater than 15L  4. pH less than 7.30  5. Deteriorating chest X-ray

## 2025-03-27 NOTE — INTERDISCIPLINARY ROUNDS
Intensive Care Daily Quality Rounding Checklist      ICU Team Members: Dr Maldonado, bedside nurse and charge nurse, clinical pharmacist    ICU Day #: NUMBER: 2    SOFA Score:7    Intubation Date: March 26    Ventilator Day #: NUMBER: 2    Central Line Insertion Date:  N/A        Day #:         Indication:      Arterial Line Insertion Date:  N/A      Day #:     Temporary Hemodialysis Catheter Insertion Date:  N/A      Day #     DVT Prophylaxis:Lovenox    GI Prophylaxis: Protonix    Camilo Catheter Insertion Date: March 26       Day #: 2      Indications: Urinary retention      Continued need (if yes, reason documented and discussed with physician): yes,     Skin Issues/ Wounds and ordered treatment discussed on rounds: y, addressed    Goals/ Plans for the Day: Wean vent as tolerated, restraint order for safety, monitor labs and replace as needed, start home methadone dose, start Lovenox, echo today,     Reviewed plan and goals for day with patient and/or representative:     **SHARE this note so that the rounding nurse may edit**

## 2025-03-27 NOTE — CONSULTS
Comprehensive Nutrition Assessment    Type and Reason for Visit:  Initial, Consult, Nutrition support (vent, TF order & management)    Nutrition Recommendations/Plan:     Start TF:    Diabetic (Glucerna 1.5) @ 50 ml/hr X 23 hrs (hold TF 30 min before & after Synthroid) with 10 ml/hr free water flush  To provide: 1150 ml tv, 1725 kcal, 95 g pro, 1113 ml total free water    Will follow     Malnutrition Assessment:  Malnutrition Status:  At risk for malnutrition (03/27/25 0916)    Context:  Acute Illness     Findings of the 6 clinical characteristics of malnutrition:  Energy Intake:  Mild decrease in energy intake  Weight Loss:  Unable to assess (d/t lack of EMR wt hx, hx CHF)     Body Fat Loss:  No body fat loss     Muscle Mass Loss:  No muscle mass loss    Fluid Accumulation:  Unable to assess (multifactorial)     Strength:  Not Performed    Nutrition Assessment:    Pt transfer from [place] w/ acute COPD exacerbation. AMS 2/2 acute drug intoxication s/p Narcan. Hx CAD, CHF, DM, hx sub abuse on methadone. Pt intubated to protect airway, will order TF per consult.    Nutrition Related Findings:    intubated/sedated, -2.5L, generalized +1 edema, abd soft/obese, hypo BS, A1C 8.3, MAP 84, NGT, +Synthroid   Wound Type:  (R buttocks- denuded)       Current Nutrition Intake & Therapies:    Average Meal Intake: NPO  Average Supplements Intake: NPO  Diet NPO    Anthropometric Measures:  Height: 172.7 cm (5' 8\")  Ideal Body Weight (IBW): 140 lbs (64 kg)       Current Body Weight: 93.9 kg (207 lb 0.2 oz) (3/26), 147.9 % IBW. Weight Source: Not specified  Current BMI (kg/m2): 31.5  Usual Body Weight:  (no actual EMR wt hx <1 year, 8/12/24 188# cardio OV- no method)                          BMI Categories: Obese Class 1 (BMI 30.0-34.9)    Estimated Daily Nutrient Needs:  Energy Requirements Based On: Formula (PS03b)  Weight Used for Energy Requirements: Current  Energy (kcal/day):   Weight Used for Protein  Requirements: Ideal  Protein (g/day): 75-85  Method Used for Fluid Requirements: Defer to provider    Nutrition Diagnosis:   Inadequate oral intake related to impaired respiratory function as evidenced by NPO or clear liquid status due to medical condition, intubation    Nutrition Interventions:   Food and/or Nutrient Delivery: Continue NPO  Nutrition Education/Counseling: No recommendation at this time  Coordination of Nutrition Care: Continue to monitor while inpatient       Goals:  Goals: Initiation of nutrition, by next RD assessment          Nutrition Monitoring and Evaluation:      Food/Nutrient Intake Outcomes: Progression of Nutrition  Physical Signs/Symptoms Outcomes: Biochemical Data, GI Status, Fluid Status or Edema, Hemodynamic Status, Nutrition Focused Physical Findings, Skin, Weight    Discharge Planning:    Too soon to determine     Miranda D'Amico, RD, LD  Contact: [phone]

## 2025-03-27 NOTE — PROGRESS NOTES
Pharmacy Consultation Note  (Antibiotic Dosing and Monitoring)    Initial consult date: 3/26  Consulting physician/provider: Joel  Drug: Vancomycin  Indication: Sepsis    Age/  Gender Height Weight IBW  Allergy Information   54 y.o./female 172.7 cm (5' 8\") 90.7 kg (200 lb)     Ideal body weight: 63.9 kg (140 lb 14 oz)  Adjusted ideal body weight: 75.9 kg (167 lb 5.3 oz)   Seasonal and Statins      Renal Function:  Recent Labs     03/26/25  1500 03/26/25  2255 03/27/25  0357   BUN 20 23* 23*   CREATININE 1.0 1.1* 1.1*       Intake/Output Summary (Last 24 hours) at 3/27/2025 0921  Last data filed at 3/27/2025 0600  Gross per 24 hour   Intake 1671.63 ml   Output 3125 ml   Net -1453.37 ml       Vancomycin Monitoring:  Trough:  No results for input(s): \"VANCOTROUGH\" in the last 72 hours.  Random:    Recent Labs     03/27/25  0357   VANCORANDOM 8.4       Recent vancomycin administrations                     vancomycin (VANCOCIN) 1,000 mg in sodium chloride 0.9 % 250 mL (addEASE) IVPB (mg) 1,000 mg Given 03/27/25 0842     1,000 mg Given 03/26/25 2019                        Assessment:  Patient is a 54 y.o. female who has been initiated on vancomycin  Estimated Creatinine Clearance: 72 mL/min (A) (based on SCr of 1.1 mg/dL (H)).  To dose vancomycin, pharmacy will be utilizing [employer] calculation software for goal AUC/ANTHONY 400-600 mg/L-hr (predicted AUC/ANTHONY = 552, Tr =14.9 mcg/mL)  3/27: Scr 1.1, Random Vanco level = 8.4 mcg/dL    Plan:  Will start vancomycin 1000 mg IV every 12 hours  Will check vancomycin levels when appropriate  Will continue to monitor renal function   Pharmacy to follow      Lesvia Bryson PharmD 3/27/2025 9:21 AM      SEB: [phone]

## 2025-03-27 NOTE — PLAN OF CARE
Problem: Chronic Conditions and Co-morbidities  Goal: Patient's chronic conditions and co-morbidity symptoms are monitored and maintained or improved  Outcome: Progressing  Flowsheets (Taken 3/26/2025 2000)  Care Plan - Patient's Chronic Conditions and Co-Morbidity Symptoms are Monitored and Maintained or Improved: Monitor and assess patient's chronic conditions and comorbid symptoms for stability, deterioration, or improvement     Problem: Safety - Adult  Goal: Free from fall injury  Outcome: Progressing     Problem: Skin/Tissue Integrity  Goal: Skin integrity remains intact  Description: 1.  Monitor for areas of redness and/or skin breakdown  2.  Assess vascular access sites hourly  3.  Every 4-6 hours minimum:  Change oxygen saturation probe site  4.  Every 4-6 hours:  If on nasal continuous positive airway pressure, respiratory therapy assess nares and determine need for appliance change or resting period  Outcome: Progressing  Flowsheets (Taken 3/26/2025 2000)  Skin Integrity Remains Intact: Monitor for areas of redness and/or skin breakdown     Problem: Discharge Planning  Goal: Discharge to home or other facility with appropriate resources  Outcome: Progressing  Flowsheets (Taken 3/26/2025 2000)  Discharge to home or other facility with appropriate resources:   Identify barriers to discharge with patient and caregiver   Identify discharge learning needs (meds, wound care, etc)     Problem: Safety - Medical Restraint  Goal: Remains free of injury from restraints (Restraint for Interference with Medical Device)  Description: INTERVENTIONS:  1. Determine that other, less restrictive measures have been tried or would not be effective before applying the restraint  2. Evaluate the patient's condition at the time of restraint application  3. Inform patient/family regarding the reason for restraint  4. Q2H: Monitor safety, psychosocial status, comfort, nutrition and hydration  Outcome:

## 2025-03-27 NOTE — PROGRESS NOTES
[facility]  Department of Internal Medicine   Internal Medicine Residency   MICU Progress Note    Patient:  Faiza Romero 54 y.o. female  MRN: [id number]     Date of Service: 3/27/2025    Allergy: Seasonal and Statins      Subjective     Patient was intubated and sedated yesterday due to worsening respiratory status.  Patient was hypertensive overnight and was given as needed hydralazine.    ROS: unable to be achieved due to current mental status    Objective     VS: BP (!) 142/57   Pulse 73   Temp 98.7 °F (37.1 °C)   Resp 16   Ht 1.727 m (5' 8\")   Wt 93.9 kg (207 lb 0.2 oz)   SpO2 100%   BMI 31.48 kg/m²           I & O - 24hr:   Intake/Output Summary (Last 24 hours) at 3/27/2025 1159  Last data filed at 3/27/2025 0600  Gross per 24 hour   Intake 1671.63 ml   Output 3125 ml   Net -1453.37 ml       Sedatives: Fentanyl, Versed    Pressors: None    Oxygen: Ventilator    ABG:     Lab Results   Component Value Date/Time    PH 7.455 03/27/2025 04:06 AM    SUB3NBA 50.2 09/29/2020 10:55 AM    PCO2 35.1 03/27/2025 04:06 AM    PO2ART 365.2 09/29/2020 10:55 AM    PO2 142.1 03/27/2025 04:06 AM    XZB0KFR 24.2 09/29/2020 10:55 AM    HCO3 24.1 03/27/2025 04:06 AM    BE 0.5 03/27/2025 04:06 AM    THB 9.9 03/27/2025 04:06 AM    O2SAT 99.1 03/27/2025 04:06 AM       Physical Exam:  General Appearance: Intubated and sedated  Neuro: Round symmetric pupil that react to light bilaterally    Cardiovascular: regular rate and rhythm, S1 and S2 heard, no murmurs appreciated   Respiratory: Diminished breath sounds bilaterally  Abdomen: Soft, non-tender; bowel sounds normal  Extremities: Extremities normal, no cyanosis, distal pulses intact    Lines & Urinary Catheter:   Peripheral IVs  Camilo  ETT     Labs     Basic Labs    Complete Blood Count:   Recent Labs     03/26/25  1500 03/26/25  2255 03/27/25  0357   WBC 14.8* 10.8 10.2   HGB 7.0* 9.0* 9.2*   HCT 22.5* 28.0* 28.5*    292 301        Last 3 Blood Glucose:  Lopressor and Lovenox today.  Will initiate tube feeds and start a bowel regimen.    Consults:   Critical care     Assessment and Plan:    Neuro   #Altered mental status  -UDS positive for cannabinoids and methadone  -CT head negative  -Currently sedated on fentanyl and Versed     # History of drug abuse  -On methadone  -Will monitor for withdrawal     Respiratory   Acute hypoxic respiratory failure  -Likely ARDS  -Intubated in the ICU  -Pro-Devin 0.45,   -Steroids initiated; Solu-Medrol 40 every 6 hours  -Daily CXR and ABGs  -Wean oxygen as tolerated. Keep O2 sat 90-92%     Cardiovascular   # CHF  -BNP 7200  -Lasix ordered  -Echo ordered and pending     # Hypertension  -Resume home Lopressor  -Holding amlodipine, Imdur, Cozaar    #CAD with stent placement  -On aspirin and Plavix  -EKG and troponins ordered     Gastrointestinal   #NG tube placed  -Tube feeds initiated  -Started bowel regimen    Renal   #ANIRUDH  -Cr 1.2  -Will monitor BMP     #Electrolyte derangements  Hyponatremia - urine studies sent  Hypokalemia - will replete     Infectious Disease   #Leukocytosis  -Blood cultures and urine cultures pending  -Empirically on Zosyn and vancomycin     Hematology/Oncology   #Anemia  Hemoglobin 8.0 > 7.0 (previously 13 in Dec 2024)  -Will transfuse PRBC     Endocrine   # Hypothyroidism   -On levothyroxine  -TSH, T4 ordered     #History type 2 diabetes mellitus  -Monitor BGL       Social/Spiritual/DNR/Other  Code status: Full code  Diet: Tube feeds  Stress ulcer prophylaxis: PPI  DVT prophylaxis: Lovenox  Transfer out of ICU today?  No        Patient remains critically ill and requiring ICU level care.       Wai Elias MD, PGY-1  Attending physician: Dr. Maldonado     I personally saw, examined and provided care for the patient. Radiographs, labs and medication list were reviewed by me independently. I spoke with bedside nursing, therapists and consultants. Critical care services and times documented are

## 2025-03-27 NOTE — ACP (ADVANCE CARE PLANNING)
Advance Care Planning   Healthcare Decision Maker:    Primary Decision Maker: Varghese Romero - [id number] - [phone]    Click here to complete Healthcare Decision Makers including selection of the Healthcare Decision Maker Relationship (ie \"Primary\").

## 2025-03-27 NOTE — PROGRESS NOTES
[facility] Hospitalist Progress Note    Admitting Date and Time: 3/25/2025  6:20 PM  Admit Dx: COPD (chronic obstructive pulmonary disease) (HCC) [J44.9]  COPD with acute exacerbation (HCC) [J44.1]    Subjective:  Patient is being followed for COPD (chronic obstructive pulmonary disease) (HCC) [J44.9]  COPD with acute exacerbation (HCC) [J44.1]   Pt sedated  Per RN: no additional concerns    ROS: LEÓN     enoxaparin  40 mg SubCUTAneous Daily    [START ON 3/28/2025] lansoprazole  30 mg Per NG tube QAM AC    metoprolol tartrate  25 mg Oral Daily    methadone  47 mg Per NG tube Daily    docusate  100 mg Orogastric BID    polyethylene glycol  17 g Orogastric Daily    furosemide  20 mg IntraVENous BID    piperacillin-tazobactam  4,500 mg IntraVENous Q8H    methylPREDNISolone  40 mg IntraVENous Q6H    insulin lispro  0-8 Units SubCUTAneous Q6H    vancomycin  1,000 mg IntraVENous Q12H    [Held by provider] amLODIPine  10 mg Oral Daily    aspirin  81 mg Oral Daily    clopidogrel  75 mg Oral Daily    [Held by provider] insulin glargine  50 Units SubCUTAneous BID    [Held by provider] isosorbide mononitrate  30 mg Oral Daily    levothyroxine  125 mcg Oral Daily    [Held by provider] losartan  100 mg Oral Daily    [Held by provider] rosuvastatin  10 mg Oral Daily    sodium chloride flush  5-40 mL IntraVENous 2 times per day    ipratropium 0.5 mg-albuterol 2.5 mg  1 Dose Inhalation Q4H WA RT    arformoterol tartrate  15 mcg Nebulization BID RT    [Held by provider] insulin lispro  0.08 Units/kg SubCUTAneous TID     guaiFENesin  400 mg Oral 3 times per day     hydrALAZINE, 10 mg, Q6H PRN  midazolam, 1 mg, Q30 Min PRN  fentaNYL, 50 mcg, Q30 Min PRN  LORazepam, 0.5 mg, Q8H PRN  sodium chloride, , PRN  sodium chloride flush, 5-40 mL, PRN  sodium chloride, , PRN  ondansetron, 4 mg, Q8H PRN   Or  ondansetron, 4 mg, Q6H PRN  acetaminophen, 650 mg, Q6H PRN   Or  acetaminophen, 650 mg, Q6H PRN  benzonatate, 100 mg, TID    Worsening of the interstitial markings throughout the lung fields in the   interval suggesting worsening edema or pneumonia.  Clinical correlation is   needed.         XR CERVICAL SPINE (2-3 VIEWS)   Final Result   No acute abnormality of the cervical spine.         XR SHOULDER LEFT (MIN 2 VIEWS)   Final Result   1. No acute fracture or dislocation.   2. Left lung airspace opacities.         XR KNEE RIGHT (1-2 VIEWS)   Final Result   No acute abnormality of the knee.         CT HEAD WO CONTRAST   Final Result   No acute intracranial abnormality.         XR CHEST PORTABLE    (Results Pending)        Assessment:  Principal Problem:    COPD with acute exacerbation (HCC)  Active Problems:    COPD (chronic obstructive pulmonary disease) (HCC)  Resolved Problems:    * No resolved hospital problems. *      Plan:  Acute hypoxic resp failure 2/2 COPD exac: presumed ARDS, requiring intubation wean vent as able, ICU following, cont steroids and wean as able  Encephalopathy: UDS positive methadone (appropriate) and cannabinoids (inappropriate), CTH negative, currently sedated and evaluate once   CHF: last EF , rpt ECHO, cont BB, cont diuresis  CAD s/p stent: cont BB  Acute renal insufficiency: Cr 1.2 on admit, baseline ~0.7-0.9, trend BMP  HTN: cont BB, hold CCB, Imdur, ARB for now given NPO, cont PRN for elevated BP  Leukocytosis: afebrile, procal elevated 0.45, follow Cx, cont empiric abx for now  Hyponatremia: resolved, trend lytes  Anemia: Hgb 8.0>7.0, s/p 1u RBC this admit  Hypothyroid: cont synthroid  DM2: SSI and adjust as indicated  Hx MOE: cont methadone as able      Dispo: pending clinical course    NOTE: Portions of this report may have been transcribed using voice recognition software. Every effort was made to ensure accuracy; however, inadvertent computerized transcription errors may be present.  Electronically signed by Lisha Frank MD on 3/27/2025 at 2:10 PM

## 2025-03-27 NOTE — PLAN OF CARE
Problem: Chronic Conditions and Co-morbidities  Goal: Patient's chronic conditions and co-morbidity symptoms are monitored and maintained or improved  3/27/2025 0442 by Angelita Colorado RN  Outcome: Progressing  3/26/2025 2249 by Yina Lino RN  Outcome: Progressing  Flowsheets (Taken 3/26/2025 2000)  Care Plan - Patient's Chronic Conditions and Co-Morbidity Symptoms are Monitored and Maintained or Improved: Monitor and assess patient's chronic conditions and comorbid symptoms for stability, deterioration, or improvement     Problem: Safety - Adult  Goal: Free from fall injury  3/27/2025 0442 by Angelita Colorado RN  Outcome: Progressing  3/26/2025 2249 by Yina Lino RN  Outcome: Progressing     Problem: Skin/Tissue Integrity  Goal: Skin integrity remains intact  Description: 1.  Monitor for areas of redness and/or skin breakdown  2.  Assess vascular access sites hourly  3.  Every 4-6 hours minimum:  Change oxygen saturation probe site  4.  Every 4-6 hours:  If on nasal continuous positive airway pressure, respiratory therapy assess nares and determine need for appliance change or resting period  3/27/2025 0442 by Angelita Colorado RN  Outcome: Progressing  Flowsheets (Taken 3/27/2025 0400)  Skin Integrity Remains Intact: Monitor for areas of redness and/or skin breakdown  3/26/2025 2249 by Yina Lino RN  Outcome: Progressing  Flowsheets (Taken 3/26/2025 2000)  Skin Integrity Remains Intact: Monitor for areas of redness and/or skin breakdown     Problem: Discharge Planning  Goal: Discharge to home or other facility with appropriate resources  3/27/2025 0442 by Angelita Colorado RN  Outcome: Progressing  3/26/2025 2249 by Yina Lino RN  Outcome: Progressing  Flowsheets (Taken 3/26/2025 2000)  Discharge to home or other facility with appropriate resources:   Identify barriers to discharge with patient and caregiver   Identify discharge learning needs (meds, wound care, etc)     Problem: Safety - Medical  Restraint  Goal: Remains free of injury from restraints (Restraint for Interference with Medical Device)  Description: INTERVENTIONS:  1. Determine that other, less restrictive measures have been tried or would not be effective before applying the restraint  2. Evaluate the patient's condition at the time of restraint application  3. Inform patient/family regarding the reason for restraint  4. Q2H: Monitor safety, psychosocial status, comfort, nutrition and hydration  3/27/2025 0442 by Angelita oClorado RN  Outcome: Progressing  Flowsheets  Taken 3/27/2025 0400 by Batsheva Morocho RN  Remains free of injury from restraints (restraint for interference with medical device): Every 2 hours: Monitor safety, psychosocial status, comfort, nutrition and hydration  Taken 3/27/2025 0200 by Batsheva Morocho RN  Remains free of injury from restraints (restraint for interference with medical device): Every 2 hours: Monitor safety, psychosocial status, comfort, nutrition and hydration  Taken 3/27/2025 0000 by Batsheva Morocho RN  Remains free of injury from restraints (restraint for interference with medical device): Every 2 hours: Monitor safety, psychosocial status, comfort, nutrition and hydration  3/26/2025 2249 by Yina Lino RN  Outcome: Progressing  Flowsheets  Taken 3/26/2025 2200  Remains free of injury from restraints (restraint for interference with medical device): Every 2 hours: Monitor safety, psychosocial status, comfort, nutrition and hydration  Taken 3/26/2025 2000  Remains free of injury from restraints (restraint for interference with medical device): Every 2 hours: Monitor safety, psychosocial status, comfort, nutrition and hydration

## 2025-03-28 ENCOUNTER — APPOINTMENT (OUTPATIENT)
Dept: GENERAL RADIOLOGY | Age: 55
DRG: 208 | End: 2025-03-28
Attending: INTERNAL MEDICINE
Payer: MEDICARE

## 2025-03-28 LAB
ANION GAP SERPL CALCULATED.3IONS-SCNC: 15 MMOL/L (ref 7–16)
B.E.: -1 MMOL/L (ref -3–3)
B.E.: 3.7 MMOL/L (ref -3–3)
B.E.: 3.9 MMOL/L (ref -3–3)
BASOPHILS # BLD: 0.02 K/UL (ref 0–0.2)
BASOPHILS NFR BLD: 0 % (ref 0–2)
BUN SERPL-MCNC: 31 MG/DL (ref 6–20)
CALCIUM SERPL-MCNC: 9.1 MG/DL (ref 8.6–10.2)
CHLORIDE SERPL-SCNC: 97 MMOL/L (ref 98–107)
CO2 SERPL-SCNC: 26 MMOL/L (ref 22–29)
COHB: 0 % (ref 0–1.5)
COHB: 0.2 % (ref 0–1.5)
COHB: 0.2 % (ref 0–1.5)
COMMENT: ABNORMAL
CREAT SERPL-MCNC: 1.2 MG/DL (ref 0.5–1)
CRITICAL: ABNORMAL
DATE ANALYZED: ABNORMAL
DATE OF COLLECTION: ABNORMAL
EOSINOPHIL # BLD: 0 K/UL (ref 0.05–0.5)
EOSINOPHILS RELATIVE PERCENT: 0 % (ref 0–6)
ERYTHROCYTE [DISTWIDTH] IN BLOOD BY AUTOMATED COUNT: 15.1 % (ref 11.5–15)
FIO2: 40 %
FIO2: 40 %
FIO2: 45 %
GFR, ESTIMATED: 56 ML/MIN/1.73M2
GLUCOSE BLD-MCNC: 210 MG/DL (ref 74–99)
GLUCOSE BLD-MCNC: 210 MG/DL (ref 74–99)
GLUCOSE BLD-MCNC: 227 MG/DL (ref 74–99)
GLUCOSE BLD-MCNC: 243 MG/DL (ref 74–99)
GLUCOSE BLD-MCNC: 249 MG/DL (ref 74–99)
GLUCOSE SERPL-MCNC: 223 MG/DL (ref 74–99)
HCO3: 26.1 MMOL/L (ref 22–26)
HCO3: 28.5 MMOL/L (ref 22–26)
HCO3: 28.6 MMOL/L (ref 22–26)
HCT VFR BLD AUTO: 30.5 % (ref 34–48)
HGB BLD-MCNC: 9.8 G/DL (ref 11.5–15.5)
HHB: 2.6 % (ref 0–5)
HHB: 3.6 % (ref 0–5)
HHB: 4.5 % (ref 0–5)
IMM GRANULOCYTES # BLD AUTO: 0.19 K/UL (ref 0–0.58)
IMM GRANULOCYTES NFR BLD: 1 % (ref 0–5)
LAB: ABNORMAL
LYMPHOCYTES NFR BLD: 0.64 K/UL (ref 1.5–4)
LYMPHOCYTES RELATIVE PERCENT: 5 % (ref 20–42)
Lab: 1343
Lab: 1705
Lab: 449
MAGNESIUM SERPL-MCNC: 2.2 MG/DL (ref 1.6–2.6)
MCH RBC QN AUTO: 29.5 PG (ref 26–35)
MCHC RBC AUTO-ENTMCNC: 32.1 G/DL (ref 32–34.5)
MCV RBC AUTO: 91.9 FL (ref 80–99.9)
METHB: 0.3 % (ref 0–1.5)
MICROORGANISM SPEC CULT: NORMAL
MODE: ABNORMAL
MODE: AC
MODE: AC
MONOCYTES NFR BLD: 0.44 K/UL (ref 0.1–0.95)
MONOCYTES NFR BLD: 3 % (ref 2–12)
NEUTROPHILS NFR BLD: 91 % (ref 43–80)
NEUTS SEG NFR BLD: 12.39 K/UL (ref 1.8–7.3)
O2 CONTENT: 14.2 ML/DL
O2 CONTENT: 14.7 ML/DL
O2 CONTENT: 14.8 ML/DL
O2 SATURATION: 95.5 % (ref 92–98.5)
O2 SATURATION: 96.4 % (ref 92–98.5)
O2 SATURATION: 97.4 % (ref 92–98.5)
O2HB: 95.2 % (ref 94–97)
O2HB: 95.9 % (ref 94–97)
O2HB: 96.9 % (ref 94–97)
OPERATOR ID: 3186
OPERATOR ID: 5100
OPERATOR ID: ABNORMAL
PATIENT TEMP: 37 C
PCO2: 43.6 MMHG (ref 35–45)
PCO2: 44.1 MMHG (ref 35–45)
PCO2: 54.8 MMHG (ref 35–45)
PEEP/CPAP: 10 CMH2O
PEEP/CPAP: 8 CMH2O
PEEP/CPAP: 8 CMH2O
PFO2: 1.93 MMHG/%
PFO2: 2.27 MMHG/%
PFO2: 2.46 MMHG/%
PH BLOOD GAS: 7.29 (ref 7.35–7.45)
PH BLOOD GAS: 7.43 (ref 7.35–7.45)
PH BLOOD GAS: 7.43 (ref 7.35–7.45)
PHOSPHATE SERPL-MCNC: 3.1 MG/DL (ref 2.5–4.5)
PLATELET # BLD AUTO: 373 K/UL (ref 130–450)
PMV BLD AUTO: 10.9 FL (ref 7–12)
PO2: 86.9 MMHG (ref 75–100)
PO2: 90.8 MMHG (ref 75–100)
PO2: 98.4 MMHG (ref 75–100)
POTASSIUM SERPL-SCNC: 3.6 MMOL/L (ref 3.5–5)
POTASSIUM SERPL-SCNC: 4.3 MMOL/L (ref 3.5–5)
RBC # BLD AUTO: 3.32 M/UL (ref 3.5–5.5)
RI(T): 1.39
RI(T): 1.45
RI(T): 2.11
RR MECHANICAL: 16 B/MIN
SERVICE CMNT-IMP: NORMAL
SODIUM SERPL-SCNC: 138 MMOL/L (ref 132–146)
SOURCE, BLOOD GAS: ABNORMAL
SPECIMEN DESCRIPTION: NORMAL
THB: 10.3 G/DL (ref 11.5–16.5)
THB: 10.9 G/DL (ref 11.5–16.5)
THB: 10.9 G/DL (ref 11.5–16.5)
TIME ANALYZED: 1354
TIME ANALYZED: 1712
TIME ANALYZED: 506
VT MECHANICAL: 400 ML
VT MECHANICAL: 400 ML
WBC OTHER # BLD: 13.7 K/UL (ref 4.5–11.5)

## 2025-03-28 PROCEDURE — 6370000000 HC RX 637 (ALT 250 FOR IP)

## 2025-03-28 PROCEDURE — 2000000000 HC ICU R&B

## 2025-03-28 PROCEDURE — 71045 X-RAY EXAM CHEST 1 VIEW: CPT

## 2025-03-28 PROCEDURE — 6370000000 HC RX 637 (ALT 250 FOR IP): Performed by: INTERNAL MEDICINE

## 2025-03-28 PROCEDURE — 84132 ASSAY OF SERUM POTASSIUM: CPT

## 2025-03-28 PROCEDURE — 94003 VENT MGMT INPAT SUBQ DAY: CPT

## 2025-03-28 PROCEDURE — 2500000003 HC RX 250 WO HCPCS: Performed by: INTERNAL MEDICINE

## 2025-03-28 PROCEDURE — 99232 SBSQ HOSP IP/OBS MODERATE 35: CPT | Performed by: STUDENT IN AN ORGANIZED HEALTH CARE EDUCATION/TRAINING PROGRAM

## 2025-03-28 PROCEDURE — 84100 ASSAY OF PHOSPHORUS: CPT

## 2025-03-28 PROCEDURE — 6360000002 HC RX W HCPCS

## 2025-03-28 PROCEDURE — 2500000003 HC RX 250 WO HCPCS

## 2025-03-28 PROCEDURE — 80048 BASIC METABOLIC PNL TOTAL CA: CPT

## 2025-03-28 PROCEDURE — 6360000002 HC RX W HCPCS: Performed by: INTERNAL MEDICINE

## 2025-03-28 PROCEDURE — 94640 AIRWAY INHALATION TREATMENT: CPT

## 2025-03-28 PROCEDURE — 82962 GLUCOSE BLOOD TEST: CPT

## 2025-03-28 PROCEDURE — 82805 BLOOD GASES W/O2 SATURATION: CPT

## 2025-03-28 PROCEDURE — 2580000003 HC RX 258: Performed by: INTERNAL MEDICINE

## 2025-03-28 PROCEDURE — 85025 COMPLETE CBC W/AUTO DIFF WBC: CPT

## 2025-03-28 PROCEDURE — 83735 ASSAY OF MAGNESIUM: CPT

## 2025-03-28 RX ORDER — WATER 10 ML/10ML
INJECTION INTRAMUSCULAR; INTRAVENOUS; SUBCUTANEOUS
Status: COMPLETED
Start: 2025-03-28 | End: 2025-03-28

## 2025-03-28 RX ORDER — METOPROLOL TARTRATE 25 MG/1
25 TABLET, FILM COATED ORAL 2 TIMES DAILY
Status: DISCONTINUED | OUTPATIENT
Start: 2025-03-28 | End: 2025-03-29

## 2025-03-28 RX ORDER — INSULIN GLARGINE 100 [IU]/ML
30 INJECTION, SOLUTION SUBCUTANEOUS DAILY
Status: DISCONTINUED | OUTPATIENT
Start: 2025-03-28 | End: 2025-04-03 | Stop reason: HOSPADM

## 2025-03-28 RX ORDER — PROPOFOL 10 MG/ML
5-50 INJECTION, EMULSION INTRAVENOUS CONTINUOUS
Status: DISCONTINUED | OUTPATIENT
Start: 2025-03-28 | End: 2025-03-29

## 2025-03-28 RX ORDER — FUROSEMIDE 40 MG/1
40 TABLET ORAL DAILY
Status: DISCONTINUED | OUTPATIENT
Start: 2025-03-29 | End: 2025-04-03 | Stop reason: HOSPADM

## 2025-03-28 RX ORDER — AMLODIPINE BESYLATE 10 MG/1
10 TABLET ORAL DAILY
Status: DISCONTINUED | OUTPATIENT
Start: 2025-03-28 | End: 2025-04-03 | Stop reason: HOSPADM

## 2025-03-28 RX ORDER — VANCOMYCIN 1 G/200ML
1000 INJECTION, SOLUTION INTRAVENOUS EVERY 12 HOURS
Status: DISCONTINUED | OUTPATIENT
Start: 2025-03-28 | End: 2025-03-28 | Stop reason: SDUPTHER

## 2025-03-28 RX ORDER — METHYLPREDNISOLONE SODIUM SUCCINATE 40 MG/ML
40 INJECTION INTRAMUSCULAR; INTRAVENOUS EVERY 8 HOURS
Status: DISCONTINUED | OUTPATIENT
Start: 2025-03-28 | End: 2025-03-28 | Stop reason: SDUPTHER

## 2025-03-28 RX ADMIN — VANCOMYCIN HYDROCHLORIDE 1000 MG: 1 INJECTION, POWDER, LYOPHILIZED, FOR SOLUTION INTRAVENOUS at 08:41

## 2025-03-28 RX ADMIN — IPRATROPIUM BROMIDE AND ALBUTEROL SULFATE 1 DOSE: 2.5; .5 SOLUTION RESPIRATORY (INHALATION) at 08:27

## 2025-03-28 RX ADMIN — Medication 150 MCG/HR: at 05:06

## 2025-03-28 RX ADMIN — Medication 150 MCG/HR: at 11:11

## 2025-03-28 RX ADMIN — Medication 8 MG/HR: at 07:48

## 2025-03-28 RX ADMIN — PIPERACILLIN AND TAZOBACTAM 4500 MG: 4; .5 INJECTION, POWDER, LYOPHILIZED, FOR SOLUTION INTRAVENOUS at 18:15

## 2025-03-28 RX ADMIN — ENOXAPARIN SODIUM 40 MG: 100 INJECTION SUBCUTANEOUS at 08:50

## 2025-03-28 RX ADMIN — Medication 150 MCG/HR: at 17:43

## 2025-03-28 RX ADMIN — CLOPIDOGREL BISULFATE 75 MG: 75 TABLET, FILM COATED ORAL at 08:50

## 2025-03-28 RX ADMIN — METOPROLOL TARTRATE 25 MG: 25 TABLET, FILM COATED ORAL at 08:50

## 2025-03-28 RX ADMIN — SODIUM CHLORIDE, PRESERVATIVE FREE 10 ML: 5 INJECTION INTRAVENOUS at 21:11

## 2025-03-28 RX ADMIN — IPRATROPIUM BROMIDE AND ALBUTEROL SULFATE 1 DOSE: 2.5; .5 SOLUTION RESPIRATORY (INHALATION) at 14:41

## 2025-03-28 RX ADMIN — DOCUSATE SODIUM LIQUID 100 MG: 100 LIQUID ORAL at 20:54

## 2025-03-28 RX ADMIN — AMLODIPINE BESYLATE 10 MG: 10 TABLET ORAL at 11:25

## 2025-03-28 RX ADMIN — POLYETHYLENE GLYCOL 3350 17 G: 17 POWDER, FOR SOLUTION ORAL at 08:50

## 2025-03-28 RX ADMIN — INSULIN LISPRO 2 UNITS: 100 INJECTION, SOLUTION INTRAVENOUS; SUBCUTANEOUS at 04:43

## 2025-03-28 RX ADMIN — IPRATROPIUM BROMIDE AND ALBUTEROL SULFATE 1 DOSE: 2.5; .5 SOLUTION RESPIRATORY (INHALATION) at 11:27

## 2025-03-28 RX ADMIN — DOCUSATE SODIUM LIQUID 100 MG: 100 LIQUID ORAL at 08:50

## 2025-03-28 RX ADMIN — FUROSEMIDE 20 MG: 10 INJECTION, SOLUTION INTRAMUSCULAR; INTRAVENOUS at 08:50

## 2025-03-28 RX ADMIN — IPRATROPIUM BROMIDE AND ALBUTEROL SULFATE 1 DOSE: 2.5; .5 SOLUTION RESPIRATORY (INHALATION) at 20:45

## 2025-03-28 RX ADMIN — HYDRALAZINE HYDROCHLORIDE 10 MG: 20 INJECTION INTRAMUSCULAR; INTRAVENOUS at 04:27

## 2025-03-28 RX ADMIN — ARFORMOTEROL TARTRATE 15 MCG: 15 SOLUTION RESPIRATORY (INHALATION) at 20:45

## 2025-03-28 RX ADMIN — PROPOFOL 20 MCG/KG/MIN: 10 INJECTION, EMULSION INTRAVENOUS at 11:23

## 2025-03-28 RX ADMIN — METHYLPREDNISOLONE SODIUM SUCCINATE 40 MG: 40 INJECTION, POWDER, FOR SOLUTION INTRAMUSCULAR; INTRAVENOUS at 17:46

## 2025-03-28 RX ADMIN — INSULIN LISPRO 2 UNITS: 100 INJECTION, SOLUTION INTRAVENOUS; SUBCUTANEOUS at 17:46

## 2025-03-28 RX ADMIN — PIPERACILLIN AND TAZOBACTAM 4500 MG: 4; .5 INJECTION, POWDER, LYOPHILIZED, FOR SOLUTION INTRAVENOUS at 10:05

## 2025-03-28 RX ADMIN — INSULIN LISPRO 2 UNITS: 100 INJECTION, SOLUTION INTRAVENOUS; SUBCUTANEOUS at 23:14

## 2025-03-28 RX ADMIN — SODIUM CHLORIDE, PRESERVATIVE FREE 10 ML: 5 INJECTION INTRAVENOUS at 08:56

## 2025-03-28 RX ADMIN — METHYLPREDNISOLONE SODIUM SUCCINATE 40 MG: 40 INJECTION INTRAMUSCULAR; INTRAVENOUS at 04:27

## 2025-03-28 RX ADMIN — LEVOTHYROXINE SODIUM 125 MCG: 0.03 TABLET ORAL at 04:31

## 2025-03-28 RX ADMIN — GUAIFENESIN 400 MG: 400 TABLET ORAL at 20:54

## 2025-03-28 RX ADMIN — GUAIFENESIN 400 MG: 400 TABLET ORAL at 04:31

## 2025-03-28 RX ADMIN — NALOXEGOL OXALATE 12.5 MG: 12.5 TABLET, FILM COATED ORAL at 15:14

## 2025-03-28 RX ADMIN — PIPERACILLIN AND TAZOBACTAM 4500 MG: 4; .5 INJECTION, POWDER, LYOPHILIZED, FOR SOLUTION INTRAVENOUS at 04:28

## 2025-03-28 RX ADMIN — INSULIN LISPRO 2 UNITS: 100 INJECTION, SOLUTION INTRAVENOUS; SUBCUTANEOUS at 11:19

## 2025-03-28 RX ADMIN — METOPROLOL TARTRATE 25 MG: 25 TABLET, FILM COATED ORAL at 20:54

## 2025-03-28 RX ADMIN — WATER 10 ML: 1 INJECTION INTRAMUSCULAR; INTRAVENOUS; SUBCUTANEOUS at 09:53

## 2025-03-28 RX ADMIN — ASPIRIN 81 MG: 81 TABLET, COATED ORAL at 08:50

## 2025-03-28 RX ADMIN — POTASSIUM BICARBONATE 40 MEQ: 782 TABLET, EFFERVESCENT ORAL at 09:20

## 2025-03-28 RX ADMIN — INSULIN GLARGINE 30 UNITS: 100 INJECTION, SOLUTION SUBCUTANEOUS at 11:18

## 2025-03-28 RX ADMIN — METHADONE HYDROCHLORIDE 47 MG: 10 CONCENTRATE ORAL at 09:20

## 2025-03-28 RX ADMIN — METHYLPREDNISOLONE SODIUM SUCCINATE 40 MG: 40 INJECTION INTRAMUSCULAR; INTRAVENOUS at 09:53

## 2025-03-28 RX ADMIN — GUAIFENESIN 400 MG: 400 TABLET ORAL at 12:35

## 2025-03-28 RX ADMIN — LANSOPRAZOLE 30 MG: 30 TABLET, ORALLY DISINTEGRATING, DELAYED RELEASE ORAL at 05:37

## 2025-03-28 RX ADMIN — Medication 5 MG/HR: at 21:21

## 2025-03-28 RX ADMIN — ARFORMOTEROL TARTRATE 15 MCG: 15 SOLUTION RESPIRATORY (INHALATION) at 08:27

## 2025-03-28 ASSESSMENT — PULMONARY FUNCTION TESTS
PIF_VALUE: 23
PIF_VALUE: 26
PIF_VALUE: 20
PIF_VALUE: 27
PIF_VALUE: 26
PIF_VALUE: 33
PIF_VALUE: 26
PIF_VALUE: 27
PIF_VALUE: 30
PIF_VALUE: 39
PIF_VALUE: 33
PIF_VALUE: 26
PIF_VALUE: 27
PIF_VALUE: 25
PIF_VALUE: 27
PIF_VALUE: 26
PIF_VALUE: 27
PIF_VALUE: 26
PIF_VALUE: 27
PIF_VALUE: 38
PIF_VALUE: 29
PIF_VALUE: 30

## 2025-03-28 NOTE — PROGRESS NOTES
[facility] [place]  Department of Internal Medicine   Internal Medicine Residency   MICU Progress Note    Patient:  Faiza Romero 54 y.o. female  MRN: [id number]     Date of Service: 3/28/2025    Allergy: Seasonal and Statins      Subjective     Patient remains intubated and sedated.  She is on fentanyl and Versed.  Pt seems to be doing better. She opens her eyes to voice and follows some commands.  Tube feeds are running at 40 cc/h. Family present at bedside.     ROS: unable to be achieved due to current mental status    Objective     VS: BP (!) 148/66   Pulse 67   Temp 98.3 °F (36.8 °C)   Resp 16   Ht 1.727 m (5' 8\")   Wt 93.9 kg (207 lb 0.2 oz)   SpO2 97%   BMI 31.48 kg/m²           I & O - 24hr:   Intake/Output Summary (Last 24 hours) at 3/28/2025 0752  Last data filed at 3/28/2025 0542  Gross per 24 hour   Intake 1712.69 ml   Output 1500 ml   Net 212.69 ml       Sedatives: Fentanyl 150, Versed 8    Pressors: None    Oxygen: Ventilator    ABG:     Lab Results   Component Value Date/Time    PH 7.429 03/28/2025 04:49 AM    SKD1ATY 50.2 09/29/2020 10:55 AM    PCO2 44.1 03/28/2025 04:49 AM    PO2ART 365.2 09/29/2020 10:55 AM    PO2 86.9 03/28/2025 04:49 AM    WPV7WJU 24.2 09/29/2020 10:55 AM    HCO3 28.5 03/28/2025 04:49 AM    BE 3.7 03/28/2025 04:49 AM    THB 10.9 03/28/2025 04:49 AM    O2SAT 96.4 03/28/2025 04:49 AM       Physical Exam:  General Appearance: Intubated and sedated, opens eyes to voice and follows some commands  Neuro: Round symmetric pupil that react to light bilaterally    Cardiovascular: regular rate and rhythm, S1 and S2 heard, no murmurs appreciated   Respiratory: Mildly diminished breath sounds bilaterally, but improved  Abdomen: Soft, non-tender; bowel sounds normal  Extremities: Extremities normal, no cyanosis, distal pulses intact    Lines & Urinary Catheter:   Peripheral IVs  Camilo  ETT     Labs     Basic Labs    Complete Blood Count:   Recent Labs     03/26/25  9508    #Leukocytosis  -Blood cultures show no growth  -Empirically on Zosyn and vancomycin  -MRSA negative; vancomycin discontinued     Hematology/Oncology   #Anemia  Hemoglobin 8.0 > 7.0  > 9.8 (previously 13 in Dec 2024)  -Will transfuse PRBC if hgb <7     Endocrine   # Hypothyroidism   -On levothyroxine  -TSH 4.59, T4 0.5     #History type 2 diabetes mellitus  -Monitor BGL  -Started on 30 of Lantus in addition to SSI       Social/Spiritual/DNR/Other  Code status: Full code  Diet: Tube feeds  Stress ulcer prophylaxis: PPI  DVT prophylaxis: Lovenox  Transfer out of ICU today?  No     Patient remains critically ill and requiring ICU level care.       Harriet Bailon MD, PGY-1  Attending physician: Dr. Maldonado     I personally saw, examined and provided care for the patient. Radiographs, labs and medication list were reviewed by me independently. I spoke with bedside nursing, therapists and consultants. Critical care services and times documented are independent of procedures and multidisciplinary rounds with Residents. Additionally comprehensive, multidisciplinary rounds were conducted with the MICU team. The case was discussed in detail and plans for care were established. Review of Residents documentation was conducted and revisions were made as appropriate. I agree with the above documented exam, problem list and plan of care with the following additions:    Oxygenation and imaging improving  Wean steroids today  Stop vanco and continue zosyn  Negative fluid balance  Multiple vent changes throughout the day d/t dyssynchrony  SBT in the AM  Discussed with     36 minutes of CCT spent with the patient, reviewing the chart including imaging studies, and discussing the case with other health care professionals.  This time excludes procedures.     During multidisciplinary team rounds the patient was seen, examined and discussed. This is confirmation that I have personally seen and examined the patient and that the  key elements of the encounter were performed by me (> 85 % time).  The medications & laboratory data and imagery was discussed and adjusted where necessary. Key issues of the case were discussed among consultants.     This patient has a high probability of sudden clinically significant deterioration. I managed/supervised life or organ supporting interventions that required frequent physician assessment. I devoted my full attention to the direct care of this patient for the period of time indicated below. In addition to above, time was devoted to teaching and to any procedure.     NOTE: This report, in part or full, may have been transcribed using voice recognition software. Every effort was made to ensure accuracy; however, inadvertent computerized transcription errors may be present. Please excuse any transcriptional grammatical or spelling errors that may have escaped my editorial review.    Total critical care time caring for this patient with life threatening, unstable organ failure, including direct patient contact, management of life support systems, review of data including imaging and labs, discussions with other team members and physicians is at least 36 Min so far today, excluding procedures.    Hazel Maldonado MD

## 2025-03-28 NOTE — PROGRESS NOTES
[facility] Hospitalist Progress Note    Admitting Date and Time: 3/25/2025  6:20 PM  Admit Dx: COPD (chronic obstructive pulmonary disease) (HCC) [J44.9]  COPD with acute exacerbation (HCC) [J44.1]    Subjective:  Patient is being followed for COPD (chronic obstructive pulmonary disease) (HCC) [J44.9]  COPD with acute exacerbation (HCC) [J44.1]   Pt sedated  Per RN: no additional concerns    ROS: LEÓN     amLODIPine  10 mg Orogastric Daily    insulin glargine  30 Units SubCUTAneous Daily    methylPREDNISolone  40 mg IntraVENous Q8H    metoprolol tartrate  25 mg Oral BID    [START ON 3/29/2025] furosemide  40 mg Oral Daily    enoxaparin  40 mg SubCUTAneous Daily    lansoprazole  30 mg Per NG tube QAM AC    methadone  47 mg Per NG tube Daily    docusate  100 mg Orogastric BID    polyethylene glycol  17 g Orogastric Daily    piperacillin-tazobactam  4,500 mg IntraVENous Q8H    insulin lispro  0-8 Units SubCUTAneous Q6H    aspirin  81 mg Oral Daily    clopidogrel  75 mg Oral Daily    [Held by provider] isosorbide mononitrate  30 mg Oral Daily    levothyroxine  125 mcg Oral Daily    [Held by provider] losartan  100 mg Oral Daily    [Held by provider] rosuvastatin  10 mg Oral Daily    sodium chloride flush  5-40 mL IntraVENous 2 times per day    ipratropium 0.5 mg-albuterol 2.5 mg  1 Dose Inhalation Q4H WA RT    arformoterol tartrate  15 mcg Nebulization BID RT    guaiFENesin  400 mg Oral 3 times per day     hydrALAZINE, 10 mg, Q6H PRN  midazolam, 1 mg, Q30 Min PRN  fentaNYL, 50 mcg, Q30 Min PRN  LORazepam, 0.5 mg, Q8H PRN  sodium chloride, , PRN  sodium chloride flush, 5-40 mL, PRN  sodium chloride, , PRN  ondansetron, 4 mg, Q8H PRN   Or  ondansetron, 4 mg, Q6H PRN  acetaminophen, 650 mg, Q6H PRN   Or  acetaminophen, 650 mg, Q6H PRN  benzonatate, 100 mg, TID PRN  glucose, 4 tablet, PRN  dextrose bolus, 125 mL, PRN   Or  dextrose bolus, 250 mL, PRN  glucagon (rDNA), 1 mg, PRN  dextrose, , Continuous PRN  naloxone,  and Nephro at Speed    Dispo: pending clinical course    NOTE: Portions of this report may have been transcribed using voice recognition software. Every effort was made to ensure accuracy; however, inadvertent computerized transcription errors may be present.  Electronically signed by Lisha Frank MD on 3/28/2025 at 11:46 AM

## 2025-03-28 NOTE — PLAN OF CARE
Problem: Chronic Conditions and Co-morbidities  Goal: Patient's chronic conditions and co-morbidity symptoms are monitored and maintained or improved  Outcome: Progressing  Flowsheets  Taken 3/27/2025 2200 by Angelita Colorado RN  Care Plan - Patient's Chronic Conditions and Co-Morbidity Symptoms are Monitored and Maintained or Improved: Monitor and assess patient's chronic conditions and comorbid symptoms for stability, deterioration, or improvement  Taken 3/27/2025 2000 by Es Servin RN  Care Plan - Patient's Chronic Conditions and Co-Morbidity Symptoms are Monitored and Maintained or Improved: Monitor and assess patient's chronic conditions and comorbid symptoms for stability, deterioration, or improvement     Problem: Safety - Adult  Goal: Free from fall injury  Outcome: Progressing  Flowsheets (Taken 3/28/2025 0030)  Free From Fall Injury: Instruct family/caregiver on patient safety     Problem: Skin/Tissue Integrity  Goal: Skin integrity remains intact  Description: 1.  Monitor for areas of redness and/or skin breakdown  2.  Assess vascular access sites hourly  3.  Every 4-6 hours minimum:  Change oxygen saturation probe site  4.  Every 4-6 hours:  If on nasal continuous positive airway pressure, respiratory therapy assess nares and determine need for appliance change or resting period  Outcome: Progressing  Flowsheets  Taken 3/28/2025 0030 by Angelita Colorado RN  Skin Integrity Remains Intact: Monitor for areas of redness and/or skin breakdown  Taken 3/27/2025 2200 by Angelita Colorado RN  Skin Integrity Remains Intact: Monitor for areas of redness and/or skin breakdown  Taken 3/27/2025 2000 by Es Servin RN  Skin Integrity Remains Intact: Monitor for areas of redness and/or skin breakdown     Problem: Discharge Planning  Goal: Discharge to home or other facility with appropriate resources  Outcome: Progressing  Flowsheets  Taken 3/27/2025 2200 by Angelita Colorado RN  Discharge to home  hydration  Taken 3/27/2025 1600 by Escobar Pam  Remains free of injury from restraints (restraint for interference with medical device): Every 2 hours: Monitor safety, psychosocial status, comfort, nutrition and hydration  Taken 3/27/2025 1400 by Escobar Pam  Remains free of injury from restraints (restraint for interference with medical device): Determine that other, less restrictive measures have been tried or would not be effective before applying the restraint  Taken 3/27/2025 1200 by Escobar Pam  Remains free of injury from restraints (restraint for interference with medical device): Determine that other, less restrictive measures have been tried or would not be effective before applying the restraint     Problem: Pain  Goal: Verbalizes/displays adequate comfort level or baseline comfort level  Outcome: Progressing  Flowsheets  Taken 3/27/2025 2300 by Angelita Colorado RN  Verbalizes/displays adequate comfort level or baseline comfort level: Assess pain using appropriate pain scale  Taken 3/27/2025 2000 by Es Servin RN  Verbalizes/displays adequate comfort level or baseline comfort level: Assess pain using appropriate pain scale     Problem: Nutrition Deficit:  Goal: Optimize nutritional status  Outcome: Progressing

## 2025-03-28 NOTE — PROGRESS NOTES
Spiritual Health History and Assessment/Progress Note  [facility]     Encounter, Rituals, Rites and Sacraments, Rapid Response,  ,      Name: Faiza Romero MRN: [id number]    Age: 54 y.o.     Sex: female   Language: English   Zoroastrian: Presybeterian   COPD with acute exacerbation (HCC)     Date: 3/28/2025                           Spiritual Assessment began in [address] ICU        Referral/Consult From: Rounding   Encounter Overview/Reason:  Encounter, Rituals, Rites and Sacraments  Service Provided For: Patient    Marcella, Belief, Meaning:   Patient is connected with a marcella tradition or spiritual practice  Family/Friends are connected with a marcella tradition or spiritual practice      Importance and Influence:  Patient has no beliefs influential to healthcare decision-making identified during this visit  Family/Friends have no beliefs influential to healthcare decision-making identified during this visit    Community:  Patient feels well-supported. Support system includes: Spouse/Partner  Family/Friends feel well-supported. Support system includes: Children    Assessment and Plan of Care:     Patient Interventions include: Facilitated expression of thoughts and feelings, Affirmed coping skills/support systems, and Provided sacramental/Worship ritual  Family/Friends Interventions include: Facilitated expression of thoughts and feelings and Affirmed coping skills/support systems    Patient Plan of Care: Spiritual Care available upon further referral  Family/Friends Plan of Care: Spiritual Care available upon further referral    Electronically signed by Chaplain Tracy on 3/28/2025 at 2:53 PM

## 2025-03-28 NOTE — PROGRESS NOTES
Vent mode changed due to patients continual breath stacking.       03/28/25 1210   Patient Observation   Pulse 68   Respirations 12   SpO2 95 %   Vent Information   Ventilator ID [id number]   Vent Mode AC/PC   Ventilator Settings   Vt (Set, mL) 400 mL   Set Pressure 16   Resp Rate (Set) 14 bpm   PEEP/CPAP (cmH2O) 8   FiO2  40 %   Insp Time (sec) 0.75 sec   Vent Patient Data (Readings)   Vt (Measured) 493 mL   Peak Inspiratory Pressure (cmH2O) 25 cmH2O   Rate Measured 14 br/min   Minute Volume (L/min) 7.47 Liters   Mean Airway Pressure (cmH2O) 12 cmH20   Plateau Pressure (cm H2O) 22 cm H2O   Driving Pressure 14   Inspiratory Time 0.75 sec   I:E Ratio 1:4.70   Flow Sensitivity 3 L/min   I Time/ I Time % 0.75 s   Backup Apnea On   Backup Rate 16 Breaths Per Minute   Backup Vt   (PC 16)   Vent Alarm Settings   High Pressure (cmH2O) 40 cmH2O   Low Minute Volume (lpm) 5 L/min   High Minute Volume (lpm) 16 L/min   Low Exhaled Vt (ml) 300 mL   High Exhaled Vt (ml) 700 mL   RR High (bpm) 35 br/min   Apnea (secs) 20 secs   Additional Respiratoray Assessments   Humidification Source Heated wire   Humidification Temp 37   Ambu Bag With Mask At Bedside Yes   ETT    Placement Date/Time: 03/26/25 1500   Present on Admission/Arrival: No  Placed By: Licensed provider  Placement Verified By: Auscultation;Capnometry;Chest X-ray;Colorimetric ETCO2 device;Direct visualization  Airway Type: Cuffed  Airway Tube Size: 8 mm...   Secured At 24 cm   Measured From Lips   ETT Placement Right   Secured By Commercial tube ramsey   Site Assessment Dry

## 2025-03-28 NOTE — PROGRESS NOTES
Pharmacy Consultation Note  (Antibiotic Dosing and Monitoring)    Initial consult date: 3/26/2025  Consulting physician/provider: Dr. Hazel Maldonado  Drug: Vancomycin  Indication: Sepsis    Note vancomycin discontinued. Clinical pharmacy will sign-off. Please re-consult if we can be of further assistance.    Josh Humphrey RPH, PharmD, BCCCP  3/28/2025  10:38 AM    SEB: [phone]

## 2025-03-28 NOTE — PROGRESS NOTES
03/28/25 1127   Patient Observation   Pulse 68   Respirations (!) 3   SpO2 96 %   Vent Information   Ventilator ID [id number]   Vent Mode AC/PRVC   Ventilator Settings   Vt (Set, mL) 400 mL   Resp Rate (Set) 14 bpm   PEEP/CPAP (cmH2O) 8   FiO2  40 %   Insp Time (sec) 0.8 sec   Vent Patient Data (Readings)   Vt (Measured) 1182 mL   Peak Inspiratory Pressure (cmH2O) 39 cmH2O   Rate Measured 16 br/min   Minute Volume (L/min) 5.31 Liters   Mean Airway Pressure (cmH2O) 16 cmH20   Plateau Pressure (cm H2O) 22 cm H2O   Driving Pressure 14   I:E Ratio 1:4.40   Flow Sensitivity 3 L/min   PEEP Intrinsic (cm H2O) 0.8 cm H2O   Static Compliance (L/cm H2O) 35   I Time/ I Time % 0.8 s   Backup Apnea On   Backup Rate 16 Breaths Per Minute   Backup Vt 400   Vent Alarm Settings   Low Pressure (cmH2O) 11.5 cmH2O   High Pressure (cmH2O) 40 cmH2O   Low Minute Volume (lpm) 5 L/min   High Minute Volume (lpm) 15 L/min   Low Exhaled Vt (ml) 300 mL   High Exhaled Vt (ml) 700 mL   RR High (bpm) 35 br/min   Apnea (secs) 20 secs   Additional Respiratoray Assessments   Humidification Source Heated wire   Humidification Temp 37   Ambu Bag With Mask At Bedside Yes   ETT    Placement Date/Time: 03/26/25 1500   Present on Admission/Arrival: No  Placed By: Licensed provider  Placement Verified By: Auscultation;Capnometry;Chest X-ray;Colorimetric ETCO2 device;Direct visualization  Airway Type: Cuffed  Airway Tube Size: 8 mm...   Secured At 24 cm   Measured From Lips   ETT Placement Right   Secured By Commercial tube ramsey   Site Assessment Dry

## 2025-03-28 NOTE — PATIENT CARE CONFERENCE
Intensive Care Daily Quality Rounding Checklist        ICU Team Members: Dr. Maldonado, Residents, bedside nurse, charge nurse, clinical pharmacist, respiratory therapist     ICU Day #: NUMBER: 3     SOFA Score: 6     Intubation Date: March 26     Ventilator Day #: 3     Central Line Insertion Date:  N/A                                                    Day #: N/A                                                    Indication: N/A      Arterial Line Insertion Date:  N/A                             Day #: N/A     Temporary Hemodialysis Catheter Insertion Date:  N/A                             Day # N/A     DVT Prophylaxis: Lovenox    GI Prophylaxis: Protonix     Camilo Catheter Insertion Date: March 26                                        Day #: 3                             Indications: Urinary retention                             Continued need (if yes, reason documented and discussed with physician): yes     Skin Issues/ Wounds and ordered treatment discussed on rounds: Yes, addressed     Goals/ Plans for the Day: Monitor labs and vitals, treat/replace as needed. Wean sedation and vent as tolerated. Breathing trial tomorrow. Restraint order for safety.  Resume Lantus and Norvasc. Wean steroids. Start enteral lasix tomorrow.     Reviewed plan and goals for day with patient and/or representative: Yes

## 2025-03-28 NOTE — PROGRESS NOTES
03/28/25 1439   Patient Observation   Pulse 68   Respirations 16   SpO2 98 %   Vent Information   Ventilator ID [id number]   Vent Mode AC/VC   Ventilator Settings   Vt (Set, mL) 400 mL   Resp Rate (Set) 16 bpm   PEEP/CPAP (cmH2O) 8   FiO2  40 %   Peak Inspiratory Flow (Set) 70 L/sec   Vent Patient Data (Readings)   Vt (Measured) 444 mL   Peak Inspiratory Pressure (cmH2O) 38 cmH2O   Rate Measured 16 br/min   Minute Volume (L/min) 7.18 Liters   Peak Inspiratory Flow (lpm) 70 L/sec   Mean Airway Pressure (cmH2O) 13 cmH20   Plateau Pressure (cm H2O) 22 cm H2O   Driving Pressure 14   I:E Ratio 1:5.50   Flow Sensitivity 3 L/min   PEEP Intrinsic (cm H2O) 4.8 cm H2O   Static Compliance (L/cm H2O) 28   I Time/ I Time % 0 s   Backup Apnea On   Backup Rate 16 Breaths Per Minute   Backup Vt 400   Vent Alarm Settings   High Pressure (cmH2O) 60 cmH2O   Low Minute Volume (lpm) 5 L/min   High Minute Volume (lpm) 15 L/min   Low Exhaled Vt (ml) 300 mL   High Exhaled Vt (ml) 700 mL   RR High (bpm) 35 br/min   Apnea (secs) 20 secs   Additional Respiratoray Assessments   Humidification Source Heated wire   Humidification Temp 37.5   Ambu Bag With Mask At Bedside Yes   ETT    Placement Date/Time: 03/26/25 1500   Present on Admission/Arrival: No  Placed By: Licensed provider  Placement Verified By: Auscultation;Capnometry;Chest X-ray;Colorimetric ETCO2 device;Direct visualization  Airway Type: Cuffed  Airway Tube Size: 8 mm...   Secured At 24 cm   Measured From Lips   ETT Placement Right   Secured By Commercial tube ramsey   Site Assessment Dry

## 2025-03-28 NOTE — PROGRESS NOTES
03/28/25 0827   Patient Observation   Pulse 72   Respirations 12   SpO2 95 %   Vent Information   Ventilator ID [id number]   Equipment Changed (S)  Expiratory Filter   Vent Mode AC/VC   Ventilator Settings   Vt (Set, mL) 400 mL   Resp Rate (Set) 16 bpm   PEEP/CPAP (cmH2O) 10   FiO2  45 %   Peak Inspiratory Flow (Set) 55 L/sec   Vent Patient Data (Readings)   Vt (Measured) 436 mL   Peak Inspiratory Pressure (cmH2O) 30 cmH2O   Rate Measured 17 br/min   Minute Volume (L/min) 7.59 Liters   Peak Inspiratory Flow (lpm) 55 L/sec   Mean Airway Pressure (cmH2O) 15 cmH20   Plateau Pressure (cm H2O) 26 cm H2O   Driving Pressure 16   I:E Ratio 1:3.20   Flow Sensitivity 3 L/min   PEEP Intrinsic (cm H2O) 0 cm H2O   Static Compliance (L/cm H2O) 29   Backup Apnea On   Backup Rate 16 Breaths Per Minute   Backup Vt 400   Vent Alarm Settings   High Pressure (cmH2O) 40 cmH2O   Low Minute Volume (lpm) 5 L/min   High Minute Volume (lpm) 15 L/min   Low Exhaled Vt (ml) 300 mL   High Exhaled Vt (ml) 700 mL   RR High (bpm) 25 br/min   Apnea (secs) 20 secs   Additional Respiratoray Assessments   Humidification Source Heated wire   Humidification Temp 37   Ambu Bag With Mask At Bedside Yes   ETT    Placement Date/Time: 03/26/25 1500   Present on Admission/Arrival: No  Placed By: Licensed provider  Placement Verified By: Auscultation;Capnometry;Chest X-ray;Colorimetric ETCO2 device;Direct visualization  Airway Type: Cuffed  Airway Tube Size: 8 mm...   Secured At 24 cm   Measured From Lips   ETT Placement Right   Secured By Commercial tube ramsey   Site Assessment Dry

## 2025-03-28 NOTE — INTERDISCIPLINARY ROUNDS
Intensive Care Daily Quality Rounding Checklist        ICU Team Members: Dr Maldonado, bedside nurse and charge nurse, clinical pharmacist     ICU Day #: NUMBER: 3     SOFA Score: 6     Intubation Date: March 26     Ventilator Day #: 3     Central Line Insertion Date:  N/A                                                    Day #:                                                     Indication:       Arterial Line Insertion Date:  N/A                             Day #:      Temporary Hemodialysis Catheter Insertion Date:  N/A                             Day #      DVT Prophylaxis:Lovenox    GI Prophylaxis: Protonix     Camilo Catheter Insertion Date: March 26                                        Day #: 3                             Indications: Urinary retention                             Continued need (if yes, reason documented and discussed with physician): yes,      Skin Issues/ Wounds and ordered treatment discussed on rounds: y, addressed     Goals/ Plans for the Day: Wean vent as tolerated, restraint order for safety, monitor labs and replace as needed     Reviewed plan and goals for day with patient and/or representative:      **SHARE this note so that the rounding nurse may edit**

## 2025-03-29 ENCOUNTER — APPOINTMENT (OUTPATIENT)
Dept: GENERAL RADIOLOGY | Age: 55
DRG: 208 | End: 2025-03-29
Attending: INTERNAL MEDICINE
Payer: MEDICARE

## 2025-03-29 LAB
ANION GAP SERPL CALCULATED.3IONS-SCNC: 10 MMOL/L (ref 7–16)
B.E.: 4.5 MMOL/L (ref -3–3)
BASOPHILS # BLD: 0.01 K/UL (ref 0–0.2)
BASOPHILS NFR BLD: 0 % (ref 0–2)
BUN SERPL-MCNC: 37 MG/DL (ref 6–20)
CALCIUM SERPL-MCNC: 9.2 MG/DL (ref 8.6–10.2)
CHLORIDE SERPL-SCNC: 100 MMOL/L (ref 98–107)
CO2 SERPL-SCNC: 28 MMOL/L (ref 22–29)
COHB: 0.3 % (ref 0–1.5)
CREAT SERPL-MCNC: 1.3 MG/DL (ref 0.5–1)
CRITICAL: ABNORMAL
DATE ANALYZED: ABNORMAL
DATE OF COLLECTION: ABNORMAL
EOSINOPHIL # BLD: 0 K/UL (ref 0.05–0.5)
EOSINOPHILS RELATIVE PERCENT: 0 % (ref 0–6)
ERYTHROCYTE [DISTWIDTH] IN BLOOD BY AUTOMATED COUNT: 15.2 % (ref 11.5–15)
FIO2: 40 %
GFR, ESTIMATED: 52 ML/MIN/1.73M2
GLUCOSE BLD-MCNC: 200 MG/DL (ref 74–99)
GLUCOSE BLD-MCNC: 209 MG/DL (ref 74–99)
GLUCOSE BLD-MCNC: 242 MG/DL (ref 74–99)
GLUCOSE BLD-MCNC: 246 MG/DL (ref 74–99)
GLUCOSE SERPL-MCNC: 188 MG/DL (ref 74–99)
HCO3: 27.2 MMOL/L (ref 22–26)
HCT VFR BLD AUTO: 31.3 % (ref 34–48)
HGB BLD-MCNC: 9.8 G/DL (ref 11.5–15.5)
HHB: 2.8 % (ref 0–5)
IMM GRANULOCYTES # BLD AUTO: 0.19 K/UL (ref 0–0.58)
IMM GRANULOCYTES NFR BLD: 1 % (ref 0–5)
LAB: ABNORMAL
LYMPHOCYTES NFR BLD: 0.68 K/UL (ref 1.5–4)
LYMPHOCYTES RELATIVE PERCENT: 5 % (ref 20–42)
Lab: 455
MAGNESIUM SERPL-MCNC: 2.4 MG/DL (ref 1.6–2.6)
MCH RBC QN AUTO: 29.3 PG (ref 26–35)
MCHC RBC AUTO-ENTMCNC: 31.3 G/DL (ref 32–34.5)
MCV RBC AUTO: 93.7 FL (ref 80–99.9)
METHB: 0.3 % (ref 0–1.5)
MICROORGANISM SPEC CULT: ABNORMAL
MICROORGANISM SPEC CULT: ABNORMAL
MICROORGANISM/AGENT SPEC: ABNORMAL
MODE: AC
MONOCYTES NFR BLD: 0.71 K/UL (ref 0.1–0.95)
MONOCYTES NFR BLD: 5 % (ref 2–12)
NEUTROPHILS NFR BLD: 89 % (ref 43–80)
NEUTS SEG NFR BLD: 12.67 K/UL (ref 1.8–7.3)
O2 CONTENT: 14.4 ML/DL
O2 SATURATION: 97.2 % (ref 92–98.5)
O2HB: 96.6 % (ref 94–97)
OPERATOR ID: 7296
PATIENT TEMP: 37 C
PCO2: 33.9 MMHG (ref 35–45)
PEEP/CPAP: 8 CMH2O
PFO2: 2.35 MMHG/%
PH BLOOD GAS: 7.52 (ref 7.35–7.45)
PHOSPHATE SERPL-MCNC: 3.1 MG/DL (ref 2.5–4.5)
PLATELET # BLD AUTO: 360 K/UL (ref 130–450)
PMV BLD AUTO: 10.3 FL (ref 7–12)
PO2: 94.1 MMHG (ref 75–100)
POTASSIUM SERPL-SCNC: 4.1 MMOL/L (ref 3.5–5)
RBC # BLD AUTO: 3.34 M/UL (ref 3.5–5.5)
RI(T): 1.62
RR MECHANICAL: 16 B/MIN
SERVICE CMNT-IMP: ABNORMAL
SODIUM SERPL-SCNC: 138 MMOL/L (ref 132–146)
SOURCE, BLOOD GAS: ABNORMAL
SPECIMEN DESCRIPTION: ABNORMAL
THB: 10.5 G/DL (ref 11.5–16.5)
TIME ANALYZED: 524
VT MECHANICAL: 400 ML
WBC OTHER # BLD: 14.3 K/UL (ref 4.5–11.5)

## 2025-03-29 PROCEDURE — 2500000003 HC RX 250 WO HCPCS: Performed by: INTERNAL MEDICINE

## 2025-03-29 PROCEDURE — 6360000002 HC RX W HCPCS

## 2025-03-29 PROCEDURE — 71045 X-RAY EXAM CHEST 1 VIEW: CPT

## 2025-03-29 PROCEDURE — 6370000000 HC RX 637 (ALT 250 FOR IP)

## 2025-03-29 PROCEDURE — 82805 BLOOD GASES W/O2 SATURATION: CPT

## 2025-03-29 PROCEDURE — 6360000002 HC RX W HCPCS: Performed by: INTERNAL MEDICINE

## 2025-03-29 PROCEDURE — 2580000003 HC RX 258: Performed by: INTERNAL MEDICINE

## 2025-03-29 PROCEDURE — 80048 BASIC METABOLIC PNL TOTAL CA: CPT

## 2025-03-29 PROCEDURE — 85025 COMPLETE CBC W/AUTO DIFF WBC: CPT

## 2025-03-29 PROCEDURE — 6370000000 HC RX 637 (ALT 250 FOR IP): Performed by: INTERNAL MEDICINE

## 2025-03-29 PROCEDURE — 2000000000 HC ICU R&B

## 2025-03-29 PROCEDURE — 84100 ASSAY OF PHOSPHORUS: CPT

## 2025-03-29 PROCEDURE — 94003 VENT MGMT INPAT SUBQ DAY: CPT

## 2025-03-29 PROCEDURE — 99232 SBSQ HOSP IP/OBS MODERATE 35: CPT | Performed by: STUDENT IN AN ORGANIZED HEALTH CARE EDUCATION/TRAINING PROGRAM

## 2025-03-29 PROCEDURE — 2700000000 HC OXYGEN THERAPY PER DAY

## 2025-03-29 PROCEDURE — 94640 AIRWAY INHALATION TREATMENT: CPT

## 2025-03-29 PROCEDURE — 82962 GLUCOSE BLOOD TEST: CPT

## 2025-03-29 PROCEDURE — 83735 ASSAY OF MAGNESIUM: CPT

## 2025-03-29 RX ORDER — CHLORHEXIDINE GLUCONATE ORAL RINSE 1.2 MG/ML
15 SOLUTION DENTAL 2 TIMES DAILY
Status: DISCONTINUED | OUTPATIENT
Start: 2025-03-29 | End: 2025-03-29

## 2025-03-29 RX ORDER — METOPROLOL TARTRATE 25 MG/1
25 TABLET, FILM COATED ORAL ONCE
Status: COMPLETED | OUTPATIENT
Start: 2025-03-29 | End: 2025-03-29

## 2025-03-29 RX ORDER — ISOSORBIDE MONONITRATE 30 MG/1
30 TABLET, EXTENDED RELEASE ORAL DAILY
Status: DISCONTINUED | OUTPATIENT
Start: 2025-03-29 | End: 2025-04-03 | Stop reason: HOSPADM

## 2025-03-29 RX ORDER — METOPROLOL TARTRATE 50 MG
50 TABLET ORAL 2 TIMES DAILY
Status: DISCONTINUED | OUTPATIENT
Start: 2025-03-29 | End: 2025-04-03 | Stop reason: HOSPADM

## 2025-03-29 RX ADMIN — INSULIN LISPRO 2 UNITS: 100 INJECTION, SOLUTION INTRAVENOUS; SUBCUTANEOUS at 22:18

## 2025-03-29 RX ADMIN — HYDRALAZINE HYDROCHLORIDE 10 MG: 20 INJECTION INTRAMUSCULAR; INTRAVENOUS at 12:07

## 2025-03-29 RX ADMIN — INSULIN LISPRO 2 UNITS: 100 INJECTION, SOLUTION INTRAVENOUS; SUBCUTANEOUS at 10:58

## 2025-03-29 RX ADMIN — CLOPIDOGREL BISULFATE 75 MG: 75 TABLET, FILM COATED ORAL at 08:28

## 2025-03-29 RX ADMIN — METHYLPREDNISOLONE SODIUM SUCCINATE 40 MG: 40 INJECTION, POWDER, FOR SOLUTION INTRAMUSCULAR; INTRAVENOUS at 22:06

## 2025-03-29 RX ADMIN — PROPOFOL 15 MCG/KG/MIN: 10 INJECTION, EMULSION INTRAVENOUS at 00:25

## 2025-03-29 RX ADMIN — INSULIN GLARGINE 30 UNITS: 100 INJECTION, SOLUTION SUBCUTANEOUS at 08:53

## 2025-03-29 RX ADMIN — HYDRALAZINE HYDROCHLORIDE 10 MG: 20 INJECTION INTRAMUSCULAR; INTRAVENOUS at 04:18

## 2025-03-29 RX ADMIN — ONDANSETRON 4 MG: 2 INJECTION, SOLUTION INTRAMUSCULAR; INTRAVENOUS at 12:01

## 2025-03-29 RX ADMIN — METOPROLOL TARTRATE 25 MG: 25 TABLET, FILM COATED ORAL at 12:48

## 2025-03-29 RX ADMIN — AMLODIPINE BESYLATE 10 MG: 10 TABLET ORAL at 08:28

## 2025-03-29 RX ADMIN — Medication 125 MCG/HR: at 01:55

## 2025-03-29 RX ADMIN — METOPROLOL TARTRATE 25 MG: 25 TABLET, FILM COATED ORAL at 08:57

## 2025-03-29 RX ADMIN — IPRATROPIUM BROMIDE AND ALBUTEROL SULFATE 1 DOSE: 2.5; .5 SOLUTION RESPIRATORY (INHALATION) at 19:45

## 2025-03-29 RX ADMIN — PIPERACILLIN AND TAZOBACTAM 4500 MG: 4; .5 INJECTION, POWDER, LYOPHILIZED, FOR SOLUTION INTRAVENOUS at 02:05

## 2025-03-29 RX ADMIN — IPRATROPIUM BROMIDE AND ALBUTEROL SULFATE 1 DOSE: 2.5; .5 SOLUTION RESPIRATORY (INHALATION) at 15:45

## 2025-03-29 RX ADMIN — INSULIN LISPRO 2 UNITS: 100 INJECTION, SOLUTION INTRAVENOUS; SUBCUTANEOUS at 06:31

## 2025-03-29 RX ADMIN — IPRATROPIUM BROMIDE AND ALBUTEROL SULFATE 1 DOSE: 2.5; .5 SOLUTION RESPIRATORY (INHALATION) at 08:41

## 2025-03-29 RX ADMIN — FUROSEMIDE 40 MG: 40 TABLET ORAL at 08:28

## 2025-03-29 RX ADMIN — ASPIRIN 81 MG: 81 TABLET, COATED ORAL at 08:28

## 2025-03-29 RX ADMIN — LANSOPRAZOLE 30 MG: 30 TABLET, ORALLY DISINTEGRATING, DELAYED RELEASE ORAL at 06:01

## 2025-03-29 RX ADMIN — POLYETHYLENE GLYCOL 3350 17 G: 17 POWDER, FOR SOLUTION ORAL at 08:27

## 2025-03-29 RX ADMIN — IPRATROPIUM BROMIDE AND ALBUTEROL SULFATE 1 DOSE: 2.5; .5 SOLUTION RESPIRATORY (INHALATION) at 12:52

## 2025-03-29 RX ADMIN — GUAIFENESIN 400 MG: 400 TABLET ORAL at 06:01

## 2025-03-29 RX ADMIN — LEVOTHYROXINE SODIUM 125 MCG: 0.03 TABLET ORAL at 06:01

## 2025-03-29 RX ADMIN — ACETAMINOPHEN 650 MG: 325 TABLET ORAL at 22:05

## 2025-03-29 RX ADMIN — ARFORMOTEROL TARTRATE 15 MCG: 15 SOLUTION RESPIRATORY (INHALATION) at 19:45

## 2025-03-29 RX ADMIN — GUAIFENESIN 400 MG: 400 TABLET ORAL at 22:05

## 2025-03-29 RX ADMIN — PIPERACILLIN AND TAZOBACTAM 4500 MG: 4; .5 INJECTION, POWDER, LYOPHILIZED, FOR SOLUTION INTRAVENOUS at 10:49

## 2025-03-29 RX ADMIN — INSULIN LISPRO 2 UNITS: 100 INJECTION, SOLUTION INTRAVENOUS; SUBCUTANEOUS at 17:57

## 2025-03-29 RX ADMIN — SODIUM CHLORIDE, PRESERVATIVE FREE 10 ML: 5 INJECTION INTRAVENOUS at 21:00

## 2025-03-29 RX ADMIN — ENOXAPARIN SODIUM 40 MG: 100 INJECTION SUBCUTANEOUS at 08:27

## 2025-03-29 RX ADMIN — METHYLPREDNISOLONE SODIUM SUCCINATE 40 MG: 40 INJECTION, POWDER, FOR SOLUTION INTRAMUSCULAR; INTRAVENOUS at 02:12

## 2025-03-29 RX ADMIN — METHADONE HYDROCHLORIDE 47 MG: 10 CONCENTRATE ORAL at 08:29

## 2025-03-29 RX ADMIN — ARFORMOTEROL TARTRATE 15 MCG: 15 SOLUTION RESPIRATORY (INHALATION) at 08:41

## 2025-03-29 RX ADMIN — LORAZEPAM 0.5 MG: 2 INJECTION INTRAMUSCULAR; INTRAVENOUS at 22:27

## 2025-03-29 RX ADMIN — METHYLPREDNISOLONE SODIUM SUCCINATE 40 MG: 40 INJECTION, POWDER, FOR SOLUTION INTRAMUSCULAR; INTRAVENOUS at 10:49

## 2025-03-29 RX ADMIN — ACETAMINOPHEN 650 MG: 325 TABLET ORAL at 12:58

## 2025-03-29 RX ADMIN — HYDRALAZINE HYDROCHLORIDE 10 MG: 20 INJECTION INTRAMUSCULAR; INTRAVENOUS at 17:59

## 2025-03-29 RX ADMIN — NALOXEGOL OXALATE 12.5 MG: 12.5 TABLET, FILM COATED ORAL at 06:01

## 2025-03-29 RX ADMIN — DOCUSATE SODIUM LIQUID 100 MG: 100 LIQUID ORAL at 08:28

## 2025-03-29 RX ADMIN — SODIUM CHLORIDE, PRESERVATIVE FREE 10 ML: 5 INJECTION INTRAVENOUS at 08:34

## 2025-03-29 RX ADMIN — ISOSORBIDE MONONITRATE 30 MG: 30 TABLET, EXTENDED RELEASE ORAL at 16:07

## 2025-03-29 RX ADMIN — GUAIFENESIN 400 MG: 400 TABLET ORAL at 16:07

## 2025-03-29 RX ADMIN — PIPERACILLIN AND TAZOBACTAM 4500 MG: 4; .5 INJECTION, POWDER, LYOPHILIZED, FOR SOLUTION INTRAVENOUS at 18:05

## 2025-03-29 RX ADMIN — METOPROLOL TARTRATE 50 MG: 50 TABLET, FILM COATED ORAL at 22:05

## 2025-03-29 ASSESSMENT — PAIN DESCRIPTION - LOCATION
LOCATION: GENERALIZED
LOCATION: BACK

## 2025-03-29 ASSESSMENT — PAIN DESCRIPTION - ORIENTATION
ORIENTATION: LOWER
ORIENTATION: RIGHT

## 2025-03-29 ASSESSMENT — PAIN SCALES - GENERAL
PAINLEVEL_OUTOF10: 1
PAINLEVEL_OUTOF10: 3
PAINLEVEL_OUTOF10: 3
PAINLEVEL_OUTOF10: 0

## 2025-03-29 ASSESSMENT — PULMONARY FUNCTION TESTS
PIF_VALUE: 31
PIF_VALUE: 31
PIF_VALUE: 28
PIF_VALUE: 29
PIF_VALUE: 30
PIF_VALUE: 27
PIF_VALUE: 14
PIF_VALUE: 32
PIF_VALUE: 27
PIF_VALUE: 27
PIF_VALUE: 30
PIF_VALUE: 27
PIF_VALUE: 27
PIF_VALUE: 28

## 2025-03-29 ASSESSMENT — PAIN - FUNCTIONAL ASSESSMENT: PAIN_FUNCTIONAL_ASSESSMENT: ACTIVITIES ARE NOT PREVENTED

## 2025-03-29 ASSESSMENT — PAIN DESCRIPTION - ONSET: ONSET: ON-GOING

## 2025-03-29 ASSESSMENT — PAIN DESCRIPTION - PAIN TYPE: TYPE: ACUTE PAIN

## 2025-03-29 ASSESSMENT — PAIN DESCRIPTION - DESCRIPTORS
DESCRIPTORS: ACHING;DULL;DISCOMFORT
DESCRIPTORS: DISCOMFORT;ACHING;DULL

## 2025-03-29 ASSESSMENT — PAIN DESCRIPTION - FREQUENCY: FREQUENCY: CONTINUOUS

## 2025-03-29 NOTE — PROGRESS NOTES
DAILY VENTILATOR WEANING ASSESSMENT PERFORMED    P/FIO2 Ratio = 2.35          (<100= do not Wean)                  Cs =  29                        (<32= Instability)  Plat. Pressure = 24  MV = 7.13  RSBI = unable to obtain, patient not on spontaneous mode of ventilation.    Instabilities:       Cardiovascular =       CNS =       Respiratory =       Metabolic =    Parameters    no    Ask Physician for a weaning plan yes    Was SAT completed no    Was SBT completed no    Additional Comments: Plan to lower sedation and wean today. Possible extubation discussed with physician. Will follow up post rounds when orders are placed.     Performed by Karla Ken RCP RRT      Reference Table:    Cardiovascular     CNS      1. Mean BP less than or equal to 75   1. Neuromuscular blockade  2. Heart Rate greater than 130   2. RASS of -3, -4, -5  3. Myocardial Ischemia    3. RASS of +3, +4  4. Mechanical Assist Device    4. ICP greater than 15 or             Intracranial Hypertension         Respiratory      Metabolic  1. PEEP equal to or greater than 10cm/H20  1.Temp. (8hrs) less than 95 or > 103  2. Respiratory Rate greater than 35   2. WBC < 5000 or > 58927  3. Minute Volume greater than 15L  4. pH less than 7.30  5. Deteriorating chest X-ray

## 2025-03-29 NOTE — PATIENT CARE CONFERENCE
Intensive Care Daily Quality Rounding Checklist        ICU Team Members:   Dr. Maldonado, residents ,charge nurse, bedside nurse and respiratory therapy      ICU Day #: NUMBER: 4     SOFA Score: 6     Intubation Date: March 26     Ventilator Day #: 4     Central Line Insertion Date:  N/A                                                    Day #: N/A                                                    Indication: N/A      Arterial Line Insertion Date:  N/A                             Day #: N/A     Temporary Hemodialysis Catheter Insertion Date:  N/A                             Day # N/A     DVT Prophylaxis: Lovenox    GI Prophylaxis: Protonix     Camilo Catheter Insertion Date: March 26                                        Day #: 4                             Indications: Urinary retention                             Continued need (if yes, reason documented and discussed with physician): yes     Skin Issues/ Wounds and ordered treatment discussed on rounds: Yes, addressed     Goals/ Plans for the Day: Wean sedation and vent as tolerated. Stop tube feeds for extubation. Order medications for BM

## 2025-03-29 NOTE — PROGRESS NOTES
03/29/25 0841   NICU Vent Information   Equipment Changed (S)  Expiratory Filter   Vent Type 980   Vent Mode AC/VC   Vt (Set, mL) 400 mL   Vt (Measured) 439 mL   Resp Rate (Set) 16 bpm   Rate Measured 16 br/min   Minute Volume (L/min) 7.04 Liters   Peak Flow 70 L/min   FiO2  40 %   Peak Inspiratory Pressure (cmH2O) 30 cmH2O   I:E Ratio 1:5.00   PEEP/CPAP (cmH2O) 8   Mean Airway Pressure (cmH2O) 13 cmH20   Plateau Pressure 24 cmH20   Static Compliance 29 mL/cmH2O   Breath Sounds   Right Upper Lobe Diminished   Right Middle Lobe Diminished   Right Lower Lobe Diminished   Left Upper Lobe Diminished   Left Lower Lobe Diminished   Additional Respiratory Assessments   Pulse 60   Respirations 16   SpO2 96 %   Position Semi-Valenzuela's   Humidification Source Heated wire   Humidification Temp 37   Circuit Condensation Drained   Vent Alarm Settings   Low Pressure (cmH2O)   (red cord in wall)   High Pressure (cmH2O) 45 cmH2O   Low Minute Volume (lpm) 5 L/min   High Minute Volume (lpm) 15 L/min   Low Exhaled Vt (ml) 300 mL   High Exhaled Vt (ml) 700 mL   RR High (bpm) 35 br/min   Apnea (secs) 20 secs   ETT    Placement Date/Time: 03/26/25 1500   Present on Admission/Arrival: No  Placed By: Licensed provider  Placement Verified By: Auscultation;Capnometry;Chest X-ray;Colorimetric ETCO2 device;Direct visualization  Airway Type: Cuffed  Airway Tube Size: 8 mm...   Secured At 24 cm   Measured From Lips   ETT Placement Center   Secured By Commercial tube ramsey   Site Assessment Dry   Treatment   $Treatment Type $Inhaled Therapy/Meds

## 2025-03-29 NOTE — PROGRESS NOTES
03/28/25 2045   Patient Observation   Pulse 63   Respirations 28   SpO2 98 %   Vent Information   Ventilator ID [id number]   Equipment Changed (S)  Expiratory Filter   Vent Mode AC/VC   Ventilator Settings   Vt (Set, mL) 400 mL   Resp Rate (Set) 16 bpm   PEEP/CPAP (cmH2O) 8   FiO2  40 %   Peak Inspiratory Flow (Set) 70 L/sec   Vent Patient Data (Readings)   Vt (Measured) 437 mL   Peak Inspiratory Pressure (cmH2O) 26 cmH2O   Rate Measured 16 br/min   Minute Volume (L/min) 7.04 Liters   Peak Inspiratory Flow (lpm) 70 L/sec   Mean Airway Pressure (cmH2O) 12 cmH20   Plateau Pressure (cm H2O) 25 cm H2O   Driving Pressure 17   I:E Ratio 1:5.00   Flow Sensitivity 3 L/min   PEEP Intrinsic (cm H2O) 0 cm H2O   Static Compliance (L/cm H2O) 31   Backup Apnea On   Backup Rate 16 Breaths Per Minute   Backup Vt 400   Vent Alarm Settings   High Pressure (cmH2O) 45 cmH2O   Low Minute Volume (lpm) 5 L/min   High Minute Volume (lpm) 15 L/min   Low Exhaled Vt (ml) 300 mL   High Exhaled Vt (ml) 700 mL   RR High (bpm) 35 br/min   Apnea (secs) 20 secs   Additional Respiratoray Assessments   Humidification Source Heated wire   Humidification Temp 36.8   Ambu Bag With Mask At Bedside Yes   ETT    Placement Date/Time: 03/26/25 1500   Present on Admission/Arrival: No  Placed By: Licensed provider  Placement Verified By: Auscultation;Capnometry;Chest X-ray;Colorimetric ETCO2 device;Direct visualization  Airway Type: Cuffed  Airway Tube Size: 8 mm...   Secured At 24 cm   Measured From [facility]   ETT Placement Center   Secured By Commercial tube ramsey   Site Assessment Dry

## 2025-03-29 NOTE — PROGRESS NOTES
[facility]  Department of Internal Medicine   Internal Medicine Residency   MICU Progress Note    Patient:  Faiza Romero 54 y.o. female  MRN: [id number]     Date of Service: 3/29/2025    Allergy: Seasonal and Statins      Subjective     Patient extubated successfully this morning.  She is off sedation.  Remains hypertensive.  Per nursing, no other complaints or concerns at this time.    Objective     VS: BP (!) 183/78   Pulse 78   Temp 99 °F (37.2 °C)   Resp 21   Ht 1.727 m (5' 8\")   Wt 94.8 kg (208 lb 15.9 oz)   SpO2 92%   BMI 31.78 kg/m²           I & O - 24hr:   Intake/Output Summary (Last 24 hours) at 3/29/2025 1501  Last data filed at 3/29/2025 1130  Gross per 24 hour   Intake 2620.46 ml   Output 1225 ml   Net 1395.46 ml       Sedatives: None  Pressors: None  Oxygen: None    ABG:     Lab Results   Component Value Date/Time    PH 7.523 03/29/2025 04:55 AM    VCT8XVI 50.2 09/29/2020 10:55 AM    PCO2 33.9 03/29/2025 04:55 AM    PO2ART 365.2 09/29/2020 10:55 AM    PO2 94.1 03/29/2025 04:55 AM    YUV0AWZ 24.2 09/29/2020 10:55 AM    HCO3 27.2 03/29/2025 04:55 AM    BE 4.5 03/29/2025 04:55 AM    THB 10.5 03/29/2025 04:55 AM    O2SAT 97.2 03/29/2025 04:55 AM       Physical Exam:  General Appearance: No acute distress  Neuro: Round symmetric pupil that react to light bilaterally    Cardiovascular: regular rate and rhythm, S1 and S2 heard, no murmurs appreciated   Respiratory: Mildly diminished breath sounds bilaterally, but improved  Abdomen: Soft, non-tender; bowel sounds normal  Extremities: Extremities normal, no cyanosis, distal pulses intact    Lines & Urinary Catheter:   Peripheral IVs  Camilo     Labs     Basic Labs    Complete Blood Count:   Recent Labs     03/27/25  0357 03/28/25  0438 03/29/25  0430   WBC 10.2 13.7* 14.3*   HGB 9.2* 9.8* 9.8*   HCT 28.5* 30.5* 31.3*    373 360        Last 3 Blood Glucose:   Recent Labs     03/27/25  0357 03/28/25  0438 03/29/25  0430   GLUCOSE  clinically significant deterioration. I managed/supervised life or organ supporting interventions that required frequent physician assessment. I devoted my full attention to the direct care of this patient for the period of time indicated below. In addition to above, time was devoted to teaching and to any procedure.     NOTE: This report, in part or full, may have been transcribed using voice recognition software. Every effort was made to ensure accuracy; however, inadvertent computerized transcription errors may be present. Please excuse any transcriptional grammatical or spelling errors that may have escaped my editorial review.    Total critical care time caring for this patient with life threatening, unstable organ failure, including direct patient contact, management of life support systems, review of data including imaging and labs, discussions with other team members and physicians is at least 36 Min so far today, excluding procedures.    Hazel Maldonado MD

## 2025-03-29 NOTE — PROGRESS NOTES
End of Weaning attempt:     beginning at 1027 and ending at 1116.     HR 79bpm  RR 21bpm  SPO2 95%    SBT complete. Extubation order placed by physician.    Karla Ken RCP RRT

## 2025-03-29 NOTE — PROGRESS NOTES
and tubes in satisfactory position.   2. Stable increased interstitial markings seen diffusely throughout the lung   fields bilaterally.  No new focal parenchymal opacification present.         XR CHEST PORTABLE   Final Result   Endotracheal tube in good position.      NG tube in good position.      Extensive bilateral alveolar process.  Acute pulmonary edema of severe degree   versus extensive bilateral pneumonia.         XR ABDOMEN FOR NG/OG/NE TUBE PLACEMENT   Final Result   Enteric tube tip in the proximal gastric body.         XR CHEST PORTABLE   Final Result   Worsening of the interstitial markings throughout the lung fields in the   interval suggesting worsening edema or pneumonia.  Clinical correlation is   needed.         XR CERVICAL SPINE (2-3 VIEWS)   Final Result   No acute abnormality of the cervical spine.         XR SHOULDER LEFT (MIN 2 VIEWS)   Final Result   1. No acute fracture or dislocation.   2. Left lung airspace opacities.         XR KNEE RIGHT (1-2 VIEWS)   Final Result   No acute abnormality of the knee.         CT HEAD WO CONTRAST   Final Result   No acute intracranial abnormality.         XR CHEST PORTABLE    (Results Pending)        Assessment:  Principal Problem:    COPD with acute exacerbation (HCC)  Active Problems:    COPD (chronic obstructive pulmonary disease) (HCC)  Resolved Problems:    * No resolved hospital problems. *      Plan:  Acute hypoxic resp failure 2/2 COPD exac: presumed ARDS, requiring intubation wean vent as able, ICU following, cont steroids and wean as able. D/w outpt provider, does not have prescribed home O2 (using family O2?)  Encephalopathy: UDS positive methadone (appropriate) and cannabinoids (inappropriate), CTH negative, currently sedated and evaluate once   CHF: last EF 55%, rpt ECHO EF 55%, cont BB, cont diuresis  CAD s/p stent: cont BB  Acute renal insufficiency: Cr 1.2 on admit, apparent baseline ~0.7-0.9, trend BMP  HTN: cont BB, hold CCB, Imdur, ARB  for now given NPO, cont PRN for elevated BP  Leukocytosis: afebrile, procal elevated 0.45, follow Cx, cont empiric abx for now  Hyponatremia: resolved, trend lytes  Anemia: Hgb 8.0>7.0, s/p 1u RBC this admit  Hypothyroid: cont synthroid  DM2: SSI and adjust as indicated  Hx MOE: cont methadone as able    -extubated to 4L NC, monitor resp status, as noted yesterday patient not formally prescribed O2 however unsure if using family member's O2 and uncertain true baseline requirements  -Cr remains largely unchanged    Dispo: pending clinical course    NOTE: Portions of this report may have been transcribed using voice recognition software. Every effort was made to ensure accuracy; however, inadvertent computerized transcription errors may be present.  Electronically signed by Lisha Frank MD on 3/29/2025 at 12:04 PM

## 2025-03-29 NOTE — PROGRESS NOTES
Patient was extubated to 4 liters/min via nasal cannula. Breath Sounds post extubation were Bilaterally diminished. Stridor was not present post extubation. SPO2 was 91%.      Performed by  Karla Ken RCP

## 2025-03-29 NOTE — PROGRESS NOTES
Respiratory Wean Start time:  Order to wean ventilator Yes (if no, contact provider)    Patient placed on PS of 8, PEEP 5cmH2O, FIO2 40 at 1027AM.    HR 70bpm  RR 23bpm  SPO2 97%      Will continue to attempt to wean based on patient tolerance.       Performed by  Karla Ken RCP RRT

## 2025-03-29 NOTE — PROGRESS NOTES
Wean parameter done    VT= 384 mls  F= 21 B/M  V= 9.94 l/m  NIF= -50 cmH2O  VC= 808 mL  RSBI= 59    Patient awake, alert, and following commands. Patient has a positive cuff leak.

## 2025-03-29 NOTE — PLAN OF CARE
Problem: Chronic Conditions and Co-morbidities  Goal: Patient's chronic conditions and co-morbidity symptoms are monitored and maintained or improved  Outcome: Progressing  Flowsheets (Taken 3/28/2025 2000)  Care Plan - Patient's Chronic Conditions and Co-Morbidity Symptoms are Monitored and Maintained or Improved: Monitor and assess patient's chronic conditions and comorbid symptoms for stability, deterioration, or improvement     Problem: Safety - Adult  Goal: Free from fall injury  Outcome: Progressing     Problem: Skin/Tissue Integrity  Goal: Skin integrity remains intact  Description: 1.  Monitor for areas of redness and/or skin breakdown  2.  Assess vascular access sites hourly  3.  Every 4-6 hours minimum:  Change oxygen saturation probe site  4.  Every 4-6 hours:  If on nasal continuous positive airway pressure, respiratory therapy assess nares and determine need for appliance change or resting period  Outcome: Progressing  Flowsheets  Taken 3/29/2025 0037  Skin Integrity Remains Intact: Monitor for areas of redness and/or skin breakdown  Taken 3/28/2025 2000  Skin Integrity Remains Intact: Monitor for areas of redness and/or skin breakdown     Problem: Discharge Planning  Goal: Discharge to home or other facility with appropriate resources  Outcome: Progressing  Flowsheets (Taken 3/28/2025 2000)  Discharge to home or other facility with appropriate resources: Identify barriers to discharge with patient and caregiver     Problem: Safety - Medical Restraint  Goal: Remains free of injury from restraints (Restraint for Interference with Medical Device)  Description: INTERVENTIONS:  1. Determine that other, less restrictive measures have been tried or would not be effective before applying the restraint  2. Evaluate the patient's condition at the time of restraint application  3. Inform patient/family regarding the reason for restraint  4. Q2H: Monitor safety, psychosocial status, comfort, nutrition and  adequate comfort level or baseline comfort level: Assess pain using appropriate pain scale     Problem: Nutrition Deficit:  Goal: Optimize nutritional status  Outcome: Progressing

## 2025-03-30 ENCOUNTER — APPOINTMENT (OUTPATIENT)
Dept: GENERAL RADIOLOGY | Age: 55
DRG: 208 | End: 2025-03-30
Attending: INTERNAL MEDICINE
Payer: MEDICARE

## 2025-03-30 LAB
ANION GAP SERPL CALCULATED.3IONS-SCNC: 14 MMOL/L (ref 7–16)
BASOPHILS # BLD: 0.04 K/UL (ref 0–0.2)
BASOPHILS NFR BLD: 0 % (ref 0–2)
BUN SERPL-MCNC: 42 MG/DL (ref 6–20)
CALCIUM SERPL-MCNC: 9.1 MG/DL (ref 8.6–10.2)
CHLORIDE SERPL-SCNC: 99 MMOL/L (ref 98–107)
CO2 SERPL-SCNC: 26 MMOL/L (ref 22–29)
CREAT SERPL-MCNC: 1.2 MG/DL (ref 0.5–1)
EOSINOPHIL # BLD: 0.04 K/UL (ref 0.05–0.5)
EOSINOPHILS RELATIVE PERCENT: 0 % (ref 0–6)
ERYTHROCYTE [DISTWIDTH] IN BLOOD BY AUTOMATED COUNT: 15.4 % (ref 11.5–15)
GFR, ESTIMATED: 52 ML/MIN/1.73M2
GLUCOSE BLD-MCNC: 147 MG/DL (ref 74–99)
GLUCOSE BLD-MCNC: 159 MG/DL (ref 74–99)
GLUCOSE BLD-MCNC: 169 MG/DL (ref 74–99)
GLUCOSE SERPL-MCNC: 170 MG/DL (ref 74–99)
HCT VFR BLD AUTO: 34.6 % (ref 34–48)
HGB BLD-MCNC: 10.5 G/DL (ref 11.5–15.5)
IMM GRANULOCYTES # BLD AUTO: 0.57 K/UL (ref 0–0.58)
IMM GRANULOCYTES NFR BLD: 3 % (ref 0–5)
LYMPHOCYTES NFR BLD: 1.13 K/UL (ref 1.5–4)
LYMPHOCYTES RELATIVE PERCENT: 5 % (ref 20–42)
MAGNESIUM SERPL-MCNC: 2.4 MG/DL (ref 1.6–2.6)
MCH RBC QN AUTO: 28.7 PG (ref 26–35)
MCHC RBC AUTO-ENTMCNC: 30.3 G/DL (ref 32–34.5)
MCV RBC AUTO: 94.5 FL (ref 80–99.9)
MONOCYTES NFR BLD: 1.22 K/UL (ref 0.1–0.95)
MONOCYTES NFR BLD: 6 % (ref 2–12)
NEUTROPHILS NFR BLD: 86 % (ref 43–80)
NEUTS SEG NFR BLD: 18.6 K/UL (ref 1.8–7.3)
PHOSPHATE SERPL-MCNC: 3.8 MG/DL (ref 2.5–4.5)
PLATELET # BLD AUTO: 392 K/UL (ref 130–450)
PMV BLD AUTO: 10.2 FL (ref 7–12)
POTASSIUM SERPL-SCNC: 4.7 MMOL/L (ref 3.5–5)
RBC # BLD AUTO: 3.66 M/UL (ref 3.5–5.5)
SODIUM SERPL-SCNC: 139 MMOL/L (ref 132–146)
WBC OTHER # BLD: 21.6 K/UL (ref 4.5–11.5)

## 2025-03-30 PROCEDURE — 6360000002 HC RX W HCPCS: Performed by: INTERNAL MEDICINE

## 2025-03-30 PROCEDURE — 85025 COMPLETE CBC W/AUTO DIFF WBC: CPT

## 2025-03-30 PROCEDURE — 99232 SBSQ HOSP IP/OBS MODERATE 35: CPT | Performed by: STUDENT IN AN ORGANIZED HEALTH CARE EDUCATION/TRAINING PROGRAM

## 2025-03-30 PROCEDURE — 84100 ASSAY OF PHOSPHORUS: CPT

## 2025-03-30 PROCEDURE — 94640 AIRWAY INHALATION TREATMENT: CPT

## 2025-03-30 PROCEDURE — 71045 X-RAY EXAM CHEST 1 VIEW: CPT

## 2025-03-30 PROCEDURE — 6370000000 HC RX 637 (ALT 250 FOR IP)

## 2025-03-30 PROCEDURE — 83735 ASSAY OF MAGNESIUM: CPT

## 2025-03-30 PROCEDURE — 6360000002 HC RX W HCPCS

## 2025-03-30 PROCEDURE — 94660 CPAP INITIATION&MGMT: CPT

## 2025-03-30 PROCEDURE — 2580000003 HC RX 258: Performed by: INTERNAL MEDICINE

## 2025-03-30 PROCEDURE — 2500000003 HC RX 250 WO HCPCS: Performed by: INTERNAL MEDICINE

## 2025-03-30 PROCEDURE — 82962 GLUCOSE BLOOD TEST: CPT

## 2025-03-30 PROCEDURE — 6370000000 HC RX 637 (ALT 250 FOR IP): Performed by: INTERNAL MEDICINE

## 2025-03-30 PROCEDURE — 1200000000 HC SEMI PRIVATE

## 2025-03-30 PROCEDURE — 80048 BASIC METABOLIC PNL TOTAL CA: CPT

## 2025-03-30 PROCEDURE — 6370000000 HC RX 637 (ALT 250 FOR IP): Performed by: STUDENT IN AN ORGANIZED HEALTH CARE EDUCATION/TRAINING PROGRAM

## 2025-03-30 PROCEDURE — 2700000000 HC OXYGEN THERAPY PER DAY

## 2025-03-30 RX ORDER — PREDNISONE 20 MG/1
40 TABLET ORAL DAILY
Status: DISCONTINUED | OUTPATIENT
Start: 2025-03-31 | End: 2025-04-01

## 2025-03-30 RX ORDER — DIPHENHYDRAMINE HCL 25 MG
25 TABLET ORAL ONCE
Status: COMPLETED | OUTPATIENT
Start: 2025-03-30 | End: 2025-03-30

## 2025-03-30 RX ORDER — LOSARTAN POTASSIUM 50 MG/1
50 TABLET ORAL DAILY
Status: DISCONTINUED | OUTPATIENT
Start: 2025-03-30 | End: 2025-03-31

## 2025-03-30 RX ORDER — PROCHLORPERAZINE MALEATE 10 MG
5 TABLET ORAL
Status: DISCONTINUED | OUTPATIENT
Start: 2025-03-30 | End: 2025-04-03 | Stop reason: HOSPADM

## 2025-03-30 RX ORDER — INSULIN LISPRO 100 [IU]/ML
0-8 INJECTION, SOLUTION INTRAVENOUS; SUBCUTANEOUS
Status: DISCONTINUED | OUTPATIENT
Start: 2025-03-30 | End: 2025-04-03 | Stop reason: HOSPADM

## 2025-03-30 RX ADMIN — METHYLPREDNISOLONE SODIUM SUCCINATE 40 MG: 40 INJECTION, POWDER, FOR SOLUTION INTRAMUSCULAR; INTRAVENOUS at 12:40

## 2025-03-30 RX ADMIN — ACETAMINOPHEN 650 MG: 325 TABLET ORAL at 15:43

## 2025-03-30 RX ADMIN — LEVOTHYROXINE SODIUM 125 MCG: 0.03 TABLET ORAL at 06:48

## 2025-03-30 RX ADMIN — SODIUM CHLORIDE, PRESERVATIVE FREE 10 ML: 5 INJECTION INTRAVENOUS at 08:39

## 2025-03-30 RX ADMIN — METHADONE HYDROCHLORIDE 47 MG: 10 CONCENTRATE ORAL at 08:31

## 2025-03-30 RX ADMIN — PIPERACILLIN AND TAZOBACTAM 4500 MG: 4; .5 INJECTION, POWDER, LYOPHILIZED, FOR SOLUTION INTRAVENOUS at 12:50

## 2025-03-30 RX ADMIN — AMLODIPINE BESYLATE 10 MG: 10 TABLET ORAL at 08:19

## 2025-03-30 RX ADMIN — GUAIFENESIN 400 MG: 400 TABLET ORAL at 06:48

## 2025-03-30 RX ADMIN — ASPIRIN 81 MG: 81 TABLET, COATED ORAL at 08:19

## 2025-03-30 RX ADMIN — ARFORMOTEROL TARTRATE 15 MCG: 15 SOLUTION RESPIRATORY (INHALATION) at 20:14

## 2025-03-30 RX ADMIN — GUAIFENESIN 400 MG: 400 TABLET ORAL at 12:54

## 2025-03-30 RX ADMIN — NALOXEGOL OXALATE 12.5 MG: 12.5 TABLET, FILM COATED ORAL at 08:19

## 2025-03-30 RX ADMIN — ENOXAPARIN SODIUM 40 MG: 100 INJECTION SUBCUTANEOUS at 08:19

## 2025-03-30 RX ADMIN — SODIUM CHLORIDE, PRESERVATIVE FREE 10 ML: 5 INJECTION INTRAVENOUS at 22:12

## 2025-03-30 RX ADMIN — IPRATROPIUM BROMIDE AND ALBUTEROL SULFATE 1 DOSE: 2.5; .5 SOLUTION RESPIRATORY (INHALATION) at 16:36

## 2025-03-30 RX ADMIN — ACETAMINOPHEN 650 MG: 325 TABLET ORAL at 03:47

## 2025-03-30 RX ADMIN — FUROSEMIDE 40 MG: 40 TABLET ORAL at 08:19

## 2025-03-30 RX ADMIN — INSULIN GLARGINE 30 UNITS: 100 INJECTION, SOLUTION SUBCUTANEOUS at 08:53

## 2025-03-30 RX ADMIN — IPRATROPIUM BROMIDE AND ALBUTEROL SULFATE 1 DOSE: 2.5; .5 SOLUTION RESPIRATORY (INHALATION) at 20:14

## 2025-03-30 RX ADMIN — ISOSORBIDE MONONITRATE 30 MG: 30 TABLET, EXTENDED RELEASE ORAL at 08:19

## 2025-03-30 RX ADMIN — PIPERACILLIN AND TAZOBACTAM 4500 MG: 4; .5 INJECTION, POWDER, LYOPHILIZED, FOR SOLUTION INTRAVENOUS at 22:39

## 2025-03-30 RX ADMIN — CLOPIDOGREL BISULFATE 75 MG: 75 TABLET, FILM COATED ORAL at 08:19

## 2025-03-30 RX ADMIN — HYDRALAZINE HYDROCHLORIDE 10 MG: 20 INJECTION INTRAMUSCULAR; INTRAVENOUS at 04:30

## 2025-03-30 RX ADMIN — METOPROLOL TARTRATE 50 MG: 50 TABLET, FILM COATED ORAL at 22:11

## 2025-03-30 RX ADMIN — ACETAMINOPHEN 650 MG: 325 TABLET ORAL at 10:18

## 2025-03-30 RX ADMIN — LANSOPRAZOLE 30 MG: 30 TABLET, ORALLY DISINTEGRATING, DELAYED RELEASE ORAL at 06:48

## 2025-03-30 RX ADMIN — METOPROLOL TARTRATE 50 MG: 50 TABLET, FILM COATED ORAL at 08:19

## 2025-03-30 RX ADMIN — LOSARTAN POTASSIUM 50 MG: 50 TABLET, FILM COATED ORAL at 09:00

## 2025-03-30 RX ADMIN — ARFORMOTEROL TARTRATE 15 MCG: 15 SOLUTION RESPIRATORY (INHALATION) at 09:04

## 2025-03-30 RX ADMIN — METHYLPREDNISOLONE SODIUM SUCCINATE 40 MG: 40 INJECTION, POWDER, FOR SOLUTION INTRAMUSCULAR; INTRAVENOUS at 22:11

## 2025-03-30 RX ADMIN — PROCHLORPERAZINE MALEATE 5 MG: 10 TABLET ORAL at 16:45

## 2025-03-30 RX ADMIN — PIPERACILLIN AND TAZOBACTAM 4500 MG: 4; .5 INJECTION, POWDER, LYOPHILIZED, FOR SOLUTION INTRAVENOUS at 03:29

## 2025-03-30 RX ADMIN — POLYETHYLENE GLYCOL 3350 17 G: 17 POWDER, FOR SOLUTION ORAL at 08:19

## 2025-03-30 RX ADMIN — IPRATROPIUM BROMIDE AND ALBUTEROL SULFATE 1 DOSE: 2.5; .5 SOLUTION RESPIRATORY (INHALATION) at 13:06

## 2025-03-30 RX ADMIN — GUAIFENESIN 400 MG: 400 TABLET ORAL at 22:11

## 2025-03-30 RX ADMIN — DIPHENHYDRAMINE HCL 25 MG: 25 TABLET ORAL at 16:45

## 2025-03-30 RX ADMIN — IPRATROPIUM BROMIDE AND ALBUTEROL SULFATE 1 DOSE: 2.5; .5 SOLUTION RESPIRATORY (INHALATION) at 09:04

## 2025-03-30 ASSESSMENT — PAIN SCALES - GENERAL
PAINLEVEL_OUTOF10: 3
PAINLEVEL_OUTOF10: 0
PAINLEVEL_OUTOF10: 1

## 2025-03-30 ASSESSMENT — PAIN DESCRIPTION - LOCATION
LOCATION: HEAD
LOCATION: BACK

## 2025-03-30 ASSESSMENT — PAIN DESCRIPTION - DESCRIPTORS: DESCRIPTORS: ACHING;DULL;DISCOMFORT

## 2025-03-30 ASSESSMENT — PAIN DESCRIPTION - ORIENTATION: ORIENTATION: LEFT;RIGHT;MID

## 2025-03-30 ASSESSMENT — PULMONARY FUNCTION TESTS: PIF_VALUE: 0

## 2025-03-30 NOTE — PROGRESS NOTES
[facility] Hospitalist Progress Note    Admitting Date and Time: 3/25/2025  6:20 PM  Admit Dx: COPD (chronic obstructive pulmonary disease) (HCC) [J44.9]  COPD with acute exacerbation (HCC) [J44.1]    Subjective:  Patient is being followed for COPD (chronic obstructive pulmonary disease) (HCC) [J44.9]  COPD with acute exacerbation (HCC) [J44.1]   Pt feel better  Per RN: no additional concerns    ROS: reports aching diffusely     insulin lispro  0-8 Units SubCUTAneous 4x Daily AC & HS    metoprolol tartrate  50 mg Oral BID    isosorbide mononitrate  30 mg Oral Daily    methylPREDNISolone  40 mg IntraVENous Q12H    amLODIPine  10 mg Orogastric Daily    insulin glargine  30 Units SubCUTAneous Daily    furosemide  40 mg Oral Daily    naloxegol  12.5 mg Oral QAM AC    enoxaparin  40 mg SubCUTAneous Daily    lansoprazole  30 mg Per NG tube QAM AC    methadone  47 mg Per NG tube Daily    docusate  100 mg Orogastric BID    polyethylene glycol  17 g Orogastric Daily    piperacillin-tazobactam  4,500 mg IntraVENous Q8H    aspirin  81 mg Oral Daily    clopidogrel  75 mg Oral Daily    levothyroxine  125 mcg Oral Daily    [Held by provider] losartan  100 mg Oral Daily    [Held by provider] rosuvastatin  10 mg Oral Daily    sodium chloride flush  5-40 mL IntraVENous 2 times per day    ipratropium 0.5 mg-albuterol 2.5 mg  1 Dose Inhalation Q4H WA RT    arformoterol tartrate  15 mcg Nebulization BID RT    guaiFENesin  400 mg Oral 3 times per day     hydrALAZINE, 10 mg, Q6H PRN  midazolam, 1 mg, Q30 Min PRN  fentaNYL, 50 mcg, Q30 Min PRN  LORazepam, 0.5 mg, Q8H PRN  sodium chloride, , PRN  sodium chloride flush, 5-40 mL, PRN  sodium chloride, , PRN  ondansetron, 4 mg, Q8H PRN   Or  ondansetron, 4 mg, Q6H PRN  acetaminophen, 650 mg, Q6H PRN   Or  acetaminophen, 650 mg, Q6H PRN  benzonatate, 100 mg, TID PRN  glucose, 4 tablet, PRN  dextrose bolus, 125 mL, PRN   Or  dextrose bolus, 250 mL, PRN  glucagon (rDNA), 1 mg,  PORTABLE   Final Result   1. Life support lines and tubes in satisfactory position.   2. Stable increased interstitial markings seen diffusely throughout the lung   fields bilaterally.  No new focal parenchymal opacification present.         XR CHEST PORTABLE   Final Result   Endotracheal tube in good position.      NG tube in good position.      Extensive bilateral alveolar process.  Acute pulmonary edema of severe degree   versus extensive bilateral pneumonia.         XR ABDOMEN FOR NG/OG/NE TUBE PLACEMENT   Final Result   Enteric tube tip in the proximal gastric body.         XR CHEST PORTABLE   Final Result   Worsening of the interstitial markings throughout the lung fields in the   interval suggesting worsening edema or pneumonia.  Clinical correlation is   needed.         XR CERVICAL SPINE (2-3 VIEWS)   Final Result   No acute abnormality of the cervical spine.         XR SHOULDER LEFT (MIN 2 VIEWS)   Final Result   1. No acute fracture or dislocation.   2. Left lung airspace opacities.         XR KNEE RIGHT (1-2 VIEWS)   Final Result   No acute abnormality of the knee.         CT HEAD WO CONTRAST   Final Result   No acute intracranial abnormality.         XR CHEST PORTABLE    (Results Pending)        Assessment:  Principal Problem:    COPD with acute exacerbation (HCC)  Active Problems:    COPD (chronic obstructive pulmonary disease) (HCC)  Resolved Problems:    * No resolved hospital problems. *      Plan:  Acute hypoxic resp failure 2/2 COPD exac: presumed ARDS, requiring intubation extubated on 3/29, ICU following, cont steroids and wean as able. Patient reports using MIL's O2 at home, baseline 3L NC  Encephalopathy: UDS positive methadone (appropriate) and cannabinoids (inappropriate), CTH negative, now extubated and improving  CHF: last EF 55%, rpt ECHO EF 55%, cont BB, cont diuresis  CAD s/p stent: cont BB  Acute renal insufficiency: Cr 1.2 on admit, apparent baseline ~0.7-0.9, trend BMP  HTN: cont BB,

## 2025-03-30 NOTE — PLAN OF CARE
Problem: Chronic Conditions and Co-morbidities  Goal: Patient's chronic conditions and co-morbidity symptoms are monitored and maintained or improved  3/30/2025 0143 by Sita Cooper RN  Outcome: Progressing  3/29/2025 2014 by Talya Danielson RN  Outcome: Progressing     Problem: Safety - Adult  Goal: Free from fall injury  3/30/2025 0143 by Sita Cooper RN  Outcome: Progressing  3/29/2025 2014 by Talya Danielson RN  Outcome: Progressing     Problem: Skin/Tissue Integrity  Goal: Skin integrity remains intact  Description: 1.  Monitor for areas of redness and/or skin breakdown  2.  Assess vascular access sites hourly  3.  Every 4-6 hours minimum:  Change oxygen saturation probe site  4.  Every 4-6 hours:  If on nasal continuous positive airway pressure, respiratory therapy assess nares and determine need for appliance change or resting period  3/30/2025 0143 by Sita Cooper RN  Outcome: Progressing  Flowsheets (Taken 3/30/2025 0142)  Skin Integrity Remains Intact: Every 4-6 hours minimum: Change oxygen saturation probe site  3/29/2025 2014 by Talya Danielson RN  Outcome: Progressing     Problem: Discharge Planning  Goal: Discharge to home or other facility with appropriate resources  3/30/2025 0143 by Sita Cooper RN  Outcome: Progressing  3/29/2025 2014 by Talya Danielson RN  Outcome: Progressing     Problem: Safety - Medical Restraint  Goal: Remains free of injury from restraints (Restraint for Interference with Medical Device)  Description: INTERVENTIONS:  1. Determine that other, less restrictive measures have been tried or would not be effective before applying the restraint  2. Evaluate the patient's condition at the time of restraint application  3. Inform patient/family regarding the reason for restraint  4. Q2H: Monitor safety, psychosocial status, comfort, nutrition and hydration  3/30/2025 0143 by Sita Cooper RN  Outcome: Progressing  3/29/2025 2014 by Talya Danielson  RN  Outcome: Progressing  Flowsheets  Taken 3/29/2025 1000  Remains free of injury from restraints (restraint for interference with medical device): Every 2 hours: Monitor safety, psychosocial status, comfort, nutrition and hydration  Taken 3/29/2025 0800  Remains free of injury from restraints (restraint for interference with medical device): Every 2 hours: Monitor safety, psychosocial status, comfort, nutrition and hydration  Taken 3/29/2025 0706  Remains free of injury from restraints (restraint for interference with medical device): Every 2 hours: Monitor safety, psychosocial status, comfort, nutrition and hydration     Problem: Pain  Goal: Verbalizes/displays adequate comfort level or baseline comfort level  3/30/2025 0143 by Sita Cooper RN  Outcome: Progressing  3/29/2025 2014 by Talya Danielson RN  Outcome: Progressing     Problem: Nutrition Deficit:  Goal: Optimize nutritional status  3/30/2025 0143 by Sita Cooper RN  Outcome: Progressing  3/29/2025 2014 by Talya Danielson RN  Outcome: Progressing

## 2025-03-30 NOTE — PLAN OF CARE
Problem: Chronic Conditions and Co-morbidities  Goal: Patient's chronic conditions and co-morbidity symptoms are monitored and maintained or improved  Outcome: Progressing     Problem: Safety - Adult  Goal: Free from fall injury  Outcome: Progressing     Problem: Skin/Tissue Integrity  Goal: Skin integrity remains intact  Description: 1.  Monitor for areas of redness and/or skin breakdown  2.  Assess vascular access sites hourly  3.  Every 4-6 hours minimum:  Change oxygen saturation probe site  4.  Every 4-6 hours:  If on nasal continuous positive airway pressure, respiratory therapy assess nares and determine need for appliance change or resting period  Outcome: Progressing     Problem: Discharge Planning  Goal: Discharge to home or other facility with appropriate resources  Outcome: Progressing     Problem: Safety - Medical Restraint  Goal: Remains free of injury from restraints (Restraint for Interference with Medical Device)  Description: INTERVENTIONS:  1. Determine that other, less restrictive measures have been tried or would not be effective before applying the restraint  2. Evaluate the patient's condition at the time of restraint application  3. Inform patient/family regarding the reason for restraint  4. Q2H: Monitor safety, psychosocial status, comfort, nutrition and hydration  Outcome: Progressing  Flowsheets  Taken 3/29/2025 1000  Remains free of injury from restraints (restraint for interference with medical device): Every 2 hours: Monitor safety, psychosocial status, comfort, nutrition and hydration  Taken 3/29/2025 0800  Remains free of injury from restraints (restraint for interference with medical device): Every 2 hours: Monitor safety, psychosocial status, comfort, nutrition and hydration  Taken 3/29/2025 0706  Remains free of injury from restraints (restraint for interference with medical device): Every 2 hours: Monitor safety, psychosocial status, comfort, nutrition and hydration

## 2025-03-30 NOTE — PROGRESS NOTES
[facility]  Department of Internal Medicine   Internal Medicine Residency   MICU Progress Note    Patient:  Faiza Romero 54 y.o. female  MRN: [id number]     Date of Service: 3/30/2025    Allergy: Seasonal and Statins      Subjective     Patient extubated successfully yesterday.  She is currently on 3 L of NC.  She is sitting comfortably in chair.  and daughter at bedside. Patient has no complaints at this time.      ROS: Denies any chest pain, shortness of breath, dysphagia, abdominal pain, nausea, vomiting  Objective     VS: BP (!) 168/70   Pulse 61   Temp 98.3 °F (36.8 °C)   Resp 17   Ht 1.727 m (5' 8\")   Wt 96.7 kg (213 lb 3 oz)   SpO2 99%   BMI 32.41 kg/m²           I & O - 24hr:   Intake/Output Summary (Last 24 hours) at 3/30/2025 1423  Last data filed at 3/30/2025 0600  Gross per 24 hour   Intake --   Output 2200 ml   Net -2200 ml       Sedatives: None  Pressors: None  Oxygen: 3L NC    ABG:     Lab Results   Component Value Date/Time    PH 7.523 03/29/2025 04:55 AM    AUM0FOG 50.2 09/29/2020 10:55 AM    PCO2 33.9 03/29/2025 04:55 AM    PO2ART 365.2 09/29/2020 10:55 AM    PO2 94.1 03/29/2025 04:55 AM    LPW7WRF 24.2 09/29/2020 10:55 AM    HCO3 27.2 03/29/2025 04:55 AM    BE 4.5 03/29/2025 04:55 AM    THB 10.5 03/29/2025 04:55 AM    O2SAT 97.2 03/29/2025 04:55 AM       Physical Exam:  General Appearance: No acute distress, sitting comfortably in chair  Neuro: Round symmetric pupil that react to light bilaterally    Cardiovascular: regular rate and rhythm, S1 and S2 heard, no murmurs appreciated   Respiratory: NC in place.  Mildly expiratory wheezing bilaterally, but improved  Abdomen: Soft, non-tender; bowel sounds normal  Extremities: Extremities normal, no cyanosis, distal pulses intact    Lines & Urinary Catheter:   Peripheral IVs  Camilo     Labs     Basic Labs    Complete Blood Count:   Recent Labs     03/28/25  0438 03/29/25  0430 03/30/25  0451   WBC 13.7* 14.3* 21.6*   HGB  9.8* 9.8* 10.5*   HCT 30.5* 31.3* 34.6    360 392        Last 3 Blood Glucose:   Recent Labs     03/28/25  0438 03/29/25  0430 03/30/25  0451   GLUCOSE 223* 188* 170*        PT/INR:    Lab Results   Component Value Date/Time    PROTIME 13.0 09/23/2022 04:49 PM    INR 1.1 09/23/2022 04:49 PM     PTT:    Lab Results   Component Value Date/Time    APTT 33.3 09/23/2022 04:49 PM       Comprehensive Metabolic Profile:   Recent Labs     03/28/25  0438 03/28/25  0541 03/29/25  0430 03/30/25  0451     --  138 139   K 4.3 3.6 4.1 4.7   CL 97*  --  100 99   CO2 26  --  28 26   BUN 31*  --  37* 42*   CREATININE 1.2*  --  1.3* 1.2*   GLUCOSE 223*  --  188* 170*   CALCIUM 9.1  --  9.2 9.1      Magnesium:   Lab Results   Component Value Date/Time    MG 2.4 03/30/2025 04:51 AM     Phosphorus:   Lab Results   Component Value Date/Time    PHOS 3.8 03/30/2025 04:51 AM     Ionized Calcium: No results found for: \"CAION\"   Troponin: No results for input(s): \"TROPONINI\" in the last 72 hours.    Imaging Studies:         Resident's Assessment and Plan     Summary of Hospital Course:  The patient is a 54 y.o. female with significant past medical history of COPD normally on 3 L of O2, CHF, type II DM, CAD with stent placement who was transferred from [place] yesterday for acute COPD exacerbation.  Patient had reported some dyspnea.  Patient was also noted to recently have influenza A.  Treated for COPD exacerbation.  Of note, patient is on methadone.     Patient had RRT called this morning due to hypoxia and increasing lethargy.  Patient had received 3 doses of Narcan during this hospitalization thus far.  Patient was on BiPAP and O2 was increased to 100%.  Patient continuing to be lethargic with concerns of impending respiratory failure.  Transferred to the ICU for further management.  Repeat ABG shows worsening pCO2.  Patient was intubated in the ICU.    3/27  Patient continues to remain intubated and sedated, chest x-ray

## 2025-03-30 NOTE — PATIENT CARE CONFERENCE
Intensive Care Daily Quality Rounding Checklist        ICU Team Members:   Dr. Maldonado, residents ,charge nurse, bedside nurse and respiratory therapy      ICU Day #: NUMBER: 5     SOFA Score: 6     Intubation Date: Extubated 3/29/2025     Ventilator Day #: N/A     Central Line Insertion Date:  N/A                                                    Day #: N/A                                                    Indication: N/A      Arterial Line Insertion Date:  N/A                             Day #: N/A     Temporary Hemodialysis Catheter Insertion Date:  N/A                             Day # N/A     DVT Prophylaxis: Lovenox    GI Prophylaxis: Protonix     Camilo Catheter Insertion Date: March 26                                        Day #: 5                             Indications: Urinary retention                             Continued need (if yes, reason documented and discussed with physician): yes     Skin Issues/ Wounds and ordered treatment discussed on rounds: Yes, addressed     Goals/ Plans for the Day: Monitor labs and vitals. OOB to chair. Restarted BP medications. Advance diet

## 2025-03-30 NOTE — PLAN OF CARE
Problem: Chronic Conditions and Co-morbidities  Goal: Patient's chronic conditions and co-morbidity symptoms are monitored and maintained or improved  Outcome: Progressing     Problem: Safety - Adult  Goal: Free from fall injury  Outcome: Progressing     Problem: Skin/Tissue Integrity  Goal: Skin integrity remains intact  Description: 1.  Monitor for areas of redness and/or skin breakdown  2.  Assess vascular access sites hourly  3.  Every 4-6 hours minimum:  Change oxygen saturation probe site  4.  Every 4-6 hours:  If on nasal continuous positive airway pressure, respiratory therapy assess nares and determine need for appliance change or resting period  Outcome: Progressing     Problem: Discharge Planning  Goal: Discharge to home or other facility with appropriate resources  Outcome: Progressing     Problem: Safety - Medical Restraint  Goal: Remains free of injury from restraints (Restraint for Interference with Medical Device)  Description: INTERVENTIONS:  1. Determine that other, less restrictive measures have been tried or would not be effective before applying the restraint  2. Evaluate the patient's condition at the time of restraint application  3. Inform patient/family regarding the reason for restraint  4. Q2H: Monitor safety, psychosocial status, comfort, nutrition and hydration  Outcome: Progressing     Problem: Pain  Goal: Verbalizes/displays adequate comfort level or baseline comfort level  Outcome: Progressing     Problem: Nutrition Deficit:  Goal: Optimize nutritional status  Outcome: Progressing

## 2025-03-31 ENCOUNTER — APPOINTMENT (OUTPATIENT)
Dept: GENERAL RADIOLOGY | Age: 55
DRG: 208 | End: 2025-03-31
Attending: INTERNAL MEDICINE
Payer: MEDICARE

## 2025-03-31 ENCOUNTER — TELEPHONE (OUTPATIENT)
Dept: CARDIOLOGY CLINIC | Age: 55
End: 2025-03-31

## 2025-03-31 LAB
ANION GAP SERPL CALCULATED.3IONS-SCNC: 12 MMOL/L (ref 7–16)
BASOPHILS # BLD: 0.03 K/UL (ref 0–0.2)
BASOPHILS NFR BLD: 0 % (ref 0–2)
BUN SERPL-MCNC: 33 MG/DL (ref 6–20)
CALCIUM SERPL-MCNC: 8.9 MG/DL (ref 8.6–10.2)
CHLORIDE SERPL-SCNC: 97 MMOL/L (ref 98–107)
CO2 SERPL-SCNC: 27 MMOL/L (ref 22–29)
CREAT SERPL-MCNC: 1.1 MG/DL (ref 0.5–1)
EOSINOPHIL # BLD: 0.03 K/UL (ref 0.05–0.5)
EOSINOPHILS RELATIVE PERCENT: 0 % (ref 0–6)
ERYTHROCYTE [DISTWIDTH] IN BLOOD BY AUTOMATED COUNT: 15.3 % (ref 11.5–15)
GFR, ESTIMATED: 60 ML/MIN/1.73M2
GLUCOSE BLD-MCNC: 140 MG/DL (ref 74–99)
GLUCOSE BLD-MCNC: 203 MG/DL (ref 74–99)
GLUCOSE BLD-MCNC: 207 MG/DL (ref 74–99)
GLUCOSE BLD-MCNC: 218 MG/DL (ref 74–99)
GLUCOSE BLD-MCNC: 224 MG/DL (ref 74–99)
GLUCOSE SERPL-MCNC: 198 MG/DL (ref 74–99)
HCT VFR BLD AUTO: 35.2 % (ref 34–48)
HGB BLD-MCNC: 10.5 G/DL (ref 11.5–15.5)
IMM GRANULOCYTES # BLD AUTO: 0.55 K/UL (ref 0–0.58)
IMM GRANULOCYTES NFR BLD: 3 % (ref 0–5)
LYMPHOCYTES NFR BLD: 1.41 K/UL (ref 1.5–4)
LYMPHOCYTES RELATIVE PERCENT: 8 % (ref 20–42)
MCH RBC QN AUTO: 28.5 PG (ref 26–35)
MCHC RBC AUTO-ENTMCNC: 29.8 G/DL (ref 32–34.5)
MCV RBC AUTO: 95.7 FL (ref 80–99.9)
MICROORGANISM SPEC CULT: NORMAL
MICROORGANISM SPEC CULT: NORMAL
MONOCYTES NFR BLD: 0.95 K/UL (ref 0.1–0.95)
MONOCYTES NFR BLD: 6 % (ref 2–12)
NEUTROPHILS NFR BLD: 83 % (ref 43–80)
NEUTS SEG NFR BLD: 13.98 K/UL (ref 1.8–7.3)
PLATELET # BLD AUTO: 362 K/UL (ref 130–450)
PMV BLD AUTO: 10.5 FL (ref 7–12)
POTASSIUM SERPL-SCNC: 4.1 MMOL/L (ref 3.5–5)
RBC # BLD AUTO: 3.68 M/UL (ref 3.5–5.5)
SERVICE CMNT-IMP: NORMAL
SERVICE CMNT-IMP: NORMAL
SODIUM SERPL-SCNC: 136 MMOL/L (ref 132–146)
SPECIMEN DESCRIPTION: NORMAL
SPECIMEN DESCRIPTION: NORMAL
WBC OTHER # BLD: 17 K/UL (ref 4.5–11.5)

## 2025-03-31 PROCEDURE — 6370000000 HC RX 637 (ALT 250 FOR IP): Performed by: STUDENT IN AN ORGANIZED HEALTH CARE EDUCATION/TRAINING PROGRAM

## 2025-03-31 PROCEDURE — 2060000000 HC ICU INTERMEDIATE R&B

## 2025-03-31 PROCEDURE — 2500000003 HC RX 250 WO HCPCS: Performed by: INTERNAL MEDICINE

## 2025-03-31 PROCEDURE — 6360000002 HC RX W HCPCS: Performed by: INTERNAL MEDICINE

## 2025-03-31 PROCEDURE — 6370000000 HC RX 637 (ALT 250 FOR IP): Performed by: INTERNAL MEDICINE

## 2025-03-31 PROCEDURE — 80048 BASIC METABOLIC PNL TOTAL CA: CPT

## 2025-03-31 PROCEDURE — 99232 SBSQ HOSP IP/OBS MODERATE 35: CPT | Performed by: STUDENT IN AN ORGANIZED HEALTH CARE EDUCATION/TRAINING PROGRAM

## 2025-03-31 PROCEDURE — 82962 GLUCOSE BLOOD TEST: CPT

## 2025-03-31 PROCEDURE — 6370000000 HC RX 637 (ALT 250 FOR IP)

## 2025-03-31 PROCEDURE — 97535 SELF CARE MNGMENT TRAINING: CPT

## 2025-03-31 PROCEDURE — 2700000000 HC OXYGEN THERAPY PER DAY

## 2025-03-31 PROCEDURE — 94640 AIRWAY INHALATION TREATMENT: CPT

## 2025-03-31 PROCEDURE — 2580000003 HC RX 258: Performed by: INTERNAL MEDICINE

## 2025-03-31 PROCEDURE — 71045 X-RAY EXAM CHEST 1 VIEW: CPT

## 2025-03-31 PROCEDURE — 6360000002 HC RX W HCPCS

## 2025-03-31 PROCEDURE — 94660 CPAP INITIATION&MGMT: CPT

## 2025-03-31 PROCEDURE — 85025 COMPLETE CBC W/AUTO DIFF WBC: CPT

## 2025-03-31 PROCEDURE — 97530 THERAPEUTIC ACTIVITIES: CPT

## 2025-03-31 PROCEDURE — 97165 OT EVAL LOW COMPLEX 30 MIN: CPT

## 2025-03-31 RX ORDER — LOSARTAN POTASSIUM 50 MG/1
100 TABLET ORAL DAILY
Status: DISCONTINUED | OUTPATIENT
Start: 2025-03-31 | End: 2025-04-03 | Stop reason: HOSPADM

## 2025-03-31 RX ADMIN — INSULIN LISPRO 2 UNITS: 100 INJECTION, SOLUTION INTRAVENOUS; SUBCUTANEOUS at 18:20

## 2025-03-31 RX ADMIN — INSULIN GLARGINE 30 UNITS: 100 INJECTION, SOLUTION SUBCUTANEOUS at 08:28

## 2025-03-31 RX ADMIN — PIPERACILLIN AND TAZOBACTAM 4500 MG: 4; .5 INJECTION, POWDER, LYOPHILIZED, FOR SOLUTION INTRAVENOUS at 16:28

## 2025-03-31 RX ADMIN — METOPROLOL TARTRATE 50 MG: 50 TABLET, FILM COATED ORAL at 21:22

## 2025-03-31 RX ADMIN — AMLODIPINE BESYLATE 10 MG: 10 TABLET ORAL at 08:29

## 2025-03-31 RX ADMIN — ARFORMOTEROL TARTRATE 15 MCG: 15 SOLUTION RESPIRATORY (INHALATION) at 08:50

## 2025-03-31 RX ADMIN — LOSARTAN POTASSIUM 100 MG: 50 TABLET, FILM COATED ORAL at 08:29

## 2025-03-31 RX ADMIN — IPRATROPIUM BROMIDE AND ALBUTEROL SULFATE 1 DOSE: 2.5; .5 SOLUTION RESPIRATORY (INHALATION) at 08:50

## 2025-03-31 RX ADMIN — LANSOPRAZOLE 30 MG: 30 TABLET, ORALLY DISINTEGRATING, DELAYED RELEASE ORAL at 05:50

## 2025-03-31 RX ADMIN — ENOXAPARIN SODIUM 40 MG: 100 INJECTION SUBCUTANEOUS at 08:28

## 2025-03-31 RX ADMIN — ARFORMOTEROL TARTRATE 15 MCG: 15 SOLUTION RESPIRATORY (INHALATION) at 18:40

## 2025-03-31 RX ADMIN — ISOSORBIDE MONONITRATE 30 MG: 30 TABLET, EXTENDED RELEASE ORAL at 08:29

## 2025-03-31 RX ADMIN — IPRATROPIUM BROMIDE AND ALBUTEROL SULFATE 1 DOSE: 2.5; .5 SOLUTION RESPIRATORY (INHALATION) at 15:16

## 2025-03-31 RX ADMIN — LEVOTHYROXINE SODIUM 125 MCG: 0.03 TABLET ORAL at 05:50

## 2025-03-31 RX ADMIN — PREDNISONE 40 MG: 20 TABLET ORAL at 08:29

## 2025-03-31 RX ADMIN — PIPERACILLIN AND TAZOBACTAM 4500 MG: 4; .5 INJECTION, POWDER, LYOPHILIZED, FOR SOLUTION INTRAVENOUS at 06:00

## 2025-03-31 RX ADMIN — INSULIN LISPRO 2 UNITS: 100 INJECTION, SOLUTION INTRAVENOUS; SUBCUTANEOUS at 21:26

## 2025-03-31 RX ADMIN — IPRATROPIUM BROMIDE AND ALBUTEROL SULFATE 1 DOSE: 2.5; .5 SOLUTION RESPIRATORY (INHALATION) at 11:20

## 2025-03-31 RX ADMIN — HYDRALAZINE HYDROCHLORIDE 10 MG: 20 INJECTION INTRAMUSCULAR; INTRAVENOUS at 16:36

## 2025-03-31 RX ADMIN — INSULIN LISPRO 2 UNITS: 100 INJECTION, SOLUTION INTRAVENOUS; SUBCUTANEOUS at 08:28

## 2025-03-31 RX ADMIN — ASPIRIN 81 MG: 81 TABLET, COATED ORAL at 08:28

## 2025-03-31 RX ADMIN — HYDRALAZINE HYDROCHLORIDE 10 MG: 20 INJECTION INTRAMUSCULAR; INTRAVENOUS at 07:17

## 2025-03-31 RX ADMIN — METOPROLOL TARTRATE 50 MG: 50 TABLET, FILM COATED ORAL at 08:29

## 2025-03-31 RX ADMIN — FUROSEMIDE 40 MG: 40 TABLET ORAL at 08:46

## 2025-03-31 RX ADMIN — CLOPIDOGREL BISULFATE 75 MG: 75 TABLET, FILM COATED ORAL at 08:29

## 2025-03-31 RX ADMIN — METHADONE HYDROCHLORIDE 47 MG: 10 CONCENTRATE ORAL at 08:23

## 2025-03-31 RX ADMIN — SODIUM CHLORIDE, PRESERVATIVE FREE 10 ML: 5 INJECTION INTRAVENOUS at 08:46

## 2025-03-31 RX ADMIN — ACETAMINOPHEN 650 MG: 325 TABLET ORAL at 08:29

## 2025-03-31 RX ADMIN — GUAIFENESIN 400 MG: 400 TABLET ORAL at 05:50

## 2025-03-31 RX ADMIN — IPRATROPIUM BROMIDE AND ALBUTEROL SULFATE 1 DOSE: 2.5; .5 SOLUTION RESPIRATORY (INHALATION) at 18:40

## 2025-03-31 ASSESSMENT — PAIN DESCRIPTION - DESCRIPTORS: DESCRIPTORS: ACHING

## 2025-03-31 ASSESSMENT — PAIN DESCRIPTION - LOCATION: LOCATION: HEAD

## 2025-03-31 ASSESSMENT — PAIN DESCRIPTION - ORIENTATION: ORIENTATION: MID

## 2025-03-31 ASSESSMENT — PAIN SCALES - GENERAL
PAINLEVEL_OUTOF10: 0
PAINLEVEL_OUTOF10: 0
PAINLEVEL_OUTOF10: 4

## 2025-03-31 ASSESSMENT — PAIN - FUNCTIONAL ASSESSMENT: PAIN_FUNCTIONAL_ASSESSMENT: ACTIVITIES ARE NOT PREVENTED

## 2025-03-31 NOTE — PLAN OF CARE
Problem: Chronic Conditions and Co-morbidities  Goal: Patient's chronic conditions and co-morbidity symptoms are monitored and maintained or improved  3/31/2025 1133 by Susan Castro RN  Outcome: Progressing     Problem: Safety - Adult  Goal: Free from fall injury  3/31/2025 1133 by Susan Castro RN  Outcome: Progressing     Problem: Skin/Tissue Integrity  Goal: Skin integrity remains intact  3/31/2025 1133 by Susan Castro RN  Outcome: Progressing     Problem: Discharge Planning  Goal: Discharge to home or other facility with appropriate resources  3/31/2025 1133 by Susan Castro RN  Outcome: Progressing     Problem: Safety - Medical Restraint  Goal: Remains free of injury from restraints (Restraint for Interference with Medical Device)  3/31/2025 1133 by Susan Castro RN  Outcome: Progressing     Problem: Pain  Goal: Verbalizes/displays adequate comfort level or baseline comfort level  3/31/2025 1133 by Susan Castro RN  Outcome: Progressing     Problem: Nutrition Deficit:  Goal: Optimize nutritional status  3/31/2025 1133 by Susan Castro RN  Outcome: Progressing     Problem: ABCDS Injury Assessment  Goal: Absence of physical injury  Outcome: Progressing

## 2025-03-31 NOTE — PROGRESS NOTES
Occupational Therapy  OCCUPATIONAL THERAPY INITIAL EVALUATION  [facility]  [address] [place], [place]    Date: 3/31/2025     Patient Name: Faiza Romero  MRN: [id number]  : 1970  Room: [facility]    Evaluating OT: Whit Solorzano, OTR/L - [id number]    Referring Provider: Hazel Maldonado MD  Specific Provider Orders/Date: \"OT eval and treat\" - 3/30/2025    Diagnosis: COPD (chronic obstructive pulmonary disease) ([facility]) [J44.9]  COPD with acute exacerbation (HCC) [J44.1]     Patient intubated on 3/26/2025; patient extubated on 3/29/2025.    Pertinent Medical History: COPD, CAD, sleep apnea, HTN, DM     Precautions: fall risk, O2 via nasal cannula    Assessment of Current Deficits:    [x] Functional mobility   [x] ADLs  [x] Strength               [] Cognition   [x] Functional transfers   [x] IADLs         [x] Safety Awareness   [x] Endurance   [x] Fine Motor Coordination  [x] Balance      [] Vision/Perception   [x] Sensation    [] Gross Motor Coordination [] ROM          [] Delirium                  [] Motor Control     OT PLAN OF CARE   OT POC is based on physician orders, patient diagnosis, and results of clinical assessment.  Frequency/Duration 2-5 days/week for 2-4 weeks PRN   Specific OT Treatment Interventions to Include:   * Instruction/training on adapted ADL techniques and AE recommendations to increase functional independence within precautions       * Training on energy conservation strategies, correct breathing pattern and techniques to improve independence/tolerance for self-care routine  * Functional transfer/mobility training/DME recommendations for increased independence, safety, and fall prevention  * Patient/Family education to increase follow through with safety techniques and functional independence  * Recommendation of environmental modifications for increased safety with functional transfers/mobility and ADLs  * Sensory

## 2025-03-31 NOTE — PLAN OF CARE
Problem: Chronic Conditions and Co-morbidities  Goal: Patient's chronic conditions and co-morbidity symptoms are monitored and maintained or improved  3/31/2025 0305 by Sita Cooper RN  Outcome: Progressing  3/30/2025 1819 by Talya Danielson RN  Outcome: Progressing     Problem: Safety - Adult  Goal: Free from fall injury  3/31/2025 0305 by Sita Cooper RN  Outcome: Progressing  3/30/2025 1819 by Talya Danielson RN  Outcome: Progressing     Problem: Skin/Tissue Integrity  Goal: Skin integrity remains intact  Description: 1.  Monitor for areas of redness and/or skin breakdown  2.  Assess vascular access sites hourly  3.  Every 4-6 hours minimum:  Change oxygen saturation probe site  4.  Every 4-6 hours:  If on nasal continuous positive airway pressure, respiratory therapy assess nares and determine need for appliance change or resting period  3/31/2025 0305 by Sita Cooper RN  Outcome: Progressing  Flowsheets (Taken 3/31/2025 0304)  Skin Integrity Remains Intact: Every 4-6 hours minimum: Change oxygen saturation probe site  3/30/2025 1819 by Talya Danielson RN  Outcome: Progressing     Problem: Discharge Planning  Goal: Discharge to home or other facility with appropriate resources  3/31/2025 0305 by Sita Cooper RN  Outcome: Progressing  3/30/2025 1819 by Talya Danielson RN  Outcome: Progressing     Problem: Safety - Medical Restraint  Goal: Remains free of injury from restraints (Restraint for Interference with Medical Device)  Description: INTERVENTIONS:  1. Determine that other, less restrictive measures have been tried or would not be effective before applying the restraint  2. Evaluate the patient's condition at the time of restraint application  3. Inform patient/family regarding the reason for restraint  4. Q2H: Monitor safety, psychosocial status, comfort, nutrition and hydration  3/31/2025 0305 by Sita Cooper RN  Outcome: Progressing  3/30/2025 1819 by Talya Danielson  RN  Outcome: Progressing     Problem: Pain  Goal: Verbalizes/displays adequate comfort level or baseline comfort level  3/31/2025 0305 by Sita Cooper RN  Outcome: Progressing  3/30/2025 1819 by Talya Danielson RN  Outcome: Progressing     Problem: Nutrition Deficit:  Goal: Optimize nutritional status  3/31/2025 0305 by Sita Cooper RN  Outcome: Progressing  3/30/2025 1819 by Talya Danielson RN  Outcome: Progressing

## 2025-03-31 NOTE — PROGRESS NOTES
Comprehensive Nutrition Assessment    Type and Reason for Visit:  Reassess    Nutrition Recommendations/Plan:   Continue current diet and ONS, as tolerated  Continue inpatient monitoring      Malnutrition Assessment:  Malnutrition Status:  At risk for malnutrition (03/27/25 0916)    Context:  Acute Illness     Findings of the 6 clinical characteristics of malnutrition:  Energy Intake:  Mild decrease in energy intake  Weight Loss:  Unable to assess (d/t lack of EMR wt hx, hx CHF)     Body Fat Loss:  No body fat loss     Muscle Mass Loss:  No muscle mass loss    Fluid Accumulation:  Unable to assess (multifactorial)     Strength:  Not Performed    Nutrition Assessment:    Pt s/p extubation 3/29. Now on Carb Controlled PO diet. Will add Diabetic ONS BID  and Wound Care ONS (wound care consult pending re: toe wound) to optimize nutrient intake as pt transitions from EN to PO after extubation.    Nutrition Related Findings:    A&Ox4, on NC, diarrhea, gen +1 edema, distended abd +BS, I/O -5.8L, Wound Type: Wound Consult Pending (3/31 consult re: toe)       Current Nutrition Intake & Therapies:    Average Meal Intake: Unable to assess  Average Supplements Intake: None Ordered  ADULT DIET; Regular; 3 carb choices (45 gm/meal)  ADULT ORAL NUTRITION SUPPLEMENT; Breakfast, Dinner; Diabetic Oral Supplement    Anthropometric Measures:  Height: 172.7 cm (5' 8\")  Ideal Body Weight (IBW): 140 lbs (64 kg)    Admission Body Weight: 93.9 kg (207 lb 0.2 oz) (3/26)  Current Body Weight: 95.8 kg (211 lb 3.2 oz), 147.9 % IBW. Weight Source: Bed scale (3/31)  Current BMI (kg/m2): 32.1  Usual Body Weight:  (no actual EMR wt hx <1 year, 8/12/24 188# cardio OV- no method)                          BMI Categories: Obese Class 1 (BMI 30.0-34.9)    Estimated Daily Nutrient Needs:  Energy Requirements Based On: Formula ([place])  Weight Used for Energy Requirements: Admission  Energy (kcal/day):   Weight Used for Protein  Requirements: Ideal  Protein (g/day): 75-85  Method Used for Fluid Requirements: Defer to provider      Nutrition Diagnosis:   Inadequate oral intake related to impaired respiratory function as evidenced by diarrhea, wounds, variable po intake    Nutrition Interventions:   Food and/or Nutrient Delivery: Start Oral Nutrition Supplement, Continue Current Diet  Nutrition Education/Counseling: No recommendation at this time  Coordination of Nutrition Care: Continue to monitor while inpatient       Goals:  Goals: PO intake 50% or greater, by next RD assessment  Type of Goal: New goal  Previous Goal Met: Goal(s) Achieved    Nutrition Monitoring and Evaluation:   Behavioral-Environmental Outcomes: None Identified  Food/Nutrient Intake Outcomes: Food and Nutrient Intake, Supplement Intake  Physical Signs/Symptoms Outcomes: Biochemical Data, GI Status, Fluid Status or Edema, Nutrition Focused Physical Findings, Skin, Weight    Discharge Planning:    Too soon to determine     Saadia Santana RD, CNSC, LD  Contact: [phone]

## 2025-03-31 NOTE — PATIENT CARE CONFERENCE
Intensive Care Daily Quality Rounding Checklist        ICU Team Members:   Dr. Maldonado, residents ,charge nurse, bedside nurse and respiratory therapy      ICU Day #: NUMBER:  Downgraded to IM 3/30/2025     SOFA Score: 6     Intubation Date: Extubated 3/29/2025     Ventilator Day #: N/A     Central Line Insertion Date:  N/A                                                    Day #: N/A                                                    Indication: N/A      Arterial Line Insertion Date:  N/A                             Day #: N/A     Temporary Hemodialysis Catheter Insertion Date:  N/A                             Day # N/A     DVT Prophylaxis: Lovenox    GI Prophylaxis: Protonix     Camilo Catheter Insertion Date: Removed 3/30/25                                        Day #: N/A                             Indications: N/A                             Continued need (if yes, reason documented and discussed with physician): N/A     Skin Issues/ Wounds and ordered treatment discussed on rounds: Yes, addressed     Goals/ Plans for the Day:

## 2025-03-31 NOTE — PROGRESS NOTES
[facility] Hospitalist Progress Note    Admitting Date and Time: 3/25/2025  6:20 PM  Admit Dx: COPD (chronic obstructive pulmonary disease) (HCC) [J44.9]  COPD with acute exacerbation (HCC) [J44.1]    Subjective:  Patient is being followed for COPD (chronic obstructive pulmonary disease) (HCC) [J44.9]  COPD with acute exacerbation (HCC) [J44.1]   Pt feel better  Per RN: no additional concerns    ROS: reports aching diffusely     losartan  100 mg Oral Daily    insulin lispro  0-8 Units SubCUTAneous 4x Daily AC & HS    predniSONE  40 mg Oral Daily    metoprolol tartrate  50 mg Oral BID    isosorbide mononitrate  30 mg Oral Daily    amLODIPine  10 mg Orogastric Daily    insulin glargine  30 Units SubCUTAneous Daily    furosemide  40 mg Oral Daily    naloxegol  12.5 mg Oral QAM AC    enoxaparin  40 mg SubCUTAneous Daily    lansoprazole  30 mg Per NG tube QAM AC    methadone  47 mg Per NG tube Daily    docusate  100 mg Orogastric BID    polyethylene glycol  17 g Orogastric Daily    piperacillin-tazobactam  4,500 mg IntraVENous Q8H    aspirin  81 mg Oral Daily    clopidogrel  75 mg Oral Daily    levothyroxine  125 mcg Oral Daily    [Held by provider] rosuvastatin  10 mg Oral Daily    sodium chloride flush  5-40 mL IntraVENous 2 times per day    ipratropium 0.5 mg-albuterol 2.5 mg  1 Dose Inhalation Q4H WA RT    arformoterol tartrate  15 mcg Nebulization BID RT    guaiFENesin  400 mg Oral 3 times per day     prochlorperazine, 5 mg, Once PRN  hydrALAZINE, 10 mg, Q6H PRN  sodium chloride, , PRN  sodium chloride flush, 5-40 mL, PRN  sodium chloride, , PRN  ondansetron, 4 mg, Q8H PRN   Or  ondansetron, 4 mg, Q6H PRN  acetaminophen, 650 mg, Q6H PRN   Or  acetaminophen, 650 mg, Q6H PRN  benzonatate, 100 mg, TID PRN  glucose, 4 tablet, PRN  dextrose bolus, 125 mL, PRN   Or  dextrose bolus, 250 mL, PRN  glucagon (rDNA), 1 mg, PRN  dextrose, , Continuous PRN  naloxone, 0.4 mg, PRN         Objective:  BP (!) 175/74   Pulse  64   Temp 98.6 °F (37 °C) (Oral)   Resp 17   Ht 1.727 m (5' 8\")   Wt 95.8 kg (211 lb 3.2 oz)   SpO2 98%   BMI 32.11 kg/m²     General Appearance: awake, oriented x3, sitting in chair  Skin: warm and dry  Head: normocephalic and atraumatic  Eyes: PERRL, EOMI, conjunctivae normal  Neck: neck supple, trachea midline, intubated  Pulmonary/Chest: CTAB, 3L NC  Cardiovascular: RRR, no murmurs  Abdomen: soft, non-distended  Extremities: no cyanosis, no clubbing and 1+ BLE edema  Neurologic: awake and alert, speech normal    Recent Labs     03/29/25  0430 03/30/25  0451 03/31/25  0549    139 136   K 4.1 4.7 4.1    99 97*   CO2 28 26 27   BUN 37* 42* 33*   CREATININE 1.3* 1.2* 1.1*   GLUCOSE 188* 170* 198*   CALCIUM 9.2 9.1 8.9       Recent Labs     03/29/25  0430 03/30/25  0451 03/31/25  0549   WBC 14.3* 21.6* 17.0*   RBC 3.34* 3.66 3.68   HGB 9.8* 10.5* 10.5*   HCT 31.3* 34.6 35.2   MCV 93.7 94.5 95.7   MCH 29.3 28.7 28.5   MCHC 31.3* 30.3* 29.8*   RDW 15.2* 15.4* 15.3*    392 362   MPV 10.3 10.2 10.5       Radiology:  XR CHEST PORTABLE   Final Result   1. Improved congestive changes.   2. No signs of pneumothorax.   3. Stable cardiomegaly.         XR CHEST PORTABLE   Final Result   1. Cardiomegaly with interstitial edema pattern.   2. Interval intubation with endotracheal tube terminating 4 cm above the   level the delta.         XR CHEST PORTABLE   Final Result   1. Cardiomegaly with pulmonary venous hypertension and mild interstitial   edema bilaterally.   2. Small right effusion is suspected.   3. Endotracheal tube and gastric catheters remain in good position.         XR CHEST PORTABLE   Final Result   Cardiomegaly with increased interstitial markings seen throughout the lung   fields with probable trace right pleural effusion.         XR CHEST PORTABLE   Final Result   1. Life support lines and tubes in satisfactory position.   2. Stable increased interstitial markings seen diffusely

## 2025-04-01 LAB
ALBUMIN SERPL-MCNC: 3.7 G/DL (ref 3.5–5.2)
ALP SERPL-CCNC: 115 U/L (ref 35–104)
ALT SERPL-CCNC: 17 U/L (ref 0–32)
ANION GAP SERPL CALCULATED.3IONS-SCNC: 15 MMOL/L (ref 7–16)
AST SERPL-CCNC: 23 U/L (ref 0–31)
BASOPHILS # BLD: 0.03 K/UL (ref 0–0.2)
BASOPHILS NFR BLD: 0 % (ref 0–2)
BILIRUB SERPL-MCNC: 0.4 MG/DL (ref 0–1.2)
BUN SERPL-MCNC: 27 MG/DL (ref 6–20)
CALCIUM SERPL-MCNC: 9.3 MG/DL (ref 8.6–10.2)
CHLORIDE SERPL-SCNC: 98 MMOL/L (ref 98–107)
CO2 SERPL-SCNC: 27 MMOL/L (ref 22–29)
CREAT SERPL-MCNC: 1.2 MG/DL (ref 0.5–1)
EOSINOPHIL # BLD: 0.11 K/UL (ref 0.05–0.5)
EOSINOPHILS RELATIVE PERCENT: 1 % (ref 0–6)
ERYTHROCYTE [DISTWIDTH] IN BLOOD BY AUTOMATED COUNT: 15 % (ref 11.5–15)
GFR, ESTIMATED: 53 ML/MIN/1.73M2
GLUCOSE BLD-MCNC: 112 MG/DL (ref 74–99)
GLUCOSE BLD-MCNC: 146 MG/DL (ref 74–99)
GLUCOSE BLD-MCNC: 155 MG/DL (ref 74–99)
GLUCOSE BLD-MCNC: 213 MG/DL (ref 74–99)
GLUCOSE BLD-MCNC: 221 MG/DL (ref 74–99)
GLUCOSE SERPL-MCNC: 109 MG/DL (ref 74–99)
HCT VFR BLD AUTO: 31.9 % (ref 34–48)
HGB BLD-MCNC: 10 G/DL (ref 11.5–15.5)
IMM GRANULOCYTES # BLD AUTO: 0.49 K/UL (ref 0–0.58)
IMM GRANULOCYTES NFR BLD: 3 % (ref 0–5)
LYMPHOCYTES NFR BLD: 2.9 K/UL (ref 1.5–4)
LYMPHOCYTES RELATIVE PERCENT: 16 % (ref 20–42)
MCH RBC QN AUTO: 28.7 PG (ref 26–35)
MCHC RBC AUTO-ENTMCNC: 31.3 G/DL (ref 32–34.5)
MCV RBC AUTO: 91.4 FL (ref 80–99.9)
MONOCYTES NFR BLD: 1.34 K/UL (ref 0.1–0.95)
MONOCYTES NFR BLD: 7 % (ref 2–12)
NEUTROPHILS NFR BLD: 73 % (ref 43–80)
NEUTS SEG NFR BLD: 13.51 K/UL (ref 1.8–7.3)
PLATELET # BLD AUTO: 356 K/UL (ref 130–450)
PMV BLD AUTO: 10.1 FL (ref 7–12)
POTASSIUM SERPL-SCNC: 3.5 MMOL/L (ref 3.5–5)
PROT SERPL-MCNC: 6.8 G/DL (ref 6.4–8.3)
RBC # BLD AUTO: 3.49 M/UL (ref 3.5–5.5)
SODIUM SERPL-SCNC: 140 MMOL/L (ref 132–146)
WBC OTHER # BLD: 18.4 K/UL (ref 4.5–11.5)

## 2025-04-01 PROCEDURE — 99232 SBSQ HOSP IP/OBS MODERATE 35: CPT | Performed by: STUDENT IN AN ORGANIZED HEALTH CARE EDUCATION/TRAINING PROGRAM

## 2025-04-01 PROCEDURE — 2060000000 HC ICU INTERMEDIATE R&B

## 2025-04-01 PROCEDURE — 6370000000 HC RX 637 (ALT 250 FOR IP)

## 2025-04-01 PROCEDURE — 97530 THERAPEUTIC ACTIVITIES: CPT

## 2025-04-01 PROCEDURE — 80053 COMPREHEN METABOLIC PANEL: CPT

## 2025-04-01 PROCEDURE — 97161 PT EVAL LOW COMPLEX 20 MIN: CPT

## 2025-04-01 PROCEDURE — 6370000000 HC RX 637 (ALT 250 FOR IP): Performed by: INTERNAL MEDICINE

## 2025-04-01 PROCEDURE — 94660 CPAP INITIATION&MGMT: CPT

## 2025-04-01 PROCEDURE — 85025 COMPLETE CBC W/AUTO DIFF WBC: CPT

## 2025-04-01 PROCEDURE — 82962 GLUCOSE BLOOD TEST: CPT

## 2025-04-01 PROCEDURE — 6360000002 HC RX W HCPCS

## 2025-04-01 PROCEDURE — 6360000002 HC RX W HCPCS: Performed by: INTERNAL MEDICINE

## 2025-04-01 PROCEDURE — 2700000000 HC OXYGEN THERAPY PER DAY

## 2025-04-01 PROCEDURE — 6370000000 HC RX 637 (ALT 250 FOR IP): Performed by: STUDENT IN AN ORGANIZED HEALTH CARE EDUCATION/TRAINING PROGRAM

## 2025-04-01 PROCEDURE — 2500000003 HC RX 250 WO HCPCS: Performed by: INTERNAL MEDICINE

## 2025-04-01 PROCEDURE — 94640 AIRWAY INHALATION TREATMENT: CPT

## 2025-04-01 RX ORDER — PANTOPRAZOLE SODIUM 40 MG/1
40 TABLET, DELAYED RELEASE ORAL
Status: DISCONTINUED | OUTPATIENT
Start: 2025-04-01 | End: 2025-04-03 | Stop reason: HOSPADM

## 2025-04-01 RX ORDER — PREDNISONE 20 MG/1
40 TABLET ORAL DAILY
Status: COMPLETED | OUTPATIENT
Start: 2025-04-02 | End: 2025-04-02

## 2025-04-01 RX ORDER — ALBUTEROL SULFATE 0.83 MG/ML
2.5 SOLUTION RESPIRATORY (INHALATION) EVERY 6 HOURS PRN
Status: DISCONTINUED | OUTPATIENT
Start: 2025-04-01 | End: 2025-04-03 | Stop reason: HOSPADM

## 2025-04-01 RX ORDER — PREDNISONE 10 MG/1
TABLET ORAL
Qty: 22 TABLET | Refills: 0 | Status: SHIPPED | OUTPATIENT
Start: 2025-04-02 | End: 2025-04-03

## 2025-04-01 RX ORDER — PREDNISONE 5 MG/1
10 TABLET ORAL DAILY
Status: DISCONTINUED | OUTPATIENT
Start: 2025-04-09 | End: 2025-04-03 | Stop reason: HOSPADM

## 2025-04-01 RX ORDER — BUDESONIDE 0.5 MG/2ML
0.5 INHALANT ORAL
Status: DISCONTINUED | OUTPATIENT
Start: 2025-04-01 | End: 2025-04-03 | Stop reason: HOSPADM

## 2025-04-01 RX ORDER — FLUTICASONE FUROATE, UMECLIDINIUM BROMIDE AND VILANTEROL TRIFENATATE 100; 62.5; 25 UG/1; UG/1; UG/1
1 POWDER RESPIRATORY (INHALATION) DAILY
Qty: 1 EACH | Refills: 2 | Status: SHIPPED | OUTPATIENT
Start: 2025-04-01 | End: 2025-04-02

## 2025-04-01 RX ORDER — ALBUTEROL SULFATE 0.83 MG/ML
2.5 SOLUTION RESPIRATORY (INHALATION) EVERY 6 HOURS PRN
Qty: 120 EACH | Refills: 3 | Status: SHIPPED | OUTPATIENT
Start: 2025-04-01 | End: 2025-04-02

## 2025-04-01 RX ORDER — PREDNISONE 20 MG/1
20 TABLET ORAL DAILY
Status: DISCONTINUED | OUTPATIENT
Start: 2025-04-06 | End: 2025-04-03 | Stop reason: HOSPADM

## 2025-04-01 RX ADMIN — SODIUM CHLORIDE, PRESERVATIVE FREE 10 ML: 5 INJECTION INTRAVENOUS at 21:51

## 2025-04-01 RX ADMIN — GUAIFENESIN 400 MG: 400 TABLET ORAL at 14:20

## 2025-04-01 RX ADMIN — IPRATROPIUM BROMIDE AND ALBUTEROL SULFATE 1 DOSE: 2.5; .5 SOLUTION RESPIRATORY (INHALATION) at 15:44

## 2025-04-01 RX ADMIN — BUDESONIDE 500 MCG: 0.5 SUSPENSION RESPIRATORY (INHALATION) at 13:07

## 2025-04-01 RX ADMIN — METHADONE HYDROCHLORIDE 47 MG: 10 CONCENTRATE ORAL at 09:56

## 2025-04-01 RX ADMIN — SODIUM CHLORIDE, PRESERVATIVE FREE 10 ML: 5 INJECTION INTRAVENOUS at 09:57

## 2025-04-01 RX ADMIN — AMLODIPINE BESYLATE 10 MG: 10 TABLET ORAL at 09:55

## 2025-04-01 RX ADMIN — IPRATROPIUM BROMIDE AND ALBUTEROL SULFATE 1 DOSE: 2.5; .5 SOLUTION RESPIRATORY (INHALATION) at 13:06

## 2025-04-01 RX ADMIN — ARFORMOTEROL TARTRATE 15 MCG: 15 SOLUTION RESPIRATORY (INHALATION) at 08:41

## 2025-04-01 RX ADMIN — CLOPIDOGREL BISULFATE 75 MG: 75 TABLET, FILM COATED ORAL at 09:54

## 2025-04-01 RX ADMIN — HYDRALAZINE HYDROCHLORIDE 10 MG: 20 INJECTION INTRAMUSCULAR; INTRAVENOUS at 00:01

## 2025-04-01 RX ADMIN — INSULIN LISPRO 2 UNITS: 100 INJECTION, SOLUTION INTRAVENOUS; SUBCUTANEOUS at 17:02

## 2025-04-01 RX ADMIN — IPRATROPIUM BROMIDE AND ALBUTEROL SULFATE 1 DOSE: 2.5; .5 SOLUTION RESPIRATORY (INHALATION) at 08:41

## 2025-04-01 RX ADMIN — GUAIFENESIN 400 MG: 400 TABLET ORAL at 21:49

## 2025-04-01 RX ADMIN — ASPIRIN 81 MG: 81 TABLET, COATED ORAL at 09:54

## 2025-04-01 RX ADMIN — METOPROLOL TARTRATE 50 MG: 50 TABLET, FILM COATED ORAL at 09:55

## 2025-04-01 RX ADMIN — Medication 3 MG: at 21:49

## 2025-04-01 RX ADMIN — PANTOPRAZOLE SODIUM 40 MG: 40 TABLET, DELAYED RELEASE ORAL at 09:54

## 2025-04-01 RX ADMIN — PREDNISONE 40 MG: 20 TABLET ORAL at 09:54

## 2025-04-01 RX ADMIN — ISOSORBIDE MONONITRATE 30 MG: 30 TABLET, EXTENDED RELEASE ORAL at 09:55

## 2025-04-01 RX ADMIN — GUAIFENESIN 400 MG: 400 TABLET ORAL at 06:21

## 2025-04-01 RX ADMIN — INSULIN LISPRO 2 UNITS: 100 INJECTION, SOLUTION INTRAVENOUS; SUBCUTANEOUS at 21:50

## 2025-04-01 RX ADMIN — LEVOTHYROXINE SODIUM 125 MCG: 0.03 TABLET ORAL at 06:21

## 2025-04-01 RX ADMIN — ARFORMOTEROL TARTRATE 15 MCG: 15 SOLUTION RESPIRATORY (INHALATION) at 19:43

## 2025-04-01 RX ADMIN — METOPROLOL TARTRATE 50 MG: 50 TABLET, FILM COATED ORAL at 21:49

## 2025-04-01 RX ADMIN — FUROSEMIDE 40 MG: 40 TABLET ORAL at 09:55

## 2025-04-01 RX ADMIN — IPRATROPIUM BROMIDE AND ALBUTEROL SULFATE 1 DOSE: 2.5; .5 SOLUTION RESPIRATORY (INHALATION) at 19:43

## 2025-04-01 RX ADMIN — LOSARTAN POTASSIUM 100 MG: 50 TABLET, FILM COATED ORAL at 09:55

## 2025-04-01 RX ADMIN — ENOXAPARIN SODIUM 40 MG: 100 INJECTION SUBCUTANEOUS at 09:54

## 2025-04-01 RX ADMIN — NALOXEGOL OXALATE 12.5 MG: 12.5 TABLET, FILM COATED ORAL at 06:21

## 2025-04-01 RX ADMIN — BUDESONIDE 500 MCG: 0.5 SUSPENSION RESPIRATORY (INHALATION) at 19:43

## 2025-04-01 RX ADMIN — INSULIN GLARGINE 30 UNITS: 100 INJECTION, SOLUTION SUBCUTANEOUS at 10:02

## 2025-04-01 ASSESSMENT — PAIN SCALES - GENERAL: PAINLEVEL_OUTOF10: 0

## 2025-04-01 NOTE — PROGRESS NOTES
Tian Urrutia M.D.,[facility]  James Moon D.O., KIRK., [facility]  Katie Argueta M.D.  Hazel Maldonado M.D.   Luis Miguel Robertson D.O.  Erickson Tatum M.D.         Daily Pulmonary Progress Note    Patient:  Faiza Romero 54 y.o. female MRN: [id number]            Synopsis     We are following patient for COPD    \"CC\" respiratory failure    Code status: FULL CODE      Subjective   Summary of Hospital Course:  The patient is a 54 y.o. female with significant past medical history of COPD normally on 3 L of O2, CHF, type II DM, CAD with stent placement who was transferred from [place] yesterday for acute COPD exacerbation.  Patient had reported some dyspnea.  Patient was also noted to recently have influenza A.  Treated for COPD exacerbation.  Of note, patient is on methadone.     Patient had RRT called this morning due to hypoxia and increasing lethargy.  Patient had received 3 doses of Narcan during this hospitalization thus far.  Patient was on BiPAP and O2 was increased to 100%.  Patient continuing to be lethargic with concerns of impending respiratory failure.  Transferred to the ICU for further management.  Repeat ABG shows worsening pCO2.  Patient was intubated in the ICU.     3/27  Patient continues to remain intubated and sedated, chest x-ray appears stable compared to yesterday.  Resume home Lopressor and Lovenox today.  Will initiate tube feeds and start a bowel regimen.     3/28  Patient is currently on fentanyl and Versed. She seems to be doing better.  She is awakens to voice and follows some commands. Tube feeds running at 40 cc/h.  She is also hypertensive.  Will increase Lopressor to 25 mg twice daily and restart amlodipine. Blood cultures and respiratory cultures are negative.  MRSA negative;  Vancomycin discontinued.  Steroids adjusted to every 8 hours. Will continue to wean off of sedation and oxygen.     3/29  Patient extubated successfully today.  She is off all sedation.  Continues to

## 2025-04-01 NOTE — PROGRESS NOTES
Physical Therapy  Facility/Department: [address] SURG  Physical Therapy Initial Assessment    Name: Faiza Romero  : 1970  MRN: [id number]  Date of Service: 2025        Patient Diagnosis(es): The encounter diagnosis was Cardiomyopathy, unspecified type (HCC).  Past Medical History:  has a past medical history of CAD in native artery, Claustrophobia, Colitis, Constipation, COPD (chronic obstructive pulmonary disease) ([facility]), COVID-19, Diabetes mellitus (HCC), Heart problem, Hyperlipidemia, Hypertension, Hypothyroidism, Sleep apnea, Thyroid disease, and Wears partial dentures.  Past Surgical History:  has a past surgical history that includes Cholecystectomy; Appendectomy; knee surgery (Right); ventral hernia repair; Hysterectomy; hernia repair (2015); Colonoscopy;  section; Cardiac catheterization (2020); Coronary artery bypass graft (N/A, 2020); Coronary angioplasty with stent (2022); and Cardiac procedure (N/A, 2024).    Evaluating Therapist: Jolie Casillas PT      Equipment Recommendation wheeled walker    Room #:  0613/0613-A  Diagnosis:  COPD (chronic obstructive pulmonary disease) ([facility]) [J44.9]  COPD with acute exacerbation ([facility]) [J44.1]  PMHx/PSHx:  CAD, COPD  Procedure/Surgery:  Pt intubated 3/26 extubated 3/29  Precautions:  falls, O2      Social:  Pt lives with  and adult children in a 1 floor plan 3 steps and 1 rails to enter.  Prior to admission pt independent without device uses home O2. Has a cane     Initial Evaluation  Date: 25 Treatment      Short Term/ Long Term   Goals   Was pt agreeable to Eval/treatment? yes     Does pt have pain? No c/o pain     Bed Mobility  Rolling: SBA  Supine to sit: SBA  Sit to supine: NT  Scooting: SBA  independent   Transfers Sit to stand: SBA  Stand to sit: SBA  Stand pivot: SBA  independent   Ambulation    40 feet with ww with min assist  150 feet with ww with supervision   Stair Negotiation  Ascended and  descended  NT   3 steps with 1 rail with supervision    LE strength     3+/5    4/5   balance      fair     AM-PAC Raw score               16/24         Pt is alert and Oriented  LE ROM: WFL  Sensation: intact  Edema: none  Endurance: fair     ASSESSMENT:    Pt displays functional ability as noted in the objective portion of this evaluation.      Patient education  Pt educated on use of ww    Patient response to education:   Pt verbalized understanding Pt demonstrated skill Pt requires further education in this area   yes   Review as needed       Comments:  Pt unsteady with ambulation, min assist for balance, c/o dizziness with ambulation  Pt's/ family goals   1. To get stronger    Conditions Requiring Skilled Therapeutic Intervention:    [x]Decreased strength     []Decreased ROM  [x]Decreased functional mobility  [x]Decreased balance   [x]Decreased endurance   []Decreased posture  []Decreased sensation  []Decreased coordination   []Decreased vision  []Decreased safety awareness   []Increased pain       Patient and or family understand(s) diagnosis, prognosis, and plan of care.    Prognosis is good for reaching above PT goals    PHYSICAL THERAPY PLAN OF CARE:    PT POC is established based on physician order and patient diagnosis     Referring provider/PT Order: Hazel Maldonado MD / PT eval and treat      Current Treatment Recommendations:     [x] Strengthening to improve independence with functional mobility   [] ROM to improve independence with functional mobility   [x] Balance Training to improve static/dynamic balance and to reduce fall risk  [x] Endurance Training to improve activity tolerance during functional mobility   [x] Transfer Training to improve safety and independence with all functional transfers   [x] Gait Training to improve gait mechanics, endurance and assess need for appropriate assistive device  [x] Stair Training in preparation for safe discharge home and/or into the community   []

## 2025-04-01 NOTE — CARE COORDINATION
Social work / Discharge planning:          Patient transferred from ICU.    Social work met with patient regarding discharge planning.    Patient states she is interested in [facility].     She has been listed as having no health insurance.    Patient states that as of today, she is Medicare A&B.    Patient states she needs to go to a facility that will provide Methadone.   Attempting to confirm insurance coverage.    Referral made to [facility] liaison.    Awaiting response from both.   Electronically signed by JOHNNY Gong on 4/1/2025 at 12:40 PM            Social work also left a message for [employer] requesting return call with update.   Electronically signed by JOHNNY Gong on 4/1/2025 at 1:00 PM

## 2025-04-01 NOTE — PLAN OF CARE
Problem: Chronic Conditions and Co-morbidities  Goal: Patient's chronic conditions and co-morbidity symptoms are monitored and maintained or improved  4/1/2025 1125 by Tammie Stratton RN  Outcome: Progressing  4/1/2025 0145 by Darlin Partida RN  Outcome: Progressing     Problem: Safety - Adult  Goal: Free from fall injury  4/1/2025 1125 by Tammie Stratton RN  Outcome: Progressing  4/1/2025 0145 by Darlin Partida RN  Outcome: Progressing     Problem: Skin/Tissue Integrity  Goal: Skin integrity remains intact  Description: 1.  Monitor for areas of redness and/or skin breakdown  2.  Assess vascular access sites hourly  3.  Every 4-6 hours minimum:  Change oxygen saturation probe site  4.  Every 4-6 hours:  If on nasal continuous positive airway pressure, respiratory therapy assess nares and determine need for appliance change or resting period  4/1/2025 1125 by Tammie Stratton RN  Outcome: Progressing  4/1/2025 0145 by Darlin Partida RN  Outcome: Progressing     Problem: Discharge Planning  Goal: Discharge to home or other facility with appropriate resources  4/1/2025 1125 by Tammie Stratton RN  Outcome: Progressing  4/1/2025 0145 by Darlin Partida RN  Outcome: Progressing     Problem: Safety - Medical Restraint  Goal: Remains free of injury from restraints (Restraint for Interference with Medical Device)  Description: INTERVENTIONS:  1. Determine that other, less restrictive measures have been tried or would not be effective before applying the restraint  2. Evaluate the patient's condition at the time of restraint application  3. Inform patient/family regarding the reason for restraint  4. Q2H: Monitor safety, psychosocial status, comfort, nutrition and hydration  4/1/2025 1125 by Tammie Stratton RN  Outcome: Progressing  4/1/2025 0145 by Darlin Partida RN  Outcome: Progressing     Problem: Pain  Goal: Verbalizes/displays adequate comfort level or baseline comfort

## 2025-04-01 NOTE — PROGRESS NOTES
Pulse oximetry assessment   87% at rest on room air (if 88% or less, skip next steps)    94% at rest on 3LPM  92% while ambulating on 3LPM

## 2025-04-01 NOTE — PLAN OF CARE
Problem: Chronic Conditions and Co-morbidities  Goal: Patient's chronic conditions and co-morbidity symptoms are monitored and maintained or improved  Outcome: Progressing     Problem: Safety - Adult  Goal: Free from fall injury  Outcome: Progressing     Problem: Skin/Tissue Integrity  Goal: Skin integrity remains intact  Description: 1.  Monitor for areas of redness and/or skin breakdown  2.  Assess vascular access sites hourly  3.  Every 4-6 hours minimum:  Change oxygen saturation probe site  4.  Every 4-6 hours:  If on nasal continuous positive airway pressure, respiratory therapy assess nares and determine need for appliance change or resting period  Outcome: Progressing     Problem: Discharge Planning  Goal: Discharge to home or other facility with appropriate resources  Outcome: Progressing     Problem: Safety - Medical Restraint  Goal: Remains free of injury from restraints (Restraint for Interference with Medical Device)  Description: INTERVENTIONS:  1. Determine that other, less restrictive measures have been tried or would not be effective before applying the restraint  2. Evaluate the patient's condition at the time of restraint application  3. Inform patient/family regarding the reason for restraint  4. Q2H: Monitor safety, psychosocial status, comfort, nutrition and hydration  Outcome: Progressing     Problem: Pain  Goal: Verbalizes/displays adequate comfort level or baseline comfort level  Outcome: Progressing     Problem: Nutrition Deficit:  Goal: Optimize nutritional status  Outcome: Progressing  Flowsheets (Taken 3/31/2025 1420 by Saadia Santana, RD, CNSC, LD)  Nutrient intake appropriate for improving, restoring, or maintaining nutritional needs:   Assess nutritional status and recommend course of action   Monitor oral intake, labs, and treatment plans   Recommend appropriate diets, oral nutritional supplements, and vitamin/mineral supplements     Problem: ABCDS Injury Assessment  Goal:

## 2025-04-01 NOTE — DISCHARGE INSTR - COC
Continuity of Care Form    Patient Name: Faiza Romero   :  1970  MRN:  [id number]    Admit date:  3/25/2025  Discharge date:  ***    Code Status Order: Full Code   Advance Directives:     Admitting Physician:  Cesar Abbasi MD  PCP: Mariana London APN    Discharging Nurse: ***  Discharging Hospital Unit/Room#: 0613/0613-A  Discharging Unit Phone Number: ***    Emergency Contact:   Extended Emergency Contact Information  Primary Emergency Contact: Varghese Romero  Address: [address] [place] [address]  Home Phone: [phone]  Mobile Phone: [phone]  Relation: Spouse   needed? No  Secondary Emergency Contact: Rhonda Romero  Home Phone: [phone]  Mobile Phone: [phone]  Relation: Child  Preferred language: English   needed? No    Past Surgical History:  Past Surgical History:   Procedure Laterality Date    APPENDECTOMY      CARDIAC CATHETERIZATION  2020    Dr. Sargent EF 55% cts consult ordered    CARDIAC PROCEDURE N/A 2024    Left heart cath / coronary angiography w grafts performed by Dnaiela Sargent MD at [facility] CARDIAC CATH LAB     SECTION      x2    CHOLECYSTECTOMY      COLONOSCOPY      CORONARY ANGIOPLASTY WITH STENT PLACEMENT  2022    Dr. Sargent [place] 3.0x12    CORONARY ARTERY BYPASS GRAFT N/A 2020    CORONARY ARTERY BYPASS, POSSIBLE LEFT RADIAL ARTERY HARVEST, MINDA performed by Kit Leyva DO at [facility] OR    HERNIA REPAIR  2015    laparoscopic incisional reapair with mesh    HYSTERECTOMY (CERVIX STATUS UNKNOWN)      KNEE SURGERY Right     VENTRAL HERNIA REPAIR         Immunization History:   Immunization History   Administered Date(s) Administered    COVID-19, MODERNA Bivalent, (age 12y+), IM, 50 mcg/0.5 mL 10/24/2022    COVID-19, PFIZER PURPLE top, DILUTE for use, (age 12 y+), 30mcg/0.3mL 2021, 01/10/2022       Active Problems:  Patient Active Problem List   Diagnosis Code     Leukocytosis D72.829    Essential hypertension I10    History of drug abuse F19.11    Chronic nonintractable headache R51.9, G89.29    Cervical disc herniation M50.20    Coronary artery disease involving native coronary artery of native heart with angina pectoris I25.119    COVID-19 U07.1    Unstable angina (HCC) I20.0    Chest pain R07.9    S/P coronary artery stent placement Z95.5    CAD (coronary artery disease) I25.10    COPD with acute exacerbation (HCC) J44.1    COPD (chronic obstructive pulmonary disease) ([facility]) J44.9       Isolation/Infection:   Isolation            No Isolation          Patient Infection Status    None to display              Nurse Assessment:  Last Vital Signs: BP (!) 143/75   Pulse 67   Temp 98.4 °F (36.9 °C) (Oral)   Resp 18   Ht 1.727 m (5' 8\")   Wt 95.2 kg (209 lb 12.8 oz)   SpO2 96%   BMI 31.90 kg/m²     Last documented pain score (0-10 scale): Pain Level: 0  Last Weight:   Wt Readings from Last 1 Encounters:   04/01/25 95.2 kg (209 lb 12.8 oz)     Mental Status:  {IP PT MENTAL STATUS:46354}    IV Access:  { ANAND IV ACCESS:513540076}    Nursing Mobility/ADLs:  Walking   {CHP DME ADLs:416767170}  Transfer  {CHP DME ADLs:295376208}  Bathing  {CHP DME ADLs:423145814}  Dressing  {CHP DME ADLs:698371596}  Toileting  {CHP DME ADLs:660555898}  Feeding  {CHP DME ADLs:728978679}  Med Admin  {CHP DME ADLs:664981438}  Med Delivery   { ANAND MED Delivery:023594691}    Wound Care Documentation and Therapy:  Wound 03/26/25 Buttocks Right (Active)   Wound Cleansed Cleansed with saline 03/28/25 0800   Dressing/Treatment Open to air 04/01/25 0955   Wound Length (cm) 1 cm 03/26/25 1600   Wound Width (cm) 1 cm 03/26/25 1600   Wound Surface Area (cm^2) 1 cm^2 03/26/25 1600   Wound Assessment Pink/red 04/01/25 0955   Drainage Amount None (dry) 04/01/25 0955   Odor None 03/31/25 2000   Vika-wound Assessment Intact 03/31/25 2000   Number of days: 6       Wound 03/31/25 Toe (Comment  which one) Right

## 2025-04-01 NOTE — PROGRESS NOTES
[facility] Hospitalist Progress Note    Admitting Date and Time: 3/25/2025  6:20 PM  Admit Dx: COPD (chronic obstructive pulmonary disease) (HCC) [J44.9]  COPD with acute exacerbation (HCC) [J44.1]    Subjective:  Patient is being followed for COPD (chronic obstructive pulmonary disease) (HCC) [J44.9]  COPD with acute exacerbation (HCC) [J44.1]   Pt feel better  Per RN: no additional concerns    ROS: reports aching diffusely     losartan  100 mg Oral Daily    insulin lispro  0-8 Units SubCUTAneous 4x Daily AC & HS    predniSONE  40 mg Oral Daily    metoprolol tartrate  50 mg Oral BID    isosorbide mononitrate  30 mg Oral Daily    amLODIPine  10 mg Orogastric Daily    insulin glargine  30 Units SubCUTAneous Daily    furosemide  40 mg Oral Daily    naloxegol  12.5 mg Oral QAM AC    enoxaparin  40 mg SubCUTAneous Daily    lansoprazole  30 mg Per NG tube QAM AC    methadone  47 mg Per NG tube Daily    docusate  100 mg Orogastric BID    polyethylene glycol  17 g Orogastric Daily    aspirin  81 mg Oral Daily    clopidogrel  75 mg Oral Daily    levothyroxine  125 mcg Oral Daily    [Held by provider] rosuvastatin  10 mg Oral Daily    sodium chloride flush  5-40 mL IntraVENous 2 times per day    ipratropium 0.5 mg-albuterol 2.5 mg  1 Dose Inhalation Q4H WA RT    arformoterol tartrate  15 mcg Nebulization BID RT    guaiFENesin  400 mg Oral 3 times per day     prochlorperazine, 5 mg, Once PRN  hydrALAZINE, 10 mg, Q6H PRN  sodium chloride, , PRN  sodium chloride flush, 5-40 mL, PRN  sodium chloride, , PRN  ondansetron, 4 mg, Q8H PRN   Or  ondansetron, 4 mg, Q6H PRN  acetaminophen, 650 mg, Q6H PRN   Or  acetaminophen, 650 mg, Q6H PRN  benzonatate, 100 mg, TID PRN  glucose, 4 tablet, PRN  dextrose bolus, 125 mL, PRN   Or  dextrose bolus, 250 mL, PRN  glucagon (rDNA), 1 mg, PRN  dextrose, , Continuous PRN  naloxone, 0.4 mg, PRN         Objective:  BP (!) 163/82   Pulse 66   Temp 98.6 °F (37 °C) (Oral)   Resp 16   Ht  for elevated BP  Leukocytosis: afebrile, procal elevated 0.45, follow Cx, completed empiric abx  Hyponatremia: resolved, trend lytes  Anemia: Hgb stable 10.5  Hypothyroid: cont synthroid  DM2: SSI and adjust as indicated  Hx MOE: cont methadone as able    -Cr stable, cont diuresis, persistent BLE edema improving  -BP remains elevated, cont to adjust regimen  -crackles in RLL, some volume overload noted on CXR yesterday, cont diuresis as above  -s/p zosyn, afebrile, WBC 17>18.4, on steroids, monitor  -reporting diarrhea, bismuth for now    Dispo: pending PT OT, anticipate HHC vs SNF, possibly later today    NOTE: Portions of this report may have been transcribed using voice recognition software. Every effort was made to ensure accuracy; however, inadvertent computerized transcription errors may be present.  Electronically signed by Lisha Frank MD on 4/1/2025 at 7:57 AM

## 2025-04-01 NOTE — PROGRESS NOTES
Tian Urrutia M.D.  James Moon D.O.  Katie Argueta M.D.  Hazel Maldonado M.D.   Luis Miguel Robertson D.O.  Erikcson Tatum M.D.           Daily Pulmonary Progress Note    Patient:  Faiza Romero 54 y.o. female MRN: [id number]     Date of Service: 4/1/2025        Subjective      Patient was seen and examined.    Previously was intubated.  Reports feeling somewhat better.  Still very short of breath and weak.  Lower extremity swelling is improved.    Objective   Vitals: BP (!) 163/82   Pulse 66   Temp 98.6 °F (37 °C) (Oral)   Resp 16   Ht 1.727 m (5' 8\")   Wt 95.2 kg (209 lb 12.8 oz)   SpO2 97%   BMI 31.90 kg/m²     I/O:  No intake or output data in the 24 hours ending 04/01/25 0906    CURRENT MEDS :  Scheduled Meds:   pantoprazole  40 mg Oral QAM AC    losartan  100 mg Oral Daily    insulin lispro  0-8 Units SubCUTAneous 4x Daily AC & HS    predniSONE  40 mg Oral Daily    metoprolol tartrate  50 mg Oral BID    isosorbide mononitrate  30 mg Oral Daily    amLODIPine  10 mg Orogastric Daily    insulin glargine  30 Units SubCUTAneous Daily    furosemide  40 mg Oral Daily    naloxegol  12.5 mg Oral QAM AC    enoxaparin  40 mg SubCUTAneous Daily    methadone  47 mg Per NG tube Daily    docusate  100 mg Orogastric BID    polyethylene glycol  17 g Orogastric Daily    aspirin  81 mg Oral Daily    clopidogrel  75 mg Oral Daily    levothyroxine  125 mcg Oral Daily    [Held by provider] rosuvastatin  10 mg Oral Daily    sodium chloride flush  5-40 mL IntraVENous 2 times per day    ipratropium 0.5 mg-albuterol 2.5 mg  1 Dose Inhalation Q4H WA RT    arformoterol tartrate  15 mcg Nebulization BID RT    guaiFENesin  400 mg Oral 3 times per day       Continuous Infusions:   sodium chloride      sodium chloride Stopped (03/26/25 1042)    dextrose         PRN Meds:  bismuth subsalicylate, prochlorperazine, hydrALAZINE, sodium chloride, sodium chloride flush, sodium chloride, ondansetron **OR** ondansetron, acetaminophen

## 2025-04-01 NOTE — CARE COORDINATION
Social work / Discharge planning:         Patient accepted by [facility] and precert is being submitted.    Will need to wait for insurance authorization prior to discharge.    Electronically signed by JOHNNY Gong on 4/1/2025 at 3:51 PM

## 2025-04-01 NOTE — PROGRESS NOTES
Occupational Therapy  OT BEDSIDE TREATMENT NOTE      Date:2025  Patient Name: Faiza Romero  MRN: [id number]  : 1970  Room: [address] [place] [address]     Evaluating OT: Whit Solorzano, OTR/VINCENT - [id number]     Referring Provider: Hazel Maldonado MD  Specific Provider Orders/Date: \"OT eval and treat\" - 3/30/2025     Diagnosis: COPD (chronic obstructive pulmonary disease) ([facility]) [J44.9]  COPD with acute exacerbation (HCC) [J44.1]      Patient intubated on 3/26/2025; patient extubated on 3/29/2025.     Pertinent Medical History: COPD, CAD, sleep apnea, HTN, DM      Precautions: fall risk, O2 via nasal cannula     Assessment of Current Deficits:    [x] Functional mobility             [x] ADLs          [x] Strength                  [] Cognition   [x] Functional transfers           [x] IADLs         [x] Safety Awareness   [x] Endurance   [x] Fine Motor Coordination    [x] Balance      [] Vision/Perception   [x] Sensation    [] Gross Motor Coordination [] ROM          [] Delirium                  [] Motor Control      OT PLAN OF CARE   OT POC is based on physician orders, patient diagnosis, and results of clinical assessment.  Frequency/Duration 2-5 days/week for 2-4 weeks PRN   Specific OT Treatment Interventions to Include:   * Instruction/training on adapted ADL techniques and AE recommendations to increase functional independence within precautions       * Training on energy conservation strategies, correct breathing pattern and techniques to improve independence/tolerance for self-care routine  * Functional transfer/mobility training/DME recommendations for increased independence, safety, and fall prevention  * Patient/Family education to increase follow through with safety techniques and functional independence  * Recommendation of environmental modifications for increased safety with functional transfers/mobility and ADLs  * Sensory re-education to improve body/limb awareness, maintain/improve skin integrity, and

## 2025-04-01 NOTE — PROGRESS NOTES
4 Eyes Skin Assessment     NAME:  Faiza Romero  YOB: 1970  MEDICAL RECORD NUMBER:  [id number]    The patient is being assessed for  {Reason for Assessment:79368}    I agree that at least one RN has performed a thorough Head to Toe Skin Assessment on the patient. ALL assessment sites listed below have been assessed.      Areas assessed by both nurses:    Head, Face, Ears, Shoulders, Back, Chest, Arms, Elbows, Hands, Sacrum. Buttock, Coccyx, Ischium, Legs. Feet and Heels, and Under Medical Devices         Does the Patient have a Wound? Yes wound(s) were present on assessment. LDA wound assessment was Initiated and completed by RN       Esau Prevention initiated by RN: Yes  Wound Care Orders initiated by RN: No    Pressure Injury (Stage 3,4, Unstageable, DTI, NWPT, and Complex wounds) if present, place Wound referral order by RN under : No    New Ostomies, if present place, Ostomy referral order under : No     Nurse 1 eSignature: Electronically signed by Nina Toney RN on 3/31/25 at 10:21 PM EDT    **SHARE this note so that the co-signing nurse can place an eSignature**    Nurse 2 eSignature: {Esignature:[id number]}

## 2025-04-01 NOTE — PROGRESS NOTES
Patient refused Lansoprazole disintegrating tablet. Patient requesting a pill form to swallow instead.

## 2025-04-02 ENCOUNTER — APPOINTMENT (OUTPATIENT)
Dept: GENERAL RADIOLOGY | Age: 55
DRG: 208 | End: 2025-04-02
Attending: INTERNAL MEDICINE
Payer: MEDICARE

## 2025-04-02 LAB
ALBUMIN SERPL-MCNC: 3.6 G/DL (ref 3.5–5.2)
ALP SERPL-CCNC: 118 U/L (ref 35–104)
ALT SERPL-CCNC: 24 U/L (ref 0–32)
ANION GAP SERPL CALCULATED.3IONS-SCNC: 12 MMOL/L (ref 7–16)
AST SERPL-CCNC: 30 U/L (ref 0–31)
BASOPHILS # BLD: 0.02 K/UL (ref 0–0.2)
BASOPHILS NFR BLD: 0 % (ref 0–2)
BILIRUB SERPL-MCNC: 0.3 MG/DL (ref 0–1.2)
BUN SERPL-MCNC: 22 MG/DL (ref 6–20)
CALCIUM SERPL-MCNC: 9.2 MG/DL (ref 8.6–10.2)
CHLORIDE SERPL-SCNC: 98 MMOL/L (ref 98–107)
CO2 SERPL-SCNC: 30 MMOL/L (ref 22–29)
CREAT SERPL-MCNC: 1 MG/DL (ref 0.5–1)
EOSINOPHIL # BLD: 0.19 K/UL (ref 0.05–0.5)
EOSINOPHILS RELATIVE PERCENT: 1 % (ref 0–6)
ERYTHROCYTE [DISTWIDTH] IN BLOOD BY AUTOMATED COUNT: 14.9 % (ref 11.5–15)
GFR, ESTIMATED: 66 ML/MIN/1.73M2
GLUCOSE BLD-MCNC: 135 MG/DL (ref 74–99)
GLUCOSE BLD-MCNC: 144 MG/DL (ref 74–99)
GLUCOSE BLD-MCNC: 184 MG/DL (ref 74–99)
GLUCOSE BLD-MCNC: 238 MG/DL (ref 74–99)
GLUCOSE BLD-MCNC: 81 MG/DL (ref 74–99)
GLUCOSE SERPL-MCNC: 90 MG/DL (ref 74–99)
HCT VFR BLD AUTO: 33 % (ref 34–48)
HGB BLD-MCNC: 10.1 G/DL (ref 11.5–15.5)
IMM GRANULOCYTES # BLD AUTO: 0.15 K/UL (ref 0–0.58)
IMM GRANULOCYTES NFR BLD: 1 % (ref 0–5)
LYMPHOCYTES NFR BLD: 2.99 K/UL (ref 1.5–4)
LYMPHOCYTES RELATIVE PERCENT: 22 % (ref 20–42)
MCH RBC QN AUTO: 28.9 PG (ref 26–35)
MCHC RBC AUTO-ENTMCNC: 30.6 G/DL (ref 32–34.5)
MCV RBC AUTO: 94.6 FL (ref 80–99.9)
MONOCYTES NFR BLD: 0.8 K/UL (ref 0.1–0.95)
MONOCYTES NFR BLD: 6 % (ref 2–12)
NEUTROPHILS NFR BLD: 69 % (ref 43–80)
NEUTS SEG NFR BLD: 9.36 K/UL (ref 1.8–7.3)
PLATELET # BLD AUTO: 295 K/UL (ref 130–450)
PMV BLD AUTO: 10.5 FL (ref 7–12)
POTASSIUM SERPL-SCNC: 3.4 MMOL/L (ref 3.5–5)
PROT SERPL-MCNC: 7.1 G/DL (ref 6.4–8.3)
RBC # BLD AUTO: 3.49 M/UL (ref 3.5–5.5)
SODIUM SERPL-SCNC: 140 MMOL/L (ref 132–146)
WBC OTHER # BLD: 13.5 K/UL (ref 4.5–11.5)

## 2025-04-02 PROCEDURE — 85025 COMPLETE CBC W/AUTO DIFF WBC: CPT

## 2025-04-02 PROCEDURE — 6370000000 HC RX 637 (ALT 250 FOR IP)

## 2025-04-02 PROCEDURE — 6360000002 HC RX W HCPCS

## 2025-04-02 PROCEDURE — 2060000000 HC ICU INTERMEDIATE R&B

## 2025-04-02 PROCEDURE — 6370000000 HC RX 637 (ALT 250 FOR IP): Performed by: INTERNAL MEDICINE

## 2025-04-02 PROCEDURE — 2700000000 HC OXYGEN THERAPY PER DAY

## 2025-04-02 PROCEDURE — 80053 COMPREHEN METABOLIC PANEL: CPT

## 2025-04-02 PROCEDURE — 82962 GLUCOSE BLOOD TEST: CPT

## 2025-04-02 PROCEDURE — 94660 CPAP INITIATION&MGMT: CPT

## 2025-04-02 PROCEDURE — 94640 AIRWAY INHALATION TREATMENT: CPT

## 2025-04-02 PROCEDURE — 6360000002 HC RX W HCPCS: Performed by: INTERNAL MEDICINE

## 2025-04-02 PROCEDURE — 6370000000 HC RX 637 (ALT 250 FOR IP): Performed by: STUDENT IN AN ORGANIZED HEALTH CARE EDUCATION/TRAINING PROGRAM

## 2025-04-02 PROCEDURE — 2500000003 HC RX 250 WO HCPCS: Performed by: INTERNAL MEDICINE

## 2025-04-02 PROCEDURE — 99232 SBSQ HOSP IP/OBS MODERATE 35: CPT | Performed by: STUDENT IN AN ORGANIZED HEALTH CARE EDUCATION/TRAINING PROGRAM

## 2025-04-02 PROCEDURE — 73630 X-RAY EXAM OF FOOT: CPT

## 2025-04-02 RX ORDER — FLUTICASONE FUROATE, UMECLIDINIUM BROMIDE AND VILANTEROL TRIFENATATE 100; 62.5; 25 UG/1; UG/1; UG/1
1 POWDER RESPIRATORY (INHALATION) DAILY
Qty: 1 EACH | Refills: 2 | Status: SHIPPED | OUTPATIENT
Start: 2025-04-02

## 2025-04-02 RX ORDER — INSULIN GLARGINE 100 [IU]/ML
30 INJECTION, SOLUTION SUBCUTANEOUS NIGHTLY
Qty: 5 ADJUSTABLE DOSE PRE-FILLED PEN SYRINGE | Refills: 3
Start: 2025-04-02

## 2025-04-02 RX ORDER — GLIPIZIDE 5 MG/1
5 TABLET ORAL 2 TIMES DAILY
Qty: 60 TABLET | Refills: 3
Start: 2025-04-02

## 2025-04-02 RX ORDER — AMLODIPINE BESYLATE 10 MG/1
10 TABLET ORAL DAILY
Qty: 30 TABLET | Refills: 3 | Status: SHIPPED | OUTPATIENT
Start: 2025-04-02

## 2025-04-02 RX ORDER — ALBUTEROL SULFATE 0.83 MG/ML
2.5 SOLUTION RESPIRATORY (INHALATION) EVERY 6 HOURS PRN
Qty: 120 EACH | Refills: 3 | Status: SHIPPED | OUTPATIENT
Start: 2025-04-02

## 2025-04-02 RX ORDER — LEVOTHYROXINE SODIUM 125 UG/1
125 TABLET ORAL DAILY
Qty: 30 TABLET | Refills: 3 | Status: SHIPPED | OUTPATIENT
Start: 2025-04-03

## 2025-04-02 RX ORDER — METOPROLOL TARTRATE 50 MG
50 TABLET ORAL 2 TIMES DAILY
Qty: 60 TABLET | Refills: 3 | Status: SHIPPED | OUTPATIENT
Start: 2025-04-02

## 2025-04-02 RX ORDER — POTASSIUM CHLORIDE 1500 MG/1
40 TABLET, EXTENDED RELEASE ORAL ONCE
Status: COMPLETED | OUTPATIENT
Start: 2025-04-02 | End: 2025-04-02

## 2025-04-02 RX ORDER — FUROSEMIDE 40 MG/1
40 TABLET ORAL DAILY
Qty: 60 TABLET | Refills: 3 | Status: SHIPPED | OUTPATIENT
Start: 2025-04-02

## 2025-04-02 RX ADMIN — LEVOTHYROXINE SODIUM 125 MCG: 0.03 TABLET ORAL at 05:54

## 2025-04-02 RX ADMIN — ARFORMOTEROL TARTRATE 15 MCG: 15 SOLUTION RESPIRATORY (INHALATION) at 09:14

## 2025-04-02 RX ADMIN — ASPIRIN 81 MG: 81 TABLET, COATED ORAL at 08:19

## 2025-04-02 RX ADMIN — INSULIN GLARGINE 30 UNITS: 100 INJECTION, SOLUTION SUBCUTANEOUS at 09:00

## 2025-04-02 RX ADMIN — INSULIN LISPRO 2 UNITS: 100 INJECTION, SOLUTION INTRAVENOUS; SUBCUTANEOUS at 20:25

## 2025-04-02 RX ADMIN — IPRATROPIUM BROMIDE AND ALBUTEROL SULFATE 1 DOSE: 2.5; .5 SOLUTION RESPIRATORY (INHALATION) at 09:14

## 2025-04-02 RX ADMIN — IPRATROPIUM BROMIDE AND ALBUTEROL SULFATE 1 DOSE: 2.5; .5 SOLUTION RESPIRATORY (INHALATION) at 20:10

## 2025-04-02 RX ADMIN — NALOXEGOL OXALATE 12.5 MG: 12.5 TABLET, FILM COATED ORAL at 05:54

## 2025-04-02 RX ADMIN — ISOSORBIDE MONONITRATE 30 MG: 30 TABLET, EXTENDED RELEASE ORAL at 08:19

## 2025-04-02 RX ADMIN — INSULIN LISPRO 2 UNITS: 100 INJECTION, SOLUTION INTRAVENOUS; SUBCUTANEOUS at 11:51

## 2025-04-02 RX ADMIN — FUROSEMIDE 40 MG: 40 TABLET ORAL at 08:19

## 2025-04-02 RX ADMIN — PANTOPRAZOLE SODIUM 40 MG: 40 TABLET, DELAYED RELEASE ORAL at 05:55

## 2025-04-02 RX ADMIN — BUDESONIDE 500 MCG: 0.5 SUSPENSION RESPIRATORY (INHALATION) at 20:10

## 2025-04-02 RX ADMIN — ARFORMOTEROL TARTRATE 15 MCG: 15 SOLUTION RESPIRATORY (INHALATION) at 20:10

## 2025-04-02 RX ADMIN — AMLODIPINE BESYLATE 10 MG: 10 TABLET ORAL at 08:19

## 2025-04-02 RX ADMIN — POTASSIUM CHLORIDE 40 MEQ: 1500 TABLET, EXTENDED RELEASE ORAL at 09:00

## 2025-04-02 RX ADMIN — CLOPIDOGREL BISULFATE 75 MG: 75 TABLET, FILM COATED ORAL at 08:19

## 2025-04-02 RX ADMIN — SODIUM CHLORIDE, PRESERVATIVE FREE 10 ML: 5 INJECTION INTRAVENOUS at 08:22

## 2025-04-02 RX ADMIN — METOPROLOL TARTRATE 50 MG: 50 TABLET, FILM COATED ORAL at 08:19

## 2025-04-02 RX ADMIN — GUAIFENESIN 400 MG: 400 TABLET ORAL at 05:55

## 2025-04-02 RX ADMIN — GUAIFENESIN 400 MG: 400 TABLET ORAL at 20:25

## 2025-04-02 RX ADMIN — GUAIFENESIN 400 MG: 400 TABLET ORAL at 14:58

## 2025-04-02 RX ADMIN — METOPROLOL TARTRATE 50 MG: 50 TABLET, FILM COATED ORAL at 20:25

## 2025-04-02 RX ADMIN — PREDNISONE 40 MG: 20 TABLET ORAL at 08:19

## 2025-04-02 RX ADMIN — BUDESONIDE 500 MCG: 0.5 SUSPENSION RESPIRATORY (INHALATION) at 09:14

## 2025-04-02 RX ADMIN — IPRATROPIUM BROMIDE AND ALBUTEROL SULFATE 1 DOSE: 2.5; .5 SOLUTION RESPIRATORY (INHALATION) at 13:37

## 2025-04-02 RX ADMIN — METHADONE HYDROCHLORIDE 47 MG: 10 CONCENTRATE ORAL at 08:19

## 2025-04-02 RX ADMIN — LOSARTAN POTASSIUM 100 MG: 50 TABLET, FILM COATED ORAL at 08:19

## 2025-04-02 RX ADMIN — SODIUM CHLORIDE, PRESERVATIVE FREE 10 ML: 5 INJECTION INTRAVENOUS at 20:26

## 2025-04-02 RX ADMIN — ENOXAPARIN SODIUM 40 MG: 100 INJECTION SUBCUTANEOUS at 08:19

## 2025-04-02 RX ADMIN — IPRATROPIUM BROMIDE AND ALBUTEROL SULFATE 1 DOSE: 2.5; .5 SOLUTION RESPIRATORY (INHALATION) at 16:11

## 2025-04-02 NOTE — CARE COORDINATION
Social work / Discharge planning:           Per liaison for [facility], precert was started yesterday.   Awaiting approval.    ANAND, 11853 and transport form completed.    Electronically signed by JOHNNY Gong on 4/2/2025 at 10:22 AM

## 2025-04-02 NOTE — PLAN OF CARE
Problem: Chronic Conditions and Co-morbidities  Goal: Patient's chronic conditions and co-morbidity symptoms are monitored and maintained or improved  4/1/2025 2231 by Radha Wilkes RN  Outcome: Progressing     Problem: Safety - Adult  Goal: Free from fall injury  4/1/2025 2231 by Radha Wilkes RN  Outcome: Progressing     Problem: Skin/Tissue Integrity  Goal: Skin integrity remains intact  Description: 1.  Monitor for areas of redness and/or skin breakdown  2.  Assess vascular access sites hourly  3.  Every 4-6 hours minimum:  Change oxygen saturation probe site  4.  Every 4-6 hours:  If on nasal continuous positive airway pressure, respiratory therapy assess nares and determine need for appliance change or resting period  4/1/2025 2231 by Radha Wilkes RN  Outcome: Progressing     Problem: Discharge Planning  Goal: Discharge to home or other facility with appropriate resources  4/1/2025 2231 by Radha Wilkes RN  Outcome: Progressing     Problem: Safety - Medical Restraint  Goal: Remains free of injury from restraints (Restraint for Interference with Medical Device)  Description: INTERVENTIONS:  1. Determine that other, less restrictive measures have been tried or would not be effective before applying the restraint  2. Evaluate the patient's condition at the time of restraint application  3. Inform patient/family regarding the reason for restraint  4. Q2H: Monitor safety, psychosocial status, comfort, nutrition and hydration  4/1/2025 2231 by Radha Wilkes RN  Outcome: Progressing     Problem: Pain  Goal: Verbalizes/displays adequate comfort level or baseline comfort level  4/1/2025 2231 by Radha Wilkes RN  Outcome: Progressing     Problem: Nutrition Deficit:  Goal: Optimize nutritional status  4/1/2025 2231 by Radha Wilkes RN  Outcome: Progressing     Problem: ABCDS Injury Assessment  Goal: Absence of physical injury  4/1/2025 2231 by Radha Wilkes RN  Outcome: Progressing

## 2025-04-02 NOTE — PROGRESS NOTES
Tian Urrutia M.D.,[facility]  James Moon D.O., KIRK., [facility]  Katie Argueta M.D.  Hazel Maldonado M.D.   Luis Miguel Robertson D.O.  Erickson Tatum M.D.         Daily Pulmonary Progress Note    Patient:  Faiza Romero 54 y.o. female MRN: [id number]            Synopsis     We are following patient for COPD    \"CC\" respiratory failure    Code status: FULL CODE      Subjective   Summary of Hospital Course:  The patient is a 54 y.o. female with significant past medical history of COPD normally on 3 L of O2, CHF, type II DM, CAD with stent placement who was transferred from [place] yesterday for acute COPD exacerbation.  Patient had reported some dyspnea.  Patient was also noted to recently have influenza A.  Treated for COPD exacerbation.  Of note, patient is on methadone.     Patient had RRT called this morning due to hypoxia and increasing lethargy.  Patient had received 3 doses of Narcan during this hospitalization thus far.  Patient was on BiPAP and O2 was increased to 100%.  Patient continuing to be lethargic with concerns of impending respiratory failure.  Transferred to the ICU for further management.  Repeat ABG shows worsening pCO2.  Patient was intubated in the ICU.     3/27  Patient continues to remain intubated and sedated, chest x-ray appears stable compared to yesterday.  Resume home Lopressor and Lovenox today.  Will initiate tube feeds and start a bowel regimen.     3/28  Patient is currently on fentanyl and Versed. She seems to be doing better.  She is awakens to voice and follows some commands. Tube feeds running at 40 cc/h.  She is also hypertensive.  Will increase Lopressor to 25 mg twice daily and restart amlodipine. Blood cultures and respiratory cultures are negative.  MRSA negative;  Vancomycin discontinued.  Steroids adjusted to every 8 hours. Will continue to wean off of sedation and oxygen.     3/29  Patient extubated successfully today.  She is off all sedation.  Continues to  Assessment:    Acute hypoxic respiratory failure  ARDS requiring intubation, extubated 3/29/25  Bilateral infiltrates  Acute on chronic HFpEF  Vaping use  SIRS possible sepsis  Probable BURTON  CAD, s/p stent placement  History of drug abuse, on methadone    Plan:   Wean oxygen as tolerated to maintain SpO2 greater than 92%  Nebulized bronchodilators - brovana and pulmicort BID with DuoNebs q4h WA. Albuterol nebs PRN  Diuretic management per primary, lasix 40 mg PO daily.  Echo reviewed, EF 55% with stage II diastolic dysfunction, follows with Dr. Sargent OP  Completed 5 day course of zosyn  Prednisone taper written  DVT/PE prophylaxis - lovenox  PT/OT  Will need OP PFTs and PSG.   Nebulizer and Trelegy ordered for discharge  Okay for discharge from a pulmonary standpoint  Message routed to office for follow up    This plan of care was reviewed in collaboration with Dr. Robertson    Electronically signed by WALLACE He - CNP on 4/2/2025 at 12:23 PM      I personally saw, examined, and cared for the patient. I performed the substantive portion of the visit. Labs, medications, radiographs reviewed. I agree with history exam and plans detailed in NP note.        Luis Miguel Robertson, DO

## 2025-04-02 NOTE — PROGRESS NOTES
[facility] Hospitalist Progress Note    Admitting Date and Time: 3/25/2025  6:20 PM  Admit Dx: COPD (chronic obstructive pulmonary disease) (HCC) [J44.9]  COPD with acute exacerbation (HCC) [J44.1]    Subjective:  Patient is being followed for COPD (chronic obstructive pulmonary disease) (HCC) [J44.9]  COPD with acute exacerbation (HCC) [J44.1]   Pt feel better  Per RN: no additional concerns    ROS: reports aching diffusely     pantoprazole  40 mg Oral QAM AC    budesonide  0.5 mg Nebulization BID RT    predniSONE  40 mg Oral Daily    Followed by    [START ON 4/3/2025] predniSONE  30 mg Oral Daily    Followed by    [START ON 4/6/2025] predniSONE  20 mg Oral Daily    Followed by    [START ON 4/9/2025] predniSONE  10 mg Oral Daily    losartan  100 mg Oral Daily    insulin lispro  0-8 Units SubCUTAneous 4x Daily AC & HS    metoprolol tartrate  50 mg Oral BID    isosorbide mononitrate  30 mg Oral Daily    amLODIPine  10 mg Orogastric Daily    insulin glargine  30 Units SubCUTAneous Daily    furosemide  40 mg Oral Daily    naloxegol  12.5 mg Oral QAM AC    enoxaparin  40 mg SubCUTAneous Daily    methadone  47 mg Per NG tube Daily    docusate  100 mg Orogastric BID    polyethylene glycol  17 g Orogastric Daily    aspirin  81 mg Oral Daily    clopidogrel  75 mg Oral Daily    levothyroxine  125 mcg Oral Daily    [Held by provider] rosuvastatin  10 mg Oral Daily    sodium chloride flush  5-40 mL IntraVENous 2 times per day    ipratropium 0.5 mg-albuterol 2.5 mg  1 Dose Inhalation Q4H WA RT    arformoterol tartrate  15 mcg Nebulization BID RT    guaiFENesin  400 mg Oral 3 times per day     bismuth subsalicylate, 30 mL, Q6H PRN  albuterol, 2.5 mg, Q6H PRN  melatonin, 3 mg, Nightly PRN  prochlorperazine, 5 mg, Once PRN  hydrALAZINE, 10 mg, Q6H PRN  sodium chloride, , PRN  sodium chloride flush, 5-40 mL, PRN  sodium chloride, , PRN  ondansetron, 4 mg, Q8H PRN   Or  ondansetron, 4 mg, Q6H PRN  acetaminophen, 650 mg, Q6H

## 2025-04-02 NOTE — CONSULTS
[facility]   Inpatient CHF Nurse Navigator Consult      Cardiologist: Dr Rosetta Wilsonaraceli Romero is a 54 y.o. (1970) female with a history of HFpEF, most recent EF:  Lab Results   Component Value Date    LVEF 55 09/08/2022       Patient was awake and alert, laying in bed during the consultation and is agreeable to heart failure education. She was engaged and asked appropriate questions throughout the education session. She is feeling much better today. She is going to rehab after discharge. Hopefully today pending pre cert. Cardiology is not following on this admission but she does have a follow up scheduled with Dr Sargent.     Barriers identified during consult contributing to HF Hospitalization: none.     [] Limited medication adherence   [] Poor health literacy, education regarding HF medications provided   [] Pill box provided to patient  [] Difficulty affording medications  [] Difficulty obtaining/ managing medications  [] Prescription assistance information given     [x] Not weighing themselves daily  [x] Weight log provided for easy monitoring  [] Scale provided     [x] Not following low sodium diet  [] Food insecurity   [x] 2 gram sodium diet education provided   [] Low sodium recipes provided  [] Sodium free seasoning provided   [] Low sodium meal delivery options given to patient  [] Dietician consulted     [] Lack of transportation to appointments     [] Depression, given chronic illness  [] Primary team notified     [] Goals of care need addressed  [] Palliative care consulted     [] CHF community health worker Darlin García consulted [phone], to assist with     Chart Reviewed:  Diet: ADULT DIET; Regular; 3 carb choices (45 gm/meal)  ADULT ORAL NUTRITION SUPPLEMENT; Breakfast, Dinner; Diabetic Oral Supplement  ADULT ORAL NUTRITION SUPPLEMENT; Lunch, Dinner; Wound Healing Oral Supplement   Daily Weights: Patient Vitals for the past 96 hrs (Last 3

## 2025-04-02 NOTE — FLOWSHEET NOTE
Inpatient Wound Care (initial consult)    Admit Date: 3/25/2025  6:20 PM    Reason for consult:  toe wound    Significant history:  Admission Diagnoses: Principal Problem:    COPD with acute exacerbation (HCC)  Active Problems:    COPD (chronic obstructive pulmonary disease) ([facility])  Resolved Problems:    * No resolved hospital problems. *      Past Medical History:   Diagnosis Date    CAD in native artery 09/14/2020    Claustrophobia     mild    Colitis     Constipation     COPD (chronic obstructive pulmonary disease) ([facility])     COVID-19     Diabetes mellitus (HCC)     Heart problem     Hyperlipidemia     Hypertension     Hypothyroidism     Sleep apnea     diagnosed 7-8 years ago does not use C-pap problems with cost    Thyroid disease     Wears partial dentures     upper           Findings:  alert and oriented     04/02/25 1454   Wound 03/31/25 Toe (Comment  which one) Right 2nd   Date First Assessed/Time First Assessed: 03/31/25 0800   Location: Toe (Comment  which one)  Wound Location Orientation: Right  Wound Description (Comments): 2nd   Wound Image     Wound Length (cm) 0.6 cm   Wound Width (cm) 0.8 cm   Wound Depth (cm)   (obs)   Wound Surface Area (cm^2) 0.48 cm^2   Wound Assessment   (dark, red, dry, dead skin flap removed from area)   Drainage Amount Scant (moist but unmeasurable)   Drainage Description Sanguinous   Odor None   Vika-wound Assessment   (loose edgegs)       Per patient a skin tear got ripped off he chest with electrode- proximal area. No drainage. Area dry      **Informed Consent**    The patient has given verbal consent to have photos taken of foot, chest and inserted into their chart as part of their permanent medical record for purposes of documentation, treatment management and/or medical review.   All Images taken on 4/2/25 of patient name: Faiza Romero were transmitted and stored on secured Epic  Site located within Media Folder Tab by a registered [employer] Mobile Application

## 2025-04-02 NOTE — CONSULTS
Department of Podiatry   Consult Note        Reason for Consult:  Second digit wound    CHIEF COMPLAINT:  COPD with acute exacerbation    HISTORY OF PRESENT ILLNESS:    Faiza Romero is a 54 y.o. female with significant past medical history of CAD, COPD, diabetes mellitus, HTN, HLD, hypothyroidism, and thyroid disease. She was in the ICU and thrashing in bed according to the nurses. Three days ago, they noticed a dark discoloration at the tip of her second digit. Denies any pain or discomfort at this time. States she's had peripheral neuropathy for at least a year. She previously power-washed her foot and was unable to feel it. Patient denies any N/V/D/F/C/SOB/CP and has no other pedal complaints at this time.     Past Medical History:        Diagnosis Date    CAD in native artery 2020    Claustrophobia     mild    Colitis     Constipation     COPD (chronic obstructive pulmonary disease) (HCC)     COVID-19     Diabetes mellitus (HCC)     Heart problem     Hyperlipidemia     Hypertension     Hypothyroidism     Sleep apnea     diagnosed 7-8 years ago does not use C-pap problems with cost    Thyroid disease     Wears partial dentures     upper       Past Surgical History:        Procedure Laterality Date    APPENDECTOMY      CARDIAC CATHETERIZATION  2020    Dr. Sargent EF 55% cts consult ordered    CARDIAC PROCEDURE N/A 2024    Left heart cath / coronary angiography w grafts [place] [facility] [place]     SECTION      x2    CHOLECYSTECTOMY      COLONOSCOPY      CORONARY ANGIOPLASTY WITH STENT PLACEMENT  2022    Dr. Sargent [place] 3.0x12    CORONARY ARTERY BYPASS GRAFT N/A 2020    CORONARY ARTERY BYPASS, POSSIBLE LEFT RADIAL ARTERY HARVEST, MINDA [place]va [place] [facility] [place]    HERNIA REPAIR  2015    laparoscopic incisional reapair with mesh    HYSTERECTOMY (CERVIX STATUS UNKNOWN)      KNEE SURGERY Right     VENTRAL HERNIA REPAIR   second digit  Diabetes mellitus  HTN  COPD  HTN  HLD    PLAN:  - Patient was examined and evaluated.Reviewed patient's recent lab results, charts and pertinent diagnostic imaging.Reviewed ancillary service notes.  - X-rays: pending  - Weightbearing: FWB to Bilateral leg(s).  - Dressing: non-adherent dressing  - Surgical intervention is not planned.   - Will continue to follow patient while they are in-house.  - Discussed patient with Dr. Raymundo Cooper DPM FACFAS  Fellowship-Trained Foot and Ankle Surgeon  Diplomate, [employer] Board of Foot and Ankle Surgeons  [phone]     Thank you for the opportunity to take part in the patient's care. Please do not hesitate to call for any questions or concerns.    Mala Grey PGY-2  04/02/25

## 2025-04-02 NOTE — PLAN OF CARE
Problem: Chronic Conditions and Co-morbidities  Goal: Patient's chronic conditions and co-morbidity symptoms are monitored and maintained or improved  4/2/2025 1013 by Tammie Stratton RN  Outcome: Progressing  4/1/2025 2231 by Radha Wilkes RN  Outcome: Progressing     Problem: Safety - Adult  Goal: Free from fall injury  4/2/2025 1013 by Tammie Stratton RN  Outcome: Progressing  4/1/2025 2231 by Radha Wilkes RN  Outcome: Progressing     Problem: Skin/Tissue Integrity  Goal: Skin integrity remains intact  Description: 1.  Monitor for areas of redness and/or skin breakdown  2.  Assess vascular access sites hourly  3.  Every 4-6 hours minimum:  Change oxygen saturation probe site  4.  Every 4-6 hours:  If on nasal continuous positive airway pressure, respiratory therapy assess nares and determine need for appliance change or resting period  4/2/2025 1013 by Tammie Stratton RN  Outcome: Progressing  4/1/2025 2231 by Radha Wilkes RN  Outcome: Progressing     Problem: Discharge Planning  Goal: Discharge to home or other facility with appropriate resources  4/2/2025 1013 by Tammie Stratton RN  Outcome: Progressing  4/1/2025 2231 by Radha Wilkes RN  Outcome: Progressing     Problem: Safety - Medical Restraint  Goal: Remains free of injury from restraints (Restraint for Interference with Medical Device)  Description: INTERVENTIONS:  1. Determine that other, less restrictive measures have been tried or would not be effective before applying the restraint  2. Evaluate the patient's condition at the time of restraint application  3. Inform patient/family regarding the reason for restraint  4. Q2H: Monitor safety, psychosocial status, comfort, nutrition and hydration  4/2/2025 1013 by Tammie Stratton RN  Outcome: Progressing  4/1/2025 2231 by Radha Wilkes RN  Outcome: Progressing     Problem: Pain  Goal: Verbalizes/displays adequate comfort level or baseline comfort level  4/2/2025 1013 by  Hephner, Tammie, RN  Outcome: Progressing  4/1/2025 2231 by Radha Wilkes RN  Outcome: Progressing     Problem: Nutrition Deficit:  Goal: Optimize nutritional status  4/2/2025 1013 by Tammie Stratton RN  Outcome: Progressing  4/1/2025 2231 by Radha Wilkes RN  Outcome: Progressing     Problem: ABCDS Injury Assessment  Goal: Absence of physical injury  4/2/2025 1013 by Tammie Stratton RN  Outcome: Progressing  4/1/2025 2231 by Radha Wilkes RN  Outcome: Progressing

## 2025-04-03 ENCOUNTER — TELEPHONE (OUTPATIENT)
Dept: CARDIOLOGY CLINIC | Age: 55
End: 2025-04-03

## 2025-04-03 VITALS
RESPIRATION RATE: 16 BRPM | TEMPERATURE: 97.9 F | BODY MASS INDEX: 31.8 KG/M2 | HEIGHT: 68 IN | DIASTOLIC BLOOD PRESSURE: 72 MMHG | OXYGEN SATURATION: 97 % | SYSTOLIC BLOOD PRESSURE: 168 MMHG | WEIGHT: 209.8 LBS | HEART RATE: 59 BPM

## 2025-04-03 PROBLEM — E11.9 CONTROLLED TYPE 2 DIABETES MELLITUS WITHOUT COMPLICATION: Status: ACTIVE | Noted: 2025-04-03

## 2025-04-03 PROBLEM — J44.1 COPD EXACERBATION (HCC): Status: ACTIVE | Noted: 2025-04-03

## 2025-04-03 PROBLEM — I10 PRIMARY HYPERTENSION: Status: ACTIVE | Noted: 2025-04-03

## 2025-04-03 PROBLEM — E03.9 HYPOTHYROIDISM: Status: ACTIVE | Noted: 2025-04-03

## 2025-04-03 LAB
ALBUMIN SERPL-MCNC: 3.7 G/DL (ref 3.5–5.2)
ALP SERPL-CCNC: 109 U/L (ref 35–104)
ALT SERPL-CCNC: 22 U/L (ref 0–32)
ANION GAP SERPL CALCULATED.3IONS-SCNC: 13 MMOL/L (ref 7–16)
AST SERPL-CCNC: 23 U/L (ref 0–31)
BASOPHILS # BLD: 0.01 K/UL (ref 0–0.2)
BASOPHILS NFR BLD: 0 % (ref 0–2)
BILIRUB SERPL-MCNC: 0.4 MG/DL (ref 0–1.2)
BUN SERPL-MCNC: 20 MG/DL (ref 6–20)
CALCIUM SERPL-MCNC: 9.4 MG/DL (ref 8.6–10.2)
CHLORIDE SERPL-SCNC: 99 MMOL/L (ref 98–107)
CO2 SERPL-SCNC: 25 MMOL/L (ref 22–29)
CREAT SERPL-MCNC: 0.9 MG/DL (ref 0.5–1)
EOSINOPHIL # BLD: 0.23 K/UL (ref 0.05–0.5)
EOSINOPHILS RELATIVE PERCENT: 2 % (ref 0–6)
ERYTHROCYTE [DISTWIDTH] IN BLOOD BY AUTOMATED COUNT: 14.8 % (ref 11.5–15)
GFR, ESTIMATED: 73 ML/MIN/1.73M2
GLUCOSE BLD-MCNC: 130 MG/DL (ref 74–99)
GLUCOSE BLD-MCNC: 181 MG/DL (ref 74–99)
GLUCOSE BLD-MCNC: 77 MG/DL (ref 74–99)
GLUCOSE SERPL-MCNC: 90 MG/DL (ref 74–99)
HCT VFR BLD AUTO: 31.5 % (ref 34–48)
HGB BLD-MCNC: 10 G/DL (ref 11.5–15.5)
IMM GRANULOCYTES # BLD AUTO: 0.16 K/UL (ref 0–0.58)
IMM GRANULOCYTES NFR BLD: 1 % (ref 0–5)
LYMPHOCYTES NFR BLD: 3.02 K/UL (ref 1.5–4)
LYMPHOCYTES RELATIVE PERCENT: 20 % (ref 20–42)
MCH RBC QN AUTO: 29 PG (ref 26–35)
MCHC RBC AUTO-ENTMCNC: 31.7 G/DL (ref 32–34.5)
MCV RBC AUTO: 91.3 FL (ref 80–99.9)
MONOCYTES NFR BLD: 0.91 K/UL (ref 0.1–0.95)
MONOCYTES NFR BLD: 6 % (ref 2–12)
NEUTROPHILS NFR BLD: 72 % (ref 43–80)
NEUTS SEG NFR BLD: 11.18 K/UL (ref 1.8–7.3)
PLATELET # BLD AUTO: 293 K/UL (ref 130–450)
PMV BLD AUTO: 10.7 FL (ref 7–12)
POTASSIUM SERPL-SCNC: 3.5 MMOL/L (ref 3.5–5)
PROT SERPL-MCNC: 7 G/DL (ref 6.4–8.3)
RBC # BLD AUTO: 3.45 M/UL (ref 3.5–5.5)
SODIUM SERPL-SCNC: 137 MMOL/L (ref 132–146)
WBC OTHER # BLD: 15.5 K/UL (ref 4.5–11.5)

## 2025-04-03 PROCEDURE — 80053 COMPREHEN METABOLIC PANEL: CPT

## 2025-04-03 PROCEDURE — 94660 CPAP INITIATION&MGMT: CPT

## 2025-04-03 PROCEDURE — 2700000000 HC OXYGEN THERAPY PER DAY

## 2025-04-03 PROCEDURE — 85025 COMPLETE CBC W/AUTO DIFF WBC: CPT

## 2025-04-03 PROCEDURE — 6370000000 HC RX 637 (ALT 250 FOR IP): Performed by: STUDENT IN AN ORGANIZED HEALTH CARE EDUCATION/TRAINING PROGRAM

## 2025-04-03 PROCEDURE — 6370000000 HC RX 637 (ALT 250 FOR IP): Performed by: INTERNAL MEDICINE

## 2025-04-03 PROCEDURE — 2500000003 HC RX 250 WO HCPCS: Performed by: INTERNAL MEDICINE

## 2025-04-03 PROCEDURE — 6360000002 HC RX W HCPCS

## 2025-04-03 PROCEDURE — 82962 GLUCOSE BLOOD TEST: CPT

## 2025-04-03 PROCEDURE — 6370000000 HC RX 637 (ALT 250 FOR IP)

## 2025-04-03 PROCEDURE — 6360000002 HC RX W HCPCS: Performed by: INTERNAL MEDICINE

## 2025-04-03 PROCEDURE — 99239 HOSP IP/OBS DSCHRG MGMT >30: CPT | Performed by: INTERNAL MEDICINE

## 2025-04-03 PROCEDURE — 94640 AIRWAY INHALATION TREATMENT: CPT

## 2025-04-03 RX ORDER — PREDNISONE 10 MG/1
TABLET ORAL
Qty: 22 TABLET | Refills: 0 | Status: SHIPPED | OUTPATIENT
Start: 2025-04-03 | End: 2025-04-13

## 2025-04-03 RX ADMIN — LEVOTHYROXINE SODIUM 125 MCG: 0.03 TABLET ORAL at 06:03

## 2025-04-03 RX ADMIN — FUROSEMIDE 40 MG: 40 TABLET ORAL at 08:33

## 2025-04-03 RX ADMIN — IPRATROPIUM BROMIDE AND ALBUTEROL SULFATE 1 DOSE: 2.5; .5 SOLUTION RESPIRATORY (INHALATION) at 15:41

## 2025-04-03 RX ADMIN — IPRATROPIUM BROMIDE AND ALBUTEROL SULFATE 1 DOSE: 2.5; .5 SOLUTION RESPIRATORY (INHALATION) at 09:09

## 2025-04-03 RX ADMIN — LOSARTAN POTASSIUM 100 MG: 50 TABLET, FILM COATED ORAL at 08:33

## 2025-04-03 RX ADMIN — METHADONE HYDROCHLORIDE 47 MG: 10 CONCENTRATE ORAL at 08:34

## 2025-04-03 RX ADMIN — IPRATROPIUM BROMIDE AND ALBUTEROL SULFATE 1 DOSE: 2.5; .5 SOLUTION RESPIRATORY (INHALATION) at 12:32

## 2025-04-03 RX ADMIN — ENOXAPARIN SODIUM 40 MG: 100 INJECTION SUBCUTANEOUS at 08:37

## 2025-04-03 RX ADMIN — ISOSORBIDE MONONITRATE 30 MG: 30 TABLET, EXTENDED RELEASE ORAL at 08:33

## 2025-04-03 RX ADMIN — CLOPIDOGREL BISULFATE 75 MG: 75 TABLET, FILM COATED ORAL at 08:33

## 2025-04-03 RX ADMIN — INSULIN GLARGINE 30 UNITS: 100 INJECTION, SOLUTION SUBCUTANEOUS at 10:25

## 2025-04-03 RX ADMIN — AMLODIPINE BESYLATE 10 MG: 10 TABLET ORAL at 08:33

## 2025-04-03 RX ADMIN — ASPIRIN 81 MG: 81 TABLET, COATED ORAL at 08:33

## 2025-04-03 RX ADMIN — GUAIFENESIN 400 MG: 400 TABLET ORAL at 15:54

## 2025-04-03 RX ADMIN — METOPROLOL TARTRATE 50 MG: 50 TABLET, FILM COATED ORAL at 08:32

## 2025-04-03 RX ADMIN — SODIUM CHLORIDE, PRESERVATIVE FREE 10 ML: 5 INJECTION INTRAVENOUS at 08:33

## 2025-04-03 RX ADMIN — GUAIFENESIN 400 MG: 400 TABLET ORAL at 06:03

## 2025-04-03 RX ADMIN — BUDESONIDE 500 MCG: 0.5 SUSPENSION RESPIRATORY (INHALATION) at 09:09

## 2025-04-03 RX ADMIN — ARFORMOTEROL TARTRATE 15 MCG: 15 SOLUTION RESPIRATORY (INHALATION) at 09:09

## 2025-04-03 RX ADMIN — PANTOPRAZOLE SODIUM 40 MG: 40 TABLET, DELAYED RELEASE ORAL at 06:03

## 2025-04-03 RX ADMIN — PREDNISONE 30 MG: 20 TABLET ORAL at 08:33

## 2025-04-03 NOTE — PROGRESS NOTES
Pulse Ox was 89% on room air at rest.   Ambulated patient on room air  Oxygen satration was 85% while ambulating on room air.  Oxygen applied.  Recovery Pulse Ox was 92% on 3 liters of oxygen while ambulating.

## 2025-04-03 NOTE — PLAN OF CARE
Problem: Chronic Conditions and Co-morbidities  Goal: Patient's chronic conditions and co-morbidity symptoms are monitored and maintained or improved  4/2/2025 2219 by Radha Wilkes RN  Outcome: Progressing     Problem: Safety - Adult  Goal: Free from fall injury  4/2/2025 2219 by Radha Wilkes RN  Outcome: Progressing     Problem: Skin/Tissue Integrity  Goal: Skin integrity remains intact  Description: 1.  Monitor for areas of redness and/or skin breakdown  2.  Assess vascular access sites hourly  3.  Every 4-6 hours minimum:  Change oxygen saturation probe site  4.  Every 4-6 hours:  If on nasal continuous positive airway pressure, respiratory therapy assess nares and determine need for appliance change or resting period  4/2/2025 2219 by Radha Wilkes RN  Outcome: Progressing     Problem: Discharge Planning  Goal: Discharge to home or other facility with appropriate resources  4/2/2025 2219 by Radha Wilkes RN  Outcome: Progressing     Problem: Safety - Medical Restraint  Goal: Remains free of injury from restraints (Restraint for Interference with Medical Device)  Description: INTERVENTIONS:  1. Determine that other, less restrictive measures have been tried or would not be effective before applying the restraint  2. Evaluate the patient's condition at the time of restraint application  3. Inform patient/family regarding the reason for restraint  4. Q2H: Monitor safety, psychosocial status, comfort, nutrition and hydration  4/2/2025 2219 by Radha Wilkes RN  Outcome: Progressing     Problem: Pain  Goal: Verbalizes/displays adequate comfort level or baseline comfort level  4/2/2025 2219 by Radha Wilkes RN  Outcome: Progressing     Problem: Nutrition Deficit:  Goal: Optimize nutritional status  4/2/2025 2219 by Radha Wilkes RN  Outcome: Progressing     Problem: ABCDS Injury Assessment  Goal: Absence of physical injury  4/2/2025 2219 by Radha Wilkes RN  Outcome: Progressing

## 2025-04-03 NOTE — TELEPHONE ENCOUNTER
Called pt to advise that our elevator is down, we have stairway that leads to 3rd floor, call office if pt wants to r/s or ok to come. NO ANSWER AND NO VOICEMAIL TO LEAVE MSG Yina Govea CMA

## 2025-04-03 NOTE — PROGRESS NOTES
Department of Podiatry  Progress Note    SUBJECTIVE:  Faiza Romero is seen at bedside for right second digit abrasion.  She states she is doing well and that it had started bleeding overnight.  Band-Aids applied, and patient has not seen any excessive drainage.  She has no complaints of pain or discomfort at this time.  No acute events overnight. Patient denies any N/V/D/F/C/SOB/CP. No other pedal complaints at this time.     OBJECTIVE:    Scheduled Meds:   pantoprazole  40 mg Oral QAM AC    budesonide  0.5 mg Nebulization BID RT    predniSONE  30 mg Oral Daily    Followed by    [START ON 4/6/2025] predniSONE  20 mg Oral Daily    Followed by    [START ON 4/9/2025] predniSONE  10 mg Oral Daily    losartan  100 mg Oral Daily    insulin lispro  0-8 Units SubCUTAneous 4x Daily AC & HS    metoprolol tartrate  50 mg Oral BID    isosorbide mononitrate  30 mg Oral Daily    amLODIPine  10 mg Orogastric Daily    insulin glargine  30 Units SubCUTAneous Daily    furosemide  40 mg Oral Daily    naloxegol  12.5 mg Oral QAM AC    enoxaparin  40 mg SubCUTAneous Daily    methadone  47 mg Per NG tube Daily    docusate  100 mg Orogastric BID    polyethylene glycol  17 g Orogastric Daily    aspirin  81 mg Oral Daily    clopidogrel  75 mg Oral Daily    levothyroxine  125 mcg Oral Daily    [Held by provider] rosuvastatin  10 mg Oral Daily    sodium chloride flush  5-40 mL IntraVENous 2 times per day    ipratropium 0.5 mg-albuterol 2.5 mg  1 Dose Inhalation Q4H WA RT    arformoterol tartrate  15 mcg Nebulization BID RT    guaiFENesin  400 mg Oral 3 times per day     Continuous Infusions:   sodium chloride      sodium chloride Stopped (03/26/25 1042)    dextrose       PRN Meds:.bismuth subsalicylate, albuterol, melatonin, prochlorperazine, hydrALAZINE, sodium chloride, sodium chloride flush, sodium chloride, ondansetron **OR** ondansetron, acetaminophen **OR** acetaminophen, benzonatate, glucose, dextrose bolus **OR** dextrose bolus,  tartrate  15 mcg Nebulization BID RT    guaiFENesin  400 mg Oral 3 times per day     Continuous Infusions:   sodium chloride      sodium chloride Stopped (03/26/25 1042)    dextrose       PRN Meds:.bismuth subsalicylate, albuterol, melatonin, prochlorperazine, hydrALAZINE, sodium chloride, sodium chloride flush, sodium chloride, ondansetron **OR** ondansetron, acetaminophen **OR** acetaminophen, benzonatate, glucose, dextrose bolus **OR** dextrose bolus, glucagon (rDNA), dextrose, naloxone    RADIOLOGY:  XR FOOT RIGHT (MIN 3 VIEWS)   Final Result   No acute osseous abnormality.         XR CHEST PORTABLE   Final Result   1. Increased interstitial prominence concerning for developing pulmonary   edema or fluid overload.   2. No signs of pneumothorax.         XR CHEST PORTABLE   Final Result   1. Improved congestive changes.   2. No signs of pneumothorax.   3. Stable cardiomegaly.         XR CHEST PORTABLE   Final Result   1. Cardiomegaly with interstitial edema pattern.   2. Interval intubation with endotracheal tube terminating 4 cm above the   level the delta.         XR CHEST PORTABLE   Final Result   1. Cardiomegaly with pulmonary venous hypertension and mild interstitial   edema bilaterally.   2. Small right effusion is suspected.   3. Endotracheal tube and gastric catheters remain in good position.         XR CHEST PORTABLE   Final Result   Cardiomegaly with increased interstitial markings seen throughout the lung   fields with probable trace right pleural effusion.         XR CHEST PORTABLE   Final Result   1. Life support lines and tubes in satisfactory position.   2. Stable increased interstitial markings seen diffusely throughout the lung   fields bilaterally.  No new focal parenchymal opacification present.         XR CHEST PORTABLE   Final Result   Endotracheal tube in good position.      NG tube in good position.      Extensive bilateral alveolar process.  Acute pulmonary edema of severe degree   versus  hesitate to call for any questions or concerns.  Mala Grey PGY-2  04/03/25

## 2025-04-03 NOTE — DISCHARGE SUMMARY
[facility] Hospitalist       Hospitalist Physician Discharge Summary       Hazel Maldonado MD  [address] [facility] [address]  [phone]    Follow up in 4 week(s)      [place] [address]  [phone]          Activity level: as josefina    Diet: ADULT DIET; Regular; 3 carb choices (45 gm/meal)  ADULT ORAL NUTRITION SUPPLEMENT; Breakfast, Dinner; Diabetic Oral Supplement  ADULT ORAL NUTRITION SUPPLEMENT; Lunch, Dinner; Wound Healing Oral Supplement    Dispo:home    Condition at discharge: fair        Patient ID:  Faiza Romero  [id number]  54 y.o.  1970    Admit date: 3/25/2025    Discharge date and time:  4/3/2025  4:27 PM    Admission Diagnoses: Principal Problem:    COPD with acute exacerbation (HCC)  Active Problems:    COPD (chronic obstructive pulmonary disease) (HCC)  Resolved Problems:    * No resolved hospital problems. *      Discharge Diagnoses: Principal Problem:    COPD with acute exacerbation (HCC)  Active Problems:    COPD (chronic obstructive pulmonary disease) (HCC)  Resolved Problems:    * No resolved hospital problems. *    Acute respiratory failure with hypoxia(O2sat 80%)  ARDS  Possible bacterial pneumonia  Copd exac  Dm type 2 controlled  Hypothyroidism  Hyponatremia  htn  hypokalemia    Consults:  IP CONSULT TO HEART FAILURE NURSE/COORDINATOR  IP CONSULT TO DIETITIAN  IP CONSULT TO PODIATRY    Procedures: echo    Left Ventricle: EF by visual approximation is 55%. Left ventricle size is normal. Moderate concentric hypertrophy. Stage 2 diastolic dysfunction.    Right Ventricle: Right ventricle size is normal. Normal systolic function. TAPSE is 1.9 cm.    Tricuspid Valve: Mild regurgitation. The estimated RVSP is 30 mmHg.    IVC/SVC: IVC size is normal.    Hospital Course: Patient was admitted with COPD (chronic obstructive pulmonary disease) (HCC) [J44.9]  COPD with acute exacerbation (HCC) [J44.1]. Patient is a  as needed for Wheezing     Lantus SoloStar 100 UNIT/ML injection pen  Generic drug: insulin glargine  Inject 30 Units into the skin nightly     predniSONE 10 MG tablet  Commonly known as: DELTASONE  Take 4 tablets by mouth daily for 1 day, THEN 3 tablets daily for 3 days, THEN 2 tablets daily for 3 days, THEN 1 tablet daily for 3 days.  Start taking on: April 2, 2025     Trelegy Ellipta 100-62.5-25 MCG/ACT Aepb inhaler  Generic drug: fluticasone-umeclidin-vilant  Inhale 1 puff into the lungs daily            CHANGE how you take these medications      amLODIPine 10 MG tablet  Commonly known as: NORVASC  1 tablet by Orogastric route daily  What changed: how to take this     furosemide 40 MG tablet  Commonly known as: LASIX  Take 1 tablet by mouth daily  What changed:   when to take this  reasons to take this  additional instructions     glipiZIDE 5 MG tablet  Commonly known as: GLUCOTROL  Take 1 tablet by mouth 2 times daily  What changed: when to take this     levothyroxine 125 MCG tablet  Commonly known as: SYNTHROID  Take 1 tablet by mouth Daily  What changed:   medication strength  how much to take     metoprolol tartrate 50 MG tablet  Commonly known as: LOPRESSOR  Take 1 tablet by mouth 2 times daily  What changed:   medication strength  how much to take  when to take this            CONTINUE taking these medications      acetaminophen 325 MG tablet  Commonly known as: TYLENOL  Take 2 tablets by mouth every 4 hours as needed for Pain     aspirin 81 MG EC tablet  Take 1 tablet by mouth daily     clopidogrel 75 MG tablet  Commonly known as: PLAVIX  Take 1 tablet by mouth daily     Crestor 10 MG tablet  Generic drug: rosuvastatin     isosorbide mononitrate 30 MG extended release tablet  Commonly known as: IMDUR  Take 1 tablet by mouth daily PATIENT TO TAKE FOR 6 MONTHS TO PREVENT RADIAL GRAFT SPASM     losartan 100 MG tablet  Commonly known as: COZAAR  Take 1 tablet by mouth daily     METHADONE HCL PO

## 2025-04-03 NOTE — PROGRESS NOTES
Tian Urrutia M.D.,[facility]  James Moon D.O., KIRK., [facility]  Katie Argueta M.D.  Hazel Maldonado M.D.   Luis Miguel Robertson D.O.  Erickson Tatum M.D.         Daily Pulmonary Progress Note    Patient:  Faiza Romero 54 y.o. female MRN: [id number]            Synopsis     We are following patient for COPD    \"CC\" respiratory failure    Code status: FULL CODE      Subjective   Summary of Hospital Course:  The patient is a 54 y.o. female with significant past medical history of COPD normally on 3 L of O2, CHF, type II DM, CAD with stent placement who was transferred from [place] yesterday for acute COPD exacerbation.  Patient had reported some dyspnea.  Patient was also noted to recently have influenza A.  Treated for COPD exacerbation.  Of note, patient is on methadone.     Patient had RRT called this morning due to hypoxia and increasing lethargy.  Patient had received 3 doses of Narcan during this hospitalization thus far.  Patient was on BiPAP and O2 was increased to 100%.  Patient continuing to be lethargic with concerns of impending respiratory failure.  Transferred to the ICU for further management.  Repeat ABG shows worsening pCO2.  Patient was intubated in the ICU.     3/27  Patient continues to remain intubated and sedated, chest x-ray appears stable compared to yesterday.  Resume home Lopressor and Lovenox today.  Will initiate tube feeds and start a bowel regimen.     3/28  Patient is currently on fentanyl and Versed. She seems to be doing better.  She is awakens to voice and follows some commands. Tube feeds running at 40 cc/h.  She is also hypertensive.  Will increase Lopressor to 25 mg twice daily and restart amlodipine. Blood cultures and respiratory cultures are negative.  MRSA negative;  Vancomycin discontinued.  Steroids adjusted to every 8 hours. Will continue to wean off of sedation and oxygen.     3/29  Patient extubated successfully today.  She is off all sedation.  Continues to

## 2025-04-03 NOTE — PLAN OF CARE
Problem: Chronic Conditions and Co-morbidities  Goal: Patient's chronic conditions and co-morbidity symptoms are monitored and maintained or improved  Outcome: Completed  Flowsheets (Taken 4/3/2025 0800)  Care Plan - Patient's Chronic Conditions and Co-Morbidity Symptoms are Monitored and Maintained or Improved: Monitor and assess patient's chronic conditions and comorbid symptoms for stability, deterioration, or improvement     Problem: Safety - Adult  Goal: Free from fall injury  Outcome: Completed  Flowsheets (Taken 4/3/2025 0800)  Free From Fall Injury: Instruct family/caregiver on patient safety     Problem: Skin/Tissue Integrity  Goal: Skin integrity remains intact  Description: 1.  Monitor for areas of redness and/or skin breakdown  2.  Assess vascular access sites hourly  3.  Every 4-6 hours minimum:  Change oxygen saturation probe site  4.  Every 4-6 hours:  If on nasal continuous positive airway pressure, respiratory therapy assess nares and determine need for appliance change or resting period  Outcome: Completed  Flowsheets (Taken 4/3/2025 0800)  Skin Integrity Remains Intact:   Monitor for areas of redness and/or skin breakdown   Assess vascular access sites hourly   Every 4-6 hours minimum:  Change oxygen saturation probe site     Problem: Discharge Planning  Goal: Discharge to home or other facility with appropriate resources  Outcome: Completed  Flowsheets (Taken 4/3/2025 0800)  Discharge to home or other facility with appropriate resources: Identify barriers to discharge with patient and caregiver     Problem: Safety - Medical Restraint  Goal: Remains free of injury from restraints (Restraint for Interference with Medical Device)  Description: INTERVENTIONS:  1. Determine that other, less restrictive measures have been tried or would not be effective before applying the restraint  2. Evaluate the patient's condition at the time of restraint application  3. Inform patient/family regarding the  reason for restraint  4. Q2H: Monitor safety, psychosocial status, comfort, nutrition and hydration  Outcome: Completed     Problem: Pain  Goal: Verbalizes/displays adequate comfort level or baseline comfort level  Outcome: Completed     Problem: Nutrition Deficit:  Goal: Optimize nutritional status  Outcome: Completed     Problem: ABCDS Injury Assessment  Goal: Absence of physical injury  Outcome: Completed

## 2025-04-03 NOTE — CARE COORDINATION
Social work / Discharge planning:            Patient is no longer interested in going to [facility] and prefers home.    She is interested in home care.    Referral made to [facility] .   Will need home care orders prior to discharge.     Referral made to [facility] for home oxygen and nebulizer.    Patient will need to wait for delivery of portable oxygen for discharge.      Liaison for [facility] updated on change in plans.    Electronically signed by JOHNNY Gong on 4/3/2025 at 11:14 AM            Patient accepted by [facility] .    Home care orders needed prior to discharge.    Electronically signed by JOHNNY Gong on 4/3/2025 at 2:12 PM

## 2025-04-07 ENCOUNTER — TELEPHONE (OUTPATIENT)
Dept: CARDIOLOGY CLINIC | Age: 55
End: 2025-04-07

## 2025-04-07 NOTE — TELEPHONE ENCOUNTER
Pt said she missed her hospital follow up with Dr Sargent, due to being in the hospital again.  She needs to schedule another.

## 2025-04-08 NOTE — PROGRESS NOTES
Physician Progress Note      PATIENT:               JEANNETTE LARA  [facility] #:                  [id number]  :                       1970  ADMIT DATE:       3/25/2025 6:20 PM  DISCH DATE:        4/3/2025 6:18 PM  RESPONDING  PROVIDER #:        Luis Miguel Robertson DO          QUERY TEXT:    \"Possible Sepsis\" is documented in the medical record Pulmonology Progress   Notes 3/31-4/3 by Dr. Robertson. Please clarify the source of sepsis or if ruled   out after study:    The clinical indicators include:  -\"Acute hypoxic respiratory failure. COPD exacerbations w/ drug overdose and   infection r/o. AMS.\" (per Progress Note 3/26 by Dr. Sanchezah)  -\"Leukocytosis. Empirically on Zosyn and vancomycin. MRSA negative; vancomycin   discontinued\" (Critical Care Progress Note 3/28 by Dr. Bailon & Dr. Maldonado)  -\"Bilateral infiltrates. Acute on chronic HFpEF. Vaping use. SIRS possible   sepsis\" (per Pulmonology Progress Notes 3/31-4/3 by Dr. Robertson)  -\"Possible bacterial pneumonia\" (per Discharge Summary 4/3 by Dr. Castorena)  -Chest x-ray 3/26 \"Extensive bilateral alveolar process. Acute pulmonary edema   of severe degree versus extensive bilateral pneumonia.\"  -3/26- Creatinine 1.3-1.0, 3/26 Procalcitonin 0.45, .0, 3/26- WBC   18.4-10.2 (Labs)  -3/25-3/29 pH 7.239-7.523, pCO2 35.1-67.1, pO2 30.3-143.7 (Labs)  -3/26 Blood Cultures Negative (Labs)  -IV azithromycin (3/26), IV vancomycin (3/26-3/28), IV Zosyn (3/26-3/31) (MAR)  -Mechanical Ventilation 3/26-3/29 (per Intubation Procedure Note 3/26 by Dr. Bailon & Dr. Maldonado & RT Progress note 3/29 by Karla Hubbard RCP)  Options provided:  -- Sepsis, present on admission, related to, Please document source.  -- Sepsis, not present on admission related to, Please document source.  -- SIRS, Sepsis was ruled out after study  -- Other - I will add my own diagnosis  -- Disagree - Not applicable / Not valid  -- Disagree - Clinically unable to determine / Unknown  -- Refer

## 2025-04-10 ENCOUNTER — TRANSCRIBE ORDERS (OUTPATIENT)
Dept: ADMINISTRATIVE | Age: 55
End: 2025-04-10

## 2025-04-10 DIAGNOSIS — F17.210 CIGARETTE SMOKER: ICD-10-CM

## 2025-04-10 DIAGNOSIS — Z87.891 PERSONAL HISTORY OF TOBACCO USE, PRESENTING HAZARDS TO HEALTH: Primary | ICD-10-CM

## 2025-04-10 NOTE — PROGRESS NOTES
Physician Progress Note      PATIENT:               JEANNETTE LARA  CSN #:                  [id number]  :                       1970  ADMIT DATE:       3/25/2025 6:20 PM  DISCH DATE:        4/3/2025 6:18 PM  RESPONDING  PROVIDER #:        Lisha Frank MD          QUERY TEXT:    Encephalopathy is documented in the medical record Progress Notes 3/27- by   Dr. Frank.  Please specify type:    The clinical indicators include:  -\"Acute hypoxic respiratory failure. COPD exacerbations w/ drug overdose and   infection r/o. AMS.\" (per Progress Note 3/26 by Dr. Rae)  -\"Eyes constricted, pinpoint. No response to sternal rub. Acute Drug   Intoxication. Opioid Overdose.\" (per Significant Event Note 3/25 by Dr. Abbasi)  -\"Encephalopathy: UDS positive methadone (appropriate) and cannabinoids   (inappropriate), CTH negative\" (per Progress Notes 3/27- by Dr. Frank)  -\"Altered mental status. UDS positive for cannabinoids and methadone.\" (per   Critical Care Progress Note 3/27 by Dr. Elias & Dr. Maldonado)  -CT Head 3/25 \"No acute intracranial abnormality.\"  -Chest x-ray 3/26 \"Extensive bilateral alveolar process. Acute pulmonary edema   of severe degree versus extensive bilateral pneumonia.\"  -3/26- Creatinine 1.3-1.0, 3/26 Procalcitonin 0.45, .0, 3/26- WBC   18.4-10.2 (Labs)  - 3/25-3/29 pH 7.239-7.523, pCO2 35.1-67.1, pO2 30.3-143.7  -Ativan (3/25), Narcan (3/25 & 3/26), IV azithromycin (3/26), IV vancomycin   (3/26-3/28),  IV Zosyn (3/26-3/31), NG methadone (3/27-4/3) (MAR)  -Mechanical Ventilation 3/26-3/29 (per Intubation Procedure Note 3/26 by Dr. Bailon & Dr. Maldonado & RT Progress note 3/29 by Karla Hubbard RCP)  Options provided:  -- Encephalopathy related to methadone and cannabinoids  -- Metabolic  -- Metabolic encephalopathy and related to methadone and cannabinoids  -- Other - I will add my own diagnosis  -- Disagree - Not applicable / Not valid  -- Disagree - Clinically unable to

## 2025-04-14 PROBLEM — F17.201: Status: ACTIVE | Noted: 2025-04-14

## 2025-04-14 PROBLEM — J96.01 ACUTE HYPOXIC RESPIRATORY FAILURE (HCC): Status: ACTIVE | Noted: 2025-04-14

## 2025-04-14 PROBLEM — G47.30 SLEEP APNEA: Status: ACTIVE | Noted: 2025-04-14

## 2025-04-14 PROBLEM — R06.83 SNORING: Status: ACTIVE | Noted: 2025-04-14

## 2025-04-14 PROBLEM — G47.19 EXCESSIVE DAYTIME SLEEPINESS: Status: ACTIVE | Noted: 2025-04-14

## 2025-04-14 PROBLEM — R29.818 SUSPECTED SLEEP APNEA: Status: ACTIVE | Noted: 2025-04-14

## 2025-04-14 PROBLEM — J44.9 SUSPECTED CHRONIC OBSTRUCTIVE PULMONARY DISEASE BASED ON INITIAL EVALUATION (HCC): Status: ACTIVE | Noted: 2025-04-14

## 2025-04-14 PROBLEM — R91.1 PULMONARY NODULE: Status: ACTIVE | Noted: 2025-04-14

## 2025-04-17 ENCOUNTER — OFFICE VISIT (OUTPATIENT)
Dept: CARDIOLOGY CLINIC | Age: 55
End: 2025-04-17
Payer: MEDICARE

## 2025-04-17 VITALS
BODY MASS INDEX: 31.83 KG/M2 | DIASTOLIC BLOOD PRESSURE: 52 MMHG | WEIGHT: 210 LBS | SYSTOLIC BLOOD PRESSURE: 136 MMHG | RESPIRATION RATE: 16 BRPM | HEIGHT: 68 IN | HEART RATE: 61 BPM

## 2025-04-17 DIAGNOSIS — I25.10 CAD IN NATIVE ARTERY: Primary | ICD-10-CM

## 2025-04-17 PROCEDURE — 3075F SYST BP GE 130 - 139MM HG: CPT | Performed by: INTERNAL MEDICINE

## 2025-04-17 PROCEDURE — 93000 ELECTROCARDIOGRAM COMPLETE: CPT | Performed by: INTERNAL MEDICINE

## 2025-04-17 PROCEDURE — 99214 OFFICE O/P EST MOD 30 MIN: CPT | Performed by: INTERNAL MEDICINE

## 2025-04-17 PROCEDURE — 3078F DIAST BP <80 MM HG: CPT | Performed by: INTERNAL MEDICINE

## 2025-04-17 NOTE — PROGRESS NOTES
[facility] Cardiology progress note  Dr. Daniela Sargent      Reason for visit: CAD  Referring Physician: Mariana London APN     CHIEF COMPLAINT: Shortness of breath and chest pain    HISTORY OF PRESENT ILLNESS:   Patient is 54 year old female with history of CAD s/p CABG, s/p PCI to left circumflex artery to OM branch in 2022, diastolic congestive heart failure diabetes mellitus, hyperlipidemia and Hypertension is here for follow-up appointment.  Was admitted to the hospital in March with acute hypoxic hypercapnic respiratory failure, was intubated, extubated, since discharge her shortness of breath is slowly improving, functional capacity is slowly improving, occasional pedal edema, occasional cough, no palpitations, no chest pain, no lightheadedness or dizziness.    Past Medical History:   Diagnosis Date    CAD in native artery 2020    Claustrophobia     mild    Colitis     Constipation     COPD (chronic obstructive pulmonary disease) (HCC)     COVID-19     Diabetes mellitus (HCC)     Heart problem     Hyperlipidemia     Hypertension     Hypothyroidism     Sleep apnea     diagnosed 7-8 years ago does not use C-pap problems with cost    Thyroid disease     Wears partial dentures     upper         Past Surgical History:   Procedure Laterality Date    APPENDECTOMY      CARDIAC CATHETERIZATION  2020    Dr. Sargent EF 55% cts consult ordered    CARDIAC PROCEDURE N/A 2024    Left heart cath / coronary angiography w grafts performed [place] [facility]     SECTION      x2    CHOLECYSTECTOMY      COLONOSCOPY      CORONARY ANGIOPLASTY WITH STENT PLACEMENT  2022    Dr. Sargent [place] 3.0x12    CORONARY ARTERY BYPASS GRAFT N/A 2020    CORONARY ARTERY BYPASS, POSSIBLE LEFT RADIAL ARTERY HARVEST, MINDA performed by Kit Leyva DO [place] [facility] [place]    HERNIA REPAIR  2015    laparoscopic incisional reapair with mesh    HYSTERECTOMY (CERVIX STATUS

## 2025-04-29 ENCOUNTER — APPOINTMENT (OUTPATIENT)
Dept: ULTRASOUND IMAGING | Age: 55
End: 2025-04-29
Payer: MEDICARE

## 2025-04-29 ENCOUNTER — APPOINTMENT (OUTPATIENT)
Dept: GENERAL RADIOLOGY | Age: 55
End: 2025-04-29
Payer: MEDICARE

## 2025-04-29 ENCOUNTER — HOSPITAL ENCOUNTER (OUTPATIENT)
Age: 55
Setting detail: OBSERVATION
LOS: 1 days | Discharge: HOME HEALTH | End: 2025-05-01
Attending: EMERGENCY MEDICINE | Admitting: STUDENT IN AN ORGANIZED HEALTH CARE EDUCATION/TRAINING PROGRAM
Payer: MEDICARE

## 2025-04-29 DIAGNOSIS — E87.79 OTHER HYPERVOLEMIA: ICD-10-CM

## 2025-04-29 DIAGNOSIS — E87.70 HYPERVOLEMIA, UNSPECIFIED HYPERVOLEMIA TYPE: Primary | ICD-10-CM

## 2025-04-29 DIAGNOSIS — I50.33 ACUTE ON CHRONIC DIASTOLIC CONGESTIVE HEART FAILURE (HCC): ICD-10-CM

## 2025-04-29 DIAGNOSIS — N18.31 STAGE 3A CHRONIC KIDNEY DISEASE (HCC): ICD-10-CM

## 2025-04-29 PROBLEM — I50.9 ACUTE EXACERBATION OF CHRONIC HEART FAILURE (HCC): Status: ACTIVE | Noted: 2025-04-29

## 2025-04-29 LAB
ANION GAP SERPL CALCULATED.3IONS-SCNC: 16 MMOL/L (ref 7–16)
BASOPHILS # BLD: 0.09 K/UL (ref 0–0.2)
BASOPHILS NFR BLD: 1 % (ref 0–2)
BNP SERPL-MCNC: 2694 PG/ML (ref 0–125)
BUN SERPL-MCNC: 34 MG/DL (ref 6–20)
CALCIUM SERPL-MCNC: 8.9 MG/DL (ref 8.6–10.2)
CHLORIDE SERPL-SCNC: 95 MMOL/L (ref 98–107)
CO2 SERPL-SCNC: 27 MMOL/L (ref 22–29)
CREAT SERPL-MCNC: 1.3 MG/DL (ref 0.5–1)
EOSINOPHIL # BLD: 0.55 K/UL (ref 0.05–0.5)
EOSINOPHILS RELATIVE PERCENT: 4 % (ref 0–6)
ERYTHROCYTE [DISTWIDTH] IN BLOOD BY AUTOMATED COUNT: 15.7 % (ref 11.5–15)
GFR, ESTIMATED: 50 ML/MIN/1.73M2
GLUCOSE BLD-MCNC: 83 MG/DL (ref 74–99)
GLUCOSE SERPL-MCNC: 160 MG/DL (ref 74–99)
HCT VFR BLD AUTO: 25.7 % (ref 34–48)
HGB BLD-MCNC: 8 G/DL (ref 11.5–15.5)
IMM GRANULOCYTES # BLD AUTO: 0.08 K/UL (ref 0–0.58)
IMM GRANULOCYTES NFR BLD: 1 % (ref 0–5)
LYMPHOCYTES NFR BLD: 1.61 K/UL (ref 1.5–4)
LYMPHOCYTES RELATIVE PERCENT: 13 % (ref 20–42)
MAGNESIUM SERPL-MCNC: 2.2 MG/DL (ref 1.6–2.6)
MCH RBC QN AUTO: 28.6 PG (ref 26–35)
MCHC RBC AUTO-ENTMCNC: 31.1 G/DL (ref 32–34.5)
MCV RBC AUTO: 91.8 FL (ref 80–99.9)
MONOCYTES NFR BLD: 1.13 K/UL (ref 0.1–0.95)
MONOCYTES NFR BLD: 9 % (ref 2–12)
NEUTROPHILS NFR BLD: 72 % (ref 43–80)
NEUTS SEG NFR BLD: 8.9 K/UL (ref 1.8–7.3)
PLATELET # BLD AUTO: 333 K/UL (ref 130–450)
PMV BLD AUTO: 10.7 FL (ref 7–12)
POTASSIUM SERPL-SCNC: 4.4 MMOL/L (ref 3.5–5)
RBC # BLD AUTO: 2.8 M/UL (ref 3.5–5.5)
SODIUM SERPL-SCNC: 138 MMOL/L (ref 132–146)
TROPONIN I SERPL HS-MCNC: 44 NG/L (ref 0–14)
TROPONIN I SERPL HS-MCNC: 49 NG/L (ref 0–14)
WBC OTHER # BLD: 12.4 K/UL (ref 4.5–11.5)

## 2025-04-29 PROCEDURE — 93005 ELECTROCARDIOGRAM TRACING: CPT | Performed by: EMERGENCY MEDICINE

## 2025-04-29 PROCEDURE — 6370000000 HC RX 637 (ALT 250 FOR IP): Performed by: STUDENT IN AN ORGANIZED HEALTH CARE EDUCATION/TRAINING PROGRAM

## 2025-04-29 PROCEDURE — 6370000000 HC RX 637 (ALT 250 FOR IP)

## 2025-04-29 PROCEDURE — 80048 BASIC METABOLIC PNL TOTAL CA: CPT

## 2025-04-29 PROCEDURE — 83880 ASSAY OF NATRIURETIC PEPTIDE: CPT

## 2025-04-29 PROCEDURE — 96374 THER/PROPH/DIAG INJ IV PUSH: CPT

## 2025-04-29 PROCEDURE — 99285 EMERGENCY DEPT VISIT HI MDM: CPT

## 2025-04-29 PROCEDURE — 2500000003 HC RX 250 WO HCPCS: Performed by: STUDENT IN AN ORGANIZED HEALTH CARE EDUCATION/TRAINING PROGRAM

## 2025-04-29 PROCEDURE — 71045 X-RAY EXAM CHEST 1 VIEW: CPT

## 2025-04-29 PROCEDURE — 84484 ASSAY OF TROPONIN QUANT: CPT

## 2025-04-29 PROCEDURE — 82962 GLUCOSE BLOOD TEST: CPT

## 2025-04-29 PROCEDURE — 85025 COMPLETE CBC W/AUTO DIFF WBC: CPT

## 2025-04-29 PROCEDURE — 99223 1ST HOSP IP/OBS HIGH 75: CPT | Performed by: STUDENT IN AN ORGANIZED HEALTH CARE EDUCATION/TRAINING PROGRAM

## 2025-04-29 PROCEDURE — 93970 EXTREMITY STUDY: CPT

## 2025-04-29 PROCEDURE — 83735 ASSAY OF MAGNESIUM: CPT

## 2025-04-29 PROCEDURE — G0378 HOSPITAL OBSERVATION PER HR: HCPCS

## 2025-04-29 PROCEDURE — 6360000002 HC RX W HCPCS: Performed by: EMERGENCY MEDICINE

## 2025-04-29 RX ORDER — SODIUM CHLORIDE 0.9 % (FLUSH) 0.9 %
5-40 SYRINGE (ML) INJECTION EVERY 12 HOURS SCHEDULED
Status: DISCONTINUED | OUTPATIENT
Start: 2025-04-29 | End: 2025-05-01 | Stop reason: HOSPADM

## 2025-04-29 RX ORDER — ACETAMINOPHEN 325 MG/1
650 TABLET ORAL EVERY 6 HOURS PRN
Status: DISCONTINUED | OUTPATIENT
Start: 2025-04-29 | End: 2025-05-01 | Stop reason: HOSPADM

## 2025-04-29 RX ORDER — GLUCAGON 1 MG/ML
1 KIT INJECTION PRN
Status: DISCONTINUED | OUTPATIENT
Start: 2025-04-29 | End: 2025-05-01 | Stop reason: HOSPADM

## 2025-04-29 RX ORDER — POLYETHYLENE GLYCOL 3350 17 G/17G
17 POWDER, FOR SOLUTION ORAL DAILY PRN
Status: DISCONTINUED | OUTPATIENT
Start: 2025-04-29 | End: 2025-05-01 | Stop reason: HOSPADM

## 2025-04-29 RX ORDER — FUROSEMIDE 10 MG/ML
40 INJECTION INTRAMUSCULAR; INTRAVENOUS ONCE
Status: COMPLETED | OUTPATIENT
Start: 2025-04-29 | End: 2025-04-29

## 2025-04-29 RX ORDER — AMLODIPINE BESYLATE 10 MG/1
10 TABLET ORAL DAILY
Status: DISCONTINUED | OUTPATIENT
Start: 2025-04-30 | End: 2025-04-30

## 2025-04-29 RX ORDER — ROSUVASTATIN CALCIUM 10 MG/1
10 TABLET, COATED ORAL DAILY
Status: DISCONTINUED | OUTPATIENT
Start: 2025-04-30 | End: 2025-04-29

## 2025-04-29 RX ORDER — PANTOPRAZOLE SODIUM 40 MG/1
40 TABLET, DELAYED RELEASE ORAL
Status: DISCONTINUED | OUTPATIENT
Start: 2025-04-30 | End: 2025-04-29

## 2025-04-29 RX ORDER — MAGNESIUM SULFATE IN WATER 40 MG/ML
2000 INJECTION, SOLUTION INTRAVENOUS PRN
Status: DISCONTINUED | OUTPATIENT
Start: 2025-04-29 | End: 2025-05-01 | Stop reason: HOSPADM

## 2025-04-29 RX ORDER — ISOSORBIDE MONONITRATE 30 MG/1
30 TABLET, EXTENDED RELEASE ORAL DAILY
Status: DISCONTINUED | OUTPATIENT
Start: 2025-04-30 | End: 2025-05-01 | Stop reason: HOSPADM

## 2025-04-29 RX ORDER — DEXTROSE MONOHYDRATE 100 MG/ML
INJECTION, SOLUTION INTRAVENOUS CONTINUOUS PRN
Status: DISCONTINUED | OUTPATIENT
Start: 2025-04-29 | End: 2025-05-01 | Stop reason: HOSPADM

## 2025-04-29 RX ORDER — SODIUM CHLORIDE 0.9 % (FLUSH) 0.9 %
5-40 SYRINGE (ML) INJECTION PRN
Status: DISCONTINUED | OUTPATIENT
Start: 2025-04-29 | End: 2025-05-01 | Stop reason: HOSPADM

## 2025-04-29 RX ORDER — INSULIN GLARGINE 100 [IU]/ML
30 INJECTION, SOLUTION SUBCUTANEOUS NIGHTLY
Status: DISCONTINUED | OUTPATIENT
Start: 2025-04-29 | End: 2025-05-01 | Stop reason: HOSPADM

## 2025-04-29 RX ORDER — ENOXAPARIN SODIUM 100 MG/ML
40 INJECTION SUBCUTANEOUS DAILY
Status: DISCONTINUED | OUTPATIENT
Start: 2025-04-30 | End: 2025-05-01 | Stop reason: HOSPADM

## 2025-04-29 RX ORDER — CLOPIDOGREL BISULFATE 75 MG/1
75 TABLET ORAL DAILY
Status: DISCONTINUED | OUTPATIENT
Start: 2025-04-30 | End: 2025-05-01 | Stop reason: HOSPADM

## 2025-04-29 RX ORDER — PANTOPRAZOLE SODIUM 40 MG/1
40 TABLET, DELAYED RELEASE ORAL DAILY
Status: DISCONTINUED | OUTPATIENT
Start: 2025-04-29 | End: 2025-05-01 | Stop reason: HOSPADM

## 2025-04-29 RX ORDER — METOPROLOL TARTRATE 50 MG
50 TABLET ORAL 2 TIMES DAILY
Status: DISCONTINUED | OUTPATIENT
Start: 2025-04-29 | End: 2025-04-30

## 2025-04-29 RX ORDER — LOSARTAN POTASSIUM 50 MG/1
100 TABLET ORAL DAILY
Status: DISCONTINUED | OUTPATIENT
Start: 2025-04-30 | End: 2025-05-01 | Stop reason: HOSPADM

## 2025-04-29 RX ORDER — ASPIRIN 81 MG/1
81 TABLET ORAL DAILY
Status: DISCONTINUED | OUTPATIENT
Start: 2025-04-30 | End: 2025-05-01 | Stop reason: HOSPADM

## 2025-04-29 RX ORDER — ACETAMINOPHEN 650 MG/1
650 SUPPOSITORY RECTAL EVERY 6 HOURS PRN
Status: DISCONTINUED | OUTPATIENT
Start: 2025-04-29 | End: 2025-05-01 | Stop reason: HOSPADM

## 2025-04-29 RX ORDER — POTASSIUM CHLORIDE 1500 MG/1
40 TABLET, EXTENDED RELEASE ORAL PRN
Status: DISCONTINUED | OUTPATIENT
Start: 2025-04-29 | End: 2025-05-01 | Stop reason: HOSPADM

## 2025-04-29 RX ORDER — ONDANSETRON 2 MG/ML
4 INJECTION INTRAMUSCULAR; INTRAVENOUS EVERY 6 HOURS PRN
Status: DISCONTINUED | OUTPATIENT
Start: 2025-04-29 | End: 2025-05-01 | Stop reason: HOSPADM

## 2025-04-29 RX ORDER — BUDESONIDE 0.25 MG/2ML
0.25 INHALANT ORAL
Status: DISCONTINUED | OUTPATIENT
Start: 2025-04-29 | End: 2025-05-01 | Stop reason: HOSPADM

## 2025-04-29 RX ORDER — ROSUVASTATIN CALCIUM 10 MG/1
10 TABLET, COATED ORAL NIGHTLY
Status: DISCONTINUED | OUTPATIENT
Start: 2025-04-30 | End: 2025-05-01 | Stop reason: HOSPADM

## 2025-04-29 RX ORDER — POTASSIUM CHLORIDE 7.45 MG/ML
10 INJECTION INTRAVENOUS PRN
Status: DISCONTINUED | OUTPATIENT
Start: 2025-04-29 | End: 2025-05-01 | Stop reason: HOSPADM

## 2025-04-29 RX ORDER — SODIUM CHLORIDE 9 MG/ML
INJECTION, SOLUTION INTRAVENOUS PRN
Status: DISCONTINUED | OUTPATIENT
Start: 2025-04-29 | End: 2025-05-01 | Stop reason: HOSPADM

## 2025-04-29 RX ORDER — ARFORMOTEROL TARTRATE 15 UG/2ML
15 SOLUTION RESPIRATORY (INHALATION)
Status: DISCONTINUED | OUTPATIENT
Start: 2025-04-29 | End: 2025-05-01 | Stop reason: HOSPADM

## 2025-04-29 RX ORDER — FUROSEMIDE 10 MG/ML
40 INJECTION INTRAMUSCULAR; INTRAVENOUS DAILY
Status: DISCONTINUED | OUTPATIENT
Start: 2025-04-30 | End: 2025-04-30

## 2025-04-29 RX ORDER — NITROGLYCERIN 0.4 MG/1
0.4 TABLET SUBLINGUAL EVERY 5 MIN PRN
Status: DISCONTINUED | OUTPATIENT
Start: 2025-04-29 | End: 2025-05-01 | Stop reason: HOSPADM

## 2025-04-29 RX ORDER — ONDANSETRON 4 MG/1
4 TABLET, ORALLY DISINTEGRATING ORAL EVERY 8 HOURS PRN
Status: DISCONTINUED | OUTPATIENT
Start: 2025-04-29 | End: 2025-05-01 | Stop reason: HOSPADM

## 2025-04-29 RX ADMIN — LEVOTHYROXINE SODIUM 125 MCG: 0.03 TABLET ORAL at 22:20

## 2025-04-29 RX ADMIN — METOPROLOL TARTRATE 50 MG: 50 TABLET, FILM COATED ORAL at 22:20

## 2025-04-29 RX ADMIN — SODIUM CHLORIDE, PRESERVATIVE FREE 10 ML: 5 INJECTION INTRAVENOUS at 22:21

## 2025-04-29 RX ADMIN — FUROSEMIDE 40 MG: 10 INJECTION, SOLUTION INTRAMUSCULAR; INTRAVENOUS at 22:20

## 2025-04-29 RX ADMIN — PANTOPRAZOLE SODIUM 40 MG: 40 TABLET, DELAYED RELEASE ORAL at 22:20

## 2025-04-29 ASSESSMENT — ENCOUNTER SYMPTOMS
ABDOMINAL DISTENTION: 0
SORE THROAT: 0
EYE DISCHARGE: 0
EYE REDNESS: 0
NAUSEA: 0
EYE PAIN: 0
DIARRHEA: 0
SINUS PRESSURE: 0
SHORTNESS OF BREATH: 0
BACK PAIN: 0
COUGH: 0
VOMITING: 0
WHEEZING: 0

## 2025-04-29 ASSESSMENT — PAIN DESCRIPTION - LOCATION: LOCATION: COCCYX;LEG

## 2025-04-29 ASSESSMENT — PAIN SCALES - GENERAL: PAINLEVEL_OUTOF10: 3

## 2025-04-29 ASSESSMENT — PAIN - FUNCTIONAL ASSESSMENT: PAIN_FUNCTIONAL_ASSESSMENT: NONE - DENIES PAIN

## 2025-04-29 NOTE — ED PROVIDER NOTES
54-year-old female presents to the emergency department with lower extremity swelling with seeping of fluid from the legs.  Patient was recently admitted for respiratory failure and intubated in the ICU at the end of March she is still chronically on oxygen though requiring no increase in oxygen.  The patient states she was at her PCPs office today who called into emergency department stating that she had been had an increase in her Lasix dose but still had pretty significant leg swelling and seeping of liquid and was concerned she would need IV diuresis.  The patient reports other than the leg swelling she reports no other significant new complaints    The history is provided by the patient and a relative.        Review of Systems   Constitutional:  Negative for chills and fever.   HENT:  Negative for ear pain, sinus pressure and sore throat.    Eyes:  Negative for pain, discharge and redness.   Respiratory:  Negative for cough, shortness of breath and wheezing.    Cardiovascular:  Positive for leg swelling. Negative for chest pain.   Gastrointestinal:  Negative for abdominal distention, diarrhea, nausea and vomiting.   Genitourinary:  Negative for dysuria and frequency.   Musculoskeletal:  Negative for arthralgias and back pain.   Skin:  Negative for rash and wound.   Neurological:  Negative for weakness and headaches.   Hematological:  Negative for adenopathy.   All other systems reviewed and are negative.       Physical Exam  Constitutional:       Appearance: Normal appearance. She is obese.   HENT:      Head: Normocephalic and atraumatic.      Nose: Nose normal.   Eyes:      Extraocular Movements: Extraocular movements intact.      Pupils: Pupils are equal, round, and reactive to light.   Cardiovascular:      Rate and Rhythm: Normal rate and regular rhythm.      Pulses: Normal pulses.      Heart sounds: Normal heart sounds.   Pulmonary:      Effort: Pulmonary effort is normal.      Breath sounds: Normal

## 2025-04-30 PROBLEM — N18.30 CKD (CHRONIC KIDNEY DISEASE) STAGE 3, GFR 30-59 ML/MIN (HCC): Status: ACTIVE | Noted: 2025-04-30

## 2025-04-30 PROBLEM — E66.811 CLASS 1 OBESITY DUE TO EXCESS CALORIES WITH SERIOUS COMORBIDITY IN ADULT: Status: ACTIVE | Noted: 2025-04-30

## 2025-04-30 PROBLEM — I50.9 ACUTE EXACERBATION OF CHRONIC HEART FAILURE (HCC): Status: RESOLVED | Noted: 2025-04-29 | Resolved: 2025-04-30

## 2025-04-30 PROBLEM — F11.20 OPIOID DEPENDENCE ON MAINTENANCE AGONIST THERAPY, NO SYMPTOMS (HCC): Status: ACTIVE | Noted: 2025-04-30

## 2025-04-30 PROBLEM — E66.09 CLASS 1 OBESITY DUE TO EXCESS CALORIES WITH SERIOUS COMORBIDITY IN ADULT: Status: ACTIVE | Noted: 2025-04-30

## 2025-04-30 PROBLEM — I50.33 ACUTE ON CHRONIC DIASTOLIC CONGESTIVE HEART FAILURE (HCC): Status: ACTIVE | Noted: 2025-04-29

## 2025-04-30 LAB
ALBUMIN SERPL-MCNC: 3.4 G/DL (ref 3.5–5.2)
ALP SERPL-CCNC: 233 U/L (ref 35–104)
ALT SERPL-CCNC: 23 U/L (ref 0–32)
ANION GAP SERPL CALCULATED.3IONS-SCNC: 12 MMOL/L (ref 7–16)
AST SERPL-CCNC: 35 U/L (ref 0–31)
BASOPHILS # BLD: 0.08 K/UL (ref 0–0.2)
BASOPHILS NFR BLD: 1 % (ref 0–2)
BILIRUB SERPL-MCNC: 0.3 MG/DL (ref 0–1.2)
BUN SERPL-MCNC: 33 MG/DL (ref 6–20)
CALCIUM SERPL-MCNC: 8.7 MG/DL (ref 8.6–10.2)
CHLORIDE SERPL-SCNC: 95 MMOL/L (ref 98–107)
CO2 SERPL-SCNC: 31 MMOL/L (ref 22–29)
CREAT SERPL-MCNC: 1.3 MG/DL (ref 0.5–1)
EKG ATRIAL RATE: 60 BPM
EKG P AXIS: 48 DEGREES
EKG P-R INTERVAL: 180 MS
EKG Q-T INTERVAL: 488 MS
EKG QRS DURATION: 90 MS
EKG QTC CALCULATION (BAZETT): 488 MS
EKG R AXIS: 0 DEGREES
EKG T AXIS: 121 DEGREES
EKG VENTRICULAR RATE: 60 BPM
EOSINOPHIL # BLD: 0.52 K/UL (ref 0.05–0.5)
EOSINOPHILS RELATIVE PERCENT: 5 % (ref 0–6)
ERYTHROCYTE [DISTWIDTH] IN BLOOD BY AUTOMATED COUNT: 15.8 % (ref 11.5–15)
GFR, ESTIMATED: 50 ML/MIN/1.73M2
GLUCOSE BLD-MCNC: 130 MG/DL (ref 74–99)
GLUCOSE BLD-MCNC: 147 MG/DL (ref 74–99)
GLUCOSE BLD-MCNC: 179 MG/DL (ref 74–99)
GLUCOSE BLD-MCNC: 271 MG/DL (ref 74–99)
GLUCOSE SERPL-MCNC: 150 MG/DL (ref 74–99)
HCT VFR BLD AUTO: 25.4 % (ref 34–48)
HGB BLD-MCNC: 7.8 G/DL (ref 11.5–15.5)
IMM GRANULOCYTES # BLD AUTO: 0.07 K/UL (ref 0–0.58)
IMM GRANULOCYTES NFR BLD: 1 % (ref 0–5)
LYMPHOCYTES NFR BLD: 1.89 K/UL (ref 1.5–4)
LYMPHOCYTES RELATIVE PERCENT: 17 % (ref 20–42)
MCH RBC QN AUTO: 28.3 PG (ref 26–35)
MCHC RBC AUTO-ENTMCNC: 30.7 G/DL (ref 32–34.5)
MCV RBC AUTO: 92 FL (ref 80–99.9)
MONOCYTES NFR BLD: 0.94 K/UL (ref 0.1–0.95)
MONOCYTES NFR BLD: 8 % (ref 2–12)
NEUTROPHILS NFR BLD: 69 % (ref 43–80)
NEUTS SEG NFR BLD: 7.78 K/UL (ref 1.8–7.3)
PLATELET # BLD AUTO: 318 K/UL (ref 130–450)
PMV BLD AUTO: 10.6 FL (ref 7–12)
POTASSIUM SERPL-SCNC: 3.8 MMOL/L (ref 3.5–5)
PROT SERPL-MCNC: 7.2 G/DL (ref 6.4–8.3)
RBC # BLD AUTO: 2.76 M/UL (ref 3.5–5.5)
SODIUM SERPL-SCNC: 138 MMOL/L (ref 132–146)
T4 FREE SERPL-MCNC: 0.7 NG/DL (ref 0.9–1.7)
TSH SERPL DL<=0.05 MIU/L-ACNC: 7.04 UIU/ML (ref 0.27–4.2)
WBC OTHER # BLD: 11.3 K/UL (ref 4.5–11.5)

## 2025-04-30 PROCEDURE — 6360000002 HC RX W HCPCS: Performed by: STUDENT IN AN ORGANIZED HEALTH CARE EDUCATION/TRAINING PROGRAM

## 2025-04-30 PROCEDURE — 6360000002 HC RX W HCPCS: Performed by: INTERNAL MEDICINE

## 2025-04-30 PROCEDURE — G0378 HOSPITAL OBSERVATION PER HR: HCPCS

## 2025-04-30 PROCEDURE — 6370000000 HC RX 637 (ALT 250 FOR IP): Performed by: STUDENT IN AN ORGANIZED HEALTH CARE EDUCATION/TRAINING PROGRAM

## 2025-04-30 PROCEDURE — 80053 COMPREHEN METABOLIC PANEL: CPT

## 2025-04-30 PROCEDURE — 96372 THER/PROPH/DIAG INJ SC/IM: CPT

## 2025-04-30 PROCEDURE — 6370000000 HC RX 637 (ALT 250 FOR IP)

## 2025-04-30 PROCEDURE — 6370000000 HC RX 637 (ALT 250 FOR IP): Performed by: INTERNAL MEDICINE

## 2025-04-30 PROCEDURE — 85025 COMPLETE CBC W/AUTO DIFF WBC: CPT

## 2025-04-30 PROCEDURE — 93010 ELECTROCARDIOGRAM REPORT: CPT | Performed by: INTERNAL MEDICINE

## 2025-04-30 PROCEDURE — 84443 ASSAY THYROID STIM HORMONE: CPT

## 2025-04-30 PROCEDURE — 2500000003 HC RX 250 WO HCPCS: Performed by: STUDENT IN AN ORGANIZED HEALTH CARE EDUCATION/TRAINING PROGRAM

## 2025-04-30 PROCEDURE — 96376 TX/PRO/DX INJ SAME DRUG ADON: CPT

## 2025-04-30 PROCEDURE — 82962 GLUCOSE BLOOD TEST: CPT

## 2025-04-30 PROCEDURE — 94640 AIRWAY INHALATION TREATMENT: CPT

## 2025-04-30 PROCEDURE — 99232 SBSQ HOSP IP/OBS MODERATE 35: CPT | Performed by: INTERNAL MEDICINE

## 2025-04-30 PROCEDURE — 2700000000 HC OXYGEN THERAPY PER DAY

## 2025-04-30 PROCEDURE — 84439 ASSAY OF FREE THYROXINE: CPT

## 2025-04-30 RX ORDER — FUROSEMIDE 10 MG/ML
40 INJECTION INTRAMUSCULAR; INTRAVENOUS 2 TIMES DAILY
Status: DISCONTINUED | OUTPATIENT
Start: 2025-04-30 | End: 2025-05-01 | Stop reason: HOSPADM

## 2025-04-30 RX ORDER — LEVOTHYROXINE SODIUM 75 UG/1
150 TABLET ORAL DAILY
Status: DISCONTINUED | OUTPATIENT
Start: 2025-04-30 | End: 2025-05-01 | Stop reason: HOSPADM

## 2025-04-30 RX ORDER — AMLODIPINE BESYLATE 5 MG/1
5 TABLET ORAL DAILY
Status: DISCONTINUED | OUTPATIENT
Start: 2025-05-01 | End: 2025-05-01

## 2025-04-30 RX ORDER — METHADONE HYDROCHLORIDE 10 MG/ML
47 CONCENTRATE ORAL DAILY
Refills: 0 | Status: DISCONTINUED | OUTPATIENT
Start: 2025-04-30 | End: 2025-05-01 | Stop reason: HOSPADM

## 2025-04-30 RX ORDER — INSULIN LISPRO 100 [IU]/ML
0-8 INJECTION, SOLUTION INTRAVENOUS; SUBCUTANEOUS
Status: DISCONTINUED | OUTPATIENT
Start: 2025-04-30 | End: 2025-05-01 | Stop reason: HOSPADM

## 2025-04-30 RX ADMIN — FUROSEMIDE 40 MG: 10 INJECTION, SOLUTION INTRAMUSCULAR; INTRAVENOUS at 08:22

## 2025-04-30 RX ADMIN — PANTOPRAZOLE SODIUM 40 MG: 40 TABLET, DELAYED RELEASE ORAL at 21:00

## 2025-04-30 RX ADMIN — METOPROLOL TARTRATE 50 MG: 50 TABLET, FILM COATED ORAL at 08:21

## 2025-04-30 RX ADMIN — ASPIRIN 81 MG: 81 TABLET, COATED ORAL at 08:21

## 2025-04-30 RX ADMIN — AMLODIPINE BESYLATE 10 MG: 10 TABLET ORAL at 08:21

## 2025-04-30 RX ADMIN — ACETAMINOPHEN 650 MG: 325 TABLET ORAL at 10:25

## 2025-04-30 RX ADMIN — ARFORMOTEROL TARTRATE 15 MCG: 15 SOLUTION RESPIRATORY (INHALATION) at 20:40

## 2025-04-30 RX ADMIN — LEVOTHYROXINE SODIUM 150 MCG: 75 TABLET ORAL at 21:00

## 2025-04-30 RX ADMIN — EMPAGLIFLOZIN 10 MG: 10 TABLET, FILM COATED ORAL at 10:12

## 2025-04-30 RX ADMIN — LOSARTAN POTASSIUM 100 MG: 50 TABLET, FILM COATED ORAL at 08:21

## 2025-04-30 RX ADMIN — ENOXAPARIN SODIUM 40 MG: 100 INJECTION SUBCUTANEOUS at 08:22

## 2025-04-30 RX ADMIN — ACETAMINOPHEN 650 MG: 325 TABLET ORAL at 21:00

## 2025-04-30 RX ADMIN — CLOPIDOGREL BISULFATE 75 MG: 75 TABLET, FILM COATED ORAL at 08:21

## 2025-04-30 RX ADMIN — INSULIN LISPRO 4 UNITS: 100 INJECTION, SOLUTION INTRAVENOUS; SUBCUTANEOUS at 11:11

## 2025-04-30 RX ADMIN — BUDESONIDE 250 MCG: 0.25 SUSPENSION RESPIRATORY (INHALATION) at 20:40

## 2025-04-30 RX ADMIN — FUROSEMIDE 40 MG: 10 INJECTION, SOLUTION INTRAMUSCULAR; INTRAVENOUS at 16:43

## 2025-04-30 RX ADMIN — PETROLATUM: 420 OINTMENT TOPICAL at 15:49

## 2025-04-30 RX ADMIN — SODIUM CHLORIDE, PRESERVATIVE FREE 10 ML: 5 INJECTION INTRAVENOUS at 21:00

## 2025-04-30 RX ADMIN — ROSUVASTATIN CALCIUM 10 MG: 10 TABLET, FILM COATED ORAL at 21:00

## 2025-04-30 RX ADMIN — BUDESONIDE 250 MCG: 0.25 SUSPENSION RESPIRATORY (INHALATION) at 07:49

## 2025-04-30 RX ADMIN — INSULIN GLARGINE 30 UNITS: 100 INJECTION, SOLUTION SUBCUTANEOUS at 21:01

## 2025-04-30 RX ADMIN — METHADONE HYDROCHLORIDE 47 MG: 10 CONCENTRATE ORAL at 10:11

## 2025-04-30 RX ADMIN — SODIUM CHLORIDE, PRESERVATIVE FREE 10 ML: 5 INJECTION INTRAVENOUS at 08:22

## 2025-04-30 RX ADMIN — ARFORMOTEROL TARTRATE 15 MCG: 15 SOLUTION RESPIRATORY (INHALATION) at 07:49

## 2025-04-30 RX ADMIN — ISOSORBIDE MONONITRATE 30 MG: 30 TABLET, EXTENDED RELEASE ORAL at 08:22

## 2025-04-30 ASSESSMENT — PAIN SCALES - GENERAL
PAINLEVEL_OUTOF10: 3
PAINLEVEL_OUTOF10: 3
PAINLEVEL_OUTOF10: 0

## 2025-04-30 ASSESSMENT — PAIN DESCRIPTION - DESCRIPTORS
DESCRIPTORS: ACHING
DESCRIPTORS: ACHING;DISCOMFORT;DULL

## 2025-04-30 ASSESSMENT — PAIN DESCRIPTION - FREQUENCY: FREQUENCY: INTERMITTENT

## 2025-04-30 ASSESSMENT — PAIN DESCRIPTION - PAIN TYPE
TYPE: ACUTE PAIN
TYPE: ACUTE PAIN

## 2025-04-30 ASSESSMENT — PAIN DESCRIPTION - LOCATION
LOCATION: HEAD
LOCATION: HEAD

## 2025-04-30 ASSESSMENT — PAIN DESCRIPTION - ORIENTATION: ORIENTATION: MID

## 2025-04-30 ASSESSMENT — PAIN - FUNCTIONAL ASSESSMENT: PAIN_FUNCTIONAL_ASSESSMENT: ACTIVITIES ARE NOT PREVENTED

## 2025-04-30 ASSESSMENT — PAIN DESCRIPTION - ONSET: ONSET: ON-GOING

## 2025-04-30 NOTE — PLAN OF CARE
Problem: Pain  Goal: Verbalizes/displays adequate comfort level or baseline comfort level  4/30/2025 0939 by Shay Chacon, RN  Outcome: Progressing     Problem: Chronic Conditions and Co-morbidities  Goal: Patient's chronic conditions and co-morbidity symptoms are monitored and maintained or improved  4/30/2025 0939 by Shay Chacon, RN  Outcome: Progressing     Problem: Discharge Planning  Goal: Discharge to home or other facility with appropriate resources  4/30/2025 0939 by Shay Chacon, RN  Outcome: Progressing     Problem: Safety - Adult  Goal: Free from fall injury  Outcome: Progressing

## 2025-04-30 NOTE — ACP (ADVANCE CARE PLANNING)
Advance Care Planning   Healthcare Decision Maker:    Primary Decision Maker: Varghese Romero - [employer] - [phone]    Click here to complete Healthcare Decision Makers including selection of the Healthcare Decision Maker Relationship (ie \"Primary\").

## 2025-04-30 NOTE — H&P
KATY VILCHIS   METHADONE HCL PO Take by mouth Takes 47 mg daily ( taking for past 20 years) history of 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  Oxycodone   Yes Provider, MD Samson   metoprolol tartrate (LOPRESSOR) 50 MG tablet Take 1 tablet by mouth 2 times daily 4/2/25   Lisha Frank MD   amLODIPine (NORVASC) 10 MG tablet 1 tablet by Orogastric route daily 4/2/25   Lisha Frank MD   furosemide (LASIX) 40 MG tablet Take 1 tablet by mouth daily 4/2/25   Lisha Frank MD   levothyroxine (SYNTHROID) 125 MCG tablet Take 1 tablet by mouth Daily 4/3/25   Lisha Frank MD   losartan (COZAAR) 100 MG tablet Take 1 tablet by mouth daily 8/12/24   Daniela Sargent MD   rosuvastatin (CRESTOR) 10 MG tablet Take 1 tablet by mouth daily    Provider, MD Samson   isosorbide mononitrate (IMDUR) 30 MG extended release tablet Take 1 tablet by mouth daily PATIENT TO TAKE FOR 6 MONTHS TO PREVENT RADIAL GRAFT SPASM 1/22/24 4/17/25  Daniela Sargent MD   clopidogrel (PLAVIX) 75 MG tablet Take 1 tablet by mouth daily 5/5/23   Daniela Sargent MD   pantoprazole (PROTONIX) 40 MG tablet Take 1 tablet by mouth daily for 14 days  Patient taking differently: Take 1 tablet by mouth as needed 5/4/21 4/17/25  Daniela Sargent MD       Allergies:    Seasonal and Statins    Social History:    reports that she has been smoking cigarettes. She started smoking about 35 years ago. She has a 35.3 pack-year smoking history. She has never used smokeless tobacco. She reports that she does not currently use alcohol. She reports that she does not currently use drugs.    Family History:   family history includes Mult Sclerosis in her maternal grandmother and mother. She was adopted.       PHYSICAL EXAM:  Vitals:  BP (!) 105/52   Pulse 65   Temp 97.6 °F (36.4 °C) (Temporal)   Resp 18   Ht 1.727 m (5' 8\")   Wt 98 kg (216 lb)   SpO2 97%   BMI 32.84 kg/m²     General Appearance: alert and oriented to person, place and time and in no acute distress, obese  Skin: warm and dry  Head:  normocephalic and atraumatic  Eyes: pupils equal, round, and reactive to light, extraocular eye movements intact, conjunctivae normal  Neck: neck supple and non tender without mass   Pulmonary/Chest: clear to auscultation bilaterally- no wheezes, rales or rhonchi, normal air movement, no respiratory distress  Cardiovascular: normal rate, normal S1 and S2 and no carotid bruits  Abdomen: soft, non-tender, non-distended, normal bowel sounds, no masses or organomegaly  Extremities: no cyanosis, no clubbing 3+ pitting edema bilateral lower extremities with seeping fluid  Neurologic: no cranial nerve deficit and speech normal        LABS: Reviewed by me independently  Recent Labs     04/29/25  1631      K 4.4   CL 95*   CO2 27   BUN 34*   CREATININE 1.3*   GLUCOSE 160*   CALCIUM 8.9       Recent Labs     04/29/25  1631   WBC 12.4*   RBC 2.80*   HGB 8.0*   HCT 25.7*   MCV 91.8   MCH 28.6   MCHC 31.1*   RDW 15.7*      MPV 10.7       No results for input(s): \"POCGLU\" in the last 72 hours.        Radiology: Reviewed by me independently  Vascular duplex lower extremity venous bilateral   Final Result   No evidence of DVT in either lower extremity.         XR CHEST 1 VIEW   Final Result   No acute process.      Stable cardiomegaly.             EKG: Normal sinus rhythm, reviewed by me independently and confirmed the findings.    ASSESSMENT:      Principal Problem:    Fluid overload  Active Problems:    Acute exacerbation of chronic heart failure (HCC)  Resolved Problems:    * No resolved hospital problems. *      PLAN:    1.  Acute exacerbation of chronic heart failure-will start patient on IV Lasix daily, input output charting, daily weight, low-sodium diet, cardiology consult, will follow.  Repeat labs in morning.  2.  Diabetes mellitus type 2-stable, continue home dose of insulin along with insulin sliding scale, hypoglycemia protocol, diabetic diet.  3.  Chronic respiratory failure-at baseline, requiring oxygen

## 2025-04-30 NOTE — FLOWSHEET NOTE
Inpatient Wound Care (initial consult) 436    Admit Date: 4/29/2025  4:23 PM    Reason for consult:  legs    Patient sitting up in bed, awake, alert and oriented. Independent ambulating in room.     Significant history:  per H&P    CHIEF COMPLAINT: Leg swelling     History of Present Illness: 54-year-old lady with past medical history significant for hypothyroidism, hypertension, hyperlipidemia, COPD and congestive heart failure presents to ED with worsening lower extremity swelling for past couple of weeks.  She was recently admitted to hospital in ICU for respiratory failure with intubation.  She requires oxygen at home at 3 L by nasal cannula.  She started noticing worsening leg swelling for last couple of weeks.  She was following cardiology outpatient who recommended to take additional dose of Lasix in case she start noticing leg swelling, which she did without any benefit.  Her symptoms progressed from leg swelling to seeping of fluid from the legs and she came to ED for evaluation.    Past Medical History:   Diagnosis Date    Acute exacerbation of chronic heart failure ([facility]) 4/29/2025    CAD in native artery 09/14/2020    CKD (chronic kidney disease) stage 3, GFR 30-59 ml/min ([facility]) 4/30/2025    Claustrophobia     mild    Colitis     Constipation     COPD (chronic obstructive pulmonary disease) ([facility])     COVID-19     Diabetes mellitus ([facility])     Heart problem     Hyperlipidemia     Hypertension     Hypothyroidism     Sleep apnea     diagnosed 7-8 years ago does not use C-pap problems with cost    Thyroid disease     Wears partial dentures     upper     Findings:     04/30/25 1307   Skin Integrity Site 5   Skin Integrity Location 5 Abrasion  (intact dry scab)   Location 5 left knee   Wound 03/31/25 Toe (Comment  which one) Right 2nd   Date First Assessed/Time First Assessed: 03/31/25 0800   Location: Toe (Comment  which one)  Wound Location Orientation: Right  Wound Description (Comments): 2nd   Wound Image

## 2025-04-30 NOTE — CARE COORDINATION
Social Work/Discharge Planning;  Met with patient and completed initial assessment.  Explained Social Work role and discussed transition of care/discharge planning.  Patient is a readmit, discharged 4/3.  Patient lives with her  and two adult children in a two story house with two steps to enter.  She stays on the first floor of her home.  PTA she is independent with no adaptive device.  She has a glucometer, nebulizer, rollator walker and wears three liters oxygen via [employer].  She states she will have a sleep study May 5th to test for sleep apnea.  She states she is active with [facility].  Plan is home at discharge.  Called liaison Pam with [facility] and confirmed patient is active for skilled nursing, physical and occupational therapy.  Patient will need a resume home care order.  Will continue to follow and assist with discharge planning.  Electronically signed by JOHNNY Knowles on 4/30/2025 at 11:59 AM

## 2025-04-30 NOTE — PROGRESS NOTES
Hospitalist Progress Note      Synopsis: Patient admitted on 4/29/2025 54-year-old lady with past medical history significant for hypothyroidism, hypertension, hyperlipidemia, COPD and congestive heart failure presents to ED with worsening lower extremity swelling for past couple of weeks.  She was recently admitted to hospital in ICU for respiratory failure with intubation.  She requires oxygen at home at 3 L by nasal cannula.  She started noticing worsening leg swelling for last couple of weeks.  She was following cardiology outpatient who recommended to take additional dose of Lasix in case she start noticing leg swelling, which she did without any benefit.  Her symptoms progressed from leg swelling to seeping of fluid from the legs and she came to ED for evaluation.  Denies any chest pain, belly pain, nausea vomiting diarrhea, no cough or shortness of breath, no lightheadedness or dizziness, no fever or chills, no focal deficits.  Workup in ED was essentially benign except WBC count 12.4, which is trending down from the previous numbers, hemoglobin 8 from baseline around 10 imaging negative for any DVT, and proBNP 2694.  Her last 2D echo on 27th March which showed ejection fraction of 55% with trophy and stage II diastolic dysfunction.  Decision to keep under observation.       ASSESSMENT:    Principal Problem:    Fluid overload  Active Problems:    Essential hypertension    Acute on chronic diastolic congestive heart failure (HCC)    CKD (chronic kidney disease) stage 3, GFR 30-59 ml/min ([facility])    Class 1 obesity due to excess calories with serious comorbidity in adult  Resolved Problems:    * No resolved hospital problems. *       PLAN:      Acute on chronic diastolic heart failure-will start patient on IV Lasix daily, input output charting, daily weight, low-sodium diet,Repeat labs in morning.  Patient has multiple medications that are associated with lower extremity edema such as high-dose amlodipine,  methadone.  Reduce amlodipine add SGLT2 inhibitor increase Lasix to 40 IV twice daily.  Monitor volume status, electrolytes, renal function closely  Echo 3/2025 EF 55% with normal LV function, mild concentric hypertrophy, stage II diastolic dysfunction-no need to repeat    Sinus bradycardia on metoprolol  Stop metoprolol  Monitor    Diabetes mellitus type 2-stable, continue home dose of insulin along with insulin sliding scale, hypoglycemia protocol, diabetic diet.    Chronic respiratory failure-at baseline, requiring oxygen at 3 L per nasal cannula, continue same.    Hypothyroidism-TSH is high, free T4 is low, Increase thyroid supplementation, repeat TSH in 4-6 weeks    Primary hypertension-continue home medications.    Opioid use disorder-on methadone.       Diet: ADULT DIET; Regular; Low Sodium (2 gm)  Code Status: Full Code  PT/OT Eval Status:     DVT Prophylaxis:     Recommended disposition at discharge: Anticipate home 24-48 hrs    Subjective    Feeling better no complaints  No CP or SOB  No fever or chills   No uncontrolled pain  No vomiting or diarrhea   No events reported overnight     Exam:  BP (!) 144/77   Pulse 56   Temp 97.3 °F (36.3 °C) (Oral)   Resp 18   Ht 1.727 m (5' 8\")   Wt 98 kg (216 lb)   SpO2 97%   BMI 32.84 kg/m²   General appearance: No apparent distress, appears stated age and cooperative.  HEENT: Pupils equal, round, and reactive to light. Conjunctivae/corneas clear.  Neck: Supple. No jugular venous distention. Trachea midline.  Respiratory:  Normal respiratory effort. Clear to auscultation, bilaterally without Rales/Wheezes/Rhonchi.  Cardiovascular: Regular rate and rhythm with normal S1/S2 without murmurs, rubs or gallops.  Abdomen: Soft, non-tender, non-distended with normal bowel sounds.  Musculoskeletal: No clubbing, cyanosis or edema bilaterally. Brisk capillary refill. 2+radial pulses.   Skin:  No rashes    Neurologic: awake, alert and following commands    Medications:

## 2025-04-30 NOTE — PROGRESS NOTES
4 Eyes Skin Assessment     NAME:  Faiza Romero  YOB: 1970  MEDICAL RECORD NUMBER:  [id number]    The patient is being assessed for  Admission    I agree that at least one RN has performed a thorough Head to Toe Skin Assessment on the patient. ALL assessment sites listed below have been assessed.      Areas assessed by both nurses:    Head, Face, Ears, Shoulders, Back, Chest, Arms, Elbows, Hands, Sacrum. Buttock, Coccyx, Ischium, and Legs. Feet and Heels        Does the Patient have a Wound? Yes wound(s) were present on assessment. LDA wound assessment was Initiated and completed by RN       Esau Prevention initiated by RN: No  Wound Care Orders initiated by RN: Yes    Pressure Injury (Stage 3,4, Unstageable, DTI, NWPT, and Complex wounds) if present, place Wound referral order by RN under : No    New Ostomies, if present place, Ostomy referral order under : No     Nurse 1 eSignature: Electronically signed by Tenisha Valencia RN on 4/30/25 at 12:09 AM EDT    **SHARE this note so that the co-signing nurse can place an eSignature**    Nurse 2 eSignature: {Esignature:[id number]}

## 2025-04-30 NOTE — PROGRESS NOTES
Dr Mahoney notified of patient reporting intermittent shortness of breath on baseline O2 of 3L NC.

## 2025-04-30 NOTE — PROGRESS NOTES
Message sent to Dr Mahoney regarding patient taking methadone at home and following with the clinic. New orders received.

## 2025-04-30 NOTE — PROGRESS NOTES
Dr Mahoney notified of patient's blood sugar of 271 and no sliding scale in place. New orders received.

## 2025-05-01 VITALS
SYSTOLIC BLOOD PRESSURE: 115 MMHG | BODY MASS INDEX: 33.22 KG/M2 | RESPIRATION RATE: 20 BRPM | HEIGHT: 68 IN | WEIGHT: 219.2 LBS | OXYGEN SATURATION: 92 % | DIASTOLIC BLOOD PRESSURE: 68 MMHG | TEMPERATURE: 98.3 F | HEART RATE: 56 BPM

## 2025-05-01 LAB
ALBUMIN SERPL-MCNC: 3.5 G/DL (ref 3.5–5.2)
ALP SERPL-CCNC: 227 U/L (ref 35–104)
ALT SERPL-CCNC: 28 U/L (ref 0–32)
ANION GAP SERPL CALCULATED.3IONS-SCNC: 11 MMOL/L (ref 7–16)
AST SERPL-CCNC: 37 U/L (ref 0–31)
BASOPHILS # BLD: 0.09 K/UL (ref 0–0.2)
BASOPHILS NFR BLD: 1 % (ref 0–2)
BILIRUB SERPL-MCNC: 0.3 MG/DL (ref 0–1.2)
BUN SERPL-MCNC: 29 MG/DL (ref 6–20)
CALCIUM SERPL-MCNC: 9 MG/DL (ref 8.6–10.2)
CHLORIDE SERPL-SCNC: 96 MMOL/L (ref 98–107)
CO2 SERPL-SCNC: 33 MMOL/L (ref 22–29)
CREAT SERPL-MCNC: 1.3 MG/DL (ref 0.5–1)
EOSINOPHIL # BLD: 0.55 K/UL (ref 0.05–0.5)
EOSINOPHILS RELATIVE PERCENT: 5 % (ref 0–6)
ERYTHROCYTE [DISTWIDTH] IN BLOOD BY AUTOMATED COUNT: 15.9 % (ref 11.5–15)
GFR, ESTIMATED: 49 ML/MIN/1.73M2
GLUCOSE BLD-MCNC: 145 MG/DL (ref 74–99)
GLUCOSE BLD-MCNC: 96 MG/DL (ref 74–99)
GLUCOSE SERPL-MCNC: 93 MG/DL (ref 74–99)
HCT VFR BLD AUTO: 25.1 % (ref 34–48)
HGB BLD-MCNC: 7.4 G/DL (ref 11.5–15.5)
IMM GRANULOCYTES # BLD AUTO: 0.05 K/UL (ref 0–0.58)
IMM GRANULOCYTES NFR BLD: 1 % (ref 0–5)
LYMPHOCYTES NFR BLD: 1.93 K/UL (ref 1.5–4)
LYMPHOCYTES RELATIVE PERCENT: 19 % (ref 20–42)
MCH RBC QN AUTO: 28 PG (ref 26–35)
MCHC RBC AUTO-ENTMCNC: 29.5 G/DL (ref 32–34.5)
MCV RBC AUTO: 95.1 FL (ref 80–99.9)
MONOCYTES NFR BLD: 1.02 K/UL (ref 0.1–0.95)
MONOCYTES NFR BLD: 10 % (ref 2–12)
NEUTROPHILS NFR BLD: 65 % (ref 43–80)
NEUTS SEG NFR BLD: 6.69 K/UL (ref 1.8–7.3)
PLATELET # BLD AUTO: 289 K/UL (ref 130–450)
PMV BLD AUTO: 10.4 FL (ref 7–12)
POTASSIUM SERPL-SCNC: 3.7 MMOL/L (ref 3.5–5)
PROT SERPL-MCNC: 7.4 G/DL (ref 6.4–8.3)
RBC # BLD AUTO: 2.64 M/UL (ref 3.5–5.5)
SODIUM SERPL-SCNC: 140 MMOL/L (ref 132–146)
WBC OTHER # BLD: 10.3 K/UL (ref 4.5–11.5)

## 2025-05-01 PROCEDURE — 6370000000 HC RX 637 (ALT 250 FOR IP): Performed by: INTERNAL MEDICINE

## 2025-05-01 PROCEDURE — 94640 AIRWAY INHALATION TREATMENT: CPT

## 2025-05-01 PROCEDURE — 80053 COMPREHEN METABOLIC PANEL: CPT

## 2025-05-01 PROCEDURE — 99239 HOSP IP/OBS DSCHRG MGMT >30: CPT | Performed by: INTERNAL MEDICINE

## 2025-05-01 PROCEDURE — G0378 HOSPITAL OBSERVATION PER HR: HCPCS

## 2025-05-01 PROCEDURE — 6360000002 HC RX W HCPCS: Performed by: STUDENT IN AN ORGANIZED HEALTH CARE EDUCATION/TRAINING PROGRAM

## 2025-05-01 PROCEDURE — 96372 THER/PROPH/DIAG INJ SC/IM: CPT

## 2025-05-01 PROCEDURE — 6370000000 HC RX 637 (ALT 250 FOR IP): Performed by: STUDENT IN AN ORGANIZED HEALTH CARE EDUCATION/TRAINING PROGRAM

## 2025-05-01 PROCEDURE — 2500000003 HC RX 250 WO HCPCS: Performed by: STUDENT IN AN ORGANIZED HEALTH CARE EDUCATION/TRAINING PROGRAM

## 2025-05-01 PROCEDURE — 82962 GLUCOSE BLOOD TEST: CPT

## 2025-05-01 PROCEDURE — 85025 COMPLETE CBC W/AUTO DIFF WBC: CPT

## 2025-05-01 PROCEDURE — 6360000002 HC RX W HCPCS: Performed by: INTERNAL MEDICINE

## 2025-05-01 PROCEDURE — 2700000000 HC OXYGEN THERAPY PER DAY

## 2025-05-01 PROCEDURE — 96376 TX/PRO/DX INJ SAME DRUG ADON: CPT

## 2025-05-01 RX ORDER — POTASSIUM CHLORIDE 1500 MG/1
20 TABLET, EXTENDED RELEASE ORAL ONCE
Status: COMPLETED | OUTPATIENT
Start: 2025-05-01 | End: 2025-05-01

## 2025-05-01 RX ORDER — METOPROLOL TARTRATE 25 MG/1
25 TABLET, FILM COATED ORAL 2 TIMES DAILY
Qty: 14 TABLET | Refills: 0 | Status: SHIPPED | OUTPATIENT
Start: 2025-05-01 | End: 2025-05-08

## 2025-05-01 RX ADMIN — ISOSORBIDE MONONITRATE 30 MG: 30 TABLET, EXTENDED RELEASE ORAL at 07:19

## 2025-05-01 RX ADMIN — LOSARTAN POTASSIUM 100 MG: 50 TABLET, FILM COATED ORAL at 07:19

## 2025-05-01 RX ADMIN — SODIUM CHLORIDE, PRESERVATIVE FREE 10 ML: 5 INJECTION INTRAVENOUS at 07:22

## 2025-05-01 RX ADMIN — CLOPIDOGREL BISULFATE 75 MG: 75 TABLET, FILM COATED ORAL at 07:19

## 2025-05-01 RX ADMIN — FUROSEMIDE 40 MG: 10 INJECTION, SOLUTION INTRAMUSCULAR; INTRAVENOUS at 07:20

## 2025-05-01 RX ADMIN — ARFORMOTEROL TARTRATE 15 MCG: 15 SOLUTION RESPIRATORY (INHALATION) at 07:46

## 2025-05-01 RX ADMIN — ACETAMINOPHEN 650 MG: 325 TABLET ORAL at 12:37

## 2025-05-01 RX ADMIN — PETROLATUM: 420 OINTMENT TOPICAL at 07:20

## 2025-05-01 RX ADMIN — AMLODIPINE BESYLATE 5 MG: 5 TABLET ORAL at 07:20

## 2025-05-01 RX ADMIN — ASPIRIN 81 MG: 81 TABLET, COATED ORAL at 07:20

## 2025-05-01 RX ADMIN — ENOXAPARIN SODIUM 40 MG: 100 INJECTION SUBCUTANEOUS at 07:20

## 2025-05-01 RX ADMIN — POTASSIUM CHLORIDE 20 MEQ: 1500 TABLET, EXTENDED RELEASE ORAL at 10:57

## 2025-05-01 RX ADMIN — EMPAGLIFLOZIN 10 MG: 10 TABLET, FILM COATED ORAL at 07:24

## 2025-05-01 RX ADMIN — METHADONE HYDROCHLORIDE 47 MG: 10 CONCENTRATE ORAL at 06:00

## 2025-05-01 RX ADMIN — BUDESONIDE 250 MCG: 0.25 SUSPENSION RESPIRATORY (INHALATION) at 07:46

## 2025-05-01 ASSESSMENT — PAIN SCALES - GENERAL
PAINLEVEL_OUTOF10: 0
PAINLEVEL_OUTOF10: 4
PAINLEVEL_OUTOF10: 0

## 2025-05-01 ASSESSMENT — PAIN DESCRIPTION - DESCRIPTORS: DESCRIPTORS: ACHING

## 2025-05-01 ASSESSMENT — PAIN DESCRIPTION - LOCATION: LOCATION: HEAD

## 2025-05-01 NOTE — PROGRESS NOTES
Spoke to pulmonary function lab. Patient is not able to get PFTs done inpatient and will need to reschedule outpatient.

## 2025-05-01 NOTE — DISCHARGE SUMMARY
[facility] Hospitalist Physician Discharge Summary       [place].  [address] [place] [address]  [phone]          Activity level: As tolerated     Dispo: Home      Condition on discharge: Stable     Patient ID:  Faiza Romero  [id number]  54 y.o.  1970    Admit date: 4/29/2025    Discharge date and time:  5/1/2025  10:38 AM    Admission Diagnoses: Principal Problem:    Fluid overload  Active Problems:    Essential hypertension    Acquired hypothyroidism    Acute on chronic diastolic congestive heart failure (HCC)    CKD (chronic kidney disease) stage 3, GFR 30-59 ml/min (HCC)    Class 1 obesity due to excess calories with serious comorbidity in adult    Opioid dependence on maintenance agonist therapy, no symptoms (HCC)  Resolved Problems:    * No resolved hospital problems. *      Discharge Diagnoses: Principal Problem:    Fluid overload  Active Problems:    Essential hypertension    Acquired hypothyroidism    Acute on chronic diastolic congestive heart failure (HCC)    CKD (chronic kidney disease) stage 3, GFR 30-59 ml/min (HCC)    Class 1 obesity due to excess calories with serious comorbidity in adult    Opioid dependence on maintenance agonist therapy, no symptoms (HCC)  Resolved Problems:    * No resolved hospital problems. *      Consults:  IP CONSULT TO INTERNAL MEDICINE  IP CONSULT TO HEART FAILURE NURSE/COORDINATOR  IP CONSULT TO DIETITIAN    Procedures:     Hospital Course:   Patient Faiza Romero is a 54 y.o. presented with Fluid overload [E87.70]  Hypervolemia, unspecified hypervolemia type [E87.70]  Acute exacerbation of chronic heart failure (HCC) [I50.9]    Patient is a 54-year-old female with history of hypothyroidism, HTN, HLD, COPD, and CHF presented with worsening bilateral LE edema progressing to weeping skin. She was recently hospitalized in ICU for respiratory failure and is on 3 L NC at baseline. Despite doubling her outpatient Lasix per  MG tablet  Commonly known as: COZAAR  Take 1 tablet by mouth daily     METHADONE HCL PO     nitroGLYCERIN 0.4 MG SL tablet  Commonly known as: Nitrostat  Place 1 tablet under the tongue every 5 minutes as needed for Chest pain up to max of 3 total doses. If no relief after 1 dose, call 911.     Zen Ellipta 100-62.5-25 MCG/ACT Aepb inhaler  Generic drug: fluticasone-umeclidin-vilant  Inhale 1 puff into the lungs daily            STOP taking these medications      isosorbide mononitrate 30 MG extended release tablet  Commonly known as: IMDUR               Where to Get Your Medications        These medications were sent to [facility] - [place], [place] - [address] - P [phone] - F [phone]  [address] [place] [address]      Phone: [phone]   empagliflozin 10 MG tablet  metoprolol tartrate 25 MG tablet           Note that more than 30 minutes was spent in preparing discharge papers, discussing discharge with patient, medication review, etc.    Signed:  Electronically signed by Balwinder Rae MD on 5/1/2025 at 10:38 AM

## 2025-05-01 NOTE — PLAN OF CARE
Problem: Pain  Goal: Verbalizes/displays adequate comfort level or baseline comfort level  Outcome: Progressing     Problem: Chronic Conditions and Co-morbidities  Goal: Patient's chronic conditions and co-morbidity symptoms are monitored and maintained or improved  Outcome: Progressing     Problem: Discharge Planning  Goal: Discharge to home or other facility with appropriate resources  Outcome: Progressing     Problem: Safety - Adult  Goal: Free from fall injury  Outcome: Progressing     Problem: Skin/Tissue Integrity  Goal: Skin integrity remains intact  Description: 1.  Monitor for areas of redness and/or skin breakdown2.  Assess vascular access sites hourly3.  Every 4-6 hours minimum:  Change oxygen saturation probe site4.  Every 4-6 hours:  If on nasal continuous positive airway pressure, respiratory therapy assess nares and determine need for appliance change or resting period  Outcome: Progressing

## 2025-05-01 NOTE — CONSULTS
Comprehensive Nutrition Assessment    Type and Reason for Visit:  Initial, Wound, Consult    Nutrition Recommendations/Plan:   Continue Current Nutrition Tx  Order Wound Healing ONS [place] BID     Malnutrition Assessment:  Malnutrition Status:  At risk for malnutrition (05/01/25 1333)    Context:  Acute Illness     Findings of the 6 clinical characteristics of malnutrition:  Energy Intake:  Mild decrease in energy intake  Weight Loss:  Unable to assess (fluid shifts)     Body Fat Loss:  No body fat loss     Muscle Mass Loss:  No muscle mass loss    Fluid Accumulation:  Unable to assess (multifactorial)     Strength:  Not Performed    Nutrition Assessment:    Pt adm Fluid Overload, Acute Exac Chr Heart Failure. PMH: DM2, R 2nd Toe Ulcer, CKD3, Obesity, recent admit for Resp Failure s/p Intubation. Per pt \"this is going to be my normal for the rest of my life\" c/o ^BLE swelling few weeks PTA, reports fluid gain from UBW 86.4kg. Reports good appetite. States she is trying quit smoking 1 month ago. Order wound healing ONS ([place]) BID. Plans to go home today.    Nutrition Related Findings:    A+O x4, I+O -1.5L, +BS, soft BM 4/30, +3 BLE edema. BUN 29, Cr 1.3, GFR 49, glu 93, POC glu 145 (insulin), ^alk phos/AST. Did not eat Lunch today has been eating well during admit. Wound Type: Venous Stasis, Full Thickness       Current Nutrition Intake & Therapies:    Average Meal Intake: 0%, %     ADULT DIET; Regular; Low Sodium (2 gm)  ADULT ORAL NUTRITION SUPPLEMENT; Breakfast, Lunch; Wound Healing Oral Supplement    Anthropometric Measures:  Height: 172.7 cm (5' 7.99\")  Ideal Body Weight (IBW): 140 lbs (64 kg)    Admission Body Weight: 98 kg (216 lb 0.8 oz) (4/30 bed scale)  Current Body Weight: 99.4 kg (219 lb 2.2 oz) (5/1 bed scale), 156.5 % IBW. Weight Source: Bed scale  Current BMI (kg/m2): 33.3  Usual Body Weight: 94.8 kg (208 lb 15.9 oz) (3/29 per EMR)     % Weight Change (Calculated): 4.9  Weight Adjustment For:

## 2025-05-01 NOTE — CARE COORDINATION
CARE MANAGEMENT.... Discharge order noted. Resume HHC orders obtained and Pam with [facility] notified of dc.

## 2025-05-01 NOTE — PROGRESS NOTES
Discharge was delayed, pt's home oxygen tank not working. Resp therapy here and fixed the oxygen tank

## 2025-05-01 NOTE — PROGRESS NOTES
[facility] Hospitalist Progress Note    Admitting Date and Time: 4/29/2025  4:23 PM  Admit Dx: Fluid overload [E87.70]  Hypervolemia, unspecified hypervolemia type [E87.70]  Acute exacerbation of chronic heart failure (HCC) [I50.9]    Subjective:  Patient is being followed for Fluid overload [E87.70]  Hypervolemia, unspecified hypervolemia type [E87.70]  Acute exacerbation of chronic heart failure (HCC) [I50.9]     Patient is seen today. No concerns     ROS: denies fever, chills, cp, sob, n/v, HA unless stated above.      methadone  47 mg Oral Daily    furosemide  40 mg IntraVENous BID    amLODIPine  5 mg Orogastric Daily    empagliflozin  10 mg Oral Daily    insulin lispro  0-8 Units SubCUTAneous 4x Daily AC & HS    white petrolatum   Topical BID    levothyroxine  150 mcg Oral Daily    sodium chloride flush  5-40 mL IntraVENous 2 times per day    enoxaparin  40 mg SubCUTAneous Daily    aspirin  81 mg Oral Daily    clopidogrel  75 mg Oral Daily    budesonide  0.25 mg Nebulization BID RT    And    arformoterol tartrate  15 mcg Nebulization BID RT    insulin glargine  30 Units SubCUTAneous Nightly    isosorbide mononitrate  30 mg Oral Daily    losartan  100 mg Oral Daily    pantoprazole  40 mg Oral Daily    rosuvastatin  10 mg Oral Nightly     white petrolatum, , BID PRN  sodium chloride flush, 5-40 mL, PRN  sodium chloride, , PRN  potassium chloride, 40 mEq, PRN   Or  potassium alternative oral replacement, 40 mEq, PRN   Or  potassium chloride, 10 mEq, PRN  magnesium sulfate, 2,000 mg, PRN  ondansetron, 4 mg, Q8H PRN   Or  ondansetron, 4 mg, Q6H PRN  polyethylene glycol, 17 g, Daily PRN  acetaminophen, 650 mg, Q6H PRN   Or  acetaminophen, 650 mg, Q6H PRN  nitroGLYCERIN, 0.4 mg, Q5 Min PRN  glucose, 4 tablet, PRN  dextrose bolus, 125 mL, PRN   Or  dextrose bolus, 250 mL, PRN  glucagon (rDNA), 1 mg, PRN  dextrose, , Continuous PRN         Objective:    BP (!) 103/56   Pulse 55   Temp 97.9 °F (36.6 °C) (Oral)

## 2025-05-01 NOTE — CONSULTS
[facility]   Inpatient CHF Nurse Navigator Consult        Cardiologist: Dr Sargent  Primary Care Physician: Mariana London APN Kathleen E Heather is a 54 y.o. (1970) female with a history of HFpEF, most recent EF:  Lab Results   Component Value Date    LVEF 55 09/08/2022       Patient was awake and alert, laying in bed during the consultation and is agreeable to heart failure education. She was engaged and asked appropriate questions throughout the education session.     Barriers identified during consult contributing to HF Hospitalization: none.     [] Limited medication adherence   [] Poor health literacy, education regarding HF medications provided   [] Pill box provided to patient  [] Difficulty affording medications  [] Difficulty obtaining/ managing medications  [] Prescription assistance information given     [] Not weighing themselves daily  [x] Weight log provided for easy monitoring  [] Scale provided     [] Not following low sodium diet  [] Food insecurity   [x] 2 gram sodium diet education provided   [] Low sodium recipes provided  [] Sodium free seasoning provided   [] Low sodium meal delivery options given to patient  [] Dietician consulted     [] Lack of transportation to appointments     [] Depression, given chronic illness  [] Primary team notified     [] Goals of care need addressed  [] Palliative care consulted     [] CHF community health worker Darlin García consulted [phone], to assist with       Chart Reviewed:  Diet: ADULT DIET; Regular; Low Sodium (2 gm)   Daily Weights: Patient Vitals for the past 96 hrs (Last 3 readings):   Weight   05/01/25 0408 99.4 kg (219 lb 3.2 oz)   04/30/25 0308 98 kg (216 lb)   04/29/25 1621 98 kg (216 lb)     I/O:   Intake/Output Summary (Last 24 hours) at 5/1/2025 1012  Last data filed at 5/1/2025 0940  Gross per 24 hour   Intake 190 ml   Output 1900 ml   Net -1710 ml       [] Nursing staff/manager notified

## 2025-05-13 LAB
25(OH)D3 SERPL-MCNC: 11.3 NG/ML (ref 30–100)
ALBUMIN SERPL-MCNC: 3.6 G/DL (ref 3.5–5.2)
ALP SERPL-CCNC: 158 U/L (ref 35–104)
ALT SERPL-CCNC: 15 U/L (ref 0–35)
ANION GAP SERPL CALCULATED.3IONS-SCNC: 12 MMOL/L (ref 7–16)
AST SERPL-CCNC: 31 U/L (ref 0–35)
BASOPHILS # BLD: 0.09 K/UL (ref 0–0.2)
BASOPHILS NFR BLD: 1 % (ref 0–2)
BILIRUB SERPL-MCNC: 0.3 MG/DL (ref 0–1.2)
BNP SERPL-MCNC: 1100 PG/ML (ref 0–125)
BUN SERPL-MCNC: 24 MG/DL (ref 6–20)
CALCIUM SERPL-MCNC: 9 MG/DL (ref 8.6–10)
CHLORIDE SERPL-SCNC: 97 MMOL/L (ref 98–107)
CHOLEST SERPL-MCNC: 116 MG/DL
CO2 SERPL-SCNC: 28 MMOL/L (ref 22–29)
CREAT SERPL-MCNC: 1.2 MG/DL (ref 0.5–1)
EOSINOPHIL # BLD: 1.72 K/UL (ref 0.05–0.5)
EOSINOPHILS RELATIVE PERCENT: 10 % (ref 0–6)
ERYTHROCYTE [DISTWIDTH] IN BLOOD BY AUTOMATED COUNT: 16.8 % (ref 11.5–15)
FERRITIN SERPL-MCNC: 53 NG/ML
GFR, ESTIMATED: 53 ML/MIN/1.73M2
GLUCOSE SERPL-MCNC: 95 MG/DL (ref 74–99)
HCT VFR BLD AUTO: 23.8 % (ref 34–48)
HDLC SERPL-MCNC: 34 MG/DL
HGB BLD-MCNC: 7.1 G/DL (ref 11.5–15.5)
IMM GRANULOCYTES # BLD AUTO: 0.15 K/UL (ref 0–0.58)
IMM GRANULOCYTES NFR BLD: 1 % (ref 0–5)
IRON SATN MFR SERPL: 12 % (ref 15–50)
IRON SERPL-MCNC: 36 UG/DL (ref 37–145)
LDLC SERPL CALC-MCNC: 41 MG/DL
LYMPHOCYTES NFR BLD: 2.73 K/UL (ref 1.5–4)
LYMPHOCYTES RELATIVE PERCENT: 16 % (ref 20–42)
MCH RBC QN AUTO: 28.6 PG (ref 26–35)
MCHC RBC AUTO-ENTMCNC: 29.8 G/DL (ref 32–34.5)
MCV RBC AUTO: 96 FL (ref 80–99.9)
MONOCYTES NFR BLD: 1.35 K/UL (ref 0.1–0.95)
MONOCYTES NFR BLD: 8 % (ref 2–12)
NEUTROPHILS NFR BLD: 64 % (ref 43–80)
NEUTS SEG NFR BLD: 10.91 K/UL (ref 1.8–7.3)
PLATELET # BLD AUTO: 448 K/UL (ref 130–450)
PMV BLD AUTO: 10.6 FL (ref 7–12)
POTASSIUM SERPL-SCNC: 3.6 MMOL/L (ref 3.5–5.1)
PROT SERPL-MCNC: 7.7 G/DL (ref 6.4–8.3)
RBC # BLD AUTO: 2.48 M/UL (ref 3.5–5.5)
SODIUM SERPL-SCNC: 136 MMOL/L (ref 136–145)
T4 SERPL-MCNC: 4.9 UG/DL (ref 4.5–11.7)
TIBC SERPL-MCNC: 296 UG/DL (ref 250–450)
TRIGL SERPL-MCNC: 208 MG/DL
TSH SERPL DL<=0.05 MIU/L-ACNC: 18.1 UIU/ML (ref 0.27–4.2)
VLDLC SERPL CALC-MCNC: 42 MG/DL
WBC OTHER # BLD: 17 K/UL (ref 4.5–11.5)

## 2025-05-15 ENCOUNTER — OFFICE VISIT (OUTPATIENT)
Age: 55
End: 2025-05-15
Payer: MEDICARE

## 2025-05-15 VITALS
RESPIRATION RATE: 16 BRPM | BODY MASS INDEX: 29.8 KG/M2 | SYSTOLIC BLOOD PRESSURE: 130 MMHG | DIASTOLIC BLOOD PRESSURE: 50 MMHG | OXYGEN SATURATION: 100 % | HEART RATE: 64 BPM | WEIGHT: 196 LBS

## 2025-05-15 DIAGNOSIS — I25.10 CAD S/P PERCUTANEOUS CORONARY ANGIOPLASTY: ICD-10-CM

## 2025-05-15 DIAGNOSIS — D64.9 ACUTE ON CHRONIC ANEMIA: ICD-10-CM

## 2025-05-15 DIAGNOSIS — K62.5 RECTAL BLEEDING: ICD-10-CM

## 2025-05-15 DIAGNOSIS — E03.9 HYPOTHYROIDISM, UNSPECIFIED TYPE: ICD-10-CM

## 2025-05-15 DIAGNOSIS — Z98.61 CAD S/P PERCUTANEOUS CORONARY ANGIOPLASTY: ICD-10-CM

## 2025-05-15 DIAGNOSIS — Z95.1 HX OF CABG: ICD-10-CM

## 2025-05-15 DIAGNOSIS — I50.32 CHRONIC HEART FAILURE WITH PRESERVED EJECTION FRACTION (HFPEF) (HCC): Primary | ICD-10-CM

## 2025-05-15 PROCEDURE — 99215 OFFICE O/P EST HI 40 MIN: CPT | Performed by: PHYSICIAN ASSISTANT

## 2025-05-15 PROCEDURE — 3078F DIAST BP <80 MM HG: CPT | Performed by: PHYSICIAN ASSISTANT

## 2025-05-15 PROCEDURE — 3075F SYST BP GE 130 - 139MM HG: CPT | Performed by: PHYSICIAN ASSISTANT

## 2025-05-15 RX ORDER — SPIRONOLACTONE 25 MG/1
12.5 TABLET ORAL DAILY
Qty: 45 TABLET | Refills: 1 | Status: SHIPPED | OUTPATIENT
Start: 2025-05-15

## 2025-05-15 RX ORDER — METOPROLOL TARTRATE 25 MG/1
25 TABLET, FILM COATED ORAL 2 TIMES DAILY
Qty: 180 TABLET | Refills: 1 | Status: SHIPPED | OUTPATIENT
Start: 2025-05-15

## 2025-05-15 ASSESSMENT — ENCOUNTER SYMPTOMS
ABDOMINAL PAIN: 0
NAUSEA: 0
COLOR CHANGE: 0
BLOOD IN STOOL: 1
SHORTNESS OF BREATH: 0
VOMITING: 0

## 2025-05-15 NOTE — PROGRESS NOTES
Congestive Heart Failure Clinic   [facility]       Reason for Visit: Heart Failure     Primary Cardiologist: Dr. Sargent        History of Present Illness:       Ms. Romero is a 54-year-old  female with PMHx significant for chronic HFpEF, CAD s/p CABG and PCI, hypertension, hyperlipidemia/hypertriglyceridemia, T2DM, hypothyroidism, history of BURTON--awaiting sleep study as ordered by pulmonology, chronic respiratory failure-on O2, former tobacco abuse and obesity.      Hospitalized 3/25/2025 through 4/3/2025 for acute COPD exacerbation and acute respiratory failure with hypoxia.  TTE during that admission on 3/27/2025 showed LVEF 55%, moderate concentric left ventricular hypertrophy, grade 2 diastolic dysfunction, normal RV size and systolic function, trace MR and mild TR.  She was treated with antibiotics and steroids.  She initially required intubation but then was extubated to nasal cannula.  She was discharged to home on 4/3/2025.    Hospitalized 4/29/2025 through 5/1/2025 for acute on chronic HFpEF.  She was treated with IV Lasix.  Bilateral lower extremity venous duplex was negative for DVT.  Portable chest x-ray revealed no acute process.  Jardiance was added during this admission.  She stabilized and was discharged home on 5/1/2025.      Here for hospital follow-up.  Presents today with family member.  Reports doing okay overall recently.  Denies shortness of breath or dyspnea on exertion when she is wearing O2.  Does admit that when she got into the shower the other day she was not wearing her oxygen and did have some discomfort in her neck and jaw but this has not ever happened when she is compliant with O2.  She reports improvement in her lower extremity edema since recent admission.  Reports occasional rectal bleeding with bowel movement and states that she has an external hemorrhoid.  Has some fatigue complaints with exertion.  She has chronic orthopnea and

## 2025-05-18 ENCOUNTER — APPOINTMENT (OUTPATIENT)
Dept: CT IMAGING | Age: 55
End: 2025-05-18
Payer: MEDICARE

## 2025-05-18 ENCOUNTER — HOSPITAL ENCOUNTER (EMERGENCY)
Age: 55
Discharge: HOME | End: 2025-05-18
Attending: STUDENT IN AN ORGANIZED HEALTH CARE EDUCATION/TRAINING PROGRAM
Payer: MEDICARE

## 2025-05-18 VITALS
DIASTOLIC BLOOD PRESSURE: 77 MMHG | BODY MASS INDEX: 28.79 KG/M2 | TEMPERATURE: 97.9 F | SYSTOLIC BLOOD PRESSURE: 151 MMHG | HEART RATE: 76 BPM | OXYGEN SATURATION: 100 % | HEIGHT: 68 IN | RESPIRATION RATE: 18 BRPM | WEIGHT: 190 LBS

## 2025-05-18 DIAGNOSIS — R10.9 FLANK PAIN: Primary | ICD-10-CM

## 2025-05-18 DIAGNOSIS — G62.9 NEUROPATHY: ICD-10-CM

## 2025-05-18 DIAGNOSIS — E87.6 HYPOKALEMIA: ICD-10-CM

## 2025-05-18 LAB
ALBUMIN SERPL-MCNC: 4.2 G/DL (ref 3.5–5.2)
ALP SERPL-CCNC: 151 U/L (ref 35–104)
ALT SERPL-CCNC: 17 U/L (ref 0–32)
ANION GAP SERPL CALCULATED.3IONS-SCNC: 13 MMOL/L (ref 7–16)
AST SERPL-CCNC: 23 U/L (ref 0–31)
BASOPHILS # BLD: 0.09 K/UL (ref 0–0.2)
BASOPHILS NFR BLD: 1 % (ref 0–2)
BILIRUB SERPL-MCNC: 0.3 MG/DL (ref 0–1.2)
BILIRUB UR QL STRIP: NEGATIVE
BUN SERPL-MCNC: 14 MG/DL (ref 6–20)
CALCIUM SERPL-MCNC: 9.7 MG/DL (ref 8.6–10.2)
CHLORIDE SERPL-SCNC: 97 MMOL/L (ref 98–107)
CLARITY UR: CLEAR
CO2 SERPL-SCNC: 30 MMOL/L (ref 22–29)
COLOR UR: YELLOW
CREAT SERPL-MCNC: 1 MG/DL (ref 0.5–1)
EOSINOPHIL # BLD: 1.11 K/UL (ref 0.05–0.5)
EOSINOPHILS RELATIVE PERCENT: 7 % (ref 0–6)
EPI CELLS #/AREA URNS HPF: ABNORMAL /HPF
ERYTHROCYTE [DISTWIDTH] IN BLOOD BY AUTOMATED COUNT: 17.1 % (ref 11.5–15)
GFR, ESTIMATED: 69 ML/MIN/1.73M2
GLUCOSE SERPL-MCNC: 163 MG/DL (ref 74–99)
GLUCOSE UR STRIP-MCNC: >=1000 MG/DL
HCT VFR BLD AUTO: 24.8 % (ref 34–48)
HGB BLD-MCNC: 7.7 G/DL (ref 11.5–15.5)
HGB UR QL STRIP.AUTO: NEGATIVE
IMM GRANULOCYTES # BLD AUTO: 0.08 K/UL (ref 0–0.58)
IMM GRANULOCYTES NFR BLD: 1 % (ref 0–5)
KETONES UR STRIP-MCNC: NEGATIVE MG/DL
LACTATE BLDV-SCNC: 1.3 MMOL/L (ref 0.5–2.2)
LEUKOCYTE ESTERASE UR QL STRIP: NEGATIVE
LIPASE SERPL-CCNC: 17 U/L (ref 13–60)
LYMPHOCYTES NFR BLD: 1.82 K/UL (ref 1.5–4)
LYMPHOCYTES RELATIVE PERCENT: 12 % (ref 20–42)
MAGNESIUM SERPL-MCNC: 2.3 MG/DL (ref 1.6–2.6)
MCH RBC QN AUTO: 28.3 PG (ref 26–35)
MCHC RBC AUTO-ENTMCNC: 31 G/DL (ref 32–34.5)
MCV RBC AUTO: 91.2 FL (ref 80–99.9)
MONOCYTES NFR BLD: 0.91 K/UL (ref 0.1–0.95)
MONOCYTES NFR BLD: 6 % (ref 2–12)
NEUTROPHILS NFR BLD: 73 % (ref 43–80)
NEUTS SEG NFR BLD: 10.96 K/UL (ref 1.8–7.3)
NITRITE UR QL STRIP: NEGATIVE
PH UR STRIP: 6.5 [PH] (ref 5–8)
PLATELET # BLD AUTO: 431 K/UL (ref 130–450)
PMV BLD AUTO: 10.2 FL (ref 7–12)
POTASSIUM SERPL-SCNC: 3 MMOL/L (ref 3.5–5)
PROT SERPL-MCNC: 8.4 G/DL (ref 6.4–8.3)
PROT UR STRIP-MCNC: NEGATIVE MG/DL
RBC # BLD AUTO: 2.72 M/UL (ref 3.5–5.5)
RBC #/AREA URNS HPF: ABNORMAL /HPF
SODIUM SERPL-SCNC: 140 MMOL/L (ref 132–146)
SP GR UR STRIP: <1.005 (ref 1–1.03)
UROBILINOGEN UR STRIP-ACNC: 0.2 EU/DL (ref 0–1)
WBC #/AREA URNS HPF: ABNORMAL /HPF
WBC OTHER # BLD: 15 K/UL (ref 4.5–11.5)
YEAST URNS QL MICRO: PRESENT

## 2025-05-18 PROCEDURE — 83605 ASSAY OF LACTIC ACID: CPT

## 2025-05-18 PROCEDURE — 85025 COMPLETE CBC W/AUTO DIFF WBC: CPT

## 2025-05-18 PROCEDURE — 6360000002 HC RX W HCPCS: Performed by: STUDENT IN AN ORGANIZED HEALTH CARE EDUCATION/TRAINING PROGRAM

## 2025-05-18 PROCEDURE — 87086 URINE CULTURE/COLONY COUNT: CPT

## 2025-05-18 PROCEDURE — 81001 URINALYSIS AUTO W/SCOPE: CPT

## 2025-05-18 PROCEDURE — 96375 TX/PRO/DX INJ NEW DRUG ADDON: CPT

## 2025-05-18 PROCEDURE — 2580000003 HC RX 258: Performed by: STUDENT IN AN ORGANIZED HEALTH CARE EDUCATION/TRAINING PROGRAM

## 2025-05-18 PROCEDURE — 99284 EMERGENCY DEPT VISIT MOD MDM: CPT

## 2025-05-18 PROCEDURE — 96376 TX/PRO/DX INJ SAME DRUG ADON: CPT

## 2025-05-18 PROCEDURE — 83735 ASSAY OF MAGNESIUM: CPT

## 2025-05-18 PROCEDURE — 6370000000 HC RX 637 (ALT 250 FOR IP): Performed by: STUDENT IN AN ORGANIZED HEALTH CARE EDUCATION/TRAINING PROGRAM

## 2025-05-18 PROCEDURE — 96374 THER/PROPH/DIAG INJ IV PUSH: CPT

## 2025-05-18 PROCEDURE — 70450 CT HEAD/BRAIN W/O DYE: CPT

## 2025-05-18 PROCEDURE — 80053 COMPREHEN METABOLIC PANEL: CPT

## 2025-05-18 PROCEDURE — 83690 ASSAY OF LIPASE: CPT

## 2025-05-18 PROCEDURE — 74176 CT ABD & PELVIS W/O CONTRAST: CPT

## 2025-05-18 RX ORDER — PROCHLORPERAZINE MALEATE 10 MG
10 TABLET ORAL EVERY 8 HOURS PRN
Qty: 21 TABLET | Refills: 0 | Status: SHIPPED | OUTPATIENT
Start: 2025-05-18 | End: 2025-05-25

## 2025-05-18 RX ORDER — PROCHLORPERAZINE EDISYLATE 5 MG/ML
10 INJECTION INTRAMUSCULAR; INTRAVENOUS ONCE
Status: COMPLETED | OUTPATIENT
Start: 2025-05-18 | End: 2025-05-18

## 2025-05-18 RX ORDER — MORPHINE SULFATE 4 MG/ML
4 INJECTION, SOLUTION INTRAMUSCULAR; INTRAVENOUS ONCE
Refills: 0 | Status: COMPLETED | OUTPATIENT
Start: 2025-05-18 | End: 2025-05-18

## 2025-05-18 RX ORDER — POTASSIUM CHLORIDE 1500 MG/1
40 TABLET, EXTENDED RELEASE ORAL ONCE
Status: COMPLETED | OUTPATIENT
Start: 2025-05-18 | End: 2025-05-18

## 2025-05-18 RX ORDER — 0.9 % SODIUM CHLORIDE 0.9 %
1000 INTRAVENOUS SOLUTION INTRAVENOUS ONCE
Status: COMPLETED | OUTPATIENT
Start: 2025-05-18 | End: 2025-05-18

## 2025-05-18 RX ORDER — CEFDINIR 300 MG/1
300 CAPSULE ORAL 2 TIMES DAILY
Qty: 10 CAPSULE | Refills: 0 | Status: SHIPPED | OUTPATIENT
Start: 2025-05-18 | End: 2025-05-23

## 2025-05-18 RX ORDER — HYDROCODONE BITARTRATE AND ACETAMINOPHEN 5; 325 MG/1; MG/1
1 TABLET ORAL EVERY 4 HOURS PRN
Qty: 6 TABLET | Refills: 0 | Status: SHIPPED | OUTPATIENT
Start: 2025-05-18 | End: 2025-05-22

## 2025-05-18 RX ADMIN — PROCHLORPERAZINE EDISYLATE 10 MG: 5 INJECTION INTRAMUSCULAR; INTRAVENOUS at 08:27

## 2025-05-18 RX ADMIN — MORPHINE SULFATE 4 MG: 4 INJECTION, SOLUTION INTRAMUSCULAR; INTRAVENOUS at 10:49

## 2025-05-18 RX ADMIN — POTASSIUM BICARBONATE 40 MEQ: 782 TABLET, EFFERVESCENT ORAL at 09:19

## 2025-05-18 RX ADMIN — SODIUM CHLORIDE 1000 ML: 0.9 INJECTION, SOLUTION INTRAVENOUS at 08:28

## 2025-05-18 RX ADMIN — MORPHINE SULFATE 4 MG: 4 INJECTION, SOLUTION INTRAMUSCULAR; INTRAVENOUS at 08:27

## 2025-05-18 RX ADMIN — POTASSIUM CHLORIDE 40 MEQ: 1500 TABLET, EXTENDED RELEASE ORAL at 10:47

## 2025-05-18 ASSESSMENT — PAIN DESCRIPTION - ONSET: ONSET: ON-GOING

## 2025-05-18 ASSESSMENT — PAIN DESCRIPTION - FREQUENCY: FREQUENCY: CONTINUOUS

## 2025-05-18 ASSESSMENT — PAIN DESCRIPTION - PAIN TYPE: TYPE: ACUTE PAIN

## 2025-05-18 ASSESSMENT — PAIN SCALES - GENERAL
PAINLEVEL_OUTOF10: 8
PAINLEVEL_OUTOF10: 7

## 2025-05-18 ASSESSMENT — PAIN - FUNCTIONAL ASSESSMENT: PAIN_FUNCTIONAL_ASSESSMENT: 0-10

## 2025-05-18 ASSESSMENT — PAIN DESCRIPTION - LOCATION
LOCATION: FLANK
LOCATION: FLANK

## 2025-05-18 ASSESSMENT — PAIN DESCRIPTION - ORIENTATION: ORIENTATION: RIGHT

## 2025-05-18 ASSESSMENT — PAIN DESCRIPTION - DESCRIPTORS: DESCRIPTORS: DISCOMFORT

## 2025-05-18 NOTE — ED PROVIDER NOTES
words are mis-transcribed.)    Willy Deutsch DO (electronically signed)       Willy Deutsch,   05/19/25 1502

## 2025-05-19 LAB
MICROORGANISM SPEC CULT: NO GROWTH
SERVICE CMNT-IMP: NORMAL
SPECIMEN DESCRIPTION: NORMAL

## 2025-05-21 ENCOUNTER — HOSPITAL ENCOUNTER (OUTPATIENT)
Dept: GENERAL RADIOLOGY | Age: 55
Discharge: HOME | End: 2025-05-23
Payer: MEDICARE

## 2025-05-21 ENCOUNTER — HOSPITAL ENCOUNTER (OUTPATIENT)
Age: 55
Discharge: HOME | End: 2025-05-23
Payer: MEDICARE

## 2025-05-21 DIAGNOSIS — S99.921D INJURY OF TOE ON RIGHT FOOT, SUBSEQUENT ENCOUNTER: ICD-10-CM

## 2025-05-21 PROCEDURE — 73630 X-RAY EXAM OF FOOT: CPT

## 2025-05-27 ENCOUNTER — HOSPITAL ENCOUNTER (INPATIENT)
Age: 55
LOS: 4 days | Discharge: HOME HEALTH | DRG: 394 | End: 2025-05-31
Attending: EMERGENCY MEDICINE | Admitting: FAMILY MEDICINE
Payer: MEDICARE

## 2025-05-27 ENCOUNTER — APPOINTMENT (OUTPATIENT)
Dept: ULTRASOUND IMAGING | Age: 55
DRG: 394 | End: 2025-05-27
Payer: MEDICARE

## 2025-05-27 ENCOUNTER — APPOINTMENT (OUTPATIENT)
Dept: CT IMAGING | Age: 55
DRG: 394 | End: 2025-05-27
Payer: MEDICARE

## 2025-05-27 DIAGNOSIS — K52.9 COLITIS: ICD-10-CM

## 2025-05-27 DIAGNOSIS — K62.5 RECTAL BLEEDING: ICD-10-CM

## 2025-05-27 DIAGNOSIS — D64.9 ANEMIA, UNSPECIFIED TYPE: ICD-10-CM

## 2025-05-27 DIAGNOSIS — R74.01 TRANSAMINITIS: ICD-10-CM

## 2025-05-27 DIAGNOSIS — M79.89 SWELLING OF LEFT LOWER EXTREMITY: ICD-10-CM

## 2025-05-27 DIAGNOSIS — D62 ACUTE BLOOD LOSS ANEMIA: Primary | ICD-10-CM

## 2025-05-27 PROBLEM — S91.301A OPEN WOUND OF RIGHT FOOT: Status: ACTIVE | Noted: 2025-05-27

## 2025-05-27 PROBLEM — D50.0 BLOOD LOSS ANEMIA: Status: ACTIVE | Noted: 2025-05-27

## 2025-05-27 PROBLEM — E11.59 TYPE 2 DIABETES MELLITUS WITH CIRCULATORY DISORDER (HCC): Status: ACTIVE | Noted: 2025-05-27

## 2025-05-27 PROBLEM — N17.9 AKI (ACUTE KIDNEY INJURY): Status: ACTIVE | Noted: 2025-05-27

## 2025-05-27 LAB
AFP SERPL-MCNC: 2.2 UG/L
ALBUMIN SERPL-MCNC: 4 G/DL (ref 3.5–5.2)
ALP SERPL-CCNC: 163 U/L (ref 35–104)
ALT SERPL-CCNC: 53 U/L (ref 0–32)
ANION GAP SERPL CALCULATED.3IONS-SCNC: 13 MMOL/L (ref 7–16)
AST SERPL-CCNC: 73 U/L (ref 0–31)
BASOPHILS # BLD: 0.07 K/UL (ref 0–0.2)
BASOPHILS NFR BLD: 1 % (ref 0–2)
BILIRUB SERPL-MCNC: 0.2 MG/DL (ref 0–1.2)
BUN SERPL-MCNC: 32 MG/DL (ref 6–20)
CALCIUM SERPL-MCNC: 9.5 MG/DL (ref 8.6–10.2)
CHLORIDE SERPL-SCNC: 101 MMOL/L (ref 98–107)
CO2 SERPL-SCNC: 24 MMOL/L (ref 22–29)
CREAT SERPL-MCNC: 1.4 MG/DL (ref 0.5–1)
EOSINOPHIL # BLD: 0.78 K/UL (ref 0.05–0.5)
EOSINOPHILS RELATIVE PERCENT: 8 % (ref 0–6)
ERYTHROCYTE [DISTWIDTH] IN BLOOD BY AUTOMATED COUNT: 15.9 % (ref 11.5–15)
FERRITIN SERPL-MCNC: 34 NG/ML
FOLATE SERPL-MCNC: 17.7 NG/ML (ref 4.6–34.8)
GFR, ESTIMATED: 46 ML/MIN/1.73M2
GLUCOSE SERPL-MCNC: 113 MG/DL (ref 74–99)
HCT VFR BLD AUTO: 20.8 % (ref 34–48)
HCT VFR BLD AUTO: 23.7 % (ref 34–48)
HGB BLD-MCNC: 6.6 G/DL (ref 11.5–15.5)
HGB BLD-MCNC: 7.3 G/DL (ref 11.5–15.5)
IMM GRANULOCYTES # BLD AUTO: 0.03 K/UL (ref 0–0.58)
IMM GRANULOCYTES NFR BLD: 0 % (ref 0–5)
INR PPP: 1.3
IRON SATN MFR SERPL: 7 % (ref 15–50)
IRON SERPL-MCNC: 21 UG/DL (ref 37–145)
LYMPHOCYTES NFR BLD: 1.7 K/UL (ref 1.5–4)
LYMPHOCYTES RELATIVE PERCENT: 16 % (ref 20–42)
MCH RBC QN AUTO: 28.6 PG (ref 26–35)
MCHC RBC AUTO-ENTMCNC: 31.7 G/DL (ref 32–34.5)
MCV RBC AUTO: 90 FL (ref 80–99.9)
MONOCYTES NFR BLD: 0.76 K/UL (ref 0.1–0.95)
MONOCYTES NFR BLD: 7 % (ref 2–12)
NEUTROPHILS NFR BLD: 68 % (ref 43–80)
NEUTS SEG NFR BLD: 7.1 K/UL (ref 1.8–7.3)
PARTIAL THROMBOPLASTIN TIME: 33.9 SEC (ref 24.5–35.1)
PLATELET # BLD AUTO: 396 K/UL (ref 130–450)
PMV BLD AUTO: 11 FL (ref 7–12)
POTASSIUM SERPL-SCNC: 3.6 MMOL/L (ref 3.5–5)
PROT SERPL-MCNC: 8.1 G/DL (ref 6.4–8.3)
PROTHROMBIN TIME: 13.6 SEC (ref 9.3–12.4)
RBC # BLD AUTO: 2.31 M/UL (ref 3.5–5.5)
SODIUM SERPL-SCNC: 138 MMOL/L (ref 132–146)
TIBC SERPL-MCNC: 300 UG/DL (ref 250–450)
VIT B12 SERPL-MCNC: 525 PG/ML (ref 232–1245)
WBC OTHER # BLD: 10.4 K/UL (ref 4.5–11.5)

## 2025-05-27 PROCEDURE — 85610 PROTHROMBIN TIME: CPT

## 2025-05-27 PROCEDURE — 86850 RBC ANTIBODY SCREEN: CPT

## 2025-05-27 PROCEDURE — 82746 ASSAY OF FOLIC ACID SERUM: CPT

## 2025-05-27 PROCEDURE — 6370000000 HC RX 637 (ALT 250 FOR IP): Performed by: FAMILY MEDICINE

## 2025-05-27 PROCEDURE — 6360000004 HC RX CONTRAST MEDICATION: Performed by: RADIOLOGY

## 2025-05-27 PROCEDURE — 86900 BLOOD TYPING SEROLOGIC ABO: CPT

## 2025-05-27 PROCEDURE — 82607 VITAMIN B-12: CPT

## 2025-05-27 PROCEDURE — 96374 THER/PROPH/DIAG INJ IV PUSH: CPT

## 2025-05-27 PROCEDURE — 2580000003 HC RX 258: Performed by: FAMILY MEDICINE

## 2025-05-27 PROCEDURE — 99223 1ST HOSP IP/OBS HIGH 75: CPT | Performed by: FAMILY MEDICINE

## 2025-05-27 PROCEDURE — P9016 RBC LEUKOCYTES REDUCED: HCPCS

## 2025-05-27 PROCEDURE — 94640 AIRWAY INHALATION TREATMENT: CPT

## 2025-05-27 PROCEDURE — 80053 COMPREHEN METABOLIC PANEL: CPT

## 2025-05-27 PROCEDURE — 99285 EMERGENCY DEPT VISIT HI MDM: CPT

## 2025-05-27 PROCEDURE — 0DJD8ZZ INSPECTION OF LOWER INTESTINAL TRACT, VIA NATURAL OR ARTIFICIAL OPENING ENDOSCOPIC: ICD-10-PCS | Performed by: FAMILY MEDICINE

## 2025-05-27 PROCEDURE — 6360000002 HC RX W HCPCS: Performed by: FAMILY MEDICINE

## 2025-05-27 PROCEDURE — 85014 HEMATOCRIT: CPT

## 2025-05-27 PROCEDURE — 82728 ASSAY OF FERRITIN: CPT

## 2025-05-27 PROCEDURE — 83516 IMMUNOASSAY NONANTIBODY: CPT

## 2025-05-27 PROCEDURE — 2580000003 HC RX 258

## 2025-05-27 PROCEDURE — 85018 HEMOGLOBIN: CPT

## 2025-05-27 PROCEDURE — 82105 ALPHA-FETOPROTEIN SERUM: CPT

## 2025-05-27 PROCEDURE — 83540 ASSAY OF IRON: CPT

## 2025-05-27 PROCEDURE — 74177 CT ABD & PELVIS W/CONTRAST: CPT

## 2025-05-27 PROCEDURE — 93005 ELECTROCARDIOGRAM TRACING: CPT

## 2025-05-27 PROCEDURE — 86923 COMPATIBILITY TEST ELECTRIC: CPT

## 2025-05-27 PROCEDURE — 6360000002 HC RX W HCPCS

## 2025-05-27 PROCEDURE — 36430 TRANSFUSION BLD/BLD COMPNT: CPT

## 2025-05-27 PROCEDURE — 2500000003 HC RX 250 WO HCPCS

## 2025-05-27 PROCEDURE — 1200000000 HC SEMI PRIVATE

## 2025-05-27 PROCEDURE — 86901 BLOOD TYPING SEROLOGIC RH(D): CPT

## 2025-05-27 PROCEDURE — 93971 EXTREMITY STUDY: CPT

## 2025-05-27 PROCEDURE — 85730 THROMBOPLASTIN TIME PARTIAL: CPT

## 2025-05-27 PROCEDURE — 85025 COMPLETE CBC W/AUTO DIFF WBC: CPT

## 2025-05-27 PROCEDURE — 83550 IRON BINDING TEST: CPT

## 2025-05-27 RX ORDER — CLOPIDOGREL BISULFATE 75 MG/1
75 TABLET ORAL DAILY
Status: DISCONTINUED | OUTPATIENT
Start: 2025-05-27 | End: 2025-05-31 | Stop reason: HOSPADM

## 2025-05-27 RX ORDER — GLUCAGON 1 MG/ML
1 KIT INJECTION PRN
Status: DISCONTINUED | OUTPATIENT
Start: 2025-05-27 | End: 2025-05-31 | Stop reason: HOSPADM

## 2025-05-27 RX ORDER — SPIRONOLACTONE 25 MG/1
12.5 TABLET ORAL DAILY
Status: DISCONTINUED | OUTPATIENT
Start: 2025-05-27 | End: 2025-05-31 | Stop reason: HOSPADM

## 2025-05-27 RX ORDER — FUROSEMIDE 40 MG/1
40 TABLET ORAL 2 TIMES DAILY
Status: DISCONTINUED | OUTPATIENT
Start: 2025-05-27 | End: 2025-05-31 | Stop reason: HOSPADM

## 2025-05-27 RX ORDER — POTASSIUM CHLORIDE 1500 MG/1
40 TABLET, EXTENDED RELEASE ORAL PRN
Status: DISCONTINUED | OUTPATIENT
Start: 2025-05-27 | End: 2025-05-31 | Stop reason: HOSPADM

## 2025-05-27 RX ORDER — SODIUM CHLORIDE 0.9 % (FLUSH) 0.9 %
5-40 SYRINGE (ML) INJECTION PRN
Status: DISCONTINUED | OUTPATIENT
Start: 2025-05-27 | End: 2025-05-31 | Stop reason: HOSPADM

## 2025-05-27 RX ORDER — METOPROLOL TARTRATE 25 MG/1
25 TABLET, FILM COATED ORAL 2 TIMES DAILY
Status: DISCONTINUED | OUTPATIENT
Start: 2025-05-27 | End: 2025-05-27

## 2025-05-27 RX ORDER — SODIUM CHLORIDE, SODIUM LACTATE, POTASSIUM CHLORIDE, CALCIUM CHLORIDE 600; 310; 30; 20 MG/100ML; MG/100ML; MG/100ML; MG/100ML
INJECTION, SOLUTION INTRAVENOUS CONTINUOUS
Status: DISCONTINUED | OUTPATIENT
Start: 2025-05-27 | End: 2025-05-31 | Stop reason: HOSPADM

## 2025-05-27 RX ORDER — SODIUM CHLORIDE 9 MG/ML
INJECTION, SOLUTION INTRAVENOUS PRN
Status: DISCONTINUED | OUTPATIENT
Start: 2025-05-27 | End: 2025-05-31 | Stop reason: HOSPADM

## 2025-05-27 RX ORDER — DOXYCYCLINE 100 MG/1
100 CAPSULE ORAL 2 TIMES DAILY
Status: ON HOLD | COMMUNITY
Start: 2025-05-26 | End: 2025-05-31 | Stop reason: HOSPADM

## 2025-05-27 RX ORDER — MUPIROCIN 2 %
1 OINTMENT (GRAM) TOPICAL DAILY
COMMUNITY

## 2025-05-27 RX ORDER — LEVOTHYROXINE SODIUM 200 UG/1
200 TABLET ORAL
COMMUNITY

## 2025-05-27 RX ORDER — DEXTROSE MONOHYDRATE 100 MG/ML
INJECTION, SOLUTION INTRAVENOUS CONTINUOUS PRN
Status: DISCONTINUED | OUTPATIENT
Start: 2025-05-27 | End: 2025-05-31 | Stop reason: HOSPADM

## 2025-05-27 RX ORDER — LEVOTHYROXINE SODIUM 50 UG/1
50 TABLET ORAL
COMMUNITY

## 2025-05-27 RX ORDER — ONDANSETRON 2 MG/ML
4 INJECTION INTRAMUSCULAR; INTRAVENOUS EVERY 6 HOURS PRN
Status: DISCONTINUED | OUTPATIENT
Start: 2025-05-27 | End: 2025-05-31 | Stop reason: HOSPADM

## 2025-05-27 RX ORDER — ERGOCALCIFEROL 1.25 MG/1
50000 CAPSULE, LIQUID FILLED ORAL WEEKLY
COMMUNITY

## 2025-05-27 RX ORDER — GLIPIZIDE 5 MG/1
5 TABLET ORAL 2 TIMES DAILY
Status: DISCONTINUED | OUTPATIENT
Start: 2025-05-27 | End: 2025-05-31 | Stop reason: HOSPADM

## 2025-05-27 RX ORDER — PROCHLORPERAZINE MALEATE 5 MG/1
10 TABLET ORAL EVERY 8 HOURS PRN
Status: DISCONTINUED | OUTPATIENT
Start: 2025-05-27 | End: 2025-05-31 | Stop reason: HOSPADM

## 2025-05-27 RX ORDER — LIDOCAINE 4 G/G
1 PATCH TOPICAL DAILY
Status: DISCONTINUED | OUTPATIENT
Start: 2025-05-27 | End: 2025-05-31 | Stop reason: HOSPADM

## 2025-05-27 RX ORDER — GABAPENTIN 100 MG/1
100 CAPSULE ORAL 3 TIMES DAILY
COMMUNITY

## 2025-05-27 RX ORDER — METRONIDAZOLE 500 MG/100ML
500 INJECTION, SOLUTION INTRAVENOUS ONCE
Status: COMPLETED | OUTPATIENT
Start: 2025-05-27 | End: 2025-05-27

## 2025-05-27 RX ORDER — METHADONE HYDROCHLORIDE 10 MG/ML
47 CONCENTRATE ORAL DAILY
Refills: 0 | Status: DISCONTINUED | OUTPATIENT
Start: 2025-05-28 | End: 2025-05-31 | Stop reason: HOSPADM

## 2025-05-27 RX ORDER — ASPIRIN 81 MG/1
81 TABLET ORAL DAILY
Status: DISCONTINUED | OUTPATIENT
Start: 2025-05-27 | End: 2025-05-31 | Stop reason: HOSPADM

## 2025-05-27 RX ORDER — ACETAMINOPHEN 650 MG/1
650 SUPPOSITORY RECTAL EVERY 6 HOURS PRN
Status: DISCONTINUED | OUTPATIENT
Start: 2025-05-27 | End: 2025-05-31 | Stop reason: HOSPADM

## 2025-05-27 RX ORDER — SODIUM CHLORIDE 0.9 % (FLUSH) 0.9 %
5-40 SYRINGE (ML) INJECTION EVERY 12 HOURS SCHEDULED
Status: DISCONTINUED | OUTPATIENT
Start: 2025-05-27 | End: 2025-05-31 | Stop reason: HOSPADM

## 2025-05-27 RX ORDER — AMLODIPINE BESYLATE 10 MG/1
10 TABLET ORAL DAILY
Status: DISCONTINUED | OUTPATIENT
Start: 2025-05-27 | End: 2025-05-31 | Stop reason: HOSPADM

## 2025-05-27 RX ORDER — ONDANSETRON 4 MG/1
4 TABLET, ORALLY DISINTEGRATING ORAL EVERY 8 HOURS PRN
Status: DISCONTINUED | OUTPATIENT
Start: 2025-05-27 | End: 2025-05-31 | Stop reason: HOSPADM

## 2025-05-27 RX ORDER — ACETAMINOPHEN 325 MG/1
650 TABLET ORAL EVERY 4 HOURS PRN
Status: DISCONTINUED | OUTPATIENT
Start: 2025-05-27 | End: 2025-05-31 | Stop reason: HOSPADM

## 2025-05-27 RX ORDER — LEVOTHYROXINE SODIUM 100 UG/1
250 TABLET ORAL DAILY
Status: DISCONTINUED | OUTPATIENT
Start: 2025-05-28 | End: 2025-05-31 | Stop reason: HOSPADM

## 2025-05-27 RX ORDER — NITROGLYCERIN 0.4 MG/1
0.4 TABLET SUBLINGUAL EVERY 5 MIN PRN
Status: DISCONTINUED | OUTPATIENT
Start: 2025-05-27 | End: 2025-05-31 | Stop reason: HOSPADM

## 2025-05-27 RX ORDER — MAGNESIUM SULFATE IN WATER 40 MG/ML
2000 INJECTION, SOLUTION INTRAVENOUS PRN
Status: DISCONTINUED | OUTPATIENT
Start: 2025-05-27 | End: 2025-05-31 | Stop reason: HOSPADM

## 2025-05-27 RX ORDER — POLYETHYLENE GLYCOL 3350 17 G/17G
17 POWDER, FOR SOLUTION ORAL DAILY PRN
Status: DISCONTINUED | OUTPATIENT
Start: 2025-05-27 | End: 2025-05-31

## 2025-05-27 RX ORDER — BUDESONIDE 0.5 MG/2ML
1 INHALANT ORAL
Status: DISCONTINUED | OUTPATIENT
Start: 2025-05-27 | End: 2025-05-28

## 2025-05-27 RX ORDER — LOSARTAN POTASSIUM 50 MG/1
100 TABLET ORAL DAILY
Status: DISCONTINUED | OUTPATIENT
Start: 2025-05-27 | End: 2025-05-31 | Stop reason: HOSPADM

## 2025-05-27 RX ORDER — IOPAMIDOL 755 MG/ML
75 INJECTION, SOLUTION INTRAVASCULAR
Status: COMPLETED | OUTPATIENT
Start: 2025-05-27 | End: 2025-05-27

## 2025-05-27 RX ORDER — BACLOFEN 5 MG/1
10 TABLET ORAL 3 TIMES DAILY
COMMUNITY

## 2025-05-27 RX ORDER — POTASSIUM CHLORIDE 7.45 MG/ML
10 INJECTION INTRAVENOUS PRN
Status: DISCONTINUED | OUTPATIENT
Start: 2025-05-27 | End: 2025-05-31 | Stop reason: HOSPADM

## 2025-05-27 RX ORDER — GABAPENTIN 100 MG/1
100 CAPSULE ORAL 3 TIMES DAILY
Status: DISCONTINUED | OUTPATIENT
Start: 2025-05-27 | End: 2025-05-31 | Stop reason: HOSPADM

## 2025-05-27 RX ORDER — ACETAMINOPHEN 325 MG/1
650 TABLET ORAL EVERY 6 HOURS PRN
Status: DISCONTINUED | OUTPATIENT
Start: 2025-05-27 | End: 2025-05-31 | Stop reason: HOSPADM

## 2025-05-27 RX ORDER — ROSUVASTATIN CALCIUM 10 MG/1
10 TABLET, COATED ORAL NIGHTLY
Status: DISCONTINUED | OUTPATIENT
Start: 2025-05-27 | End: 2025-05-31 | Stop reason: HOSPADM

## 2025-05-27 RX ORDER — ARFORMOTEROL TARTRATE 15 UG/2ML
15 SOLUTION RESPIRATORY (INHALATION)
Status: DISCONTINUED | OUTPATIENT
Start: 2025-05-27 | End: 2025-05-28

## 2025-05-27 RX ORDER — ALBUTEROL SULFATE 0.83 MG/ML
2.5 SOLUTION RESPIRATORY (INHALATION) EVERY 6 HOURS PRN
Status: DISCONTINUED | OUTPATIENT
Start: 2025-05-27 | End: 2025-05-31 | Stop reason: HOSPADM

## 2025-05-27 RX ORDER — METRONIDAZOLE 500 MG/100ML
500 INJECTION, SOLUTION INTRAVENOUS EVERY 8 HOURS
Status: DISCONTINUED | OUTPATIENT
Start: 2025-05-27 | End: 2025-05-31 | Stop reason: HOSPADM

## 2025-05-27 RX ADMIN — FUROSEMIDE 40 MG: 40 TABLET ORAL at 23:29

## 2025-05-27 RX ADMIN — ACETAMINOPHEN 650 MG: 325 TABLET ORAL at 17:37

## 2025-05-27 RX ADMIN — PANTOPRAZOLE SODIUM 40 MG: 40 INJECTION, POWDER, FOR SOLUTION INTRAVENOUS at 11:30

## 2025-05-27 RX ADMIN — IPRATROPIUM BROMIDE 0.5 MG: 0.5 SOLUTION RESPIRATORY (INHALATION) at 21:29

## 2025-05-27 RX ADMIN — AMLODIPINE BESYLATE 10 MG: 10 TABLET ORAL at 17:36

## 2025-05-27 RX ADMIN — LOSARTAN POTASSIUM 100 MG: 50 TABLET, FILM COATED ORAL at 17:36

## 2025-05-27 RX ADMIN — EMPAGLIFLOZIN 10 MG: 10 TABLET, FILM COATED ORAL at 17:36

## 2025-05-27 RX ADMIN — ROSUVASTATIN CALCIUM 10 MG: 10 TABLET, FILM COATED ORAL at 23:29

## 2025-05-27 RX ADMIN — METRONIDAZOLE 500 MG: 500 INJECTION, SOLUTION INTRAVENOUS at 17:51

## 2025-05-27 RX ADMIN — BUDESONIDE 1000 MCG: 0.5 SUSPENSION RESPIRATORY (INHALATION) at 16:35

## 2025-05-27 RX ADMIN — IOPAMIDOL 75 ML: 755 INJECTION, SOLUTION INTRAVENOUS at 12:48

## 2025-05-27 RX ADMIN — SPIRONOLACTONE 12.5 MG: 25 TABLET ORAL at 17:36

## 2025-05-27 RX ADMIN — GABAPENTIN 100 MG: 100 CAPSULE ORAL at 23:29

## 2025-05-27 RX ADMIN — CEFTRIAXONE SODIUM 1000 MG: 1 INJECTION, POWDER, FOR SOLUTION INTRAMUSCULAR; INTRAVENOUS at 17:37

## 2025-05-27 RX ADMIN — SODIUM CHLORIDE, PRESERVATIVE FREE 40 MG: 5 INJECTION INTRAVENOUS at 17:37

## 2025-05-27 RX ADMIN — ARFORMOTEROL TARTRATE 15 MCG: 15 SOLUTION RESPIRATORY (INHALATION) at 16:34

## 2025-05-27 RX ADMIN — METRONIDAZOLE 500 MG: 500 INJECTION, SOLUTION INTRAVENOUS at 23:37

## 2025-05-27 RX ADMIN — IPRATROPIUM BROMIDE 0.5 MG: 0.5 SOLUTION RESPIRATORY (INHALATION) at 16:34

## 2025-05-27 RX ADMIN — Medication 6 MG: at 23:29

## 2025-05-27 ASSESSMENT — ENCOUNTER SYMPTOMS
RESPIRATORY NEGATIVE: 1
BLOOD IN STOOL: 1
ANAL BLEEDING: 1
ABDOMINAL PAIN: 1
EYES NEGATIVE: 1
ALLERGIC/IMMUNOLOGIC NEGATIVE: 1

## 2025-05-27 ASSESSMENT — PAIN SCALES - GENERAL: PAINLEVEL_OUTOF10: 4

## 2025-05-27 NOTE — ED PROVIDER NOTES
[facility] EMERGENCY DEPARTMENT  EMERGENCY DEPARTMENT ENCOUNTER        Pt Name: Faiza Romero  MRN: [id number]  Birthdate 1970  Date of evaluation: 5/27/2025  Provider: Luis Miguel Minor DO  PCP: Mariana London APN  Note Started: 12:34 PM EDT 5/27/25    CHIEF COMPLAINT       Chief Complaint   Patient presents with    OTHER     Pt sent in from PCP for low hgb. Pt having some abdominal soreness, but no other symptoms. Chronically on 3L O2       HISTORY OF PRESENT ILLNESS: 1 or more Elements   History received from: Patient    Faiza Romero is a 54 y.o. female who presents to the emergency department with chief complaint of low hemoglobin.  Patient states that she has been having intermittent rectal bleeding from rectal hemorrhoid over the past several weeks.  States that every time she has bowel movement she has large amount of rectal bleeding in the toilet bowl does have large amount of bright red blood in it.  States that the bleeding slows down after the bowel movement but she has been becoming increasingly weak and fatigued.  Had blood work done recently and was called today stating that her blood count was less than 7 and she needed to come to the hospital.  She does state that over the last couple of days she has been more fatigued than normal and has been feeling weak but has not passed out and has not felt lightheaded.  Patient has no numbness elsewhere and states that she has not had any hematemesis.  Does endorse mild abdominal cramping in the right upper quadrant and was recently diagnosed with colitis as well.  Denies any fevers, chills, nausea, vomiting, chest pain, shortness of breath, hematuria or dysuria, constipation or diarrhea.    Nursing Notes were all reviewed and agreed with or any disagreements were addressed in the HPI.    REVIEW OF SYSTEMS :    Positives and Pertinent negatives as per HPI.    PAST MEDICAL HISTORY/Chronic Conditions Affecting Care    has a past  portions of this note were completed with a voice recognition program.  Efforts were made to edit the dictations but occasionally words are mis-transcribed.)    Luis Miguel Minor DO (electronically signed)

## 2025-05-27 NOTE — CONSENT
Informed Consent for Blood Component Transfusion Note    I have discussed with the patient the rationale for blood component transfusion; its benefits in treating or preventing fatigue, organ damage, or death; and its risk which includes mild transfusion reactions, rare risk of blood borne infection, or more serious but rare reactions. I have discussed the alternatives to transfusion, including the risk and consequences of not receiving transfusion. The patient had an opportunity to ask questions and had agreed to proceed with transfusion of blood components.    Electronically signed by Tara Shoemaker DO on 5/27/25 at 3:28 PM EDT

## 2025-05-27 NOTE — H&P
History & Physicial  Faiza Romero  [id number]  1970  05/27/25  Primary Care:  Mariana London, APN  [address] [facility] / [place] [address]        Chief Complaint   Patient presents with    OTHER     Pt sent in from PCP for low hgb. Pt having some abdominal soreness, but no other symptoms. Chronically on 3L O2       HPI:    Faiza Romero is a 54 y.o. female who presents to the emergency department with chief complaint of abnormal labs consisting of low hemoglobin.  Patient states that she has been having intermittent rectal bleeding she suspects from rectal hemorrhoid over the past several weeks.  States that every time she has bowel movement she has large amount of rectal bleeding in the toilet bowl does have large amount of bright red blood in it.  States that the bleeding slows down after the bowel movement but she has been becoming increasingly weak and fatigued.  Had outpatient blood work done recently and was called today stating that her blood count was less than 7 and she needed to come to the hospital.  She has been anemic requiring transfusion during a previous hospital admission. She apparently had GI bleeding and colitis at [facility] previously, however does not remember what if anything was found. She does not remember the name of the GI doctor and did not follow up. She is on aspirin and plavix for heart disease. She does state that over the last couple of days she has been more fatigued than normal and has been feeling weak but has not passed out and has not felt lightheaded.  Patient has no numbness elsewhere and states that she has not had any hematemesis.  Does endorse mild abdominal cramping in the right upper quadrant and was recently diagnosed with colitis as well, however was not started on medications for this.  Denies any fevers, chills, nausea, vomiting, chest pain, shortness of breath, hematuria or dysuria, constipation or diarrhea. She did take her medications this am. Her  for past 20 years) history of 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  Oxycodone  Provider, MD Samson     Social History     Tobacco Use    Smoking status: Every Day     Current packs/day: 1.00     Average packs/day: 1 pack/day for 35.4 years (35.4 ttl pk-yrs)     Types: Cigarettes     Start date:     Smokeless tobacco: Never    Tobacco comments:     trying to quit. currently 7-8/day   Vaping Use    Vaping status: Some Days   Substance Use Topics    Alcohol use: Not Currently    Drug use: Not Currently     Comment: methadone      Family History   Adopted: Yes   Problem Relation Age of Onset    Mult Sclerosis Mother     Mult Sclerosis Maternal Grandmother      Past Surgical History:   Procedure Laterality Date    APPENDECTOMY      CARDIAC CATHETERIZATION  2020    Dr. Sargent EF 55% cts consult ordered    CARDIAC PROCEDURE N/A 2024    Left heart cath / coronary angiography w grafts [place] [facility] [place] [facility]     SECTION      x2    CHOLECYSTECTOMY      COLONOSCOPY      CORONARY ANGIOPLASTY WITH STENT PLACEMENT  2022    Dr. Sargent [place] 3.0x12    CORONARY ARTERY BYPASS GRAFT N/A 2020    CORONARY ARTERY BYPASS, POSSIBLE LEFT RADIAL ARTERY HARVEST, MINDA performed [place] Kit Leyva [place] [facility] [place]    HERNIA REPAIR  2015    laparoscopic incisional reapair with mesh    HYSTERECTOMY (CERVIX STATUS UNKNOWN)      KNEE SURGERY Right     VENTRAL HERNIA REPAIR       Past Medical History:   Diagnosis Date    Acute exacerbation of chronic heart failure (HCC) 2025    CAD in native artery 2020    CKD (chronic kidney disease) stage 3, GFR 30-59 ml/min ([facility]) 2025    Claustrophobia     mild    Colitis     Constipation     COPD (chronic obstructive pulmonary disease) ([facility])     COVID-19     Diabetes mellitus ([facility])     Heart problem     Hyperlipidemia     Hypertension     Hypothyroidism     Sleep apnea     diagnosed 7-8 years ago does not use C-pap problems with cost  No cranial nerve deficit.   Psychiatric:         Mood and Affect: Mood normal.         Behavior: Behavior normal.         Thought Content: Thought content normal.         Judgment: Judgment normal.         Principal Problem:    Blood loss anemia  Active Problems:    CAD (coronary artery disease)    COPD (chronic obstructive pulmonary disease) (HCC)    Type 2 diabetes mellitus with circulatory disorder (HCC)    ANIRUDH (acute kidney injury)    Anemia requiring transfusions    Open wound of right foot  Resolved Problems:    * No resolved hospital problems. *    Plan:  Blood loss anemia, likely 2/2 Colitis, seen on previous imaging; formed stools, but recent abx use x 2  --flagyl and rocephin  --consult to gi  --clear liquids at this time  --pt reports stool is formed  --transfuse to keep hgb >7, however w/ hx of cad would prefer to keep it >8  --holding antiplatelet agents  --defer further w/u to GI for gi bleeding    Left lower ext wound, right foot wounds  --follows w/ dr maldonado, was on doxy as an outpatient  --consult podiatry for wound care in house    Left lower extremity swelling and pain  --order dvt us  --lidocaine patch to area of tenderness    Type 2 dm  --hypoglycemia tx orders  --pt on clear liquids, will monitor sugars and use iss accordingly  --holding sulfonylureas    Copd, chronic, no acute exacerbation  --continue at home medications as previously prescribed  --at baseline o2 status    BURTON  --strongly suspected on previous admission, awaiting home sleep study, scheduled for next week    DVT Prophylaxis: pcds  Code Status: full    Continue at home medications as previously prescribed for the management of chronic conditions during this admission if/where appropriate.    Electronically signed by Tara Shoemaker DO on 5/27/2025 at 3:28 PM

## 2025-05-28 ENCOUNTER — ANESTHESIA EVENT (OUTPATIENT)
Dept: ENDOSCOPY | Age: 55
End: 2025-05-28
Payer: MEDICARE

## 2025-05-28 ENCOUNTER — ANESTHESIA (OUTPATIENT)
Dept: ENDOSCOPY | Age: 55
End: 2025-05-28
Payer: MEDICARE

## 2025-05-28 LAB
ABO/RH: NORMAL
AFP SERPL-MCNC: 2.1 UG/L
ALBUMIN SERPL-MCNC: 3.5 G/DL (ref 3.5–5.2)
ALP SERPL-CCNC: 149 U/L (ref 35–104)
ALT SERPL-CCNC: 43 U/L (ref 0–32)
ANION GAP SERPL CALCULATED.3IONS-SCNC: 12 MMOL/L (ref 7–16)
ANTIBODY SCREEN: NEGATIVE
ARM BAND NUMBER: NORMAL
AST SERPL-CCNC: 48 U/L (ref 0–31)
BASOPHILS # BLD: 0.09 K/UL (ref 0–0.2)
BASOPHILS NFR BLD: 1 % (ref 0–2)
BILIRUB SERPL-MCNC: 0.3 MG/DL (ref 0–1.2)
BLOOD BANK BLOOD PRODUCT EXPIRATION DATE: NORMAL
BLOOD BANK DISPENSE STATUS: NORMAL
BLOOD BANK ISBT PRODUCT BLOOD TYPE: 5100
BLOOD BANK PRODUCT CODE: NORMAL
BLOOD BANK SAMPLE EXPIRATION: NORMAL
BLOOD BANK UNIT TYPE AND RH: NORMAL
BPU ID: NORMAL
BUN SERPL-MCNC: 24 MG/DL (ref 6–20)
CALCIUM SERPL-MCNC: 9.1 MG/DL (ref 8.6–10.2)
CHLORIDE SERPL-SCNC: 101 MMOL/L (ref 98–107)
CO2 SERPL-SCNC: 25 MMOL/L (ref 22–29)
COMPONENT: NORMAL
CREAT SERPL-MCNC: 1.2 MG/DL (ref 0.5–1)
CROSSMATCH RESULT: NORMAL
DATE, STOOL #1: ABNORMAL
EOSINOPHIL # BLD: 0.8 K/UL (ref 0.05–0.5)
EOSINOPHILS RELATIVE PERCENT: 8 % (ref 0–6)
ERYTHROCYTE [DISTWIDTH] IN BLOOD BY AUTOMATED COUNT: 16 % (ref 11.5–15)
GFR, ESTIMATED: 52 ML/MIN/1.73M2
GLUCOSE SERPL-MCNC: 95 MG/DL (ref 74–99)
HAV IGM SERPL QL IA: NONREACTIVE
HBV CORE IGM SERPL QL IA: NONREACTIVE
HBV SURFACE AG SERPL QL IA: NONREACTIVE
HCT VFR BLD AUTO: 23.1 % (ref 34–48)
HCV AB SERPL QL IA: NONREACTIVE
HEMOCCULT SP1 STL QL: POSITIVE
HGB BLD-MCNC: 7.1 G/DL (ref 11.5–15.5)
IMM GRANULOCYTES # BLD AUTO: 0.04 K/UL (ref 0–0.58)
IMM GRANULOCYTES NFR BLD: 0 % (ref 0–5)
LYMPHOCYTES NFR BLD: 1.59 K/UL (ref 1.5–4)
LYMPHOCYTES RELATIVE PERCENT: 16 % (ref 20–42)
MAGNESIUM SERPL-MCNC: 2.2 MG/DL (ref 1.6–2.6)
MCH RBC QN AUTO: 27.5 PG (ref 26–35)
MCHC RBC AUTO-ENTMCNC: 30.7 G/DL (ref 32–34.5)
MCV RBC AUTO: 89.5 FL (ref 80–99.9)
MONOCYTES NFR BLD: 0.8 K/UL (ref 0.1–0.95)
MONOCYTES NFR BLD: 8 % (ref 2–12)
NEUTROPHILS NFR BLD: 66 % (ref 43–80)
NEUTS SEG NFR BLD: 6.54 K/UL (ref 1.8–7.3)
PLATELET # BLD AUTO: 399 K/UL (ref 130–450)
PMV BLD AUTO: 10.8 FL (ref 7–12)
POTASSIUM SERPL-SCNC: 3.4 MMOL/L (ref 3.5–5)
PROT SERPL-MCNC: 7.3 G/DL (ref 6.4–8.3)
RBC # BLD AUTO: 2.58 M/UL (ref 3.5–5.5)
SODIUM SERPL-SCNC: 138 MMOL/L (ref 132–146)
TIME, STOOL #1: ABNORMAL
TRANSFUSION STATUS: NORMAL
UNIT DIVISION: 0
UNIT ISSUE DATE/TIME: NORMAL
WBC OTHER # BLD: 9.9 K/UL (ref 4.5–11.5)

## 2025-05-28 PROCEDURE — 2500000003 HC RX 250 WO HCPCS: Performed by: FAMILY MEDICINE

## 2025-05-28 PROCEDURE — 3609012400 HC EGD TRANSORAL BIOPSY SINGLE/MULTIPLE: Performed by: INTERNAL MEDICINE

## 2025-05-28 PROCEDURE — 85025 COMPLETE CBC W/AUTO DIFF WBC: CPT

## 2025-05-28 PROCEDURE — G0480 DRUG TEST DEF 1-7 CLASSES: HCPCS

## 2025-05-28 PROCEDURE — 80053 COMPREHEN METABOLIC PANEL: CPT

## 2025-05-28 PROCEDURE — 83735 ASSAY OF MAGNESIUM: CPT

## 2025-05-28 PROCEDURE — 6370000000 HC RX 637 (ALT 250 FOR IP): Performed by: HOSPITALIST

## 2025-05-28 PROCEDURE — 2580000003 HC RX 258: Performed by: NURSE ANESTHETIST, CERTIFIED REGISTERED

## 2025-05-28 PROCEDURE — 6360000002 HC RX W HCPCS: Performed by: FAMILY MEDICINE

## 2025-05-28 PROCEDURE — 82105 ALPHA-FETOPROTEIN SERUM: CPT

## 2025-05-28 PROCEDURE — 6360000002 HC RX W HCPCS: Performed by: NURSE ANESTHETIST, CERTIFIED REGISTERED

## 2025-05-28 PROCEDURE — 3700000001 HC ADD 15 MINUTES (ANESTHESIA): Performed by: INTERNAL MEDICINE

## 2025-05-28 PROCEDURE — 3700000000 HC ANESTHESIA ATTENDED CARE: Performed by: INTERNAL MEDICINE

## 2025-05-28 PROCEDURE — 82272 OCCULT BLD FECES 1-3 TESTS: CPT

## 2025-05-28 PROCEDURE — 7100000011 HC PHASE II RECOVERY - ADDTL 15 MIN: Performed by: INTERNAL MEDICINE

## 2025-05-28 PROCEDURE — 6370000000 HC RX 637 (ALT 250 FOR IP): Performed by: FAMILY MEDICINE

## 2025-05-28 PROCEDURE — 88305 TISSUE EXAM BY PATHOLOGIST: CPT

## 2025-05-28 PROCEDURE — 2709999900 HC NON-CHARGEABLE SUPPLY: Performed by: INTERNAL MEDICINE

## 2025-05-28 PROCEDURE — 36415 COLL VENOUS BLD VENIPUNCTURE: CPT

## 2025-05-28 PROCEDURE — 80074 ACUTE HEPATITIS PANEL: CPT

## 2025-05-28 PROCEDURE — 7100000010 HC PHASE II RECOVERY - FIRST 15 MIN: Performed by: INTERNAL MEDICINE

## 2025-05-28 PROCEDURE — 6360000002 HC RX W HCPCS: Performed by: HOSPITALIST

## 2025-05-28 PROCEDURE — 94640 AIRWAY INHALATION TREATMENT: CPT

## 2025-05-28 PROCEDURE — 0DB98ZX EXCISION OF DUODENUM, VIA NATURAL OR ARTIFICIAL OPENING ENDOSCOPIC, DIAGNOSTIC: ICD-10-PCS | Performed by: INTERNAL MEDICINE

## 2025-05-28 PROCEDURE — 1200000000 HC SEMI PRIVATE

## 2025-05-28 PROCEDURE — 2580000003 HC RX 258: Performed by: FAMILY MEDICINE

## 2025-05-28 PROCEDURE — 99232 SBSQ HOSP IP/OBS MODERATE 35: CPT | Performed by: FAMILY MEDICINE

## 2025-05-28 PROCEDURE — 2580000003 HC RX 258: Performed by: HOSPITALIST

## 2025-05-28 RX ORDER — POTASSIUM CHLORIDE 1500 MG/1
40 TABLET, EXTENDED RELEASE ORAL ONCE
Status: COMPLETED | OUTPATIENT
Start: 2025-05-28 | End: 2025-05-28

## 2025-05-28 RX ORDER — PROPOFOL 10 MG/ML
INJECTION, EMULSION INTRAVENOUS
Status: DISCONTINUED | OUTPATIENT
Start: 2025-05-28 | End: 2025-05-28 | Stop reason: SDUPTHER

## 2025-05-28 RX ORDER — ARFORMOTEROL TARTRATE 15 UG/2ML
15 SOLUTION RESPIRATORY (INHALATION)
Status: DISCONTINUED | OUTPATIENT
Start: 2025-05-28 | End: 2025-05-31 | Stop reason: HOSPADM

## 2025-05-28 RX ORDER — OCTREOTIDE ACETATE 50 UG/ML
50 INJECTION, SOLUTION INTRAVENOUS; SUBCUTANEOUS ONCE
Status: COMPLETED | OUTPATIENT
Start: 2025-05-28 | End: 2025-05-28

## 2025-05-28 RX ORDER — MIDAZOLAM HYDROCHLORIDE 1 MG/ML
INJECTION, SOLUTION INTRAMUSCULAR; INTRAVENOUS
Status: DISCONTINUED | OUTPATIENT
Start: 2025-05-28 | End: 2025-05-28 | Stop reason: SDUPTHER

## 2025-05-28 RX ORDER — BUDESONIDE 0.5 MG/2ML
1 INHALANT ORAL
Status: DISCONTINUED | OUTPATIENT
Start: 2025-05-28 | End: 2025-05-31 | Stop reason: HOSPADM

## 2025-05-28 RX ORDER — SODIUM CHLORIDE, SODIUM LACTATE, POTASSIUM CHLORIDE, CALCIUM CHLORIDE 600; 310; 30; 20 MG/100ML; MG/100ML; MG/100ML; MG/100ML
INJECTION, SOLUTION INTRAVENOUS
Status: DISCONTINUED | OUTPATIENT
Start: 2025-05-28 | End: 2025-05-28 | Stop reason: SDUPTHER

## 2025-05-28 RX ADMIN — METRONIDAZOLE 500 MG: 500 INJECTION, SOLUTION INTRAVENOUS at 06:34

## 2025-05-28 RX ADMIN — SODIUM CHLORIDE, POTASSIUM CHLORIDE, SODIUM LACTATE AND CALCIUM CHLORIDE: 600; 310; 30; 20 INJECTION, SOLUTION INTRAVENOUS at 12:58

## 2025-05-28 RX ADMIN — SODIUM CHLORIDE, POTASSIUM CHLORIDE, SODIUM LACTATE AND CALCIUM CHLORIDE: 600; 310; 30; 20 INJECTION, SOLUTION INTRAVENOUS at 13:18

## 2025-05-28 RX ADMIN — POTASSIUM CHLORIDE 40 MEQ: 1500 TABLET, EXTENDED RELEASE ORAL at 09:21

## 2025-05-28 RX ADMIN — SODIUM CHLORIDE, PRESERVATIVE FREE 40 MG: 5 INJECTION INTRAVENOUS at 15:26

## 2025-05-28 RX ADMIN — ARFORMOTEROL TARTRATE 15 MCG: 15 SOLUTION RESPIRATORY (INHALATION) at 18:33

## 2025-05-28 RX ADMIN — OCTREOTIDE ACETATE 50 MCG: 50 INJECTION, SOLUTION INTRAVENOUS; SUBCUTANEOUS at 09:23

## 2025-05-28 RX ADMIN — MIDAZOLAM 2 MG: 1 INJECTION INTRAMUSCULAR; INTRAVENOUS at 13:16

## 2025-05-28 RX ADMIN — ACETAMINOPHEN 650 MG: 325 TABLET ORAL at 15:25

## 2025-05-28 RX ADMIN — POLYETHYLENE GLYCOL-3350 AND ELECTROLYTES 4000 ML: 236; 6.74; 5.86; 2.97; 22.74 POWDER, FOR SOLUTION ORAL at 16:05

## 2025-05-28 RX ADMIN — METHADONE HYDROCHLORIDE 47 MG: 10 CONCENTRATE ORAL at 09:29

## 2025-05-28 RX ADMIN — WATER 1000 MG: 1 INJECTION INTRAMUSCULAR; INTRAVENOUS; SUBCUTANEOUS at 15:26

## 2025-05-28 RX ADMIN — Medication 5 ML: at 00:01

## 2025-05-28 RX ADMIN — FUROSEMIDE 40 MG: 40 TABLET ORAL at 22:34

## 2025-05-28 RX ADMIN — ACETAMINOPHEN 650 MG: 325 TABLET ORAL at 07:48

## 2025-05-28 RX ADMIN — BUDESONIDE 1000 MCG: 0.5 SUSPENSION RESPIRATORY (INHALATION) at 18:33

## 2025-05-28 RX ADMIN — LEVOTHYROXINE SODIUM 250 MCG: 0.1 TABLET ORAL at 06:34

## 2025-05-28 RX ADMIN — METRONIDAZOLE 500 MG: 500 INJECTION, SOLUTION INTRAVENOUS at 15:39

## 2025-05-28 RX ADMIN — SODIUM CHLORIDE, PRESERVATIVE FREE 40 MG: 5 INJECTION INTRAVENOUS at 03:11

## 2025-05-28 RX ADMIN — ACETAMINOPHEN 650 MG: 325 TABLET ORAL at 22:33

## 2025-05-28 RX ADMIN — FUROSEMIDE 40 MG: 40 TABLET ORAL at 09:21

## 2025-05-28 RX ADMIN — SERTRALINE 50 MG: 50 TABLET, FILM COATED ORAL at 09:22

## 2025-05-28 RX ADMIN — ALBUTEROL SULFATE 2.5 MG: 2.5 SOLUTION RESPIRATORY (INHALATION) at 05:38

## 2025-05-28 RX ADMIN — SPIRONOLACTONE 12.5 MG: 25 TABLET ORAL at 09:22

## 2025-05-28 RX ADMIN — LOSARTAN POTASSIUM 100 MG: 50 TABLET, FILM COATED ORAL at 09:21

## 2025-05-28 RX ADMIN — Medication 10 ML: at 22:39

## 2025-05-28 RX ADMIN — SODIUM CHLORIDE, SODIUM LACTATE, POTASSIUM CHLORIDE, AND CALCIUM CHLORIDE: .6; .31; .03; .02 INJECTION, SOLUTION INTRAVENOUS at 00:53

## 2025-05-28 RX ADMIN — Medication 10 ML: at 09:31

## 2025-05-28 RX ADMIN — GABAPENTIN 100 MG: 100 CAPSULE ORAL at 09:22

## 2025-05-28 RX ADMIN — SODIUM CHLORIDE, POTASSIUM CHLORIDE, SODIUM LACTATE AND CALCIUM CHLORIDE: 600; 310; 30; 20 INJECTION, SOLUTION INTRAVENOUS at 13:00

## 2025-05-28 RX ADMIN — BUDESONIDE 1000 MCG: 0.5 SUSPENSION RESPIRATORY (INHALATION) at 05:38

## 2025-05-28 RX ADMIN — AMLODIPINE BESYLATE 10 MG: 10 TABLET ORAL at 09:21

## 2025-05-28 RX ADMIN — IPRATROPIUM BROMIDE 0.5 MG: 0.5 SOLUTION RESPIRATORY (INHALATION) at 18:33

## 2025-05-28 RX ADMIN — PROPOFOL 80 MG: 10 INJECTION, EMULSION INTRAVENOUS at 13:16

## 2025-05-28 RX ADMIN — ROSUVASTATIN CALCIUM 10 MG: 10 TABLET, FILM COATED ORAL at 22:43

## 2025-05-28 RX ADMIN — ARFORMOTEROL TARTRATE 15 MCG: 15 SOLUTION RESPIRATORY (INHALATION) at 05:38

## 2025-05-28 RX ADMIN — GABAPENTIN 100 MG: 100 CAPSULE ORAL at 22:39

## 2025-05-28 RX ADMIN — PROCHLORPERAZINE MALEATE 10 MG: 5 TABLET ORAL at 12:45

## 2025-05-28 RX ADMIN — EMPAGLIFLOZIN 10 MG: 10 TABLET, FILM COATED ORAL at 09:21

## 2025-05-28 RX ADMIN — ONDANSETRON 4 MG: 2 INJECTION, SOLUTION INTRAMUSCULAR; INTRAVENOUS at 22:35

## 2025-05-28 RX ADMIN — GABAPENTIN 100 MG: 100 CAPSULE ORAL at 15:25

## 2025-05-28 RX ADMIN — OCTREOTIDE ACETATE 50 MCG/HR: 500 INJECTION, SOLUTION INTRAVENOUS; SUBCUTANEOUS at 09:28

## 2025-05-28 RX ADMIN — IPRATROPIUM BROMIDE 0.5 MG: 0.5 SOLUTION RESPIRATORY (INHALATION) at 05:38

## 2025-05-28 ASSESSMENT — PAIN SCALES - GENERAL
PAINLEVEL_OUTOF10: 5
PAINLEVEL_OUTOF10: 3
PAINLEVEL_OUTOF10: 1
PAINLEVEL_OUTOF10: 6
PAINLEVEL_OUTOF10: 1
PAINLEVEL_OUTOF10: 5
PAINLEVEL_OUTOF10: 4

## 2025-05-28 ASSESSMENT — PAIN DESCRIPTION - ORIENTATION
ORIENTATION: RIGHT;LEFT
ORIENTATION: RIGHT
ORIENTATION: RIGHT
ORIENTATION: RIGHT;LEFT

## 2025-05-28 ASSESSMENT — PAIN DESCRIPTION - ONSET
ONSET: ON-GOING
ONSET: ON-GOING

## 2025-05-28 ASSESSMENT — PAIN DESCRIPTION - LOCATION
LOCATION: LEG;RIB CAGE
LOCATION: LEG

## 2025-05-28 ASSESSMENT — PAIN - FUNCTIONAL ASSESSMENT
PAIN_FUNCTIONAL_ASSESSMENT: PREVENTS OR INTERFERES WITH ALL ACTIVE AND SOME PASSIVE ACTIVITIES
PAIN_FUNCTIONAL_ASSESSMENT: PREVENTS OR INTERFERES WITH ALL ACTIVE AND SOME PASSIVE ACTIVITIES
PAIN_FUNCTIONAL_ASSESSMENT: 0-10
PAIN_FUNCTIONAL_ASSESSMENT: PREVENTS OR INTERFERES WITH MANY ACTIVE NOT PASSIVE ACTIVITIES

## 2025-05-28 ASSESSMENT — PAIN DESCRIPTION - FREQUENCY
FREQUENCY: CONTINUOUS
FREQUENCY: CONTINUOUS

## 2025-05-28 ASSESSMENT — PAIN DESCRIPTION - DESCRIPTORS
DESCRIPTORS: ACHING;SQUEEZING;THROBBING
DESCRIPTORS: ACHING
DESCRIPTORS: ACHING

## 2025-05-28 ASSESSMENT — PAIN DESCRIPTION - PAIN TYPE
TYPE: ACUTE PAIN
TYPE: ACUTE PAIN

## 2025-05-28 NOTE — ED NOTES
Primary nurse had attempted report 3x. Attempted report again at this time. Will take patient up to floor when bed ready for bed side nurse to nurse report.

## 2025-05-28 NOTE — ANESTHESIA PRE PROCEDURE
lb)     Body mass index is 29.65 kg/m².    CBC:   Lab Results   Component Value Date/Time    WBC 9.9 05/28/2025 05:30 AM    RBC 2.58 05/28/2025 05:30 AM    HGB 7.1 05/28/2025 05:30 AM    HCT 23.1 05/28/2025 05:30 AM    MCV 89.5 05/28/2025 05:30 AM    RDW 16.0 05/28/2025 05:30 AM     05/28/2025 05:30 AM       CMP:   Lab Results   Component Value Date/Time     05/28/2025 05:30 AM    K 3.4 05/28/2025 05:30 AM    K 3.7 04/11/2022 06:42 PM     05/28/2025 05:30 AM    CO2 25 05/28/2025 05:30 AM    BUN 24 05/28/2025 05:30 AM    CREATININE 1.2 05/28/2025 05:30 AM    GFRAA >60 09/27/2022 05:25 AM    LABGLOM 52 05/28/2025 05:30 AM    LABGLOM >60 01/08/2024 04:23 PM    GLUCOSE 95 05/28/2025 05:30 AM    CALCIUM 9.1 05/28/2025 05:30 AM    BILITOT 0.3 05/28/2025 05:30 AM    ALKPHOS 149 05/28/2025 05:30 AM    AST 48 05/28/2025 05:30 AM    ALT 43 05/28/2025 05:30 AM       POC Tests: No results for input(s): \"POCGLU\", \"POCNA\", \"POCK\", \"POCCL\", \"POCBUN\", \"POCHEMO\", \"POCHCT\" in the last 72 hours.    Coags:   Lab Results   Component Value Date/Time    PROTIME 13.6 05/27/2025 11:00 AM    INR 1.3 05/27/2025 11:00 AM    APTT 33.9 05/27/2025 11:00 AM    APTT 33.3 09/23/2022 04:49 PM       HCG (If Applicable): No results found for: \"PREGTESTUR\", \"PREGSERUM\", \"HCG\", \"HCGQUANT\"     ABGs:   Lab Results   Component Value Date/Time    PO2ART 365.2 09/29/2020 10:55 AM    WAX1SIY 50.2 09/29/2020 10:55 AM    ALO6ZLA 24.2 09/29/2020 10:55 AM        Type & Screen (If Applicable):  Lab Results   Component Value Date    ABORH O POSITIVE 05/27/2025    LABANTI NEGATIVE 05/27/2025       Drug/Infectious Status (If Applicable):  Lab Results   Component Value Date/Time    HEPCAB NONREACTIVE 05/28/2025 08:22 AM       COVID-19 Screening (If Applicable):   Lab Results   Component Value Date/Time    COVID19 Not Detected 01/20/2021 02:26 PM    COVID19 Not Detected 09/24/2020 12:30 PM     Interpretation Summary 3-27-25  Show Result Comparison

## 2025-05-28 NOTE — ANESTHESIA POSTPROCEDURE EVALUATION
Department of Anesthesiology  Postprocedure Note    Patient: Faiza Romero  MRN: [id number]  YOB: 1970  Date of evaluation: 5/28/2025    Procedure Summary       Date: 05/28/25 Room / Location: [place] [id number] / [facility]    Anesthesia Start: 1258 Anesthesia Stop: 1336    Procedure: ESOPHAGOGASTRODUODENOSCOPY BIOPSY Diagnosis:       Anemia, unspecified type      (Anemia, unspecified type [D64.9])    Surgeons: Jose Armendariz MD Responsible Provider: Erickson Reeves DO    Anesthesia Type: MAC ASA Status: 3            Anesthesia Type: No value filed.    Jose Phase I:      Jose Phase II: Jose Score: 9    Anesthesia Post Evaluation    Patient location during evaluation: PACU  Patient participation: complete - patient participated  Level of consciousness: awake and alert  Pain score: 0  Airway patency: patent  Nausea & Vomiting: no nausea and no vomiting  Cardiovascular status: blood pressure returned to baseline and hemodynamically stable  Respiratory status: acceptable  Hydration status: stable  Pain management: adequate    No notable events documented.

## 2025-05-28 NOTE — CONSULTS
CONSULT  Raimundo Molina M.D.  [facility]  Dr. Henrry Finley M.D.,  Dr. Inder Rose M.D.,  NICKOLAS MarcosO.,  Dr. Jordan Escudero D.O. ,  Dr. Yeison Ferrera M.D.,          Faiza Romero  54 y.o.  female      Re: \"lower gi bleeding\"  Requesting physician: Dr. Shoemaker  Date:7:23 AM 5/28/2025          HPI: 55-year-old female patient seen in the hospital for above described issue.  Patient has been admitted yesterday with chief complaint of abnormal blood work from her PCPs office.  Patient has pertinent past medical history of COPD, CHF, CAD, CKD, colitis, constipation, diabetes mellitus, hyperlipidemia, hypertension, hypothyroidism, sleep apnea.  Patient reports colonoscopy \"few years ago\" at [facility] however cannot provide details.  Patient reports no significant abdominal pain.  She denies nausea vomiting.  She denies hematemesis emesis of coffee-ground material.  She reports rectal bleed which she describes as bright red blood which is apparently heavy at times.  Patient denies currently any dysphagia renal failure.  On presentation she was noted to have hemoglobin of 6.6 after which she was transfused with increased hemoglobin to 7.3 and further decreased to 7.1 this morning.  Hemoglobin on discharge from the beginning of the month was 7.4.  Iron studies reveal iron deficiency.  Fecal occult blood test has not been reported.  Chemistry panel reveals renal failure with BUN of 24 and creatinine of 1.2.  Liver profile shows elevated transaminase and alkaline phosphatase.  INR is 1.3 on presentation.  CT scan of the abdomen and pelvis on presentation with IV contrast is reported to show colonic stool burden with continued thickening of the rectosigmoid colon which was present on the 18th on the CAT scan.  Also reported this level 2.  Questionable plantar irregularity with trace perihepatic ascites.    Information sources:   -Patient  -medical record  -health care  team    PMHx:  Past Medical History:   Diagnosis Date    Acute exacerbation of chronic heart failure ([facility]) 2025    CAD in native artery 2020    CKD (chronic kidney disease) stage 3, GFR 30-59 ml/min ([facility]) 2025    Claustrophobia     mild    Colitis     Constipation     COPD (chronic obstructive pulmonary disease) ([facility])     COVID-19     Diabetes mellitus ([facility])     Heart problem     Hyperlipidemia     Hypertension     Hypothyroidism     Sleep apnea     diagnosed 7-8 years ago does not use C-pap problems with cost    Thyroid disease     Wears partial dentures     upper       PSHx:  Past Surgical History:   Procedure Laterality Date    APPENDECTOMY      CARDIAC CATHETERIZATION  2020    Dr. Sargent EF 55% cts consult ordered    CARDIAC PROCEDURE N/A 2024    Left heart cath / coronary angiography w grafts performed by Daniela Sargent [place] [facility] [facility]     SECTION      x2    CHOLECYSTECTOMY      COLONOSCOPY      CORONARY ANGIOPLASTY WITH STENT PLACEMENT  2022    Dr. Sargent [place] 3.0x12    CORONARY ARTERY BYPASS GRAFT N/A 2020    CORONARY ARTERY BYPASS, POSSIBLE LEFT RADIAL ARTERY HARVEST, MINDA performed by Kit Leyva DO [place] [facility] [place]    HERNIA REPAIR  2015    laparoscopic incisional reapair with mesh    HYSTERECTOMY (CERVIX STATUS UNKNOWN)      KNEE SURGERY Right     VENTRAL HERNIA REPAIR         Meds:  Current Facility-Administered Medications   Medication Dose Route Frequency Provider Last Rate Last Admin    potassium chloride (KLOR-CON M) extended release tablet 40 mEq  40 mEq Oral Once Tara Shoemaker E, DO        0.9 % sodium chloride infusion   IntraVENous PRN Luis Migeul Minor DO        glucose chewable tablet 16 g  4 tablet Oral PRN Tara Shoemaker E, DO        dextrose bolus 10% 125 mL  125 mL IntraVENous PRN Tara Shoemaker E, DO        Or    dextrose bolus 10% 250 mL  250 mL IntraVENous PRN Tara Shoemaker E, DO        glucagon injection 1  AM     Lab Results   Component Value Date/Time    LIPASE 17 05/18/2025 08:21 AM     Lab Results   Component Value Date/Time    AMYLASE 39 07/29/2019 09:45 PM         ASSESSMENT/PLAN:  Patient Active Problem List   Diagnosis    Leukocytosis    Essential hypertension    History of drug abuse ([facility])    Chronic nonintractable headache    Cervical disc herniation    Coronary artery disease involving native coronary artery of native heart with angina pectoris    COVID-19    Unstable angina (HCC)    Chest pain    S/P coronary artery stent placement    CAD (coronary artery disease)    COPD with acute exacerbation (HCC)    COPD (chronic obstructive pulmonary disease) ([facility])    Controlled type 2 diabetes mellitus without complication ([facility])    Acquired hypothyroidism    Primary hypertension    COPD exacerbation ([facility])    Sleep apnea    Pulmonary nodule    Snoring    Excessive daytime sleepiness    Suspected sleep apnea    Acute hypoxic respiratory failure ([facility])    Suspected chronic obstructive pulmonary disease based on initial evaluation ([facility])    Severe tobacco use disorder, in early remission    Fluid overload    Acute on chronic diastolic congestive heart failure ([facility])    CKD (chronic kidney disease) stage 3, GFR 30-59 ml/min ([facility])    Class 1 obesity due to excess calories with serious comorbidity in adult    Opioid dependence on maintenance agonist therapy, no symptoms ([facility])    Blood loss anemia    Type 2 diabetes mellitus with circulatory disorder ([facility])    ANIRUDH (acute kidney injury)    Anemia requiring transfusions    Open wound of right foot     1.  Anemia/GI bleed  - Acute on chronic  - Normocytic/iron deficient  - Upper versus lower GI bleed  - CT scan concerning for cirrhosis  - Start octreotide  - High-dose IV PPI  - Elevate head of bed; oxygen nasal cannula  - Plan for further evaluation with upper endoscopy  - Recommendations regarding colonoscopy pending EGD findings/H&H dynamic/clinical course    2.  Possible

## 2025-05-28 NOTE — PROGRESS NOTES
4 Eyes Skin Assessment     NAME:  Faiza Romero  YOB: 1970  MEDICAL RECORD NUMBER:  [id number]    The patient is being assessed for  Admission    I agree that at least one RN has performed a thorough Head to Toe Skin Assessment on the patient. ALL assessment sites listed below have been assessed.      Areas assessed by both nurses:    Head, Face, Ears, Shoulders, Back, Chest, Arms, Elbows, Hands, Sacrum. Buttock, Coccyx, Ischium, Legs. Feet and Heels, and Under Medical Devices         Does the Patient have a Wound? Yes wound(s) were present on assessment. LDA wound assessment was Initiated and completed by RN  Right foot, right second toe, coccyx, LLE       Esau Prevention initiated by RN: Yes  Wound Care Orders initiated by RN: Yes    Pressure Injury (Stage 3,4, Unstageable, DTI, NWPT, and Complex wounds) if present, place Wound referral order by RN under : Yes    New Ostomies, if present place, Ostomy referral order under : No     Nurse 1 eSignature: Electronically signed by Johanne Paris RN on 5/28/25 at 2:04 AM EDT    **SHARE this note so that the co-signing nurse can place an eSignature**    Nurse 2 eSignature: Electronically signed by Val Quinonez RN on 5/28/25 at 4:47 AM EDT

## 2025-05-28 NOTE — PROGRESS NOTES
Spiritual Health History and Assessment/Progress Note  [facility]     Encounter, Rituals, Rites and Sacraments,  ,  ,      Name: Faiza Romero MRN: [id number]    Age: 54 y.o.     Sex: female   Language: English   Congregation: [employer]   Blood loss anemia     Date: 5/28/2025                           Spiritual Assessment began in [facility] ENDOSCOPY        Referral/Consult From: Rounding   Encounter Overview/Reason:  Encounter, Rituals, Rites and Sacraments  Service Provided For: Patient    Marcella, Belief, Meaning:   Patient is connected with a marcella tradition or spiritual practice  Family/Friends No family/friends present      Importance and Influence:  Patient has no beliefs influential to healthcare decision-making identified during this visit  Family/Friends No family/friends present    Community:  Patient feels well-supported. Support system includes: Spouse/Partner and Children  Family/Friends No family/friends present    Assessment and Plan of Care:     Patient Interventions include: Facilitated expression of thoughts and feelings, Affirmed coping skills/support systems, and Provided sacramental/Latter day ritual  Family/Friends Interventions include: No family/friends present    Patient Plan of Care: Spiritual Care available upon further referral  Family/Friends Plan of Care: Spiritual Care available upon further referral    Electronically signed by Chaplain Tracy on 5/28/2025 at 1:42 PM

## 2025-05-28 NOTE — PROGRESS NOTES
[facility] Hospitalist Progress Note    Admitting Date and Time: 5/27/2025 10:22 AM  Admit Dx: Acute blood loss anemia [D62]  Colitis [K52.9]  Rectal bleeding [K62.5]  Transaminitis [R74.01]  Blood loss anemia [D50.0]  Anemia requiring transfusions [D64.9]  Swelling of left lower extremity [M79.89]      Assessment:    Principal Problem:    Blood loss anemia  Active Problems:    CAD (coronary artery disease)    COPD (chronic obstructive pulmonary disease) (HCC)    Type 2 diabetes mellitus with circulatory disorder (HCC)    ANIRUDH (acute kidney injury)    Anemia requiring transfusions    Open wound of right foot  Resolved Problems:    * No resolved hospital problems. *      Plan:  5/27/2025  Blood loss anemia, likely 2/2 Colitis, seen on previous imaging; formed stools, but recent abx use x 2  --flagyl and rocephin  --consult to gi  --clear liquids at this time  --pt reports stool is formed  --transfuse to keep hgb >7, however w/ hx of cad would prefer to keep it >8  --holding antiplatelet agents  --defer further w/u to GI for gi bleeding     Left lower ext wound, right foot wounds  --follows w/ dr maldonado, was on doxy as an outpatient  --consult podiatry for wound care in house     Left lower extremity swelling and pain  --order dvt us  --lidocaine patch to area of tenderness     Type 2 dm  --hypoglycemia tx orders  --pt on clear liquids, will monitor sugars and use iss accordingly  --holding sulfonylureas     Copd, chronic, no acute exacerbation  --continue at home medications as previously prescribed  --at baseline o2 status     BURTON  --strongly suspected on previous admission, awaiting home sleep study, scheduled for next week     5/28/2025  --remains on flagyl and rocephin for colitis  --npo for possible endoscopy today?  --hgb stable at 7.1 post transfusion, continue to monitor q6h  --cirrhotic w/u pending per gi  --podiatry following for leg and foot wounds, known to their service outpatient  --control  tenderness.      Comments: Obese/protuberant,   Musculoskeletal:         General: Normal range of motion.      Cervical back: Normal range of motion. No rigidity.      Right lower leg: Edema present.      Left lower leg: Edema present.      Comments: Dressing of the left lower extremity, dressing of the right foot both clean dry and intact   Skin:     General: Skin is warm and dry.   Neurological:      General: No focal deficit present.      Mental Status: She is alert and oriented to person, place, and time.      Cranial Nerves: No cranial nerve deficit.   Psychiatric:         Mood and Affect: Mood normal.         Behavior: Behavior normal.         Thought Content: Thought content normal.         Judgment: Judgment normal.         Recent Labs     05/27/25  1100 05/28/25  0530    138   K 3.6 3.4*    101   CO2 24 25   BUN 32* 24*   CREATININE 1.4* 1.2*   GLUCOSE 113* 95   CALCIUM 9.5 9.1       Recent Labs     05/27/25  1100 05/27/25  1820 05/28/25  0530   WBC 10.4  --  9.9   RBC 2.31*  --  2.58*   HGB 6.6* 7.3* 7.1*   HCT 20.8* 23.7* 23.1*   MCV 90.0  --  89.5   MCH 28.6  --  27.5   MCHC 31.7*  --  30.7*   RDW 15.9*  --  16.0*     --  399   MPV 11.0  --  10.8       Greater than 35 minutes spent in face-to-face encounter with the patient.  Additional time spent in record and chart review.      NOTE: This report was transcribed using voice recognition software. Every effort was made to ensure accuracy; however, inadvertent computerized transcription errors may be present.    Electronically signed by Tara Shoemaker DO on 5/28/2025 at 8:01 AM

## 2025-05-28 NOTE — OP NOTE
Operative Note      Patient: Faiza Romero  YOB: 1970  MRN: [id number]    Date of Procedure: 5/28/2025    Pre-Op Diagnosis Codes:      * Anemia, unspecified type [D64.9]    Post-Op Diagnosis: Anatomical study.       Procedure(s):  ESOPHAGOGASTRODUODENOSCOPY    Surgeon(s):  Jose Armendariz MD    Assistant:   Surgical Assistant: Martina Mora RN    Anesthesia: Monitor Anesthesia Care    Estimated Blood Loss (mL): None    Complications: None    Specimens:   * No specimens in log *    Implants:  * No implants in log *      Drains: * No LDAs found *    Findings:  Infection Present At Time Of Surgery (PATOS) (choose all levels that have infection present):  None  Other Findings: None    Detailed Description of Procedure:   54-year-old female patient, with profound anemia.    The upper endoscope was introduced to the mouth.  The esophagus, stomach and proximal duodenum were inspected.  Exam of the gastric fundus for retroflexion.  The stomach distended well with air insufflation.  No lesions seen.  Biopsies duodenum.  Exam of the upper esophagus and hypopharynx during withdrawal.    Discharge to floor.  Resume hospital care.  Further GI plan as per Dr. Keys.    Electronically signed by Jose Armendariz MD on 5/28/2025 at 1:02 PM

## 2025-05-29 ENCOUNTER — ANESTHESIA (OUTPATIENT)
Dept: ENDOSCOPY | Age: 55
End: 2025-05-29
Payer: MEDICARE

## 2025-05-29 ENCOUNTER — ANESTHESIA EVENT (OUTPATIENT)
Dept: ENDOSCOPY | Age: 55
End: 2025-05-29
Payer: MEDICARE

## 2025-05-29 LAB
ALBUMIN SERPL-MCNC: 3.8 G/DL (ref 3.5–5.2)
ALP SERPL-CCNC: 147 U/L (ref 35–104)
ALT SERPL-CCNC: 43 U/L (ref 0–32)
ANION GAP SERPL CALCULATED.3IONS-SCNC: 14 MMOL/L (ref 7–16)
AST SERPL-CCNC: 45 U/L (ref 0–31)
BASOPHILS # BLD: 0.09 K/UL (ref 0–0.2)
BASOPHILS NFR BLD: 1 % (ref 0–2)
BILIRUB SERPL-MCNC: 0.4 MG/DL (ref 0–1.2)
BUN SERPL-MCNC: 16 MG/DL (ref 6–20)
CALCIUM SERPL-MCNC: 9 MG/DL (ref 8.6–10.2)
CHLORIDE SERPL-SCNC: 98 MMOL/L (ref 98–107)
CO2 SERPL-SCNC: 27 MMOL/L (ref 22–29)
CREAT SERPL-MCNC: 1.1 MG/DL (ref 0.5–1)
EOSINOPHIL # BLD: 0.7 K/UL (ref 0.05–0.5)
EOSINOPHILS RELATIVE PERCENT: 7 % (ref 0–6)
ERYTHROCYTE [DISTWIDTH] IN BLOOD BY AUTOMATED COUNT: 15.8 % (ref 11.5–15)
GFR, ESTIMATED: 64 ML/MIN/1.73M2
GLUCOSE SERPL-MCNC: 126 MG/DL (ref 74–99)
HCT VFR BLD AUTO: 22.9 % (ref 34–48)
HGB BLD-MCNC: 7.1 G/DL (ref 11.5–15.5)
IMM GRANULOCYTES # BLD AUTO: 0.03 K/UL (ref 0–0.58)
IMM GRANULOCYTES NFR BLD: 0 % (ref 0–5)
LYMPHOCYTES NFR BLD: 1.23 K/UL (ref 1.5–4)
LYMPHOCYTES RELATIVE PERCENT: 13 % (ref 20–42)
MAGNESIUM SERPL-MCNC: 1.9 MG/DL (ref 1.6–2.6)
MCH RBC QN AUTO: 27.6 PG (ref 26–35)
MCHC RBC AUTO-ENTMCNC: 31 G/DL (ref 32–34.5)
MCV RBC AUTO: 89.1 FL (ref 80–99.9)
MONOCYTES NFR BLD: 0.83 K/UL (ref 0.1–0.95)
MONOCYTES NFR BLD: 8 % (ref 2–12)
NEUTROPHILS NFR BLD: 71 % (ref 43–80)
NEUTS SEG NFR BLD: 6.98 K/UL (ref 1.8–7.3)
PLATELET # BLD AUTO: 372 K/UL (ref 130–450)
PMV BLD AUTO: 10.7 FL (ref 7–12)
POTASSIUM SERPL-SCNC: 3 MMOL/L (ref 3.5–5)
PROT SERPL-MCNC: 7.5 G/DL (ref 6.4–8.3)
RBC # BLD AUTO: 2.57 M/UL (ref 3.5–5.5)
SODIUM SERPL-SCNC: 139 MMOL/L (ref 132–146)
WBC OTHER # BLD: 9.9 K/UL (ref 4.5–11.5)

## 2025-05-29 PROCEDURE — 2500000003 HC RX 250 WO HCPCS: Performed by: FAMILY MEDICINE

## 2025-05-29 PROCEDURE — 1200000000 HC SEMI PRIVATE

## 2025-05-29 PROCEDURE — 6370000000 HC RX 637 (ALT 250 FOR IP): Performed by: FAMILY MEDICINE

## 2025-05-29 PROCEDURE — 85025 COMPLETE CBC W/AUTO DIFF WBC: CPT

## 2025-05-29 PROCEDURE — 6360000002 HC RX W HCPCS: Performed by: FAMILY MEDICINE

## 2025-05-29 PROCEDURE — 2580000003 HC RX 258: Performed by: FAMILY MEDICINE

## 2025-05-29 PROCEDURE — 80053 COMPREHEN METABOLIC PANEL: CPT

## 2025-05-29 PROCEDURE — 3609027000 HC COLONOSCOPY: Performed by: INTERNAL MEDICINE

## 2025-05-29 PROCEDURE — 3700000000 HC ANESTHESIA ATTENDED CARE: Performed by: INTERNAL MEDICINE

## 2025-05-29 PROCEDURE — 83735 ASSAY OF MAGNESIUM: CPT

## 2025-05-29 PROCEDURE — 2709999900 HC NON-CHARGEABLE SUPPLY: Performed by: INTERNAL MEDICINE

## 2025-05-29 PROCEDURE — 3700000001 HC ADD 15 MINUTES (ANESTHESIA): Performed by: INTERNAL MEDICINE

## 2025-05-29 PROCEDURE — 7100000010 HC PHASE II RECOVERY - FIRST 15 MIN: Performed by: INTERNAL MEDICINE

## 2025-05-29 PROCEDURE — 30233N1 TRANSFUSION OF NONAUTOLOGOUS RED BLOOD CELLS INTO PERIPHERAL VEIN, PERCUTANEOUS APPROACH: ICD-10-PCS | Performed by: INTERNAL MEDICINE

## 2025-05-29 PROCEDURE — 36415 COLL VENOUS BLD VENIPUNCTURE: CPT

## 2025-05-29 PROCEDURE — 2580000003 HC RX 258: Performed by: NURSE ANESTHETIST, CERTIFIED REGISTERED

## 2025-05-29 PROCEDURE — 6360000002 HC RX W HCPCS: Performed by: NURSE ANESTHETIST, CERTIFIED REGISTERED

## 2025-05-29 PROCEDURE — 7100000011 HC PHASE II RECOVERY - ADDTL 15 MIN: Performed by: INTERNAL MEDICINE

## 2025-05-29 PROCEDURE — 94640 AIRWAY INHALATION TREATMENT: CPT

## 2025-05-29 PROCEDURE — 99232 SBSQ HOSP IP/OBS MODERATE 35: CPT | Performed by: FAMILY MEDICINE

## 2025-05-29 RX ORDER — PROPOFOL 10 MG/ML
INJECTION, EMULSION INTRAVENOUS
Status: DISCONTINUED | OUTPATIENT
Start: 2025-05-29 | End: 2025-05-29 | Stop reason: SDUPTHER

## 2025-05-29 RX ORDER — PETROLATUM 420 MG/G
OINTMENT TOPICAL DAILY
Status: DISCONTINUED | OUTPATIENT
Start: 2025-05-29 | End: 2025-05-31 | Stop reason: HOSPADM

## 2025-05-29 RX ORDER — MIDAZOLAM HYDROCHLORIDE 1 MG/ML
INJECTION, SOLUTION INTRAMUSCULAR; INTRAVENOUS
Status: DISCONTINUED | OUTPATIENT
Start: 2025-05-29 | End: 2025-05-29 | Stop reason: SDUPTHER

## 2025-05-29 RX ORDER — LIDOCAINE HYDROCHLORIDE 10 MG/ML
INJECTION, SOLUTION INFILTRATION; PERINEURAL
Status: DISCONTINUED | OUTPATIENT
Start: 2025-05-29 | End: 2025-05-29 | Stop reason: SDUPTHER

## 2025-05-29 RX ORDER — BACLOFEN 10 MG/1
10 TABLET ORAL 3 TIMES DAILY
Status: DISCONTINUED | OUTPATIENT
Start: 2025-05-29 | End: 2025-05-31 | Stop reason: HOSPADM

## 2025-05-29 RX ORDER — SODIUM CHLORIDE 9 MG/ML
INJECTION, SOLUTION INTRAVENOUS
Status: DISCONTINUED | OUTPATIENT
Start: 2025-05-29 | End: 2025-05-29 | Stop reason: SDUPTHER

## 2025-05-29 RX ORDER — FENTANYL CITRATE 50 UG/ML
INJECTION, SOLUTION INTRAMUSCULAR; INTRAVENOUS
Status: DISCONTINUED | OUTPATIENT
Start: 2025-05-29 | End: 2025-05-29 | Stop reason: SDUPTHER

## 2025-05-29 RX ORDER — NEOMYCIN/BACITRACIN/POLYMYXINB 3.5-400-5K
OINTMENT (GRAM) TOPICAL DAILY
Status: DISCONTINUED | OUTPATIENT
Start: 2025-05-29 | End: 2025-05-31 | Stop reason: HOSPADM

## 2025-05-29 RX ADMIN — METRONIDAZOLE 500 MG: 500 INJECTION, SOLUTION INTRAVENOUS at 06:28

## 2025-05-29 RX ADMIN — FUROSEMIDE 40 MG: 40 TABLET ORAL at 21:25

## 2025-05-29 RX ADMIN — METRONIDAZOLE 500 MG: 500 INJECTION, SOLUTION INTRAVENOUS at 17:01

## 2025-05-29 RX ADMIN — METHADONE HYDROCHLORIDE 47 MG: 10 CONCENTRATE ORAL at 10:41

## 2025-05-29 RX ADMIN — SODIUM CHLORIDE, PRESERVATIVE FREE 40 MG: 5 INJECTION INTRAVENOUS at 04:26

## 2025-05-29 RX ADMIN — IPRATROPIUM BROMIDE 0.5 MG: 0.5 SOLUTION RESPIRATORY (INHALATION) at 17:30

## 2025-05-29 RX ADMIN — Medication 10 ML: at 21:27

## 2025-05-29 RX ADMIN — ARFORMOTEROL TARTRATE 15 MCG: 15 SOLUTION RESPIRATORY (INHALATION) at 17:30

## 2025-05-29 RX ADMIN — BACLOFEN 10 MG: 10 TABLET ORAL at 10:01

## 2025-05-29 RX ADMIN — ACETAMINOPHEN 650 MG: 325 TABLET ORAL at 08:50

## 2025-05-29 RX ADMIN — BACLOFEN 10 MG: 10 TABLET ORAL at 21:25

## 2025-05-29 RX ADMIN — BACLOFEN 10 MG: 10 TABLET ORAL at 16:44

## 2025-05-29 RX ADMIN — Medication 10 ML: at 11:31

## 2025-05-29 RX ADMIN — METRONIDAZOLE 500 MG: 500 INJECTION, SOLUTION INTRAVENOUS at 00:05

## 2025-05-29 RX ADMIN — BUDESONIDE 1000 MCG: 0.5 SUSPENSION RESPIRATORY (INHALATION) at 17:30

## 2025-05-29 RX ADMIN — AMLODIPINE BESYLATE 10 MG: 10 TABLET ORAL at 10:01

## 2025-05-29 RX ADMIN — FUROSEMIDE 40 MG: 40 TABLET ORAL at 10:01

## 2025-05-29 RX ADMIN — PROPOFOL 30 MG: 10 INJECTION, EMULSION INTRAVENOUS at 14:03

## 2025-05-29 RX ADMIN — LOSARTAN POTASSIUM 100 MG: 50 TABLET, FILM COATED ORAL at 10:01

## 2025-05-29 RX ADMIN — EMPAGLIFLOZIN 10 MG: 10 TABLET, FILM COATED ORAL at 10:01

## 2025-05-29 RX ADMIN — GABAPENTIN 100 MG: 100 CAPSULE ORAL at 21:25

## 2025-05-29 RX ADMIN — LIDOCAINE HYDROCHLORIDE 50 MG: 10 INJECTION, SOLUTION INFILTRATION; PERINEURAL at 14:01

## 2025-05-29 RX ADMIN — Medication 6 MG: at 21:25

## 2025-05-29 RX ADMIN — PROPOFOL 50 MG: 10 INJECTION, EMULSION INTRAVENOUS at 14:01

## 2025-05-29 RX ADMIN — IPRATROPIUM BROMIDE 0.5 MG: 0.5 SOLUTION RESPIRATORY (INHALATION) at 04:57

## 2025-05-29 RX ADMIN — LEVOTHYROXINE SODIUM 250 MCG: 0.1 TABLET ORAL at 06:19

## 2025-05-29 RX ADMIN — ACETAMINOPHEN 650 MG: 325 TABLET ORAL at 19:35

## 2025-05-29 RX ADMIN — GABAPENTIN 100 MG: 100 CAPSULE ORAL at 16:44

## 2025-05-29 RX ADMIN — SERTRALINE 50 MG: 50 TABLET, FILM COATED ORAL at 10:01

## 2025-05-29 RX ADMIN — BACITRACIN ZINC, NEOMYCIN, POLYMYXIN B SULFAT: 5000; 3.5; 4 OINTMENT TOPICAL at 16:47

## 2025-05-29 RX ADMIN — BUDESONIDE 1000 MCG: 0.5 SUSPENSION RESPIRATORY (INHALATION) at 04:57

## 2025-05-29 RX ADMIN — FENTANYL CITRATE 50 MCG: 50 INJECTION, SOLUTION INTRAMUSCULAR; INTRAVENOUS at 14:01

## 2025-05-29 RX ADMIN — WATER 1000 MG: 1 INJECTION INTRAMUSCULAR; INTRAVENOUS; SUBCUTANEOUS at 16:58

## 2025-05-29 RX ADMIN — PROPOFOL 100 MCG/KG/MIN: 10 INJECTION, EMULSION INTRAVENOUS at 14:02

## 2025-05-29 RX ADMIN — SPIRONOLACTONE 12.5 MG: 25 TABLET ORAL at 10:01

## 2025-05-29 RX ADMIN — SODIUM CHLORIDE, PRESERVATIVE FREE 40 MG: 5 INJECTION INTRAVENOUS at 16:58

## 2025-05-29 RX ADMIN — ROSUVASTATIN CALCIUM 10 MG: 10 TABLET, FILM COATED ORAL at 21:25

## 2025-05-29 RX ADMIN — MIDAZOLAM 2 MG: 1 INJECTION INTRAMUSCULAR; INTRAVENOUS at 14:01

## 2025-05-29 RX ADMIN — ARFORMOTEROL TARTRATE 15 MCG: 15 SOLUTION RESPIRATORY (INHALATION) at 04:57

## 2025-05-29 RX ADMIN — GABAPENTIN 100 MG: 100 CAPSULE ORAL at 10:01

## 2025-05-29 RX ADMIN — SODIUM CHLORIDE: 9 INJECTION, SOLUTION INTRAVENOUS at 13:55

## 2025-05-29 ASSESSMENT — PAIN DESCRIPTION - LOCATION
LOCATION: BACK;LEG
LOCATION: BACK;LEG
LOCATION: GENERALIZED;LEG
LOCATION: KNEE

## 2025-05-29 ASSESSMENT — PAIN DESCRIPTION - DESCRIPTORS
DESCRIPTORS: THROBBING
DESCRIPTORS: ACHING;CRAMPING;DISCOMFORT

## 2025-05-29 ASSESSMENT — LIFESTYLE VARIABLES: SMOKING_STATUS: 1

## 2025-05-29 ASSESSMENT — PAIN SCALES - GENERAL
PAINLEVEL_OUTOF10: 5
PAINLEVEL_OUTOF10: 5
PAINLEVEL_OUTOF10: 6
PAINLEVEL_OUTOF10: 5

## 2025-05-29 ASSESSMENT — PAIN DESCRIPTION - ORIENTATION
ORIENTATION: RIGHT;LEFT;ANTERIOR;POSTERIOR
ORIENTATION: RIGHT;LEFT;ANTERIOR;POSTERIOR
ORIENTATION: LEFT
ORIENTATION: LEFT;ANTERIOR;POSTERIOR

## 2025-05-29 ASSESSMENT — PAIN - FUNCTIONAL ASSESSMENT: PAIN_FUNCTIONAL_ASSESSMENT: PREVENTS OR INTERFERES WITH MANY ACTIVE NOT PASSIVE ACTIVITIES

## 2025-05-29 NOTE — ANESTHESIA POSTPROCEDURE EVALUATION
Department of Anesthesiology  Postprocedure Note    Patient: Faiza Romero  MRN: [id number]  YOB: 1970  Date of evaluation: 5/29/2025    Procedure Summary       Date: 05/29/25 Room / Location: [place] [address] / [facility]    Anesthesia Start: 1355 Anesthesia Stop: 1428    Procedure: COLONOSCOPY DIAGNOSTIC Diagnosis:       Gastrointestinal hemorrhage, unspecified gastrointestinal hemorrhage type      (Gastrointestinal hemorrhage, unspecified gastrointestinal hemorrhage type [K92.2])    Surgeons: Jose Armendariz MD Responsible Provider: Tian Stein MD    Anesthesia Type: MAC ASA Status: 3            Anesthesia Type: No value filed.    Jose Phase I:      Jose Phase II: Jose Score: 9    Anesthesia Post Evaluation    Patient location during evaluation: PACU  Patient participation: complete - patient participated  Level of consciousness: awake and alert  Airway patency: patent  Nausea & Vomiting: no nausea and no vomiting  Cardiovascular status: hemodynamically stable  Respiratory status: acceptable  Hydration status: euvolemic  Pain management: satisfactory to patient    No notable events documented.

## 2025-05-29 NOTE — PLAN OF CARE
Problem: Chronic Conditions and Co-morbidities  Goal: Patient's chronic conditions and co-morbidity symptoms are monitored and maintained or improved  5/29/2025 1334 by Kaya Castellanos, RN  Outcome: Progressing     Problem: Skin/Tissue Integrity  Goal: Skin integrity remains intact  Description: 1.  Monitor for areas of redness and/or skin breakdown2.  Assess vascular access sites hourly3.  Every 4-6 hours minimum:  Change oxygen saturation probe site4.  Every 4-6 hours:  If on nasal continuous positive airway pressure, respiratory therapy assess nares and determine need for appliance change or resting period  Outcome: Progressing     Problem: ABCDS Injury Assessment  Goal: Absence of physical injury  5/29/2025 1334 by Kaya Castellanos, RN  Outcome: Progressing     Problem: Safety - Adult  Goal: Free from fall injury  Outcome: Progressing     Problem: Pain  Goal: Verbalizes/displays adequate comfort level or baseline comfort level  Outcome: Progressing

## 2025-05-29 NOTE — CARE COORDINATION
Case Management Assessment  Initial Evaluation    Date/Time of Evaluation: 5/29/2025 11:56 AM  Assessment Completed by: JOHNNY Weems    If patient is discharged prior to next notation, then this note serves as note for discharge by case management.    Patient Name: Faiza Romero                   YOB: 1970  Diagnosis: Acute blood loss anemia [D62]  Colitis [K52.9]  Rectal bleeding [K62.5]  Transaminitis [R74.01]  Blood loss anemia [D50.0]  Anemia requiring transfusions [D64.9]  Swelling of left lower extremity [M79.89]                   Date / Time: 5/27/2025 10:22 AM    Patient Admission Status: Inpatient   Readmission Risk (Low < 19, Mod (19-27), High > 27): Readmission Risk Score: 28.4    Current PCP: Mariana London [facility]  PCP verified by CM? Yes    Chart Reviewed: Yes      History Provided by: Patient  Patient Orientation: Alert and Oriented, Person, Place, Situation    Patient Cognition: Alert    Hospitalization in the last 30 days (Readmission):  Yes    If yes, Readmission Assessment in CM Navigator will be completed.    Advance Directives:      Code Status: Full Code   Patient's Primary Decision Maker is: Legal Next of Kin    Primary Decision Maker: Varghese Romero - Spouse - [phone]    Discharge Planning:    Patient lives with: Spouse/Significant Other, Children Type of Home: House  Primary Care Giver: Self (spouse and two adult children in home.)  Patient Support Systems include: Spouse/Significant Other two adult children in the home.     Current Financial resources:    Current community resources:    Current services prior to admission: Home Care, Oxygen Therapy (pt/ot)            Current DME:  has home 02 via [facility]. Ww, shower chair.             Type of Home Care services:  Active with [facility] need resume orders.     ADLS  Prior functional level: Assistance with the following:, Mobility (uses ww for longer distances, furniture walker in  [R74.01]  Blood loss anemia [D50.0]  Anemia requiring transfusions [D64.9]  Swelling of left lower extremity [M79.89]

## 2025-05-29 NOTE — ANESTHESIA PRE PROCEDURE
Department of Anesthesiology  Preprocedure Note       Name:  Faiza Romero   Age:  54 y.o.  :  1970                                          MRN:  [id number]         Date:  2025      Surgeon: Surgeon(s):  Jose Armendariz MD    Procedure: Procedure(s):  COLONOSCOPY DIAGNOSTIC    Medications prior to admission:   Prior to Admission medications    Medication Sig Start Date End Date Taking? Authorizing Provider   levothyroxine (SYNTHROID) 200 MCG tablet Take 1 tablet by mouth every morning (before breakfast) Take with Levothyroxine 50 mcg for a total dose of 250 mcg daily.   Yes Samson Aly MD   levothyroxine (SYNTHROID) 50 MCG tablet Take 1 tablet by mouth every morning (before breakfast) Take with Levothyroxine 200 mcg for a total dose of 250 mcg daily.   Yes Samson Aly MD   OXYGEN Inhale 3 L into the lungs continuous   Yes Samson Aly MD   Baclofen (LIORESAL) 5 MG tablet Take 2 tablets by mouth 3 times daily   Yes Samson Aly MD   doxycycline hyclate (VIBRAMYCIN) 100 MG capsule Take 1 capsule by mouth 2 times daily 25 Yes Samson Aly MD   vitamin D (ERGOCALCIFEROL) 1.25 MG (21258 UT) CAPS capsule Take 1 capsule by mouth once a week Thursday   Yes Samson Aly MD   gabapentin (NEURONTIN) 100 MG capsule Take 1 capsule by mouth 3 times daily.   Yes Samson Aly MD   mupirocin (BACTROBAN) 2 % ointment Apply 1 each topically daily Apply topically to right foot wound.   Yes Samson Aly MD   sertraline (ZOLOFT) 50 MG tablet Take 1 tablet by mouth daily   Yes Samson Aly MD   spironolactone (ALDACTONE) 25 MG tablet Take 0.5 tablets by mouth daily 5/15/25  Yes Kaylah Mares PA   empagliflozin (JARDIANCE) 10 MG tablet Take 1 tablet by mouth daily 25  Yes Balwinder Rae MD   amLODIPine (NORVASC) 10 MG tablet 1 tablet by Orogastric route daily  Patient taking differently: Take 1 tablet by

## 2025-05-29 NOTE — FLOWSHEET NOTE
Inpatient Wound Care    Admit Date: 5/27/2025 10:22 AM    Reason for consult:  coccyx and left foot    Significant history:  Per H&P:   Faiza Romero is a 54 y.o. female who presents to the emergency department with chief complaint of abnormal labs consisting of low hemoglobin.  Patient states that she has been having intermittent rectal bleeding she suspects from rectal hemorrhoid over the past several weeks.  States that every time she has bowel movement she has large amount of rectal bleeding in the toilet bowl does have large amount of bright red blood in it.  States that the bleeding slows down after the bowel movement but she has been becoming increasingly weak and fatigued.  Had outpatient blood work done recently and was called today stating that her blood count was less than 7 and she needed to come to the hospital.  She has been anemic requiring transfusion during a previous hospital admission. She apparently had GI bleeding and colitis at [place] [place] previously, however does not remember what if anything was found. She does not remember the name of the GI doctor and did not follow up. She is on aspirin and plavix for heart disease. She does state that over the last couple of days she has been more fatigued than normal and has been feeling weak but has not passed out and has not felt lightheaded.  Patient has no numbness elsewhere and states that she has not had any hematemesis.  Does endorse mild abdominal cramping in the right upper quadrant and was recently diagnosed with colitis as well, however was not started on medications for this.  Denies any fevers, chills, nausea, vomiting, chest pain, shortness of breath, hematuria or dysuria, constipation or diarrhea. She did take her medications this am. Her eating has been sporadic over the past 24 hours. Notes hx of constipation but states that she is regular now. She is at her baseline o2 status on nasal cannula.  Workup in the ER showed BUN 32, creatinine    Wound Width (cm) 1.2 cm   Wound Depth (cm) 0 cm   Wound Surface Area (cm^2) 0.6 cm^2   Change in Wound Size % (l*w) -25   Wound Volume (cm^3) 0 cm^3   Wound Assessment   (dry yellow tissue)   Drainage Amount None (dry)   Odor None   Vika-wound Assessment Dry/flaky     **Informed Consent**    The patient has given verbal consent to have photos taken of wounds  and inserted into their chart as part of their permanent medical record for purposes of documentation, treatment management and/or medical review.   All Images taken on 5/29/25 of patient name: Faiza Romero were transmitted and stored on secured Epic  Site located within Media Folder Tab by a registered Epic-Ha[employer] Mobile Application Device.      Impression:  bilateral buttock- erosion from buttock rubbing together  Left leg- scabs   Right foot second toe- per patient-traumatic wound    Plan: Continue with Bacitracin and band-aid to right foot second toe  Calmoseptine ointment to buttock  Eucerin cream to bilateral LE and feet  TAPS  Float heels while in bed  Will need continued preventive care    Martha Mahan RN 5/29/2025 1:41 PM

## 2025-05-29 NOTE — OP NOTE
Medication:   Requested Prescriptions     Pending Prescriptions Disp Refills    tiZANidine (ZANAFLEX) 4 MG tablet [Pharmacy Med Name: TIZANIDINE 4MG TABLETS] 90 tablet 5     Sig: TAKE 1 TABLET BY MOUTH THREE TIMES DAILY        Last Filled:  10/10/2023, 90, 5    Patient Phone Number: [phone] (home)     Last appt: 6/10/2024   Next appt: Visit date not found    Last OARRS:       6/10/2024     8:35 AM   RX Monitoring   Periodic Controlled Substance Monitoring No signs of potential drug abuse or diversion identified.          Operative Note      Patient: Faiza Romero  YOB: 1970  MRN: [id number]    Date of Procedure: 5/29/2025    Pre-Op Diagnosis Codes:      * Gastrointestinal hemorrhage, unspecified gastrointestinal hemorrhage type [K92.2]    Post-Op Diagnosis: Suboptimal prep, otherwise the visualized mucosa was normal.       Procedure(s):  COLONOSCOPY DIAGNOSTIC    Surgeon(s):  Jose Armendariz MD    Assistant:   Surgical Assistant: Mary Chavez RN    Anesthesia: Monitor Anesthesia Care    Estimated Blood Loss (mL): None    Complications: None    Specimens:   * No specimens in log *    Implants:  * No implants in log *      Drains: * No LDAs found *    Findings:  Infection Present At Time Of Surgery (PATOS) (choose all levels that have infection present):      Other Findings:      Detailed Description of Procedure:   64-year-old female patient, with anemia.    Digital rectal exam: Anatomic.  The colonoscope was introduced to the anus.  It was advanced gently to the cecum.  The prep was suboptimal throughout, the visualized mucosa was normal.  Normal hemorrhoidal cushion.    Discharge to floor.  Resume hospital care.  Needs colonoscopy within 1 year after an aggressive colonic preparation.  Further GI plans as per Dr. Keys.    Electronically signed by Jose Armendariz MD on 5/29/2025 at 1:49 PM

## 2025-05-29 NOTE — PROGRESS NOTES
[facility] Hospitalist Progress Note    Admitting Date and Time: 5/27/2025 10:22 AM  Admit Dx: Acute blood loss anemia [D62]  Colitis [K52.9]  Rectal bleeding [K62.5]  Transaminitis [R74.01]  Blood loss anemia [D50.0]  Anemia requiring transfusions [D64.9]  Swelling of left lower extremity [M79.89]      Assessment:    Principal Problem:    Blood loss anemia  Active Problems:    CAD (coronary artery disease)    COPD (chronic obstructive pulmonary disease) (HCC)    Type 2 diabetes mellitus with circulatory disorder (HCC)    ANIRUDH (acute kidney injury)    Anemia requiring transfusions    Open wound of right foot  Resolved Problems:    * No resolved hospital problems. *      Plan:  5/27/2025  Blood loss anemia, likely 2/2 Colitis, seen on previous imaging; formed stools, but recent abx use x 2  --flagyl and rocephin  --consult to gi  --clear liquids at this time  --pt reports stool is formed  --transfuse to keep hgb >7, however w/ hx of cad would prefer to keep it >8  --holding antiplatelet agents  --defer further w/u to GI for gi bleeding     Left lower ext wound, right foot wounds  --follows w/ dr maldonado, was on doxy as an outpatient  --consult podiatry for wound care in house     Left lower extremity swelling and pain  --order dvt us  --lidocaine patch to area of tenderness     Type 2 dm  --hypoglycemia tx orders  --pt on clear liquids, will monitor sugars and use iss accordingly  --holding sulfonylureas     Copd, chronic, no acute exacerbation  --continue at home medications as previously prescribed  --at baseline o2 status     [place]  --strongly suspected on previous admission, awaiting home sleep study, scheduled for next week     5/28/2025  --remains on flagyl and rocephin for colitis  --npo for possible endoscopy today?  --hgb stable at 7.1 post transfusion, continue to monitor q6h  --cirrhotic w/u pending per gi  --podiatry following for leg and foot wounds, known to their service outpatient  --control  mg Nebulization BID RT    And    arformoterol tartrate  15 mcg Nebulization BID RT    And    ipratropium  0.5 mg Nebulization 4x Daily RT    cefTRIAXone (ROCEPHIN) IV  1,000 mg IntraVENous Q24H    metroNIDAZOLE  500 mg IntraVENous Q8H    pantoprazole (PROTONIX) 40 mg in sodium chloride (PF) 0.9 % 10 mL injection  40 mg IntraVENous Q12H    amLODIPine  10 mg Orogastric Daily    [Held by provider] aspirin  81 mg Oral Daily    [Held by provider] clopidogrel  75 mg Oral Daily    empagliflozin  10 mg Oral Daily    furosemide  40 mg Oral BID    [Held by provider] glipiZIDE  5 mg Oral BID    levothyroxine  250 mcg Oral Daily    losartan  100 mg Oral Daily    rosuvastatin  10 mg Oral Nightly    spironolactone  12.5 mg Oral Daily    lidocaine  1 patch TransDERmal Daily    methadone  47 mg Oral Daily    sodium chloride flush  5-40 mL IntraVENous 2 times per day    melatonin  6 mg Oral Nightly    gabapentin  100 mg Oral TID    sertraline  50 mg Oral Daily     sodium chloride, , PRN  glucose, 4 tablet, PRN  dextrose bolus, 125 mL, PRN   Or  dextrose bolus, 250 mL, PRN  glucagon (rDNA), 1 mg, PRN  dextrose, , Continuous PRN  acetaminophen, 650 mg, Q4H PRN  albuterol, 2.5 mg, Q6H PRN  nitroGLYCERIN, 0.4 mg, Q5 Min PRN  prochlorperazine, 10 mg, Q8H PRN  sodium chloride flush, 5-40 mL, PRN  sodium chloride, , PRN  potassium chloride, 40 mEq, PRN   Or  potassium alternative oral replacement, 40 mEq, PRN   Or  potassium chloride, 10 mEq, PRN  magnesium sulfate, 2,000 mg, PRN  ondansetron, 4 mg, Q8H PRN   Or  ondansetron, 4 mg, Q6H PRN  polyethylene glycol, 17 g, Daily PRN  acetaminophen, 650 mg, Q6H PRN   Or  acetaminophen, 650 mg, Q6H PRN         Objective:    /76   Pulse 71   Temp 98.3 °F (36.8 °C) (Oral)   Resp 17   Ht 1.727 m (5' 8\")   Wt 95.3 kg (210 lb 1.6 oz)   SpO2 100%   BMI 31.95 kg/m²   Constitutional:       General: She is not in acute distress.     Appearance: She is obese.      Comments: Diffusely pallorous        Greater than 35 minutes spent in face-to-face encounter with the patient.  Additional time spent in record and chart review.      NOTE: This report was transcribed using voice recognition software. Every effort was made to ensure accuracy; however, inadvertent computerized transcription errors may be present.    Electronically signed by Tara Shoemaker DO on 5/29/2025 at 7:29 AM

## 2025-05-29 NOTE — PROGRESS NOTES
Patient seen and examined prior to procedure.  Tolerated prep well however stool is still not clear.  Patient is refusing reprepping for tomorrow but is willing to undergo tapwater enemas until clear.  H&P reviewed -no new changes except as described above.  Vitals:    05/28/25 2230   BP: 130/76   Pulse: 71   Resp:    Temp:    SpO2:      Plan: Proceed with colonoscopy after enemas  Please, see orders for plan of care.  Thank you    Raimundo Molina MD

## 2025-05-30 LAB
ALBUMIN SERPL-MCNC: 3.7 G/DL (ref 3.5–5.2)
ALP SERPL-CCNC: 149 U/L (ref 35–104)
ALT SERPL-CCNC: 29 U/L (ref 0–32)
ANION GAP SERPL CALCULATED.3IONS-SCNC: 13 MMOL/L (ref 7–16)
AST SERPL-CCNC: 22 U/L (ref 0–31)
BASOPHILS # BLD: 0.07 K/UL (ref 0–0.2)
BASOPHILS NFR BLD: 1 % (ref 0–2)
BILIRUB SERPL-MCNC: 0.3 MG/DL (ref 0–1.2)
BUN SERPL-MCNC: 14 MG/DL (ref 6–20)
CALCIUM SERPL-MCNC: 9.2 MG/DL (ref 8.6–10.2)
CHLORIDE SERPL-SCNC: 97 MMOL/L (ref 98–107)
CO2 SERPL-SCNC: 30 MMOL/L (ref 22–29)
CREAT SERPL-MCNC: 1 MG/DL (ref 0.5–1)
EOSINOPHIL # BLD: 0.78 K/UL (ref 0.05–0.5)
EOSINOPHILS RELATIVE PERCENT: 8 % (ref 0–6)
ERYTHROCYTE [DISTWIDTH] IN BLOOD BY AUTOMATED COUNT: 15.7 % (ref 11.5–15)
GFR, ESTIMATED: 68 ML/MIN/1.73M2
GLIADIN IGA SER IA-ACNC: 5.1 U/ML
GLIADIN IGG SER IA-ACNC: 0.9 U/ML
GLUCOSE SERPL-MCNC: 123 MG/DL (ref 74–99)
HCT VFR BLD AUTO: 24.5 % (ref 34–48)
HGB BLD-MCNC: 7.7 G/DL (ref 11.5–15.5)
IMM GRANULOCYTES # BLD AUTO: 0.04 K/UL (ref 0–0.58)
IMM GRANULOCYTES NFR BLD: 0 % (ref 0–5)
LABORATORY REPORT: NORMAL
LYMPHOCYTES NFR BLD: 1.19 K/UL (ref 1.5–4)
LYMPHOCYTES RELATIVE PERCENT: 12 % (ref 20–42)
MAGNESIUM SERPL-MCNC: 1.8 MG/DL (ref 1.6–2.6)
MCH RBC QN AUTO: 28.1 PG (ref 26–35)
MCHC RBC AUTO-ENTMCNC: 31.4 G/DL (ref 32–34.5)
MCV RBC AUTO: 89.4 FL (ref 80–99.9)
MONOCYTES NFR BLD: 0.82 K/UL (ref 0.1–0.95)
MONOCYTES NFR BLD: 8 % (ref 2–12)
NEUTROPHILS NFR BLD: 71 % (ref 43–80)
NEUTS SEG NFR BLD: 7.27 K/UL (ref 1.8–7.3)
PETH INTERPRETATION: NORMAL
PLATELET # BLD AUTO: 401 K/UL (ref 130–450)
PLPETH BLD-MCNC: <10 NG/ML
PMV BLD AUTO: 10.7 FL (ref 7–12)
POPETH BLD-MCNC: <10 NG/ML
POTASSIUM SERPL-SCNC: 2.6 MMOL/L (ref 3.5–5)
POTASSIUM SERPL-SCNC: 3.2 MMOL/L (ref 3.5–5)
PROT SERPL-MCNC: 7.6 G/DL (ref 6.4–8.3)
RBC # BLD AUTO: 2.74 M/UL (ref 3.5–5.5)
SODIUM SERPL-SCNC: 140 MMOL/L (ref 132–146)
WBC OTHER # BLD: 10.2 K/UL (ref 4.5–11.5)

## 2025-05-30 PROCEDURE — 84132 ASSAY OF SERUM POTASSIUM: CPT

## 2025-05-30 PROCEDURE — 36415 COLL VENOUS BLD VENIPUNCTURE: CPT

## 2025-05-30 PROCEDURE — 2580000003 HC RX 258: Performed by: FAMILY MEDICINE

## 2025-05-30 PROCEDURE — 6370000000 HC RX 637 (ALT 250 FOR IP): Performed by: FAMILY MEDICINE

## 2025-05-30 PROCEDURE — 6360000002 HC RX W HCPCS: Performed by: FAMILY MEDICINE

## 2025-05-30 PROCEDURE — 80053 COMPREHEN METABOLIC PANEL: CPT

## 2025-05-30 PROCEDURE — 99233 SBSQ HOSP IP/OBS HIGH 50: CPT | Performed by: INTERNAL MEDICINE

## 2025-05-30 PROCEDURE — 2500000003 HC RX 250 WO HCPCS: Performed by: FAMILY MEDICINE

## 2025-05-30 PROCEDURE — 1200000000 HC SEMI PRIVATE

## 2025-05-30 PROCEDURE — 6370000000 HC RX 637 (ALT 250 FOR IP): Performed by: INTERNAL MEDICINE

## 2025-05-30 PROCEDURE — 85025 COMPLETE CBC W/AUTO DIFF WBC: CPT

## 2025-05-30 PROCEDURE — 83735 ASSAY OF MAGNESIUM: CPT

## 2025-05-30 PROCEDURE — 94640 AIRWAY INHALATION TREATMENT: CPT

## 2025-05-30 PROCEDURE — 2700000000 HC OXYGEN THERAPY PER DAY

## 2025-05-30 PROCEDURE — 94761 N-INVAS EAR/PLS OXIMETRY MLT: CPT

## 2025-05-30 RX ORDER — POTASSIUM CHLORIDE 1500 MG/1
40 TABLET, EXTENDED RELEASE ORAL ONCE
Status: COMPLETED | OUTPATIENT
Start: 2025-05-30 | End: 2025-05-30

## 2025-05-30 RX ADMIN — WATER 1000 MG: 1 INJECTION INTRAMUSCULAR; INTRAVENOUS; SUBCUTANEOUS at 14:56

## 2025-05-30 RX ADMIN — BACITRACIN ZINC, NEOMYCIN, POLYMYXIN B SULFAT: 5000; 3.5; 4 OINTMENT TOPICAL at 09:14

## 2025-05-30 RX ADMIN — METHADONE HYDROCHLORIDE 47 MG: 10 CONCENTRATE ORAL at 09:05

## 2025-05-30 RX ADMIN — SPIRONOLACTONE 12.5 MG: 25 TABLET ORAL at 09:20

## 2025-05-30 RX ADMIN — ACETAMINOPHEN 650 MG: 325 TABLET ORAL at 17:28

## 2025-05-30 RX ADMIN — LEVOTHYROXINE SODIUM 250 MCG: 0.1 TABLET ORAL at 05:07

## 2025-05-30 RX ADMIN — POTASSIUM CHLORIDE 10 MEQ: 7.46 INJECTION, SOLUTION INTRAVENOUS at 16:06

## 2025-05-30 RX ADMIN — ALBUTEROL SULFATE 2.5 MG: 2.5 SOLUTION RESPIRATORY (INHALATION) at 18:25

## 2025-05-30 RX ADMIN — IPRATROPIUM BROMIDE 0.5 MG: 0.5 SOLUTION RESPIRATORY (INHALATION) at 06:39

## 2025-05-30 RX ADMIN — EMPAGLIFLOZIN 10 MG: 10 TABLET, FILM COATED ORAL at 09:11

## 2025-05-30 RX ADMIN — Medication 10 ML: at 20:29

## 2025-05-30 RX ADMIN — METRONIDAZOLE 500 MG: 500 INJECTION, SOLUTION INTRAVENOUS at 23:58

## 2025-05-30 RX ADMIN — ACETAMINOPHEN 650 MG: 325 TABLET ORAL at 05:09

## 2025-05-30 RX ADMIN — POTASSIUM CHLORIDE 10 MEQ: 7.46 INJECTION, SOLUTION INTRAVENOUS at 10:40

## 2025-05-30 RX ADMIN — SODIUM CHLORIDE, PRESERVATIVE FREE 40 MG: 5 INJECTION INTRAVENOUS at 04:13

## 2025-05-30 RX ADMIN — ANORECTAL OINTMENT: 15.7; .44; 24; 20.6 OINTMENT TOPICAL at 00:15

## 2025-05-30 RX ADMIN — POTASSIUM CHLORIDE 10 MEQ: 7.46 INJECTION, SOLUTION INTRAVENOUS at 13:39

## 2025-05-30 RX ADMIN — ARFORMOTEROL TARTRATE 15 MCG: 15 SOLUTION RESPIRATORY (INHALATION) at 06:40

## 2025-05-30 RX ADMIN — BUDESONIDE 1000 MCG: 0.5 SUSPENSION RESPIRATORY (INHALATION) at 18:25

## 2025-05-30 RX ADMIN — GABAPENTIN 100 MG: 100 CAPSULE ORAL at 09:10

## 2025-05-30 RX ADMIN — ANORECTAL OINTMENT: 15.7; .44; 24; 20.6 OINTMENT TOPICAL at 09:21

## 2025-05-30 RX ADMIN — BACLOFEN 10 MG: 10 TABLET ORAL at 09:11

## 2025-05-30 RX ADMIN — POTASSIUM CHLORIDE 10 MEQ: 7.46 INJECTION, SOLUTION INTRAVENOUS at 14:55

## 2025-05-30 RX ADMIN — AMLODIPINE BESYLATE 10 MG: 10 TABLET ORAL at 09:12

## 2025-05-30 RX ADMIN — FUROSEMIDE 40 MG: 40 TABLET ORAL at 09:10

## 2025-05-30 RX ADMIN — ACETAMINOPHEN 650 MG: 325 TABLET ORAL at 13:33

## 2025-05-30 RX ADMIN — METRONIDAZOLE 500 MG: 500 INJECTION, SOLUTION INTRAVENOUS at 00:13

## 2025-05-30 RX ADMIN — POTASSIUM CHLORIDE 10 MEQ: 7.46 INJECTION, SOLUTION INTRAVENOUS at 12:09

## 2025-05-30 RX ADMIN — SERTRALINE 50 MG: 50 TABLET, FILM COATED ORAL at 09:13

## 2025-05-30 RX ADMIN — SODIUM CHLORIDE, PRESERVATIVE FREE 40 MG: 5 INJECTION INTRAVENOUS at 15:10

## 2025-05-30 RX ADMIN — POTASSIUM CHLORIDE 40 MEQ: 1500 TABLET, EXTENDED RELEASE ORAL at 12:19

## 2025-05-30 RX ADMIN — ARFORMOTEROL TARTRATE 15 MCG: 15 SOLUTION RESPIRATORY (INHALATION) at 18:25

## 2025-05-30 RX ADMIN — ONDANSETRON 4 MG: 4 TABLET, ORALLY DISINTEGRATING ORAL at 01:22

## 2025-05-30 RX ADMIN — ROSUVASTATIN CALCIUM 10 MG: 10 TABLET, FILM COATED ORAL at 20:27

## 2025-05-30 RX ADMIN — GABAPENTIN 100 MG: 100 CAPSULE ORAL at 13:33

## 2025-05-30 RX ADMIN — Medication 10 ML: at 10:43

## 2025-05-30 RX ADMIN — ANORECTAL OINTMENT: 15.7; .44; 24; 20.6 OINTMENT TOPICAL at 20:28

## 2025-05-30 RX ADMIN — METRONIDAZOLE 500 MG: 500 INJECTION, SOLUTION INTRAVENOUS at 06:22

## 2025-05-30 RX ADMIN — PETROLATUM: 420 OINTMENT TOPICAL at 09:22

## 2025-05-30 RX ADMIN — BUDESONIDE 1000 MCG: 0.5 SUSPENSION RESPIRATORY (INHALATION) at 06:39

## 2025-05-30 RX ADMIN — IPRATROPIUM BROMIDE 0.5 MG: 0.5 SOLUTION RESPIRATORY (INHALATION) at 18:25

## 2025-05-30 RX ADMIN — IPRATROPIUM BROMIDE 0.5 MG: 0.5 SOLUTION RESPIRATORY (INHALATION) at 09:48

## 2025-05-30 RX ADMIN — POTASSIUM CHLORIDE 10 MEQ: 7.46 INJECTION, SOLUTION INTRAVENOUS at 09:00

## 2025-05-30 RX ADMIN — BACLOFEN 10 MG: 10 TABLET ORAL at 13:32

## 2025-05-30 RX ADMIN — ACETAMINOPHEN 650 MG: 325 TABLET ORAL at 00:14

## 2025-05-30 RX ADMIN — FUROSEMIDE 40 MG: 40 TABLET ORAL at 20:27

## 2025-05-30 RX ADMIN — BACLOFEN 10 MG: 10 TABLET ORAL at 20:27

## 2025-05-30 RX ADMIN — IPRATROPIUM BROMIDE 0.5 MG: 0.5 SOLUTION RESPIRATORY (INHALATION) at 13:47

## 2025-05-30 RX ADMIN — ALBUTEROL SULFATE 2.5 MG: 2.5 SOLUTION RESPIRATORY (INHALATION) at 06:39

## 2025-05-30 RX ADMIN — GABAPENTIN 100 MG: 100 CAPSULE ORAL at 20:27

## 2025-05-30 RX ADMIN — METRONIDAZOLE 500 MG: 500 INJECTION, SOLUTION INTRAVENOUS at 16:05

## 2025-05-30 RX ADMIN — ALBUTEROL SULFATE 2.5 MG: 2.5 SOLUTION RESPIRATORY (INHALATION) at 13:47

## 2025-05-30 RX ADMIN — LOSARTAN POTASSIUM 100 MG: 50 TABLET, FILM COATED ORAL at 09:11

## 2025-05-30 ASSESSMENT — PAIN DESCRIPTION - LOCATION
LOCATION: LEG
LOCATION: LEG
LOCATION: KNEE

## 2025-05-30 ASSESSMENT — PAIN SCALES - GENERAL
PAINLEVEL_OUTOF10: 4
PAINLEVEL_OUTOF10: 1
PAINLEVEL_OUTOF10: 5
PAINLEVEL_OUTOF10: 8
PAINLEVEL_OUTOF10: 6
PAINLEVEL_OUTOF10: 2
PAINLEVEL_OUTOF10: 5
PAINLEVEL_OUTOF10: 5
PAINLEVEL_OUTOF10: 0
PAINLEVEL_OUTOF10: 5

## 2025-05-30 ASSESSMENT — PAIN DESCRIPTION - ORIENTATION
ORIENTATION: LEFT

## 2025-05-30 ASSESSMENT — PAIN DESCRIPTION - DESCRIPTORS
DESCRIPTORS: ACHING
DESCRIPTORS: ACHING;DISCOMFORT;SORE
DESCRIPTORS: ACHING;SHARP
DESCRIPTORS: DISCOMFORT;ACHING;THROBBING
DESCRIPTORS: ACHING

## 2025-05-30 NOTE — CARE COORDINATION
Ss note:5/30/2025 11:22 AM Plan is to return home with spouse and family. Pt has 02 via [facility], is active with [facility], resume orders noted and referral completed to Varghese with [facility]. Family to transport home at dischare. JOHNNY Mcmahon

## 2025-05-30 NOTE — PROGRESS NOTES
PROGRESS NOTE  By Raimundo Molina M.D.    [facility]  Dr. Henrry Finley M.D.,  Dr. Inder Rose M.D.,   Dr. Srinath Ty D.O.,  Dr. Yeison Ferrera M.D.,  Dr. Jordan Escudero D.O.,          Faiza Romero  54 y.o.  female    SUBJECTIVE:  Denies abdominal pain.  Denies nausea or vomiting    OBJECTIVE:    /76   Pulse 72   Temp 98 °F (36.7 °C) (Oral)   Resp 18   Ht 1.727 m (5' 8\")   Wt 89.8 kg (198 lb)   SpO2 93%   BMI 30.11 kg/m²     General: NAD/adult overweight  female.  AAO x 3  HEENT: Anicteric sclera/moist oral mucosa  Neck: Supple with trachea midline  Chest: Symmetric excursion/nonlabored respirations  Cor: Regular  Abd.: Soft.  Obese.  Nontender  Extr.:  Decreased muscle tone and bulk, consistent with age and condition  Skin: Warm and dry/anicteric      DATA:    Monitor data reviewed -sinus rhythm noted.       Lab Results   Component Value Date/Time    WBC 10.2 05/30/2025 06:31 AM    RBC 2.74 05/30/2025 06:31 AM    HGB 7.7 05/30/2025 06:31 AM    HCT 24.5 05/30/2025 06:31 AM    MCV 89.4 05/30/2025 06:31 AM    MCH 28.1 05/30/2025 06:31 AM    MCHC 31.4 05/30/2025 06:31 AM    RDW 15.7 05/30/2025 06:31 AM     05/30/2025 06:31 AM    MPV 10.7 05/30/2025 06:31 AM     Lab Results   Component Value Date/Time     05/30/2025 06:31 AM    K 2.6 05/30/2025 06:31 AM    K 3.7 04/11/2022 06:42 PM    CL 97 05/30/2025 06:31 AM    CO2 30 05/30/2025 06:31 AM    BUN 14 05/30/2025 06:31 AM    CREATININE 1.0 05/30/2025 06:31 AM    CALCIUM 9.2 05/30/2025 06:31 AM    BILITOT 0.3 05/30/2025 06:31 AM    ALKPHOS 149 05/30/2025 06:31 AM    AST 22 05/30/2025 06:31 AM    ALT 29 05/30/2025 06:31 AM     Lab Results   Component Value Date/Time    LIPASE 17 05/18/2025 08:21 AM     Lab Results   Component Value Date/Time    AMYLASE 39 07/29/2019 09:45 PM         ASSESSMENT/PLAN:  Patient Active Problem List   Diagnosis    Leukocytosis    Essential hypertension    History of drug abuse (HCC)     POV.  Follow-up in the office in 2 to 3 weeks -patient to call for appointment and with questions/concerns at [phone].  Thank you for the opportunity to participate in the care of . Heather       Raimundo Molina MD  5/30/2025  9:40 AM    NOTE:  This report was transcribed using voice recognition software.  Every effort was made to ensure accuracy; however, inadvertent computerized transcription errors may be present.

## 2025-05-30 NOTE — PROGRESS NOTES
Progress  Note  Faiza Romero  [id number]  1970  [id number]    Chief Complaint   Patient presents with    OTHER     Pt sent in from PCP for low hgb. Pt having some abdominal soreness, but no other symptoms. Chronically on 3L O2     Historical Issues:    Principal Problem:    Blood loss anemia  Active Problems:    CAD (coronary artery disease)    COPD (chronic obstructive pulmonary disease) ([facility])    Type 2 diabetes mellitus with circulatory disorder (HCC)    ANIRUDH (acute kidney injury)    Anemia requiring transfusions    Open wound of right foot  Resolved Problems:    * No resolved hospital problems. *    Current Facility-Administered Medications   Medication Dose Route Frequency Provider Last Rate Last Admin    baclofen (LIORESAL) tablet 10 mg  10 mg Oral TID Tara Shoemaker, DO   10 mg at 05/30/25 0911    menthol-zinc oxide (CALMOSEPTINE) 0.44-20.6 % ointment   Topical BID Tara Shoemaker, DO   Given at 05/30/25 0921    neomycin-bacitracin-polymyxin (NEOSPORIN) ointment   Topical Daily Tara Shoemaker, DO   Given at 05/30/25 0914    Petrolatum 42 % ointment   Topical Daily Tara Shoemaker, DO   Given at 05/30/25 0922    budesonide (PULMICORT) nebulizer suspension 1,000 mcg  1 mg Nebulization BID RT Tara Shoemaker, DO   1,000 mcg at 05/30/25 0639    And    arformoterol tartrate (BROVANA) nebulizer solution 15 mcg  15 mcg Nebulization BID RT Tara Shoemaker, DO   15 mcg at 05/30/25 0640    And    ipratropium (ATROVENT) 0.02 % nebulizer solution 0.5 mg  0.5 mg Nebulization 4x Daily RT Tara Shoemaker, DO   0.5 mg at 05/30/25 0948    0.9 % sodium chloride infusion   IntraVENous PRN Luis Miguel Minor DO        glucose chewable tablet 16 g  4 tablet Oral PRN Tara Shoemaker, DO        dextrose bolus 10% 125 mL  125 mL IntraVENous PRN Tara Shoemaker, DO        Or    dextrose bolus 10% 250 mL  250 mL IntraVENous PRN Tara Shoemaker, DO        glucagon injection 1 mg  1 mg

## 2025-05-30 NOTE — CONSULTS
HF Navigator Note:   Pt currently established in the CHF clinic. Saw pt at the bedside to review HF education, HFU appointments. Reviewed HF zones daily weights, and when to call with increase in symptoms. HFU appointments and CHF discharge instructions placed on AVS.     Future Appointments   Date Time Provider Department Center   6/2/2025  1:00 PM [facility] SLEEP LAB ROOM 1 [facility] SLEEP [place]   6/3/2025  8:15 AM [facility] CHF ROOM 3 [facility] CHF [place]   7/23/2025  8:45 AM Daniela Sargent MD [facility]   8/14/2025  9:00 AM SCHEDULE, [facility]

## 2025-05-31 VITALS
HEIGHT: 68 IN | SYSTOLIC BLOOD PRESSURE: 126 MMHG | WEIGHT: 198 LBS | TEMPERATURE: 98.1 F | OXYGEN SATURATION: 99 % | RESPIRATION RATE: 18 BRPM | BODY MASS INDEX: 30.01 KG/M2 | DIASTOLIC BLOOD PRESSURE: 58 MMHG | HEART RATE: 65 BPM

## 2025-05-31 LAB
ALBUMIN SERPL-MCNC: 4 G/DL (ref 3.5–5.2)
ALP SERPL-CCNC: 149 U/L (ref 35–104)
ALT SERPL-CCNC: 22 U/L (ref 0–32)
ANION GAP SERPL CALCULATED.3IONS-SCNC: 12 MMOL/L (ref 7–16)
AST SERPL-CCNC: 16 U/L (ref 0–31)
BASOPHILS # BLD: 0.1 K/UL (ref 0–0.2)
BASOPHILS NFR BLD: 1 % (ref 0–2)
BILIRUB SERPL-MCNC: 0.3 MG/DL (ref 0–1.2)
BUN SERPL-MCNC: 12 MG/DL (ref 6–20)
CALCIUM SERPL-MCNC: 9.4 MG/DL (ref 8.6–10.2)
CHLORIDE SERPL-SCNC: 97 MMOL/L (ref 98–107)
CO2 SERPL-SCNC: 29 MMOL/L (ref 22–29)
CREAT SERPL-MCNC: 1 MG/DL (ref 0.5–1)
EOSINOPHIL # BLD: 0.95 K/UL (ref 0.05–0.5)
EOSINOPHILS RELATIVE PERCENT: 8 % (ref 0–6)
ERYTHROCYTE [DISTWIDTH] IN BLOOD BY AUTOMATED COUNT: 15.9 % (ref 11.5–15)
GFR, ESTIMATED: 70 ML/MIN/1.73M2
GLUCOSE BLD-MCNC: 159 MG/DL (ref 74–99)
GLUCOSE SERPL-MCNC: 145 MG/DL (ref 74–99)
HCT VFR BLD AUTO: 24.6 % (ref 34–48)
HGB BLD-MCNC: 7.8 G/DL (ref 11.5–15.5)
IMM GRANULOCYTES # BLD AUTO: 0.06 K/UL (ref 0–0.58)
IMM GRANULOCYTES NFR BLD: 1 % (ref 0–5)
LYMPHOCYTES NFR BLD: 1.07 K/UL (ref 1.5–4)
LYMPHOCYTES RELATIVE PERCENT: 9 % (ref 20–42)
MAGNESIUM SERPL-MCNC: 1.7 MG/DL (ref 1.6–2.6)
MCH RBC QN AUTO: 28.3 PG (ref 26–35)
MCHC RBC AUTO-ENTMCNC: 31.7 G/DL (ref 32–34.5)
MCV RBC AUTO: 89.1 FL (ref 80–99.9)
MONOCYTES NFR BLD: 0.89 K/UL (ref 0.1–0.95)
MONOCYTES NFR BLD: 8 % (ref 2–12)
NEUTROPHILS NFR BLD: 74 % (ref 43–80)
NEUTS SEG NFR BLD: 8.58 K/UL (ref 1.8–7.3)
PLATELET # BLD AUTO: 383 K/UL (ref 130–450)
PMV BLD AUTO: 10.5 FL (ref 7–12)
POTASSIUM SERPL-SCNC: 3.5 MMOL/L (ref 3.5–5)
PROT SERPL-MCNC: 7.9 G/DL (ref 6.4–8.3)
RBC # BLD AUTO: 2.76 M/UL (ref 3.5–5.5)
SODIUM SERPL-SCNC: 138 MMOL/L (ref 132–146)
WBC OTHER # BLD: 11.7 K/UL (ref 4.5–11.5)

## 2025-05-31 PROCEDURE — 80053 COMPREHEN METABOLIC PANEL: CPT

## 2025-05-31 PROCEDURE — 6360000002 HC RX W HCPCS: Performed by: FAMILY MEDICINE

## 2025-05-31 PROCEDURE — 82962 GLUCOSE BLOOD TEST: CPT

## 2025-05-31 PROCEDURE — 94640 AIRWAY INHALATION TREATMENT: CPT

## 2025-05-31 PROCEDURE — 6370000000 HC RX 637 (ALT 250 FOR IP): Performed by: FAMILY MEDICINE

## 2025-05-31 PROCEDURE — 85025 COMPLETE CBC W/AUTO DIFF WBC: CPT

## 2025-05-31 PROCEDURE — 2500000003 HC RX 250 WO HCPCS: Performed by: FAMILY MEDICINE

## 2025-05-31 PROCEDURE — 2580000003 HC RX 258: Performed by: FAMILY MEDICINE

## 2025-05-31 PROCEDURE — 36415 COLL VENOUS BLD VENIPUNCTURE: CPT

## 2025-05-31 PROCEDURE — 83735 ASSAY OF MAGNESIUM: CPT

## 2025-05-31 PROCEDURE — 6370000000 HC RX 637 (ALT 250 FOR IP): Performed by: NURSE PRACTITIONER

## 2025-05-31 PROCEDURE — 99239 HOSP IP/OBS DSCHRG MGMT >30: CPT | Performed by: INTERNAL MEDICINE

## 2025-05-31 PROCEDURE — 2700000000 HC OXYGEN THERAPY PER DAY

## 2025-05-31 RX ORDER — POLYETHYLENE GLYCOL 3350 17 G/17G
17 POWDER, FOR SOLUTION ORAL DAILY
Status: DISCONTINUED | OUTPATIENT
Start: 2025-05-31 | End: 2025-05-31 | Stop reason: HOSPADM

## 2025-05-31 RX ORDER — FUROSEMIDE 40 MG/1
40 TABLET ORAL 2 TIMES DAILY
Qty: 60 TABLET | Refills: 3 | Status: SHIPPED | OUTPATIENT
Start: 2025-05-31 | End: 2025-06-13 | Stop reason: ALTCHOICE

## 2025-05-31 RX ORDER — PANTOPRAZOLE SODIUM 40 MG/1
40 TABLET, DELAYED RELEASE ORAL DAILY
Qty: 90 TABLET | Refills: 2 | Status: SHIPPED | OUTPATIENT
Start: 2025-05-31

## 2025-05-31 RX ORDER — SENNOSIDES 8.6 MG/1
2 TABLET ORAL NIGHTLY PRN
Status: DISCONTINUED | OUTPATIENT
Start: 2025-05-31 | End: 2025-05-31 | Stop reason: HOSPADM

## 2025-05-31 RX ADMIN — SODIUM CHLORIDE, PRESERVATIVE FREE 40 MG: 5 INJECTION INTRAVENOUS at 05:35

## 2025-05-31 RX ADMIN — PETROLATUM: 420 OINTMENT TOPICAL at 08:41

## 2025-05-31 RX ADMIN — METRONIDAZOLE 500 MG: 500 INJECTION, SOLUTION INTRAVENOUS at 05:45

## 2025-05-31 RX ADMIN — IPRATROPIUM BROMIDE 0.5 MG: 0.5 SOLUTION RESPIRATORY (INHALATION) at 13:43

## 2025-05-31 RX ADMIN — POLYETHYLENE GLYCOL 3350 17 G: 17 POWDER, FOR SOLUTION ORAL at 13:51

## 2025-05-31 RX ADMIN — METHADONE HYDROCHLORIDE 47 MG: 10 CONCENTRATE ORAL at 08:42

## 2025-05-31 RX ADMIN — LOSARTAN POTASSIUM 100 MG: 50 TABLET, FILM COATED ORAL at 08:38

## 2025-05-31 RX ADMIN — IPRATROPIUM BROMIDE 0.5 MG: 0.5 SOLUTION RESPIRATORY (INHALATION) at 05:08

## 2025-05-31 RX ADMIN — BACLOFEN 10 MG: 10 TABLET ORAL at 13:51

## 2025-05-31 RX ADMIN — ANORECTAL OINTMENT: 15.7; .44; 24; 20.6 OINTMENT TOPICAL at 08:41

## 2025-05-31 RX ADMIN — Medication 10 ML: at 08:42

## 2025-05-31 RX ADMIN — ONDANSETRON 4 MG: 4 TABLET, ORALLY DISINTEGRATING ORAL at 10:18

## 2025-05-31 RX ADMIN — LEVOTHYROXINE SODIUM 250 MCG: 0.1 TABLET ORAL at 05:36

## 2025-05-31 RX ADMIN — AMLODIPINE BESYLATE 10 MG: 10 TABLET ORAL at 08:38

## 2025-05-31 RX ADMIN — GABAPENTIN 100 MG: 100 CAPSULE ORAL at 08:38

## 2025-05-31 RX ADMIN — EMPAGLIFLOZIN 10 MG: 10 TABLET, FILM COATED ORAL at 08:38

## 2025-05-31 RX ADMIN — SPIRONOLACTONE 12.5 MG: 25 TABLET ORAL at 08:39

## 2025-05-31 RX ADMIN — BACITRACIN ZINC, NEOMYCIN, POLYMYXIN B SULFAT: 5000; 3.5; 4 OINTMENT TOPICAL at 08:41

## 2025-05-31 RX ADMIN — GABAPENTIN 100 MG: 100 CAPSULE ORAL at 13:51

## 2025-05-31 RX ADMIN — FUROSEMIDE 40 MG: 40 TABLET ORAL at 05:36

## 2025-05-31 RX ADMIN — ARFORMOTEROL TARTRATE 15 MCG: 15 SOLUTION RESPIRATORY (INHALATION) at 05:08

## 2025-05-31 RX ADMIN — BUDESONIDE 1000 MCG: 0.5 SUSPENSION RESPIRATORY (INHALATION) at 05:08

## 2025-05-31 RX ADMIN — IPRATROPIUM BROMIDE 0.5 MG: 0.5 SOLUTION RESPIRATORY (INHALATION) at 09:18

## 2025-05-31 RX ADMIN — ACETAMINOPHEN 650 MG: 325 TABLET ORAL at 10:17

## 2025-05-31 RX ADMIN — ACETAMINOPHEN 650 MG: 325 TABLET ORAL at 05:00

## 2025-05-31 RX ADMIN — BACLOFEN 10 MG: 10 TABLET ORAL at 08:38

## 2025-05-31 ASSESSMENT — PAIN DESCRIPTION - DESCRIPTORS
DESCRIPTORS: ACHING;THROBBING
DESCRIPTORS: ACHING;SORE;DISCOMFORT
DESCRIPTORS: ACHING;DISCOMFORT;SORE
DESCRIPTORS: ACHING

## 2025-05-31 ASSESSMENT — PAIN DESCRIPTION - LOCATION
LOCATION: KNEE
LOCATION: KNEE
LOCATION: LEG;KNEE
LOCATION: KNEE

## 2025-05-31 ASSESSMENT — PAIN DESCRIPTION - ORIENTATION
ORIENTATION: LEFT

## 2025-05-31 ASSESSMENT — PAIN SCALES - GENERAL
PAINLEVEL_OUTOF10: 0
PAINLEVEL_OUTOF10: 5
PAINLEVEL_OUTOF10: 4
PAINLEVEL_OUTOF10: 4

## 2025-05-31 NOTE — DISCHARGE SUMMARY
[facility] Hospitalist Physician Discharge Summary       Mariana London APN  [address] [facility] [address] [phone]    Call today  to schedule hospital follow up appointment, to be seen within 7 days of discharge, Bring all pill bottles to appointment      Activity level: as tolerated    Diet: ADULT DIET; Regular    Labs:recommend repeat potassium and hemoglobin within a week    Dispo:home      Condition on discharge: stable    Patient ID:  Faiza Romero  [id number]  54 y.o.  1970    Admit date: 5/27/2025    Discharge date and time:  5/31/2025  12:14 PM    Admission Diagnoses: Principal Problem:    Blood loss anemia  Active Problems:    CAD (coronary artery disease)    COPD (chronic obstructive pulmonary disease) (HCC)    Type 2 diabetes mellitus with circulatory disorder (HCC)    ANIRUDH (acute kidney injury)    Anemia requiring transfusions    Open wound of right foot  Resolved Problems:    * No resolved hospital problems. *      Discharge Diagnoses: Principal Problem:    Blood loss anemia  Active Problems:    CAD (coronary artery disease)    COPD (chronic obstructive pulmonary disease) (HCC)    Type 2 diabetes mellitus with circulatory disorder (HCC)    ANIRUDH (acute kidney injury)    Anemia requiring transfusions    Open wound of right foot  Resolved Problems:    * No resolved hospital problems. *      Consults:  IP CONSULT TO GI  IP CONSULT TO PODIATRY  IP CONSULT TO HEART FAILURE NURSE/COORDINATOR  IP CONSULT TO SOCIAL WORK    Procedures: colonoscopy    Hospital Course:   Patient Faiza Romero is a 54 y.o. presented with Acute blood loss anemia [D62]  Colitis [K52.9]  Rectal bleeding [K62.5]  Transaminitis [R74.01]  Blood loss anemia [D50.0]  Anemia requiring transfusions [D64.9]  Swelling of left lower extremity [M79.89]    HPI:  Faiza Romero is a 54 y.o. female who presents to the emergency department with chief complaint of abnormal labs consisting of low hemoglobin.   days     prochlorperazine 10 MG tablet  Commonly known as: COMPAZINE  Take 1 tablet by mouth every 8 hours as needed (nausea)     sertraline 50 MG tablet  Commonly known as: ZOLOFT     spironolactone 25 MG tablet  Commonly known as: Aldactone  Take 0.5 tablets by mouth daily     Trelegy Ellipta 100-62.5-25 MCG/ACT Aepb inhaler  Generic drug: fluticasone-umeclidin-vilant  Inhale 1 puff into the lungs daily     vitamin D 1.25 MG (69325 UT) Caps capsule  Commonly known as: ERGOCALCIFEROL           * This list has 3 medication(s) that are the same as other medications prescribed for you. Read the directions carefully, and ask your doctor or other care provider to review them with you.                    Note that more than 30 minutes was spent in preparing discharge papers, discussing discharge with patient, medication review, etc.    Signed:  Electronically signed by WHIT STEEN MD on 5/31/2025 at 12:14 PM

## 2025-05-31 NOTE — PROGRESS NOTES
Name:  Faiza Romero  :  1970  MRN:  [id number]  Room:  [facility]7/0437-02  DOS:  2025    [facility]  [facility]  Dr. Henrry Finley M.D.  Dr. Inder Rose M.D.  Dr. Srinath Ty D.O.  Dr. Yeison Ferrera M.D.  Dr. Jordan Escudero D.O.    -NP Progress Note-    PCP:  Mariana London APN  Admitting Physician:  Tara Shoemaker DO  Chief Complaint:    Chief Complaint   Patient presents with    OTHER     Pt sent in from PCP for low hgb. Pt having some abdominal soreness, but no other symptoms. Chronically on 3L O2       Subjective  Patient sitting up in chair.  Denies abdominal pain, nausea, or vomiting.  Tolerating diet.    Physical Examination  Vitals:  BP (!) 126/58   Pulse 65   Temp 98.1 °F (36.7 °C)   Resp 18   Ht 1.727 m (5' 8\")   Wt 89.8 kg (198 lb)   SpO2 99%   BMI 30.11 kg/m²   General Appearance:  awake, alert, and oriented to person, place, time, and purpose; appears stated age and cooperative; no apparent distress no labored breathing  HEENT:  PERRL; EOMI; sclera clear; buccal mucosa moist  Neck:  supple; trachea midline; no thyromegaly; no JVD; no bruits  Heart:  rhythm regular; rate controlled; no murmurs  Lungs:  symmetrical; clear to auscultation bilaterally; no wheezes; no rhonchi; no rales  Abdomen:  soft, non-tender, non-distended; bowel sounds positive; no organomegaly or masses  Extremities:  peripheral pulses present; no peripheral edema; no ulcers  Neurologic:  alert and oriented x 3; no focal deficit; cranial nerves grossly intact  Skin:  no petechia; no hemorrhage; no wounds    Medications  Scheduled Meds    baclofen  10 mg Oral TID    menthol-zinc oxide   Topical BID    neomycin-bacitracin-polymyxin   Topical Daily    Petrolatum   Topical Daily    budesonide  1 mg Nebulization BID RT    And    arformoterol tartrate  15 mcg Nebulization BID RT    And    ipratropium  0.5 mg Nebulization 4x Daily RT    cefTRIAXone (ROCEPHIN) IV  1,000 mg  IntraVENous Q24H    metroNIDAZOLE  500 mg IntraVENous Q8H    pantoprazole (PROTONIX) 40 mg in sodium chloride (PF) 0.9 % 10 mL injection  40 mg IntraVENous Q12H    amLODIPine  10 mg Orogastric Daily    [Held by provider] aspirin  81 mg Oral Daily    [Held by provider] clopidogrel  75 mg Oral Daily    empagliflozin  10 mg Oral Daily    furosemide  40 mg Oral BID    [Held by provider] glipiZIDE  5 mg Oral BID    levothyroxine  250 mcg Oral Daily    losartan  100 mg Oral Daily    rosuvastatin  10 mg Oral Nightly    spironolactone  12.5 mg Oral Daily    lidocaine  1 patch TransDERmal Daily    methadone  47 mg Oral Daily    sodium chloride flush  5-40 mL IntraVENous 2 times per day    melatonin  6 mg Oral Nightly    gabapentin  100 mg Oral TID    sertraline  50 mg Oral Daily     Infusion Meds    sodium chloride      dextrose      [Held by provider] lactated ringers Stopped (05/28/25 0917)    sodium chloride       PRN Meds sodium chloride, glucose, dextrose bolus **OR** dextrose bolus, glucagon (rDNA), dextrose, acetaminophen, albuterol, nitroGLYCERIN, prochlorperazine, sodium chloride flush, sodium chloride, potassium chloride **OR** potassium alternative oral replacement **OR** potassium chloride, magnesium sulfate, ondansetron **OR** ondansetron, polyethylene glycol, acetaminophen **OR** acetaminophen    Laboratory Data  Recent Results (from the past 24 hours)   Potassium    Collection Time: 05/30/25  5:39 PM   Result Value Ref Range    Potassium 3.2 (L) 3.5 - 5.0 mmol/L   CBC with Auto Differential    Collection Time: 05/31/25  7:18 AM   Result Value Ref Range    WBC 11.7 (H) 4.5 - 11.5 k/uL    RBC 2.76 (L) 3.50 - 5.50 m/uL    Hemoglobin 7.8 (L) 11.5 - 15.5 g/dL    Hematocrit 24.6 (L) 34.0 - 48.0 %    MCV 89.1 80.0 - 99.9 fL    MCH 28.3 26.0 - 35.0 pg    MCHC 31.7 (L) 32.0 - 34.5 g/dL    RDW 15.9 (H) 11.5 - 15.0 %    Platelets 383 130 - 450 k/uL    MPV 10.5 7.0 - 12.0 fL    Neutrophils % 74 43.0 - 80.0 %     Comorbidities   -COPD, diabetes mellitus, hypertension, hyperlipidemia, etc.  - Per admitting/pertinent consultants      Hemoglobin overall fairly stable, increased today.  EGD and colonoscopy with no bleeding source identified.  Consider bleeding scan if decrease in hemoglobin.  Consider capsule endoscopy, likely outpatient.  Monitor labs.  GI will follow.  If hemoglobin remained stable, patient can be considered for discharge from GI POV.    Jaime Silverman, APRN - CNP  12:16 PM  5/31/2025

## 2025-06-02 ENCOUNTER — HOSPITAL ENCOUNTER (OUTPATIENT)
Dept: SLEEP CENTER | Age: 55
Discharge: HOME | End: 2025-06-02
Payer: MEDICARE

## 2025-06-02 DIAGNOSIS — R06.83 SNORING: ICD-10-CM

## 2025-06-02 DIAGNOSIS — G47.30 SLEEP APNEA, UNSPECIFIED TYPE: ICD-10-CM

## 2025-06-02 DIAGNOSIS — G47.19 EXCESSIVE DAYTIME SLEEPINESS: ICD-10-CM

## 2025-06-02 PROCEDURE — 95800 SLP STDY UNATTENDED: CPT

## 2025-06-04 ENCOUNTER — HOSPITAL ENCOUNTER (OUTPATIENT)
Age: 55
Discharge: HOME | End: 2025-06-06
Payer: MEDICARE

## 2025-06-04 ENCOUNTER — HOSPITAL ENCOUNTER (OUTPATIENT)
Dept: OTHER | Age: 55
Setting detail: THERAPIES SERIES
Discharge: HOME | End: 2025-06-04
Payer: MEDICARE

## 2025-06-04 ENCOUNTER — HOSPITAL ENCOUNTER (OUTPATIENT)
Dept: GENERAL RADIOLOGY | Age: 55
Discharge: HOME | End: 2025-06-06
Payer: MEDICARE

## 2025-06-04 VITALS
HEART RATE: 74 BPM | SYSTOLIC BLOOD PRESSURE: 162 MMHG | WEIGHT: 197 LBS | DIASTOLIC BLOOD PRESSURE: 70 MMHG | RESPIRATION RATE: 18 BRPM | BODY MASS INDEX: 29.95 KG/M2 | OXYGEN SATURATION: 95 %

## 2025-06-04 DIAGNOSIS — M25.562 PAIN IN JOINT OF LEFT KNEE: ICD-10-CM

## 2025-06-04 LAB
ANION GAP SERPL CALCULATED.3IONS-SCNC: 15 MMOL/L (ref 7–16)
BNP SERPL-MCNC: 2723 PG/ML (ref 0–450)
BUN SERPL-MCNC: 17 MG/DL (ref 6–20)
CALCIUM SERPL-MCNC: 9.4 MG/DL (ref 8.6–10.2)
CHLORIDE SERPL-SCNC: 95 MMOL/L (ref 98–107)
CO2 SERPL-SCNC: 26 MMOL/L (ref 22–29)
CREAT SERPL-MCNC: 1.1 MG/DL (ref 0.5–1)
GFR, ESTIMATED: 63 ML/MIN/1.73M2
GLUCOSE SERPL-MCNC: 102 MG/DL (ref 74–99)
POTASSIUM SERPL-SCNC: 3.3 MMOL/L (ref 3.5–5)
SODIUM SERPL-SCNC: 136 MMOL/L (ref 132–146)
SURGICAL PATHOLOGY REPORT: NORMAL

## 2025-06-04 PROCEDURE — 83880 ASSAY OF NATRIURETIC PEPTIDE: CPT

## 2025-06-04 PROCEDURE — 6360000002 HC RX W HCPCS: Performed by: INTERNAL MEDICINE

## 2025-06-04 PROCEDURE — 36415 COLL VENOUS BLD VENIPUNCTURE: CPT

## 2025-06-04 PROCEDURE — 99214 OFFICE O/P EST MOD 30 MIN: CPT

## 2025-06-04 PROCEDURE — 2500000003 HC RX 250 WO HCPCS: Performed by: INTERNAL MEDICINE

## 2025-06-04 PROCEDURE — 80048 BASIC METABOLIC PNL TOTAL CA: CPT

## 2025-06-04 PROCEDURE — 96374 THER/PROPH/DIAG INJ IV PUSH: CPT

## 2025-06-04 PROCEDURE — 73560 X-RAY EXAM OF KNEE 1 OR 2: CPT

## 2025-06-04 RX ORDER — SODIUM CHLORIDE 0.9 % (FLUSH) 0.9 %
10 SYRINGE (ML) INJECTION ONCE
Status: COMPLETED | OUTPATIENT
Start: 2025-06-04 | End: 2025-06-04

## 2025-06-04 RX ORDER — FUROSEMIDE 10 MG/ML
40 INJECTION INTRAMUSCULAR; INTRAVENOUS ONCE
Status: COMPLETED | OUTPATIENT
Start: 2025-06-04 | End: 2025-06-04

## 2025-06-04 RX ADMIN — SODIUM CHLORIDE, PRESERVATIVE FREE 10 ML: 5 INJECTION INTRAVENOUS at 11:05

## 2025-06-04 RX ADMIN — FUROSEMIDE 40 MG: 10 INJECTION, SOLUTION INTRAMUSCULAR; INTRAVENOUS at 11:05

## 2025-06-04 ASSESSMENT — PATIENT HEALTH QUESTIONNAIRE - PHQ9
SUM OF ALL RESPONSES TO PHQ QUESTIONS 1-9: 2
1. LITTLE INTEREST OR PLEASURE IN DOING THINGS: SEVERAL DAYS
SUM OF ALL RESPONSES TO PHQ QUESTIONS 1-9: 2
2. FEELING DOWN, DEPRESSED OR HOPELESS: SEVERAL DAYS
SUM OF ALL RESPONSES TO PHQ QUESTIONS 1-9: 2
SUM OF ALL RESPONSES TO PHQ QUESTIONS 1-9: 2

## 2025-06-04 NOTE — PLAN OF CARE
Problem: Chronic Conditions and Co-morbidities  Goal: Patient's chronic conditions and co-morbidity symptoms are monitored and maintained or improved  Outcome: Progressing  Flowsheets (Taken 6/4/2025 1101)  Care Plan - Patient's Chronic Conditions and Co-Morbidity Symptoms are Monitored and Maintained or Improved: Monitor and assess patient's chronic conditions and comorbid symptoms for stability, deterioration, or improvement

## 2025-06-04 NOTE — PROGRESS NOTES
Congestive Heart Failure Clinic   [facility]      Referring Provider:   Primary Care Physician: Mariana London APN   Cardiologist: Dr Sargent  Nephrologist:       HISTORY OF PRESENT ILLNESS:     Faiza Romero is a 54 y.o. (1970) female with a history of HFpEF (EF> 50%)  Pre Cupid:     Lab Results   Component Value Date    LVEF 55 09/08/2022     Post Cupid:  No results found for: \"EFBP\"      She presents to the CHF clinic for ongoing evaluation and monitoring of heart failure.    In the CHF clinic today she denies any adverse symptoms except:  [] Dizziness or lightheadedness   [] Syncope or near syncope  [] Recent Fall  [] Shortness of breath at rest   [] Dyspnea with exertion  [] Decline in functional capacity (unable to perform activities they had previously been able to do)  [] Fatigue   [] Orthopnea  [] PND  [] Nocturnal cough  [] Hemoptysis  [] Chest pain, pressure, or discomfort  [] Palpitations  [] Abdominal distention  [] Abdominal bloating  [] Early satiety  [] Blood in stool   [] Diarrhea  [] Constipation  [] Nausea/Vomiting  [] Decreased urinary response to oral diuretic   [] Scrotal swelling   [] Lower extremity edema  [] Used PRN doses of oral diuretic   [] Weight gain    Wt Readings from Last 3 Encounters:   06/04/25 89.4 kg (197 lb)   05/30/25 89.8 kg (198 lb)   05/18/25 86.2 kg (190 lb)           SOCIAL HISTORY:  [x] Denies tobacco, alcohol or illicit drug abuse  [] Tobacco use:  [] ETOH use:  [] Illicit drug use:        MEDICATIONS:    Allergies   Allergen Reactions    Environmental/Seasonal      Maple trees grass dust dust mites    Statins      Prior to Visit Medications    Medication Sig Taking? Authorizing Provider   pantoprazole (PROTONIX) 40 MG tablet Take 1 tablet by mouth daily Yes Inez Deleon MD   furosemide (LASIX) 40 MG tablet Take 1 tablet by mouth 2 times daily Yes Inez Deleon MD   levothyroxine (SYNTHROID)

## 2025-06-05 ENCOUNTER — TELEPHONE (OUTPATIENT)
Dept: CARDIOLOGY CLINIC | Age: 55
End: 2025-06-05

## 2025-06-05 NOTE — TELEPHONE ENCOUNTER
Krys Hood RN Barakat, Urwa, MD; Karina Patterson MA  Spoke on the phone, changes you wanted made, increasing aldactone to 25mg daily, starting the pt on hydralazine 25mg TID and  start pt on K supplement.

## 2025-06-06 NOTE — PROGRESS NOTES
[place] [id number]                           SLEEP CENTER REPORT      PATIENT NAME: JEANNETTE LARA             : 1970  MED REC NO: [id number]                        ROOM:   ACCOUNT NO: [id number]                       ADMIT DATE: 2025  PROVIDER: Linda Argueta MD      HOME SLEEP APNEA TEST    DATE OF STUDY:  2025    REFERRING PHYSICIAN:  ELVIS RODRIGES      INDICATION FOR STUDY:  The patient with excessive daytime sleepiness, snores, naps, witnessed apneas, waking gasping, wakes coughing, morning headaches, wakes confused, wakes to urinate frequently, talks in sleep, sleep walks, restless or aching legs, crawling sensation in legs, trouble falling to sleep, eats while sleepy.    PAST MEDICAL HISTORY:  Significant for hypertension, CHF, anemia, sinus problems, coronary artery disease, hypothyroid, depression, hernia, claustrophobia, COPD, irregular heartbeat, high cholesterol, diabetes, and chronic pain.    CURRENT MEDICATIONS:  Include metoprolol, aspirin, pantoprazole, Lantus, isosorbide, Ellipta Trelegy, lovastatin, glipizide, methadone, furosemide, levothyroxine, amlodipine, nitroglycerin, losartan, clopidogrel, and albuterol.    The patient has a BMI of 29.7.  Neck circumference of 17 inches and [place] Sleepiness Scale 12/24.    DESCRIPTION:  An unattended full-night home sleep apnea testing was performed using an FDA approved WatchPAT device.  Peripheral arterial tonometry, pulse rate, oxygen saturation, total sleep time, sleep staging, body position, and snoring were recorded.  Reported AHI calculations in this report are score based on 4% desaturations according to AASM scoring criteria.  Recording started at 10:22:16 p.m. and ended at 04:56:40 a.m.  Of the 6 hours and 34 minutes of recording time, the patient was asleep for 5 hours and 56 minutes.  Respiratory indices were calculated using the

## 2025-06-10 ENCOUNTER — HOSPITAL ENCOUNTER (OUTPATIENT)
Dept: INFUSION THERAPY | Age: 55
Discharge: HOME | End: 2025-06-10
Payer: MEDICARE

## 2025-06-10 ENCOUNTER — OFFICE VISIT (OUTPATIENT)
Dept: ONCOLOGY | Age: 55
End: 2025-06-10

## 2025-06-10 VITALS
SYSTOLIC BLOOD PRESSURE: 146 MMHG | HEART RATE: 71 BPM | DIASTOLIC BLOOD PRESSURE: 78 MMHG | BODY MASS INDEX: 29.48 KG/M2 | WEIGHT: 194.5 LBS | HEIGHT: 68 IN | OXYGEN SATURATION: 93 % | TEMPERATURE: 98.1 F

## 2025-06-10 DIAGNOSIS — D72.829 LEUKOCYTOSIS, UNSPECIFIED TYPE: ICD-10-CM

## 2025-06-10 DIAGNOSIS — D64.9 ANEMIA, UNSPECIFIED TYPE: ICD-10-CM

## 2025-06-10 DIAGNOSIS — D72.829 LEUKOCYTOSIS, UNSPECIFIED TYPE: Primary | ICD-10-CM

## 2025-06-10 LAB
ALBUMIN SERPL-MCNC: 4 G/DL (ref 3.5–5.2)
ALP SERPL-CCNC: 141 U/L (ref 35–104)
ALT SERPL-CCNC: 26 U/L (ref 0–35)
ANION GAP SERPL CALCULATED.3IONS-SCNC: 12 MMOL/L (ref 7–16)
AST SERPL-CCNC: 28 U/L (ref 0–35)
BASOPHILS # BLD: 0.07 K/UL (ref 0–0.2)
BASOPHILS NFR BLD: 1 % (ref 0–2)
BILIRUB SERPL-MCNC: 0.4 MG/DL (ref 0–1.2)
BUN SERPL-MCNC: 18 MG/DL (ref 6–20)
CALCIUM SERPL-MCNC: 9.3 MG/DL (ref 8.6–10)
CHLORIDE SERPL-SCNC: 98 MMOL/L (ref 98–107)
CO2 SERPL-SCNC: 28 MMOL/L (ref 22–29)
CREAT SERPL-MCNC: 1.2 MG/DL (ref 0.5–1)
EOSINOPHIL # BLD: 0.61 K/UL (ref 0.05–0.5)
EOSINOPHILS RELATIVE PERCENT: 6 % (ref 0–6)
ERYTHROCYTE [DISTWIDTH] IN BLOOD BY AUTOMATED COUNT: 15.4 % (ref 11.5–15)
ERYTHROCYTE [SEDIMENTATION RATE] IN BLOOD BY WESTERGREN METHOD: 69 MM/HR (ref 0–20)
FERRITIN SERPL-MCNC: 24 NG/ML
FOLATE SERPL-MCNC: 10.2 NG/ML (ref 4.6–34.8)
GFR, ESTIMATED: 54 ML/MIN/1.73M2
GLUCOSE SERPL-MCNC: 187 MG/DL (ref 74–99)
HCT VFR BLD AUTO: 24.9 % (ref 34–48)
HGB BLD-MCNC: 7.6 G/DL (ref 11.5–15.5)
IGA SERPL-MCNC: 358 MG/DL (ref 70–400)
IGG SERPL-MCNC: 1916 MG/DL (ref 700–1600)
IGM SERPL-MCNC: 107 MG/DL (ref 40–230)
IMM GRANULOCYTES # BLD AUTO: 0.03 K/UL (ref 0–0.58)
IMM GRANULOCYTES NFR BLD: 0 % (ref 0–5)
IRON SATN MFR SERPL: 11 % (ref 15–50)
IRON SERPL-MCNC: 32 UG/DL (ref 37–145)
LDH SERPL-CCNC: 187 U/L (ref 135–214)
LYMPHOCYTES NFR BLD: 1.63 K/UL (ref 1.5–4)
LYMPHOCYTES RELATIVE PERCENT: 16 % (ref 20–42)
MCH RBC QN AUTO: 27 PG (ref 26–35)
MCHC RBC AUTO-ENTMCNC: 30.5 G/DL (ref 32–34.5)
MCV RBC AUTO: 88.3 FL (ref 80–99.9)
MONOCYTES NFR BLD: 0.87 K/UL (ref 0.1–0.95)
MONOCYTES NFR BLD: 9 % (ref 2–12)
NEUTROPHILS NFR BLD: 68 % (ref 43–80)
NEUTS SEG NFR BLD: 6.95 K/UL (ref 1.8–7.3)
PLATELET # BLD AUTO: 383 K/UL (ref 130–450)
PMV BLD AUTO: 10.7 FL (ref 7–12)
POTASSIUM SERPL-SCNC: 3.3 MMOL/L (ref 3.5–5.1)
PROT SERPL-MCNC: 7.9 G/DL (ref 6.4–8.3)
RBC # BLD AUTO: 2.82 M/UL (ref 3.5–5.5)
RHEUMATOID FACT SER NEPH-ACNC: <10 IU/ML (ref 0–14)
SEND OUT REPORT: NORMAL
SODIUM SERPL-SCNC: 138 MMOL/L (ref 136–145)
TEST NAME: NORMAL
TIBC SERPL-MCNC: 304 UG/DL (ref 250–450)
TSH SERPL DL<=0.05 MIU/L-ACNC: 0.66 UIU/ML (ref 0.27–4.2)
VIT B12 SERPL-MCNC: 423 PG/ML (ref 232–1245)
WBC OTHER # BLD: 10.2 K/UL (ref 4.5–11.5)

## 2025-06-10 PROCEDURE — 86140 C-REACTIVE PROTEIN: CPT

## 2025-06-10 PROCEDURE — 83550 IRON BINDING TEST: CPT

## 2025-06-10 PROCEDURE — 84155 ASSAY OF PROTEIN SERUM: CPT

## 2025-06-10 PROCEDURE — 85652 RBC SED RATE AUTOMATED: CPT

## 2025-06-10 PROCEDURE — 82607 VITAMIN B-12: CPT

## 2025-06-10 PROCEDURE — 80053 COMPREHEN METABOLIC PANEL: CPT

## 2025-06-10 PROCEDURE — 86038 ANTINUCLEAR ANTIBODIES: CPT

## 2025-06-10 PROCEDURE — 83615 LACTATE (LD) (LDH) ENZYME: CPT

## 2025-06-10 PROCEDURE — 85025 COMPLETE CBC W/AUTO DIFF WBC: CPT

## 2025-06-10 PROCEDURE — 84165 PROTEIN E-PHORESIS SERUM: CPT

## 2025-06-10 PROCEDURE — 86039 ANTINUCLEAR ANTIBODIES (ANA): CPT

## 2025-06-10 PROCEDURE — 82728 ASSAY OF FERRITIN: CPT

## 2025-06-10 PROCEDURE — 83540 ASSAY OF IRON: CPT

## 2025-06-10 PROCEDURE — 84443 ASSAY THYROID STIM HORMONE: CPT

## 2025-06-10 PROCEDURE — 82746 ASSAY OF FOLIC ACID SERUM: CPT

## 2025-06-10 PROCEDURE — 36415 COLL VENOUS BLD VENIPUNCTURE: CPT

## 2025-06-10 PROCEDURE — 82784 ASSAY IGA/IGD/IGG/IGM EACH: CPT

## 2025-06-10 PROCEDURE — 86431 RHEUMATOID FACTOR QUANT: CPT

## 2025-06-10 NOTE — PROGRESS NOTES
[facility]  [address] [place] [address]   Hematology/Oncology  Consult      Patient Name: Faiza Romero  YOB: 1970  PCP: Mariana London APN   Referring Provider: [address] [place] [address] [place] [address]     Reason for Consultation:   Chief Complaint   Patient presents with    New Patient     Leukocytosis, unspecified type            History of Present Illness:  This pt is a 55 yo female who presents today in referral for evaluation of leukocytosis. Labs from 2/25 w/ WBC 14.5, ANC 11.1, platelets 351, Hgb 10.4, MCV 92. CBC 10/24 with WBC 11.1, ANC 7.3, platelets 333, Hgb 11.8, MCV 93. She states today     Diagnostic Data:     Past Medical History:   Diagnosis Date    Acute exacerbation of chronic heart failure (HCC) 4/29/2025    CAD in native artery 09/14/2020    CKD (chronic kidney disease) stage 3, GFR 30-59 ml/min ([facility]) 4/30/2025    Claustrophobia     mild    Colitis     Constipation     COPD (chronic obstructive pulmonary disease) ([facility])     COVID-19     Diabetes mellitus ([facility])     Heart problem     Hyperlipidemia     Hypertension     Hypothyroidism     Sleep apnea     diagnosed 7-8 years ago does not use C-pap problems with cost    Thyroid disease     Wears partial dentures     upper       Patient Active Problem List    Diagnosis Date Noted    CAD (coronary artery disease) 08/26/2024    S/P coronary artery stent placement 11/01/2022    Unstable angina ([facility]) 09/27/2022    Chest pain 09/27/2022    Blood loss anemia 05/27/2025    Type 2 diabetes mellitus with circulatory disorder ([facility]) 05/27/2025    ANIRUDH (acute kidney injury) 05/27/2025    Anemia requiring transfusions 05/27/2025    Open wound of right foot 05/27/2025    CKD (chronic kidney disease) stage 3, GFR 30-59 ml/min ([facility]) 04/30/2025    Class 1 obesity due to excess calories with serious comorbidity in adult 04/30/2025    Opioid dependence on maintenance agonist therapy, no symptoms ([facility]) 04/30/2025    Fluid 
(Yellow sticker Level III) placed on patient chart           MALNUTRITION RISK SCREENING ASSESSMENT    Instructions:  Assess the patient and enter the appropriate indicators that are present for nutrition risk identification. Total the numbers entered and assign a risk score. Follow the appropriate action for total score listed below.     Assessment   Date  6/10/2025     Have you lost weight without trying?      0- No     Have you been eating poorly because of a decreased appetite?         0- No   3. Do you have a diagnosis of head and neck cancer OR will you be receiving neoadjuvant treatment for breast cancer?      0- No                                                                                    TOTAL 0        Score of 0-1: No action  Score 2 or greater:  For Non-Diabetic Patient: Recommend adding Ensure Enlive 2 x daily and provide patient with Ensure wellness bag with coupons  For Diabetic Patient: Recommend adding Glucerna Shake 2 x daily and provide patient with Glucerna Wellness bag with coupons  Route to the dietitian via Timoteo Bland RN

## 2025-06-11 ENCOUNTER — TRANSCRIBE ORDERS (OUTPATIENT)
Dept: ADMINISTRATIVE | Age: 55
End: 2025-06-11

## 2025-06-11 DIAGNOSIS — R25.1 TREMOR, UNSPECIFIED: Primary | ICD-10-CM

## 2025-06-11 LAB
ALBUMIN SERPL-MCNC: 3.3 G/DL (ref 3.5–4.7)
ALPHA1 GLOB SERPL ELPH-MCNC: 0.2 G/DL (ref 0.2–0.4)
ALPHA2 GLOB SERPL ELPH-MCNC: 0.8 G/DL (ref 0.5–1)
ANA SER QL IA: NEGATIVE
B-GLOBULIN SERPL ELPH-MCNC: 1.2 G/DL (ref 0.8–1.3)
CRP SERPL HS-MCNC: 8.9 MG/L (ref 0–5)
GAMMA GLOB SERPL ELPH-MCNC: 1.9 G/DL (ref 0.7–1.6)
PATH REV BLD -IMP: NORMAL
PATHOLOGIST: ABNORMAL
PROT PATTERN SERPL ELPH-IMP: ABNORMAL
PROT SERPL-MCNC: 7.4 G/DL (ref 6.4–8.3)

## 2025-06-13 ENCOUNTER — HOSPITAL ENCOUNTER (OUTPATIENT)
Dept: GENERAL RADIOLOGY | Age: 55
Discharge: HOME | End: 2025-06-15
Payer: MEDICARE

## 2025-06-13 ENCOUNTER — TELEPHONE (OUTPATIENT)
Dept: OTHER | Age: 55
End: 2025-06-13

## 2025-06-13 ENCOUNTER — HOSPITAL ENCOUNTER (OUTPATIENT)
Age: 55
Discharge: HOME | End: 2025-06-15
Payer: MEDICARE

## 2025-06-13 DIAGNOSIS — M25.512 CHRONIC LEFT SHOULDER PAIN: ICD-10-CM

## 2025-06-13 DIAGNOSIS — G89.29 CHRONIC LEFT SHOULDER PAIN: ICD-10-CM

## 2025-06-13 PROCEDURE — 73030 X-RAY EXAM OF SHOULDER: CPT

## 2025-06-13 NOTE — TELEPHONE ENCOUNTER
Patient LVM that she weighed 189lbs this morning and gained 4 lbs 4 hours later. Reports leaking fluid behind scabs on legs. Scabs are from when patient previously had swelling and leaking fluid in legs.  Patient weighed self in same clothes 4 hours apart. Pt. Did have pancakes between weights. Denies sob.   Please advise.

## 2025-06-13 NOTE — TELEPHONE ENCOUNTER
3:09 PM 6/13/2025     Called patient back about her weight gain of 4lbs in 4 hrs. Instructed to weight daily only.   + Edema in the BLE however has improved since this morning. Pt states she has been elevated her legs in the chair.  BLE are not seeping.  Currently taking Lasix 40 mg BID. Reiterated instructions from Dr. Sargent note \" CHF: Daily weight, take an extra Lasix for weight gain of more than 2-3 pounds in 24 hours, compliance with diuretics, low-salt diet were all advised. \" Advised her to monitor her s/s symptoms and weights and call the CHF clinic Monday if daily weight trending up.         Future Appointments   Date Time Provider Department Center   6/24/2025 10:45 AM Srinath Tatum MD Blood Cancer [facility]   6/27/2025  9:45 AM [facility] CHF ROOM 3 [facility]   7/23/2025  8:45 AM [facility]   8/14/2025  9:00 AM SCHEDULE, [facility]

## 2025-06-16 LAB
SEND OUT REPORT: NORMAL
TEST NAME: NORMAL

## 2025-06-18 ENCOUNTER — TELEPHONE (OUTPATIENT)
Dept: CARDIOLOGY CLINIC | Age: 55
End: 2025-06-18

## 2025-06-24 ENCOUNTER — OFFICE VISIT (OUTPATIENT)
Dept: ONCOLOGY | Age: 55
End: 2025-06-24

## 2025-06-24 VITALS
DIASTOLIC BLOOD PRESSURE: 65 MMHG | HEART RATE: 77 BPM | OXYGEN SATURATION: 95 % | SYSTOLIC BLOOD PRESSURE: 99 MMHG | BODY MASS INDEX: 27.4 KG/M2 | HEIGHT: 68 IN | WEIGHT: 180.8 LBS | TEMPERATURE: 97.8 F

## 2025-06-24 DIAGNOSIS — D62 ACUTE BLOOD LOSS ANEMIA: Primary | ICD-10-CM

## 2025-06-24 RX ORDER — DIPHENHYDRAMINE HYDROCHLORIDE 50 MG/ML
50 INJECTION, SOLUTION INTRAMUSCULAR; INTRAVENOUS
OUTPATIENT
Start: 2025-06-24

## 2025-06-24 RX ORDER — ONDANSETRON 2 MG/ML
8 INJECTION INTRAMUSCULAR; INTRAVENOUS
OUTPATIENT
Start: 2025-06-24

## 2025-06-24 RX ORDER — EPINEPHRINE 1 MG/ML
0.3 INJECTION, SOLUTION, CONCENTRATE INTRAVENOUS PRN
OUTPATIENT
Start: 2025-06-24

## 2025-06-24 RX ORDER — HEPARIN 100 UNIT/ML
500 SYRINGE INTRAVENOUS PRN
OUTPATIENT
Start: 2025-06-24

## 2025-06-24 RX ORDER — SODIUM CHLORIDE 9 MG/ML
5-250 INJECTION, SOLUTION INTRAVENOUS PRN
OUTPATIENT
Start: 2025-06-24

## 2025-06-24 RX ORDER — SODIUM CHLORIDE 0.9 % (FLUSH) 0.9 %
5-40 SYRINGE (ML) INJECTION PRN
OUTPATIENT
Start: 2025-06-24

## 2025-06-24 RX ORDER — SODIUM CHLORIDE 9 MG/ML
INJECTION, SOLUTION INTRAVENOUS PRN
OUTPATIENT
Start: 2025-06-24

## 2025-06-24 RX ORDER — ACETAMINOPHEN 325 MG/1
650 TABLET ORAL
OUTPATIENT
Start: 2025-06-24

## 2025-06-24 RX ORDER — ALBUTEROL SULFATE 90 UG/1
4 INHALANT RESPIRATORY (INHALATION) PRN
OUTPATIENT
Start: 2025-06-24

## 2025-06-24 RX ORDER — HYDROCORTISONE SODIUM SUCCINATE 100 MG/2ML
100 INJECTION INTRAMUSCULAR; INTRAVENOUS
OUTPATIENT
Start: 2025-06-24

## 2025-06-24 RX ORDER — FAMOTIDINE 10 MG/ML
20 INJECTION, SOLUTION INTRAVENOUS
OUTPATIENT
Start: 2025-06-24

## 2025-06-24 NOTE — PROGRESS NOTES
Radiology-    XR SHOULDER LEFT (MIN 2 VIEWS)  Result Date: 6/13/2025  EXAMINATION: THREE XRAY VIEWS OF THE LEFT SHOULDER 6/13/2025 10:02 am COMPARISON: None. HISTORY: ORDERING SYSTEM PROVIDED HISTORY: Chronic left shoulder pain FINDINGS: Glenohumeral joint is normally aligned.  No evidence of acute fracture or dislocation.  No abnormal periarticular calcifications.  The AC joint is unremarkable in appearance. Visualized lung is unremarkable.     No acute abnormality.     XR KNEE LEFT (1-2 VIEWS)  Result Date: 6/4/2025  EXAMINATION: XR left knee AP and lateral two views 6/4/2025 9:40 am COMPARISON: None HISTORY: ORDERING SYSTEM PROVIDED HISTORY: Pain in joint of left knee, FINDINGS: No acute fracture, dislocation, lytic process or periosteal reaction is seen in the visualized bones and joints.  No erosive type of arthritis.  No periarticular soft tissue calcification. There is mild osteoarthritis with spurring of the tibial spines but no significant joint space narrowing or joint effusion peer there is a sclerotic focus in the subchondral lateral tibial plateau of about 1.5 cm.     No acute skeletal abnormality is seen. . Early osteoarthritis.  Subchondral area of sclerosis in the lateral tibial plateau is nonspecific.  Possibilities include a chondroid lesion, calcified nonacute bone infarct or other pathology.  MRI of the knee can be considered for further evaluation.     Vascular duplex lower extremity venous left  Result Date: 5/27/2025  EXAMINATION: DUPLEX VENOUS ULTRASOUND OF THE LEFT LOWER EXTREMITY 5/27/2025 8:02 pm TECHNIQUE: Duplex ultrasound using B-mode/gray scaled imaging and Doppler spectral analysis and color flow was obtained of the deep venous structures of the left extremity. COMPARISON: None. HISTORY: ORDERING SYSTEM PROVIDED HISTORY: Pain left calf TECHNOLOGIST PROVIDED HISTORY: What reading provider will be dictating this exam?->CRC FINDINGS: The visualized veins of the left lower

## 2025-06-25 NOTE — PROGRESS NOTES
Physician Progress Note      PATIENT:               JEANNETTE LARA  CSN #:                  [id number]  :                       1970  ADMIT DATE:       2025 10:22 AM  DISCH DATE:        2025 3:45 PM  RESPONDING  PROVIDER #:        Tara Shoemaker DO          QUERY TEXT:    ANIRUDH (acute kidney injury) is documented on the active hospital problem list   H&P by Tara Lawrence,  2025. Labs noted as Cr 1.4, GFR 70, 68,   BUN 32, 24. Please clarify the status such as:    The clinical indicators include:  -Age 54 yrs, female, HTN, CKD3, DM2, Hypokalemia.    -\"Pt presented for the evaluation of low hemoglobin, found to have Acute blood   loss anemia, Rectal bleeding, colitis.\" (ED by Luis Miguel Minor, DO ).  -\"ANIRUDH (acute kidney injury), mentioned in active problem list.\" (H&P by Tara Lawrence,  2025).  -\"ANIRUDH (acute kidney injury), Severe hypokalemia, she is having difficulty with   IV potassium due to burning.\" (PN by Cesar Catalan MD ).  -\"ANIRUDH (acute kidney injury) mentioned in active problem list.\" (DS by Inez Ho MD ).    -Cr 1.4, 1.2,1.1,1.0,1.0 (from labs -)    -GFR 46,52,64,68,70 (from labs -)    -BUN 32,24,16,14,12 (from labs -)    -Treated with IV Hydration, Serial labs.    Cesar Steel.DEE Gomez.CDS.  Options provided:  -- ANIRUDH (acute kidney injury) is currently being treated/evaluated as evidenced   by, Please document clinical support.  -- ANIRUDH (acute kidney injury) has been ruled out after study  -- Other - I will add my own diagnosis  -- Disagree - Not applicable / Not valid  -- Disagree - Clinically unable to determine / Unknown  -- Refer to Clinical Documentation Reviewer    PROVIDER RESPONSE TEXT:    ANIRUDH (acute kidney injury) is currently being treated/evaluated as evidenced by   increased cr from baseline    Query created by: Cesar Gomez on 6/3/2025 1:50 AM      Electronically signed by:  Tara ROSE

## 2025-06-27 ENCOUNTER — TELEPHONE (OUTPATIENT)
Dept: OTHER | Age: 55
End: 2025-06-27

## 2025-06-27 ENCOUNTER — TELEPHONE (OUTPATIENT)
Dept: INFUSION THERAPY | Age: 55
End: 2025-06-27

## 2025-06-27 NOTE — TELEPHONE ENCOUNTER
Attempt #1 to contact pt and schedule iron infusions. No answer at this time. Left message with request for pt to please call to schedule.

## 2025-06-27 NOTE — TELEPHONE ENCOUNTER
10:54 AM 6/27/2025 Pt was a no call no show at today's scheduled CHF clinic visit.  I have attempted to contact this patient by phone with the following results:     -No answer and unable to left a VM at this time.         Future Appointments   Date Time Provider Department Center   7/16/2025  9:30 PM [facility] SLEEP LAB ROOM 1 [facility] SLEEP [place]   7/23/2025  8:45 AM Daniela Sargent MD [facility]   8/4/2025  7:00 AM [facility] LABS ROOM MEDICAL ONCOLOGY [facility] MED ONC [place]   8/5/2025 10:15 AM Srinath Tatum MD Blood Cancer [facility]   8/14/2025  9:00 AM SCHEDULE, [facility]

## 2025-07-09 ENCOUNTER — HOSPITAL ENCOUNTER (OUTPATIENT)
Dept: SLEEP CENTER | Age: 55
Discharge: HOME | End: 2025-07-09
Payer: MEDICARE

## 2025-07-09 DIAGNOSIS — G47.33 MODERATE OBSTRUCTIVE SLEEP APNEA: ICD-10-CM

## 2025-07-09 PROCEDURE — 2700000000 HC OXYGEN THERAPY PER DAY

## 2025-07-09 PROCEDURE — 95811 POLYSOM 6/>YRS CPAP 4/> PARM: CPT

## 2025-07-09 NOTE — TELEPHONE ENCOUNTER
Attempted to reminded patient of scheduled procedure on 9/26.   Instructions given  to machine Statement Selected

## 2025-08-18 ENCOUNTER — TELEPHONE (OUTPATIENT)
Dept: OTHER | Age: 55
End: 2025-08-18

## 2025-08-21 ENCOUNTER — HOSPITAL ENCOUNTER (OUTPATIENT)
Dept: OTHER | Age: 55
Setting detail: THERAPIES SERIES
Discharge: HOME | End: 2025-08-21
Payer: MEDICARE

## 2025-08-21 VITALS
OXYGEN SATURATION: 98 % | RESPIRATION RATE: 17 BRPM | SYSTOLIC BLOOD PRESSURE: 149 MMHG | HEART RATE: 65 BPM | WEIGHT: 192 LBS | BODY MASS INDEX: 29.19 KG/M2 | DIASTOLIC BLOOD PRESSURE: 76 MMHG

## 2025-08-21 LAB
ANION GAP SERPL CALCULATED.3IONS-SCNC: 11 MMOL/L (ref 7–16)
BNP SERPL-MCNC: 3136 PG/ML (ref 0–125)
BUN SERPL-MCNC: 14 MG/DL (ref 6–20)
CALCIUM SERPL-MCNC: 9 MG/DL (ref 8.6–10)
CHLORIDE SERPL-SCNC: 96 MMOL/L (ref 98–107)
CO2 SERPL-SCNC: 26 MMOL/L (ref 22–29)
CREAT SERPL-MCNC: 1 MG/DL (ref 0.5–1)
GFR, ESTIMATED: 68 ML/MIN/1.73M2
GLUCOSE SERPL-MCNC: 151 MG/DL (ref 74–99)
POTASSIUM SERPL-SCNC: 3.9 MMOL/L (ref 3.5–5.1)
SODIUM SERPL-SCNC: 134 MMOL/L (ref 136–145)

## 2025-08-21 PROCEDURE — 36415 COLL VENOUS BLD VENIPUNCTURE: CPT

## 2025-08-21 PROCEDURE — 83880 ASSAY OF NATRIURETIC PEPTIDE: CPT

## 2025-08-21 PROCEDURE — 99214 OFFICE O/P EST MOD 30 MIN: CPT

## 2025-08-21 PROCEDURE — 80048 BASIC METABOLIC PNL TOTAL CA: CPT

## 2025-08-21 RX ORDER — FUROSEMIDE 40 MG/1
40 TABLET ORAL 2 TIMES DAILY
COMMUNITY

## 2025-09-05 ENCOUNTER — TELEPHONE (OUTPATIENT)
Dept: OTHER | Age: 55
End: 2025-09-05

## 2025-09-05 ENCOUNTER — HOSPITAL ENCOUNTER (OUTPATIENT)
Dept: OTHER | Age: 55
Setting detail: THERAPIES SERIES
Discharge: HOME | End: 2025-09-05
Payer: MEDICARE

## 2025-09-05 ENCOUNTER — TELEPHONE (OUTPATIENT)
Dept: CARDIOLOGY CLINIC | Age: 55
End: 2025-09-05

## 2025-09-05 ENCOUNTER — TELEPHONE (OUTPATIENT)
Age: 55
End: 2025-09-05

## 2025-09-05 VITALS
HEART RATE: 78 BPM | OXYGEN SATURATION: 100 % | SYSTOLIC BLOOD PRESSURE: 125 MMHG | RESPIRATION RATE: 18 BRPM | BODY MASS INDEX: 28.29 KG/M2 | WEIGHT: 186.03 LBS | DIASTOLIC BLOOD PRESSURE: 78 MMHG

## 2025-09-05 LAB
ANION GAP SERPL CALCULATED.3IONS-SCNC: 11 MMOL/L (ref 7–16)
BNP SERPL-MCNC: 3279 PG/ML (ref 0–125)
BUN SERPL-MCNC: 22 MG/DL (ref 6–20)
CALCIUM SERPL-MCNC: 9.2 MG/DL (ref 8.6–10)
CHLORIDE SERPL-SCNC: 92 MMOL/L (ref 98–107)
CO2 SERPL-SCNC: 26 MMOL/L (ref 22–29)
CREAT SERPL-MCNC: 1.1 MG/DL (ref 0.5–1)
GFR, ESTIMATED: 58 ML/MIN/1.73M2
GLUCOSE SERPL-MCNC: 192 MG/DL (ref 74–99)
POTASSIUM SERPL-SCNC: 3.9 MMOL/L (ref 3.5–5.1)
SODIUM SERPL-SCNC: 130 MMOL/L (ref 136–145)

## 2025-09-05 PROCEDURE — 80048 BASIC METABOLIC PNL TOTAL CA: CPT

## 2025-09-05 PROCEDURE — 36415 COLL VENOUS BLD VENIPUNCTURE: CPT

## 2025-09-05 PROCEDURE — 99214 OFFICE O/P EST MOD 30 MIN: CPT

## 2025-09-05 PROCEDURE — 83880 ASSAY OF NATRIURETIC PEPTIDE: CPT

## (undated) DEVICE — BLOWER COR ART L16.5CM PLAS SHFT MAL W/ MIST IV SET AXIUS

## (undated) DEVICE — PERCLOSE™ PROSTYLE™ SUTURE-MEDIATED CLOSURE AND REPAIR SYSTEM: Brand: PERCLOSE™ PROSTYLE™

## (undated) DEVICE — Device: Brand: VIRTUOSAPH PLUS WITH RADIAL INDICATION

## (undated) DEVICE — EZ GLIDE AORTIC CANNULA: Brand: EDWARDS LIFESCIENCES EZ GLIDE AORTIC CANNULA

## (undated) DEVICE — GLOVE SURG SZ 6.5 L11.2IN FNGR THK9.8MIL STRW LTX POLYMER

## (undated) DEVICE — GUIDEWIRE VASCULAR L145CM 0.035IN J TIP L3MM PTFE FIX COR NAMIC

## (undated) DEVICE — SET ACB VEIN

## (undated) DEVICE — SHEATH INTRO 6FR L11CM 0.038IN SIL TRICSP VLV W/ GWIRE

## (undated) DEVICE — BASIC SINGLE BASIN 1-LF: Brand: MEDLINE INDUSTRIES, INC.

## (undated) DEVICE — CLIP INT SM TI EZ LD LIG SYS WECK HORZ

## (undated) DEVICE — AIR/WATER CLEANING ADAPTER FOR OLYMPUS® GI ENDOSCOPE: Brand: BULLDOG®

## (undated) DEVICE — SET SURG BASIN OPEN HEART NO  1 REUSABLE

## (undated) DEVICE — GAUZE,SPONGE,4"X4",8PLY,STRL,LF,10/TRAY: Brand: MEDLINE

## (undated) DEVICE — MPS® PRESSURE LINE W/TRANSDUCER: Brand: MPS

## (undated) DEVICE — JELLY,LUBE,STERILE,FLIP TOP,TUBE,4-OZ: Brand: MEDLINE

## (undated) DEVICE — BANDAGE,ELASTIC,ESMARK,STERILE,4"X9',LF: Brand: MEDLINE

## (undated) DEVICE — APPLICATOR MEDICATED 26 CC SOLUTION HI LT ORNG CHLORAPREP

## (undated) DEVICE — CONTAINER SPEC COLL 960ML POLYPR TRIANG GRAD INTAKE/OUTPUT

## (undated) DEVICE — DRAIN CHN 19FR L0.25MM DIA6.3MM SIL RND HUBLESS FULL FLUT

## (undated) DEVICE — TAPE ADH W2INXL10YD WHT PAPR GENTLE BRTH FLX COMFORTABLE

## (undated) DEVICE — LABEL MED CARD SURG 4 IN PANEL STRL

## (undated) DEVICE — TAPE ADH W2INXL10YD PLAS TRNSPAR H2O RESIST HYPOALRG CURAD

## (undated) DEVICE — NEEDLE ANGIO 1 WALL STYL 18 GAX70 CM THN ADV

## (undated) DEVICE — RETROGRADE CARDIOPLEGIA CATHETER: Brand: EDWARDS LIFESCIENCES RETROGRADE CARDIOPLEGIA CATHETER

## (undated) DEVICE — Device: Brand: CLEANCUT ROTATING AORTIC PUNCH

## (undated) DEVICE — DISCONTINUED USE 320496 BATTERY 50-800-01 OR 50-800-03 SNGL USE

## (undated) DEVICE — AGENT HEMSTAT W4XL8IN OXIDIZED REGENERATED CELOS ABSRB

## (undated) DEVICE — CLOTH SURG PREP PREOPERATIVE CHLORHEXIDINE GLUC 2% READYPREP

## (undated) DEVICE — BLADE OPHTH 180DEG CUT SURF BLU STR SHRP DBL BVL GRINDLESS

## (undated) DEVICE — YANKAUER,BULB TIP,W/O VENT,RIGID,STERILE: Brand: MEDLINE

## (undated) DEVICE — Device: Brand: DEFENDO VALVE AND CONNECTOR KIT

## (undated) DEVICE — Z INACTIVE USE 2662641 SOLUTION IV 1000ML 140MEQ/L SOD 5MEQ/L K 3MEQ/L MG 27MEQ/L

## (undated) DEVICE — SPONGE GZ 4IN 4IN 4 PLY N WVN AVANT

## (undated) DEVICE — GOWN,SIRUS,FABRNF,L,20/CS: Brand: MEDLINE

## (undated) DEVICE — TOWEL,OR,DSP,ST,WHITE,DLX,4/PK,20PK/CS: Brand: MEDLINE

## (undated) DEVICE — VASOVIEW 2 HEMOPRO MIN ORD 5 EA

## (undated) DEVICE — BLOCK BITE 60FR CAREGUARD

## (undated) DEVICE — 3M™ TEGADERM™ CHG DRESSING 25/CARTON 4 CARTONS/CASE 1657: Brand: TEGADERM™

## (undated) DEVICE — BNDG,ELSTC,MATRIX,STRL,3"X5YD,LF,HOOK&LP: Brand: MEDLINE

## (undated) DEVICE — TTL1LYR 16FR10ML 100%SIL TMPST TR: Brand: MEDLINE

## (undated) DEVICE — CATHETER DIAG 6FR L100CM LUMN ID0.056IN JL4 CRV 0 SIDE H

## (undated) DEVICE — RETRACTOR RULTRACT INTERN MAMMARY ARTERY

## (undated) DEVICE — SYRINGE MED 20ML STD CLR PLAS LUERSLIP TIP N CTRL DISP

## (undated) DEVICE — GOWN,SIRUS,FABRNF,XL,20/CS: Brand: MEDLINE

## (undated) DEVICE — ADAPTER CLEANING PORPOISE CLEANING

## (undated) DEVICE — CLIP INT M L GRN TI TRNSVRS GRV CHEVRON SHP W/ PRECIS TIP

## (undated) DEVICE — PERFUSION PACK CUST OPN HRT

## (undated) DEVICE — CANNULA NSL CANN NSL L25FT TBNG AD O2 SUP SFT UC

## (undated) DEVICE — PICO 7 10CM X 30CM: Brand: PICO™ 7

## (undated) DEVICE — SET CARDIAC II

## (undated) DEVICE — GLOVE SURG SZ 65 THK91MIL LTX FREE SYN POLYISOPRENE

## (undated) DEVICE — TUBING, SUCTION, 3/16" X 12', STRAIGHT: Brand: MEDLINE

## (undated) DEVICE — RETRACTOR SURG INSRT SUT HLD OCTOBASE

## (undated) DEVICE — COUNTER NDL 30 COUNT DBL MAG

## (undated) DEVICE — TIP APPL GEL PLT ENDO 5MMX32CM

## (undated) DEVICE — SET CARDIAC I

## (undated) DEVICE — Device

## (undated) DEVICE — GLOVE ORANGE PI 7 1/2   MSG9075

## (undated) DEVICE — TOWEL,OR,DSP,ST,BLUE,STD,6/PK,12PK/CS: Brand: MEDLINE

## (undated) DEVICE — MPS® DELIVERY SET W/ARREST AGENT AND ADDITIVE CASSETTES, HEAT EXCHANGER & 10 FT. DELIVERY TUBING: Brand: MPS

## (undated) DEVICE — COVER US PRB W5XL96IN LTX W/ GEL

## (undated) DEVICE — CONNECTOR DRNGE W3/8-0.5XH3/16XL3/16IN 2:1 SIL CARD STR

## (undated) DEVICE — KENDALL 450 SERIES MONITORING FOAM ELECTRODE - RECTANGULAR SHAPE ( 3/PK): Brand: KENDALL

## (undated) DEVICE — BLADE CLIPPER GEN PURP NS

## (undated) DEVICE — PATIENT RETURN ELECTRODE, SINGLE-USE, CONTACT QUALITY MONITORING, ADULT, WITH 9FT CORD, FOR PATIENTS WEIGING OVER 33LBS. (15KG): Brand: MEGADYNE

## (undated) DEVICE — SURGICAL PROCEDURE PACK CRD SEHC

## (undated) DEVICE — 6 FOOT DISPOSABLE EXTENSION CABLE WITH SAFE CONNECT / SCREW-DOWN

## (undated) DEVICE — TUBING, SUCTION, 1/4" X 10', STRAIGHT: Brand: MEDLINE

## (undated) DEVICE — GLOVE SURG SZ 6 THK91MIL LTX FREE SYN POLYISOPRENE ANTI

## (undated) DEVICE — SHEET,DRAPE,40X58,STERILE: Brand: MEDLINE

## (undated) DEVICE — INSUFFLATION TUBING SET WITH FILTER, FUNNEL CONNECTOR AND LUER LOCK: Brand: JOSNOE MEDICAL INC

## (undated) DEVICE — BAG SPECIMEN BIOHAZARD 6IN X 9IN

## (undated) DEVICE — GLOVE ORANGE PI 7   MSG9070

## (undated) DEVICE — CATHETER 6FR JR4 MEDTRONIC 100CM

## (undated) DEVICE — DRAPE,REIN 53X77,STERILE: Brand: MEDLINE

## (undated) DEVICE — SOLUTION IV IRRIG WATER 1000ML POUR BRL 2F7114

## (undated) DEVICE — DRAIN SURG SGL COLL PT TB FOR ATS BG OASIS

## (undated) DEVICE — SYRINGE MED 50ML LUERLOCK TIP

## (undated) DEVICE — CONNECTOR PERF W64XH64XL64MM W  LUERLOCK

## (undated) DEVICE — E-Z CLEAN, NON-STICK, PTFE COATED, ELECTROSURGICAL BLADE ELECTRODE, MODIFIED EXTENDED INSULATION, 6.5 INCH (16.5 CM): Brand: MEGADYNE

## (undated) DEVICE — PADDLE INTERN DEFIB CHILD

## (undated) DEVICE — 1.5L THIN WALL CAN: Brand: CRD

## (undated) DEVICE — DRESSING FOAM W22XL25CM FILVE LAYR FOAM DP DEF SAFETAC

## (undated) DEVICE — LANCET AORTOTOMY 3.3X10MM

## (undated) DEVICE — SUPPLEMENT DIGESTIVE H2O SOL GI-EASE

## (undated) DEVICE — KIT PLT RATIO DISPNS KT 2IN CANN TIP SPRY TIP DISP MAGELLAN

## (undated) DEVICE — SOLUTION IV 50ML 0.9% SOD CHL PLAS CONT USP VIAFLX

## (undated) DEVICE — PACK OPEN HRT DRP

## (undated) DEVICE — CAMERA CARDIAC STRYKER 1488 HD

## (undated) DEVICE — Z DUP USE 2257490 ADHESIVE SKIN CLSRE 036ML TPCL 2CTL CNCRLTE HIGH VSCSTY DRMB

## (undated) DEVICE — CARDIAC STRYKER STERNAL SAW

## (undated) DEVICE — Z DISCONTINUED USE 2624852 GLOVE SURG 7 PF TEXT NEOPRNE BRN STRL NEOLON 2G LF

## (undated) DEVICE — 4-PORT MANIFOLD: Brand: NEPTUNE 2

## (undated) DEVICE — DRAIN SURG L3/8-1/2IN DIA3/16IN SIL CARD CONN 1:1 BLAK

## (undated) DEVICE — CANNULA INJ L2.5IN BLNT TIP 3MM CLR BODY W/ 1 W VLV DLP

## (undated) DEVICE — MASK CAPNOGRAPHY AD W35IN DIA58IN SAMP LN L10FT O2 LN

## (undated) DEVICE — KIT BEDSIDE REVITAL OX 500ML

## (undated) DEVICE — CATHETER,URETHRAL,REDRUBBER,STRL,18FR: Brand: MEDLINE

## (undated) DEVICE — ALCOHOL 70% RUBBING 16OZ

## (undated) DEVICE — BLOOD TRANSFUSION FILTER: Brand: HAEMONETICS

## (undated) DEVICE — PACK SURG CARDIAC CATH

## (undated) DEVICE — AVID DUAL STAGE VENOUS DRAINAGE CANNULA: Brand: AVID DUAL STAGE VENOUS DRAINAGE CANNULA

## (undated) DEVICE — CANNULA PERF 7FR L5.5IN AORT ROOT RADPQ STD TIP W/ VENT LN

## (undated) DEVICE — BANDAGE,GAUZE,4.5"X4.1YD,STERILE,LF: Brand: MEDLINE

## (undated) DEVICE — FORCEPS BX L240CM JAW DIA2.8MM L CAP W/ NDL MIC MESH TOOTH

## (undated) DEVICE — SURGICAL BRA: Brand: DEROYAL

## (undated) DEVICE — Z INACTIVE USE 2641837 CLIP LIG M BLU TI HRT SHP WIRE HORZ 600 PER BX

## (undated) DEVICE — PAD, DEFIB, ADULT, RADIOTRAN, PHYSIO, LO: Brand: MEDLINE

## (undated) DEVICE — ZIMMER® STERILE DISPOSABLE TOURNIQUET CUFF WITH PROTECTIVE SLEEVE AND PLC, DUAL PORT, SINGLE BLADDER, 18 IN. (46 CM)